# Patient Record
Sex: MALE | Race: WHITE | Employment: OTHER | URBAN - METROPOLITAN AREA
[De-identification: names, ages, dates, MRNs, and addresses within clinical notes are randomized per-mention and may not be internally consistent; named-entity substitution may affect disease eponyms.]

---

## 2017-02-01 RX ORDER — LOSARTAN POTASSIUM 100 MG/1
TABLET ORAL
Qty: 90 TABLET | Refills: 0 | Status: SHIPPED | OUTPATIENT
Start: 2017-02-01 | End: 2017-06-22 | Stop reason: SDUPTHER

## 2017-02-01 RX ORDER — DILTIAZEM HYDROCHLORIDE 240 MG/1
CAPSULE, EXTENDED RELEASE ORAL
Qty: 90 CAPSULE | Refills: 0 | Status: SHIPPED | OUTPATIENT
Start: 2017-02-01 | End: 2017-04-30 | Stop reason: SDUPTHER

## 2017-02-01 RX ORDER — LEVOTHYROXINE SODIUM 0.2 MG/1
TABLET ORAL
Qty: 90 TABLET | Refills: 0 | Status: SHIPPED | OUTPATIENT
Start: 2017-02-01 | End: 2017-04-30 | Stop reason: SDUPTHER

## 2017-02-26 LAB
CHOLESTEROL, TOTAL: 168 MG/DL
CHOLESTEROL/HDL RATIO: NORMAL
HDLC SERPL-MCNC: 63 MG/DL (ref 35–70)
LDL CHOLESTEROL CALCULATED: 87 MG/DL (ref 0–160)
PROSTATE SPECIFIC ANTIGEN: 0.9 NG/ML
TRIGL SERPL-MCNC: 92 MG/DL
VLDLC SERPL CALC-MCNC: NORMAL MG/DL

## 2017-03-01 ENCOUNTER — OFFICE VISIT (OUTPATIENT)
Dept: FAMILY MEDICINE CLINIC | Age: 62
End: 2017-03-01

## 2017-03-01 VITALS
HEART RATE: 88 BPM | BODY MASS INDEX: 31.46 KG/M2 | DIASTOLIC BLOOD PRESSURE: 88 MMHG | WEIGHT: 208.4 LBS | SYSTOLIC BLOOD PRESSURE: 140 MMHG | TEMPERATURE: 97.8 F | RESPIRATION RATE: 20 BRPM

## 2017-03-01 DIAGNOSIS — I10 ESSENTIAL HYPERTENSION: Primary | Chronic | ICD-10-CM

## 2017-03-01 DIAGNOSIS — Z11.59 NEED FOR HEPATITIS C SCREENING TEST: ICD-10-CM

## 2017-03-01 DIAGNOSIS — E03.9 HYPOTHYROIDISM, UNSPECIFIED TYPE: Chronic | ICD-10-CM

## 2017-03-01 PROCEDURE — 99213 OFFICE O/P EST LOW 20 MIN: CPT | Performed by: FAMILY MEDICINE

## 2017-03-02 ASSESSMENT — ENCOUNTER SYMPTOMS
SORE THROAT: 0
ABDOMINAL PAIN: 0
ABDOMINAL DISTENTION: 0
RHINORRHEA: 0
CONSTIPATION: 0
COUGH: 0
DIARRHEA: 0
SHORTNESS OF BREATH: 0
NAUSEA: 0
EYE PAIN: 0
SINUS PRESSURE: 0

## 2017-05-01 RX ORDER — DILTIAZEM HYDROCHLORIDE 240 MG/1
CAPSULE, EXTENDED RELEASE ORAL
Qty: 90 CAPSULE | Refills: 2 | Status: SHIPPED | OUTPATIENT
Start: 2017-05-01 | End: 2018-01-27 | Stop reason: SDUPTHER

## 2017-05-01 RX ORDER — LEVOTHYROXINE SODIUM 0.2 MG/1
TABLET ORAL
Qty: 90 TABLET | Refills: 2 | Status: SHIPPED | OUTPATIENT
Start: 2017-05-01 | End: 2018-01-27 | Stop reason: SDUPTHER

## 2017-06-22 RX ORDER — LOSARTAN POTASSIUM 100 MG/1
TABLET ORAL
Qty: 90 TABLET | Refills: 2 | Status: SHIPPED | OUTPATIENT
Start: 2017-06-22 | End: 2018-03-19 | Stop reason: SDUPTHER

## 2018-01-29 RX ORDER — LEVOTHYROXINE SODIUM 0.2 MG/1
TABLET ORAL
Qty: 90 TABLET | Refills: 0 | Status: SHIPPED | OUTPATIENT
Start: 2018-01-29 | End: 2018-03-06 | Stop reason: SDUPTHER

## 2018-01-29 RX ORDER — DILTIAZEM HYDROCHLORIDE 240 MG/1
CAPSULE, EXTENDED RELEASE ORAL
Qty: 90 CAPSULE | Refills: 0 | Status: SHIPPED | OUTPATIENT
Start: 2018-01-29 | End: 2018-03-06 | Stop reason: SDUPTHER

## 2018-03-01 LAB
ANTIBODY: NORMAL
CHOLESTEROL, TOTAL: 175 MG/DL
CHOLESTEROL/HDL RATIO: NORMAL
HDLC SERPL-MCNC: 55 MG/DL (ref 35–70)
LDL CHOLESTEROL CALCULATED: 104 MG/DL (ref 0–160)
TRIGL SERPL-MCNC: 80 MG/DL
VLDLC SERPL CALC-MCNC: NORMAL MG/DL

## 2018-03-06 ENCOUNTER — OFFICE VISIT (OUTPATIENT)
Dept: FAMILY MEDICINE CLINIC | Age: 63
End: 2018-03-06
Payer: COMMERCIAL

## 2018-03-06 VITALS
BODY MASS INDEX: 31.4 KG/M2 | SYSTOLIC BLOOD PRESSURE: 130 MMHG | TEMPERATURE: 98.3 F | RESPIRATION RATE: 20 BRPM | DIASTOLIC BLOOD PRESSURE: 80 MMHG | WEIGHT: 212 LBS | HEIGHT: 69 IN | HEART RATE: 68 BPM

## 2018-03-06 DIAGNOSIS — I10 ESSENTIAL HYPERTENSION: Primary | Chronic | ICD-10-CM

## 2018-03-06 DIAGNOSIS — Z12.5 SCREENING PSA (PROSTATE SPECIFIC ANTIGEN): ICD-10-CM

## 2018-03-06 DIAGNOSIS — E03.9 HYPOTHYROIDISM, UNSPECIFIED TYPE: Chronic | ICD-10-CM

## 2018-03-06 PROCEDURE — 99213 OFFICE O/P EST LOW 20 MIN: CPT | Performed by: FAMILY MEDICINE

## 2018-03-06 RX ORDER — LEVOTHYROXINE SODIUM 0.2 MG/1
TABLET ORAL
Qty: 90 TABLET | Refills: 2 | Status: SHIPPED | OUTPATIENT
Start: 2018-03-06 | End: 2019-01-01 | Stop reason: SDUPTHER

## 2018-03-06 RX ORDER — DILTIAZEM HYDROCHLORIDE 240 MG/1
CAPSULE, EXTENDED RELEASE ORAL
Qty: 90 CAPSULE | Refills: 2 | Status: SHIPPED | OUTPATIENT
Start: 2018-03-06 | End: 2019-01-01 | Stop reason: SDUPTHER

## 2018-03-06 ASSESSMENT — PATIENT HEALTH QUESTIONNAIRE - PHQ9
SUM OF ALL RESPONSES TO PHQ9 QUESTIONS 1 & 2: 0
2. FEELING DOWN, DEPRESSED OR HOPELESS: 0
SUM OF ALL RESPONSES TO PHQ QUESTIONS 1-9: 0
1. LITTLE INTEREST OR PLEASURE IN DOING THINGS: 0

## 2018-03-11 ASSESSMENT — ENCOUNTER SYMPTOMS
CONSTIPATION: 0
ABDOMINAL PAIN: 0
COUGH: 0
ABDOMINAL DISTENTION: 0
SHORTNESS OF BREATH: 0
RHINORRHEA: 0
EYE PAIN: 0
NAUSEA: 0
SINUS PRESSURE: 0
SORE THROAT: 0
DIARRHEA: 0

## 2018-03-19 RX ORDER — LOSARTAN POTASSIUM 100 MG/1
TABLET ORAL
Qty: 90 TABLET | Refills: 3 | Status: SHIPPED | OUTPATIENT
Start: 2018-03-19 | End: 2019-03-17 | Stop reason: SDUPTHER

## 2019-01-02 RX ORDER — DILTIAZEM HYDROCHLORIDE 240 MG/1
CAPSULE, EXTENDED RELEASE ORAL
Qty: 90 CAPSULE | Refills: 2 | Status: SHIPPED | OUTPATIENT
Start: 2019-01-02 | End: 2019-09-30 | Stop reason: SDUPTHER

## 2019-01-02 RX ORDER — LEVOTHYROXINE SODIUM 0.2 MG/1
TABLET ORAL
Qty: 90 TABLET | Refills: 2 | Status: SHIPPED | OUTPATIENT
Start: 2019-01-02 | End: 2019-03-12 | Stop reason: SDUPTHER

## 2019-03-07 LAB
CHOLESTEROL, TOTAL: 168 MG/DL
CHOLESTEROL/HDL RATIO: NORMAL
HDLC SERPL-MCNC: 53 MG/DL (ref 35–70)
LDL CHOLESTEROL CALCULATED: 96 MG/DL (ref 0–160)
PROSTATE SPECIFIC ANTIGEN: 0.9 NG/ML
TRIGL SERPL-MCNC: 97 MG/DL
VLDLC SERPL CALC-MCNC: NORMAL MG/DL

## 2019-03-12 ENCOUNTER — OFFICE VISIT (OUTPATIENT)
Dept: FAMILY MEDICINE CLINIC | Age: 64
End: 2019-03-12
Payer: MEDICARE

## 2019-03-12 VITALS
BODY MASS INDEX: 31.67 KG/M2 | HEART RATE: 86 BPM | DIASTOLIC BLOOD PRESSURE: 70 MMHG | RESPIRATION RATE: 16 BRPM | HEIGHT: 70 IN | SYSTOLIC BLOOD PRESSURE: 138 MMHG | WEIGHT: 221.2 LBS

## 2019-03-12 DIAGNOSIS — I10 ESSENTIAL HYPERTENSION: Chronic | ICD-10-CM

## 2019-03-12 DIAGNOSIS — E03.9 HYPOTHYROIDISM, UNSPECIFIED TYPE: Primary | Chronic | ICD-10-CM

## 2019-03-12 PROCEDURE — 99213 OFFICE O/P EST LOW 20 MIN: CPT | Performed by: FAMILY MEDICINE

## 2019-03-12 RX ORDER — LEVOTHYROXINE SODIUM 0.12 MG/1
TABLET ORAL
Qty: 180 TABLET | Refills: 3 | Status: SHIPPED | OUTPATIENT
Start: 2019-03-12 | End: 2020-02-17

## 2019-03-12 ASSESSMENT — PATIENT HEALTH QUESTIONNAIRE - PHQ9
SUM OF ALL RESPONSES TO PHQ9 QUESTIONS 1 & 2: 0
2. FEELING DOWN, DEPRESSED OR HOPELESS: 0
SUM OF ALL RESPONSES TO PHQ QUESTIONS 1-9: 0
SUM OF ALL RESPONSES TO PHQ QUESTIONS 1-9: 0
1. LITTLE INTEREST OR PLEASURE IN DOING THINGS: 0

## 2019-03-16 ASSESSMENT — ENCOUNTER SYMPTOMS
VOMITING: 0
WHEEZING: 0
SORE THROAT: 0
RHINORRHEA: 0
SHORTNESS OF BREATH: 0
ABDOMINAL PAIN: 0
BACK PAIN: 0
COUGH: 0
NAUSEA: 0
DIARRHEA: 0
CONSTIPATION: 0

## 2019-03-18 RX ORDER — LOSARTAN POTASSIUM 100 MG/1
TABLET ORAL
Qty: 90 TABLET | Refills: 3 | Status: SHIPPED | OUTPATIENT
Start: 2019-03-18 | End: 2020-03-12

## 2019-06-18 ENCOUNTER — TELEPHONE (OUTPATIENT)
Dept: FAMILY MEDICINE CLINIC | Age: 64
End: 2019-06-18

## 2019-09-30 RX ORDER — DILTIAZEM HYDROCHLORIDE 240 MG/1
CAPSULE, EXTENDED RELEASE ORAL
Qty: 90 CAPSULE | Refills: 1 | Status: SHIPPED | OUTPATIENT
Start: 2019-09-30 | End: 2020-03-16 | Stop reason: SDUPTHER

## 2020-02-17 RX ORDER — LEVOTHYROXINE SODIUM 0.12 MG/1
TABLET ORAL
Qty: 180 TABLET | Refills: 4 | Status: SHIPPED | OUTPATIENT
Start: 2020-02-17 | End: 2021-04-12 | Stop reason: SDUPTHER

## 2020-02-18 ENCOUNTER — TELEPHONE (OUTPATIENT)
Dept: FAMILY MEDICINE CLINIC | Age: 65
End: 2020-02-18

## 2020-02-18 NOTE — TELEPHONE ENCOUNTER
DOLV=03-16-20. Nena Sosa is calling to request an order for routine bloodwork so he can go over the results at his upcoming appt. He wants his B12 checked.

## 2020-03-05 LAB
CHOLESTEROL, TOTAL: 160 MG/DL
CHOLESTEROL/HDL RATIO: NORMAL
HDLC SERPL-MCNC: 57 MG/DL (ref 35–70)
LDL CHOLESTEROL CALCULATED: 92 MG/DL (ref 0–160)
TRIGL SERPL-MCNC: 56 MG/DL
VLDLC SERPL CALC-MCNC: 11 MG/DL

## 2020-03-12 RX ORDER — LOSARTAN POTASSIUM 100 MG/1
TABLET ORAL
Qty: 90 TABLET | Refills: 0 | Status: SHIPPED | OUTPATIENT
Start: 2020-03-12 | End: 2020-03-16 | Stop reason: SDUPTHER

## 2020-03-12 NOTE — TELEPHONE ENCOUNTER
Last visit- 3/12/2019  Next visit- 3/16/2020    Requested Prescriptions     Pending Prescriptions Disp Refills    losartan (COZAAR) 100 MG tablet [Pharmacy Med Name: LOSARTAN TABS 100MG] 90 tablet 3     Sig: TAKE 1 TABLET DAILY       CMP completed 03/04/20.

## 2020-03-16 ENCOUNTER — OFFICE VISIT (OUTPATIENT)
Dept: FAMILY MEDICINE CLINIC | Age: 65
End: 2020-03-16
Payer: MEDICARE

## 2020-03-16 VITALS
HEART RATE: 76 BPM | SYSTOLIC BLOOD PRESSURE: 136 MMHG | RESPIRATION RATE: 16 BRPM | BODY MASS INDEX: 31.25 KG/M2 | WEIGHT: 211 LBS | DIASTOLIC BLOOD PRESSURE: 78 MMHG | HEIGHT: 69 IN

## 2020-03-16 PROCEDURE — 99214 OFFICE O/P EST MOD 30 MIN: CPT | Performed by: FAMILY MEDICINE

## 2020-03-16 RX ORDER — DILTIAZEM HYDROCHLORIDE 240 MG/1
CAPSULE, EXTENDED RELEASE ORAL
Qty: 90 CAPSULE | Refills: 3 | Status: SHIPPED | OUTPATIENT
Start: 2020-03-16 | End: 2021-03-11

## 2020-03-16 RX ORDER — LOSARTAN POTASSIUM 100 MG/1
TABLET ORAL
Qty: 90 TABLET | Refills: 3 | Status: SHIPPED | OUTPATIENT
Start: 2020-03-16 | End: 2021-05-13

## 2020-03-16 ASSESSMENT — ENCOUNTER SYMPTOMS
EYE PAIN: 0
DIARRHEA: 0
RHINORRHEA: 0
COUGH: 0
SINUS PRESSURE: 0
ABDOMINAL PAIN: 0
NAUSEA: 0
CONSTIPATION: 0
SHORTNESS OF BREATH: 0
ABDOMINAL DISTENTION: 0
SORE THROAT: 0

## 2020-03-16 NOTE — PROGRESS NOTES
[facility]  [address] [place] [id number]  Dept: [phone]  Dept Fax: [phone]  Loc: [phone]  PROGRESS NOTE      VisitDate: 3/16/2020    Yaw Pollard is a 75 y.o. male who presents today for:     Chief Complaint   Patient presents with    Check-Up     HTN, Hypothyroidism    Discuss Labs    Immunizations     declined pneumovax    Medication Refill         Subjective:  HPI  Follow-up hypertension and hypothyroidism. Blood pressure is stable. Hypothyroid is him stable controlled. Labs reviewed with the patient. Patient and his wife report he is got very active legs at nighttime it does not disrupt his sleep but he states he is fatigued through the day once he gets to the mid afternoon he needs a nap. Review of Systems   Constitutional: Negative for appetite change and fever. HENT: Negative for congestion, ear pain, postnasal drip, rhinorrhea, sinus pressure and sore throat. Eyes: Negative for pain and visual disturbance. Respiratory: Negative for cough and shortness of breath. Cardiovascular: Negative for chest pain. Gastrointestinal: Negative for abdominal distention, abdominal pain, constipation, diarrhea and nausea. Genitourinary: Negative for dysuria, frequency and urgency. Musculoskeletal: Negative for arthralgias. Skin: Negative for rash. Neurological: Negative for dizziness. Past Medical History:   Diagnosis Date    Hypertension     Hypothyroidism       No past surgical history on file. Family History   Problem Relation Age of Onset    High Blood Pressure Mother      Social History     Tobacco Use    Smoking status: Current Every Day Smoker     Types: Cigars    Smokeless tobacco: Never Used    Tobacco comment: -3-4 cigars per day   Substance Use Topics    Alcohol use:  Yes     Alcohol/week: 0.0 standard drinks     Comment: 3-4 beers per day       Current Outpatient Medications oriented to person, place, and time. /78   Pulse 76   Resp 16   Ht 5' 8.75\" (1.746 m)   Wt 211 lb (95.7 kg)   BMI 31.39 kg/m²       Impression/Plan:  1. Essential hypertension    2. Hypothyroidism, unspecified type    3. RLS (restless legs syndrome)    4. Screening PSA (prostate specific antigen)      Requested Prescriptions     Signed Prescriptions Disp Refills    dilTIAZem (DILT-XR) 240 MG extended release capsule 90 capsule 3     Sig: TAKE 1 CAPSULE DAILY    losartan (COZAAR) 100 MG tablet 90 tablet 3     Sig: TAKE 1 TABLET DAILY     Orders Placed This Encounter   Procedures    Comprehensive Metabolic Panel     Standing Status:   Future     Standing Expiration Date:   3/16/2021    Lipid Panel     Standing Status:   Future     Standing Expiration Date:   3/16/2021     Order Specific Question:   Is Patient Fasting?/# of Hours     Answer:   yes/12    Psa screening     Standing Status:   Future     Standing Expiration Date:   3/16/2021    T4, Free     Standing Status:   Future     Standing Expiration Date:   3/16/2021    TSH without Reflex     Standing Status:   Future     Standing Expiration Date:   3/16/2021     25 minutes face-to-face time time spent on counseling regarding current viral illnesses exposure transmission lack of treatment and testing criteria. Discussed restless leg syndrome screen for sleep apnea  Patient giveneducational materials - see patient instructions. Discussed use, benefit, and side effects of prescribed medications. All patient questions answered. Pt voiced understanding. Reviewed health maintenance. Patient agreedwith treatment plan. Follow up as directed. **This report has been created using voice recognition software. It may contain minor errorswhich are inherent in voice recognition technology. **       Electronically signed by Reginaldo Rodney MD on 3/16/2020 at 1:36 PM

## 2020-06-19 ENCOUNTER — TELEPHONE (OUTPATIENT)
Dept: FAMILY MEDICINE CLINIC | Age: 65
End: 2020-06-19

## 2020-06-23 ENCOUNTER — OFFICE VISIT (OUTPATIENT)
Dept: FAMILY MEDICINE CLINIC | Age: 65
End: 2020-06-23
Payer: MEDICARE

## 2020-06-23 ENCOUNTER — HOSPITAL ENCOUNTER (OUTPATIENT)
Dept: GENERAL RADIOLOGY | Age: 65
Discharge: HOME | End: 2020-06-23
Payer: MEDICARE

## 2020-06-23 ENCOUNTER — HOSPITAL ENCOUNTER (OUTPATIENT)
Age: 65
Discharge: HOME | End: 2020-06-23
Payer: MEDICARE

## 2020-06-23 VITALS
BODY MASS INDEX: 28.68 KG/M2 | DIASTOLIC BLOOD PRESSURE: 72 MMHG | WEIGHT: 193.6 LBS | HEART RATE: 84 BPM | HEIGHT: 69 IN | TEMPERATURE: 98.4 F | SYSTOLIC BLOOD PRESSURE: 138 MMHG | RESPIRATION RATE: 16 BRPM

## 2020-06-23 DIAGNOSIS — M25.50 POLYARTHRALGIA: ICD-10-CM

## 2020-06-23 DIAGNOSIS — E03.9 HYPOTHYROIDISM, UNSPECIFIED TYPE: ICD-10-CM

## 2020-06-23 LAB
BASOPHILS # BLD: 0.9 %
BASOPHILS ABSOLUTE: 0.1 THOU/MM3 (ref 0–0.1)
C-REACTIVE PROTEIN: 7.28 MG/DL (ref 0–1)
EOSINOPHIL # BLD: 1.2 %
EOSINOPHILS ABSOLUTE: 0.1 THOU/MM3 (ref 0–0.4)
ERYTHROCYTE [DISTWIDTH] IN BLOOD BY AUTOMATED COUNT: 13.2 % (ref 11.5–14.5)
ERYTHROCYTE [DISTWIDTH] IN BLOOD BY AUTOMATED COUNT: 47.5 FL (ref 35–45)
HCT VFR BLD CALC: 48.5 % (ref 42–52)
HEMOGLOBIN: 15.4 GM/DL (ref 14–18)
IMMATURE GRANS (ABS): 0.05 THOU/MM3 (ref 0–0.07)
IMMATURE GRANULOCYTES: 0.4 %
LYMPHOCYTES # BLD: 18.7 %
LYMPHOCYTES ABSOLUTE: 2.2 THOU/MM3 (ref 1–4.8)
MCH RBC QN AUTO: 31 PG (ref 26–33)
MCHC RBC AUTO-ENTMCNC: 31.8 GM/DL (ref 32.2–35.5)
MCV RBC AUTO: 97.6 FL (ref 80–94)
MONOCYTES # BLD: 8.2 %
MONOCYTES ABSOLUTE: 0.9 THOU/MM3 (ref 0.4–1.3)
NUCLEATED RED BLOOD CELLS: 0 /100 WBC
PLATELET # BLD: 382 THOU/MM3 (ref 130–400)
PMV BLD AUTO: 10.4 FL (ref 9.4–12.4)
RBC # BLD: 4.97 MILL/MM3 (ref 4.7–6.1)
RHEUMATOID FACTOR: 12 IU/ML (ref 0–13)
SEDIMENTATION RATE, ERYTHROCYTE: 60 MM/HR (ref 0–10)
SEG NEUTROPHILS: 70.6 %
SEGMENTED NEUTROPHILS ABSOLUTE COUNT: 8.1 THOU/MM3 (ref 1.8–7.7)
T4 FREE: 2.09 NG/DL (ref 0.93–1.76)
TSH SERPL DL<=0.05 MIU/L-ACNC: 0.48 UIU/ML (ref 0.4–4.2)
WBC # BLD: 11.5 THOU/MM3 (ref 4.8–10.8)

## 2020-06-23 PROCEDURE — 99213 OFFICE O/P EST LOW 20 MIN: CPT | Performed by: FAMILY MEDICINE

## 2020-06-23 PROCEDURE — 73120 X-RAY EXAM OF HAND: CPT

## 2020-06-23 PROCEDURE — G8510 SCR DEP NEG, NO PLAN REQD: HCPCS | Performed by: FAMILY MEDICINE

## 2020-06-23 PROCEDURE — 84439 ASSAY OF FREE THYROXINE: CPT

## 2020-06-23 PROCEDURE — 3288F FALL RISK ASSESSMENT DOCD: CPT | Performed by: FAMILY MEDICINE

## 2020-06-23 PROCEDURE — 86430 RHEUMATOID FACTOR TEST QUAL: CPT

## 2020-06-23 PROCEDURE — 86140 C-REACTIVE PROTEIN: CPT

## 2020-06-23 PROCEDURE — 84443 ASSAY THYROID STIM HORMONE: CPT

## 2020-06-23 PROCEDURE — 86038 ANTINUCLEAR ANTIBODIES: CPT

## 2020-06-23 PROCEDURE — 85651 RBC SED RATE NONAUTOMATED: CPT

## 2020-06-23 PROCEDURE — 36415 COLL VENOUS BLD VENIPUNCTURE: CPT

## 2020-06-23 PROCEDURE — 85025 COMPLETE CBC W/AUTO DIFF WBC: CPT

## 2020-06-23 RX ORDER — MELOXICAM 15 MG/1
15 TABLET ORAL DAILY PRN
Qty: 30 TABLET | Refills: 3 | Status: SHIPPED | OUTPATIENT
Start: 2020-06-23 | End: 2020-09-16 | Stop reason: SDUPTHER

## 2020-06-23 RX ORDER — CELECOXIB 200 MG/1
200 CAPSULE ORAL 2 TIMES DAILY
Qty: 60 CAPSULE | Refills: 3 | Status: SHIPPED | OUTPATIENT
Start: 2020-06-23 | End: 2020-06-24

## 2020-06-23 SDOH — ECONOMIC STABILITY: TRANSPORTATION INSECURITY
IN THE PAST 12 MONTHS, HAS THE LACK OF TRANSPORTATION KEPT YOU FROM MEDICAL APPOINTMENTS OR FROM GETTING MEDICATIONS?: NO

## 2020-06-23 SDOH — ECONOMIC STABILITY: FOOD INSECURITY: WITHIN THE PAST 12 MONTHS, YOU WORRIED THAT YOUR FOOD WOULD RUN OUT BEFORE YOU GOT MONEY TO BUY MORE.: NEVER TRUE

## 2020-06-23 SDOH — ECONOMIC STABILITY: TRANSPORTATION INSECURITY
IN THE PAST 12 MONTHS, HAS LACK OF TRANSPORTATION KEPT YOU FROM MEETINGS, WORK, OR FROM GETTING THINGS NEEDED FOR DAILY LIVING?: NO

## 2020-06-23 SDOH — ECONOMIC STABILITY: INCOME INSECURITY: HOW HARD IS IT FOR YOU TO PAY FOR THE VERY BASICS LIKE FOOD, HOUSING, MEDICAL CARE, AND HEATING?: NOT HARD AT ALL

## 2020-06-23 SDOH — ECONOMIC STABILITY: FOOD INSECURITY: WITHIN THE PAST 12 MONTHS, THE FOOD YOU BOUGHT JUST DIDN'T LAST AND YOU DIDN'T HAVE MONEY TO GET MORE.: NEVER TRUE

## 2020-06-23 ASSESSMENT — ENCOUNTER SYMPTOMS
ABDOMINAL PAIN: 0
SHORTNESS OF BREATH: 0
SINUS PRESSURE: 0
COUGH: 0
CONSTIPATION: 0
ABDOMINAL DISTENTION: 0
SORE THROAT: 0
DIARRHEA: 0
NAUSEA: 0
RHINORRHEA: 0
EYE PAIN: 0

## 2020-06-23 ASSESSMENT — PATIENT HEALTH QUESTIONNAIRE - PHQ9
SUM OF ALL RESPONSES TO PHQ QUESTIONS 1-9: 0
2. FEELING DOWN, DEPRESSED OR HOPELESS: 0
SUM OF ALL RESPONSES TO PHQ QUESTIONS 1-9: 0
SUM OF ALL RESPONSES TO PHQ9 QUESTIONS 1 & 2: 0
1. LITTLE INTEREST OR PLEASURE IN DOING THINGS: 0

## 2020-06-23 NOTE — PROGRESS NOTES
[address] [facility] [address]  Dept: [phone]  Dept Fax: [phone]  Loc: [phone]  PROGRESS NOTE      VisitDate: 6/23/2020    Bhaskar Adair is a 75 y.o. male who presents today for:     Chief Complaint   Patient presents with    Swelling     joint pain and swelling bilateral hands, ankles, aches, causing sleeping issues, ibuprofen did not help, makes him help         Subjective:  HPI  Patient comes in with months long history of increasing pain swelling and stiffness in his hands and feet. When he wakes up in the morning it takes him several hours to get loosened up. His symptoms never completely resolved but have gotten worse recently. No recent blood work. History of hypothyroidism which previously is well controlled. History of hypertension that is stable and well-controlled. Review of Systems   Constitutional: Positive for fatigue. Negative for appetite change and fever. HENT: Negative for congestion, ear pain, postnasal drip, rhinorrhea, sinus pressure and sore throat. Eyes: Negative for pain and visual disturbance. Respiratory: Negative for cough and shortness of breath. Cardiovascular: Negative for chest pain. Gastrointestinal: Negative for abdominal distention, abdominal pain, constipation, diarrhea and nausea. Genitourinary: Negative for dysuria, frequency and urgency. Musculoskeletal: Positive for arthralgias. Skin: Negative for rash. Neurological: Negative for dizziness. Past Medical History:   Diagnosis Date    Hypertension     Hypothyroidism       No past surgical history on file. Family History   Problem Relation Age of Onset    High Blood Pressure Mother      Social History     Tobacco Use    Smoking status: Current Every Day Smoker     Types: Cigars    Smokeless tobacco: Never Used    Tobacco comment: -3-4 cigars per day   Substance Use Topics    Alcohol use:  Yes

## 2020-06-23 NOTE — TELEPHONE ENCOUNTER
Patient called about the medication that was sent today. Celebrex. The Pharmacy called him to tell him the cost was over $250, he was wondering if there was something cheaper that will give the same results. Please advise.

## 2020-06-24 ENCOUNTER — TELEPHONE (OUTPATIENT)
Dept: FAMILY MEDICINE CLINIC | Age: 65
End: 2020-06-24

## 2020-06-26 LAB — ANA SCREEN: NORMAL

## 2020-07-01 ENCOUNTER — TELEPHONE (OUTPATIENT)
Dept: FAMILY MEDICINE CLINIC | Age: 65
End: 2020-07-01

## 2020-07-01 NOTE — TELEPHONE ENCOUNTER
----- Message from CARLOS Jensen CNP sent at 6/30/2020  8:26 AM EDT -----  Dr. Herlinda Iglesias already resulted the other labs except the DIANA screen was not completed. It is now shown to be negative.

## 2020-09-12 LAB
ALBUMIN SERPL-MCNC: 4.4 G/DL
ALP BLD-CCNC: 69 U/L
ALT SERPL-CCNC: 10 U/L
ANION GAP SERPL CALCULATED.3IONS-SCNC: 1.6 MMOL/L
AST SERPL-CCNC: 19 U/L
BILIRUB SERPL-MCNC: 0.5 MG/DL (ref 0.1–1.4)
BUN BLDV-MCNC: 21 MG/DL
CALCIUM SERPL-MCNC: 9.5 MG/DL
CHLORIDE BLD-SCNC: 102 MMOL/L
CHOLESTEROL, TOTAL: 170 MG/DL
CHOLESTEROL/HDL RATIO: NORMAL
CO2: 23 MMOL/L
CREAT SERPL-MCNC: 1.39 MG/DL
GFR CALCULATED: 52
GLUCOSE BLD-MCNC: 102 MG/DL
HDLC SERPL-MCNC: 70 MG/DL (ref 35–70)
LDL CHOLESTEROL CALCULATED: 85 MG/DL (ref 0–160)
NONHDLC SERPL-MCNC: NORMAL MG/DL
POTASSIUM SERPL-SCNC: 4.7 MMOL/L
PSA, ULTRASENSITIVE: 0.9
SODIUM BLD-SCNC: 139 MMOL/L
TOTAL PROTEIN: 7.2
TRIGL SERPL-MCNC: 78 MG/DL
VLDLC SERPL CALC-MCNC: 15 MG/DL

## 2020-09-16 ENCOUNTER — OFFICE VISIT (OUTPATIENT)
Dept: FAMILY MEDICINE CLINIC | Age: 65
End: 2020-09-16
Payer: MEDICARE

## 2020-09-16 VITALS
BODY MASS INDEX: 29.09 KG/M2 | DIASTOLIC BLOOD PRESSURE: 80 MMHG | OXYGEN SATURATION: 98 % | HEART RATE: 70 BPM | RESPIRATION RATE: 14 BRPM | WEIGHT: 197 LBS | SYSTOLIC BLOOD PRESSURE: 128 MMHG | TEMPERATURE: 97.1 F

## 2020-09-16 PROBLEM — M15.9 PRIMARY OSTEOARTHRITIS INVOLVING MULTIPLE JOINTS: Status: ACTIVE | Noted: 2020-09-16

## 2020-09-16 PROCEDURE — 99213 OFFICE O/P EST LOW 20 MIN: CPT | Performed by: FAMILY MEDICINE

## 2020-09-16 RX ORDER — MELOXICAM 15 MG/1
15 TABLET ORAL DAILY PRN
Qty: 90 TABLET | Refills: 3 | Status: SHIPPED | OUTPATIENT
Start: 2020-09-16 | End: 2021-09-20

## 2020-09-16 ASSESSMENT — ENCOUNTER SYMPTOMS
COUGH: 0
DIARRHEA: 0
SORE THROAT: 0
CONSTIPATION: 0
ABDOMINAL DISTENTION: 0
SHORTNESS OF BREATH: 0
RHINORRHEA: 0
EYE PAIN: 0
ABDOMINAL PAIN: 0
SINUS PRESSURE: 0
NAUSEA: 0

## 2020-09-17 NOTE — PROGRESS NOTES
[address] [place] [address] [address]  [facility] [address]  Dept: [phone]  Dept Fax: [phone]  Loc: [phone]  PROGRESS NOTE      VisitDate: 9/16/2020    Kelly Matamoros is a 75 y.o. male who presents today for:     Chief Complaint   Patient presents with    6 Month Follow-Up     HTN         Subjective:  HPI  Follow-up hypertension, stable well-controlled on current medications. Follow-up hypothyroidism stable well-controlled TSH normal.  Follow-up osteoarthritis. Celebrex was unaffordable due to his insurance. Transition to Mobic and his symptoms have improved greatly he is taking it daily. Monitoring his renal function has improved from previous but remains slightly elevated. Review of Systems   Constitutional: Negative for appetite change and fever. HENT: Negative for congestion, ear pain, postnasal drip, rhinorrhea, sinus pressure and sore throat. Eyes: Negative for pain and visual disturbance. Respiratory: Negative for cough and shortness of breath. Cardiovascular: Negative for chest pain. Gastrointestinal: Negative for abdominal distention, abdominal pain, constipation, diarrhea and nausea. Genitourinary: Negative for dysuria, frequency and urgency. Musculoskeletal: Positive for arthralgias. Skin: Negative for rash. Neurological: Negative for dizziness. Past Medical History:   Diagnosis Date    Hypertension     Hypothyroidism       History reviewed. No pertinent surgical history. Family History   Problem Relation Age of Onset    High Blood Pressure Mother      Social History     Tobacco Use    Smoking status: Current Every Day Smoker     Packs/day: 0.10     Years: 30.00     Pack years: 3.00     Types: Cigars     Start date: 9/16/1990    Smokeless tobacco: Never Used    Tobacco comment: -3-4 cigars per day   Substance Use Topics    Alcohol use:  Yes     Alcohol/week: 0.0 standard drinks     Comment: 3-4 beers per day       Current Outpatient Medications   Medication Sig Dispense Refill    meloxicam (MOBIC) 15 MG tablet Take 1 tablet by mouth daily as needed for Pain 90 tablet 3    dilTIAZem (DILT-XR) 240 MG extended release capsule TAKE 1 CAPSULE DAILY 90 capsule 3    losartan (COZAAR) 100 MG tablet TAKE 1 TABLET DAILY 90 tablet 3    levothyroxine (SYNTHROID) 125 MCG tablet TAKE 2 TABLETS DAILY 180 tablet 4    Multiple Vitamin (MULTIVITAMIN PO) Take 1 capsule by mouth daily.  aspirin 81 MG EC tablet Take 81 mg by mouth daily. No current facility-administered medications for this visit. No Known Allergies  Health Maintenance   Topic Date Due    AAA screen  1955    DTaP/Tdap/Td vaccine (1 - Tdap) 12/20/1972    Diabetes screen  12/20/1993    Shingles Vaccine (1 of 2) 12/20/2003    Pneumococcal 65+ years Vaccine (1 of 1 - PPSV23) 12/20/2018    Annual Wellness Visit (AWV)  05/29/2019    Flu vaccine (1) 09/01/2020    TSH testing  06/23/2021    Potassium monitoring  09/12/2021    Creatinine monitoring  09/12/2021    Lipid screen  09/12/2025    Colon cancer screen colonoscopy  02/24/2026    Hepatitis C screen  Completed    Hepatitis A vaccine  Aged Out    Hepatitis B vaccine  Aged Out    Hib vaccine  Aged Out    Meningococcal (ACWY) vaccine  Aged Out         Objective:     Physical Exam  Constitutional:       General: He is not in acute distress. Appearance: He is well-developed. He is not diaphoretic. HENT:      Head: Normocephalic and atraumatic. Right Ear: External ear normal.      Left Ear: External ear normal.   Eyes:      Conjunctiva/sclera: Conjunctivae normal.   Neck:      Vascular: No JVD. Cardiovascular:      Rate and Rhythm: Normal rate and regular rhythm. Heart sounds: Normal heart sounds. Pulmonary:      Effort: Pulmonary effort is normal.      Breath sounds: Normal breath sounds. No wheezing or rales.    Musculoskeletal:         General: No tenderness. Skin:     General: Skin is warm and dry. Coloration: Skin is not pale. Neurological:      Mental Status: He is alert and oriented to person, place, and time. /80   Pulse 70   Temp 97.1 °F (36.2 °C) (Temporal)   Resp 14   Wt 197 lb (89.4 kg)   SpO2 98%   BMI 29.09 kg/m²       Impression/Plan:  1. Essential hypertension    2. Hypothyroidism, unspecified type    3. Primary osteoarthritis involving multiple joints      Requested Prescriptions     Signed Prescriptions Disp Refills    meloxicam (MOBIC) 15 MG tablet 90 tablet 3     Sig: Take 1 tablet by mouth daily as needed for Pain     Orders Placed This Encounter   Procedures    T4, Free     Standing Status:   Future     Standing Expiration Date:   9/16/2021    TSH without Reflex     Standing Status:   Future     Standing Expiration Date:   9/16/2021    Lipid Panel     Standing Status:   Future     Standing Expiration Date:   9/16/2021     Order Specific Question:   Is Patient Fasting?/# of Hours     Answer:   yes/12    Comprehensive Metabolic Panel     Standing Status:   Future     Standing Expiration Date:   9/16/2021   Labs as above. Counseled on healthy diet maintaining exercise    Patient giveneducational materials - see patient instructions. Discussed use, benefit, and side effects of prescribed medications. All patient questions answered. Pt voiced understanding. Reviewed health maintenance. Patient agreedwith treatment plan. Follow up as directed. **This report has been created using voice recognition software. It may contain minor errorswhich are inherent in voice recognition technology. **       Electronically signed by Lance Osman MD on 9/16/2020 at 8:54 PM

## 2021-03-11 RX ORDER — DILTIAZEM HYDROCHLORIDE 240 MG/1
CAPSULE, EXTENDED RELEASE ORAL
Qty: 90 CAPSULE | Refills: 1 | Status: SHIPPED | OUTPATIENT
Start: 2021-03-11 | End: 2021-09-07

## 2021-03-16 LAB
CHOLESTEROL, TOTAL: 163 MG/DL
CHOLESTEROL/HDL RATIO: NORMAL
HDLC SERPL-MCNC: 60 MG/DL (ref 35–70)
LDL CHOLESTEROL CALCULATED: 88 MG/DL (ref 0–160)
NONHDLC SERPL-MCNC: NORMAL MG/DL
TRIGL SERPL-MCNC: 81 MG/DL
VLDLC SERPL CALC-MCNC: 15 MG/DL

## 2021-04-12 ENCOUNTER — OFFICE VISIT (OUTPATIENT)
Dept: FAMILY MEDICINE CLINIC | Age: 66
End: 2021-04-12
Payer: MEDICARE

## 2021-04-12 VITALS
RESPIRATION RATE: 20 BRPM | BODY MASS INDEX: 28.77 KG/M2 | HEART RATE: 72 BPM | SYSTOLIC BLOOD PRESSURE: 138 MMHG | HEIGHT: 70 IN | TEMPERATURE: 98.4 F | WEIGHT: 201 LBS | DIASTOLIC BLOOD PRESSURE: 84 MMHG

## 2021-04-12 DIAGNOSIS — E03.9 HYPOTHYROIDISM, UNSPECIFIED TYPE: Chronic | ICD-10-CM

## 2021-04-12 DIAGNOSIS — M15.9 PRIMARY OSTEOARTHRITIS INVOLVING MULTIPLE JOINTS: ICD-10-CM

## 2021-04-12 DIAGNOSIS — I10 ESSENTIAL HYPERTENSION: Primary | ICD-10-CM

## 2021-04-12 PROCEDURE — 99214 OFFICE O/P EST MOD 30 MIN: CPT | Performed by: FAMILY MEDICINE

## 2021-04-12 RX ORDER — LEVOTHYROXINE SODIUM 0.12 MG/1
TABLET ORAL
Qty: 180 TABLET | Refills: 3 | Status: SHIPPED | OUTPATIENT
Start: 2021-04-12 | End: 2022-04-12 | Stop reason: SDUPTHER

## 2021-04-12 ASSESSMENT — PATIENT HEALTH QUESTIONNAIRE - PHQ9
SUM OF ALL RESPONSES TO PHQ9 QUESTIONS 1 & 2: 0
1. LITTLE INTEREST OR PLEASURE IN DOING THINGS: 0
2. FEELING DOWN, DEPRESSED OR HOPELESS: 0

## 2021-04-12 NOTE — PATIENT INSTRUCTIONS
Patient Education        Stopping Smoking: Care Instructions  Your Care Instructions     Cigarette smokers crave the nicotine in cigarettes. Giving it up is much harder than simply changing a habit. Your body has to stop craving the nicotine. It is hard to quit, but you can do it. There are many tools that people use to quit smoking. You may find that combining tools works best for you. There are several steps to quitting. First you get ready to quit. Then you get support to help you. After that, you learn new skills and behaviors to become a nonsmoker. For many people, a necessary step is getting and using medicine. Your doctor will help you set up the plan that best meets your needs. You may want to attend a smoking cessation program to help you quit smoking. When you choose a program, look for one that has proven success. Ask your doctor for ideas. You will greatly increase your chances of success if you take medicine as well as get counseling or join a cessation program.  Some of the changes you feel when you first quit tobacco are uncomfortable. Your body will miss the nicotine at first, and you may feel short-tempered and grumpy. You may have trouble sleeping or concentrating. Medicine can help you deal with these symptoms. You may struggle with changing your smoking habits and rituals. The last step is the tricky one: Be prepared for the smoking urge to continue for a time. This is a lot to deal with, but keep at it. You will feel better. Follow-up care is a key part of your treatment and safety. Be sure to make and go to all appointments, and call your doctor if you are having problems. It's also a good idea to know your test results and keep a list of the medicines you take. How can you care for yourself at home? · Ask your family, friends, and coworkers for support. You have a better chance of quitting if you have help and support.   · Join a support group, such as Nicotine Anonymous, for people who are trying to quit smoking. · Consider signing up for a smoking cessation program, such as the American Lung Association's Freedom from Smoking program.  · Get text messaging support. Go to the website at www.smokefree. gov to sign up for the  program.  · Set a quit date. Pick your date carefully so that it is not right in the middle of a big deadline or stressful time. Once you quit, do not even take a puff. Get rid of all ashtrays and lighters after your last cigarette. Clean your house and your clothes so that they do not smell of smoke. · Learn how to be a nonsmoker. Think about ways you can avoid those things that make you reach for a cigarette. ? Avoid situations that put you at greatest risk for smoking. For some people, it is hard to have a drink with friends without smoking. For others, they might skip a coffee break with coworkers who smoke. ? Change your daily routine. Take a different route to work or eat a meal in a different place. · Cut down on stress. Calm yourself or release tension by doing an activity you enjoy, such as reading a book, taking a hot bath, or gardening. · Talk to your doctor or pharmacist about nicotine replacement therapy, which replaces the nicotine in your body. You still get nicotine but you do not use tobacco. Nicotine replacement products help you slowly reduce the amount of nicotine you need. These products come in several forms, many of them available over-the-counter:  ? Nicotine patches  ? Nicotine gum and lozenges  ? Nicotine inhaler  · Ask your doctor about bupropion (Wellbutrin) or varenicline (Chantix), which are prescription medicines. They do not contain nicotine. They help you by reducing withdrawal symptoms, such as stress and anxiety. · Some people find hypnosis, acupuncture, and massage helpful for ending the smoking habit. · Eat a healthy diet and get regular exercise.  Having healthy habits will help your body move past its craving for nicotine. · Be prepared to keep trying. Most people are not successful the first few times they try to quit. Do not get mad at yourself if you smoke again. Make a list of things you learned and think about when you want to try again, such as next week, next month, or next year. Where can you learn more? Go to https://HeyStakspedwight.[employer]. org and sign in to your [employer] account. Enter Q612 in the Feedjit box to learn more about \"Stopping Smoking: Care Instructions. \"     If you do not have an account, please click on the \"Sign Up Now\" link. Current as of: March 12, 2020               Content Version: 12.8  © 2006-2021 Healthwise, Incorporated. Care instructions adapted under license by [facility] ([facility]). If you have questions about a medical condition or this instruction, always ask your healthcare professional. [address] any warranty or liability for your use of this information.

## 2021-04-18 ASSESSMENT — ENCOUNTER SYMPTOMS
COUGH: 0
ABDOMINAL DISTENTION: 0
ABDOMINAL PAIN: 0
SINUS PRESSURE: 0
SORE THROAT: 0
NAUSEA: 0
EYE PAIN: 0
SHORTNESS OF BREATH: 0
CONSTIPATION: 0
RHINORRHEA: 0
DIARRHEA: 0

## 2021-04-18 NOTE — PROGRESS NOTES
[address] [place] [facility] [address]  Dept: [phone]  Dept Fax: [phone]  Loc: [phone]  PROGRESS NOTE      VisitDate: 4/12/2021    Mari Robles is a 77 y.o. male who presents today for:     Chief Complaint   Patient presents with    6 Month Follow-Up     HTN, Hypothyroidism, OA    Discuss Labs     due for thyroid labs    Medication Refill         Subjective:  HPI  Hypertension, stable doing well with current medications. Follow-up hypothyroidism, labs reviewed with the patient discussed dosing of his medication. Follow-up osteoarthritis seems to be worse in his hands pain and stiffness. Review of Systems   Constitutional: Negative for appetite change and fever. HENT: Negative for congestion, ear pain, postnasal drip, rhinorrhea, sinus pressure and sore throat. Eyes: Negative for pain and visual disturbance. Respiratory: Negative for cough and shortness of breath. Cardiovascular: Negative for chest pain. Gastrointestinal: Negative for abdominal distention, abdominal pain, constipation, diarrhea and nausea. Genitourinary: Negative for dysuria, frequency and urgency. Musculoskeletal: Positive for arthralgias. Skin: Negative for rash. Neurological: Negative for dizziness. Past Medical History:   Diagnosis Date    Hypertension     Hypothyroidism       History reviewed. No pertinent surgical history. Family History   Problem Relation Age of Onset    High Blood Pressure Mother      Social History     Tobacco Use    Smoking status: Current Every Day Smoker     Packs/day: 0.10     Years: 30.00     Pack years: 3.00     Types: Cigars     Start date: 9/16/1990    Smokeless tobacco: Never Used    Tobacco comment: -3-4 cigars per day. Printed to avs   Substance Use Topics    Alcohol use:  Yes     Alcohol/week: 0.0 standard drinks     Comment: 3-4 beers per day       Current Outpatient Medications Medication Sig Dispense Refill    levothyroxine (SYNTHROID) 125 MCG tablet TAKE 2 TABLETS DAILY 180 tablet 3    dilTIAZem (DILACOR XR) 240 MG extended release capsule TAKE 1 CAPSULE DAILY 90 capsule 1    meloxicam (MOBIC) 15 MG tablet Take 1 tablet by mouth daily as needed for Pain 90 tablet 3    losartan (COZAAR) 100 MG tablet TAKE 1 TABLET DAILY 90 tablet 3    Multiple Vitamin (MULTIVITAMIN PO) Take 1 capsule by mouth daily.  aspirin 81 MG EC tablet Take 81 mg by mouth daily. No current facility-administered medications for this visit. No Known Allergies  Health Maintenance   Topic Date Due    AAA screen  Never done    COVID-19 Vaccine (1) Never done    DTaP/Tdap/Td vaccine (1 - Tdap) Never done    Diabetes screen  Never done    Shingles Vaccine (1 of 2) Never done    Pneumococcal 65+ years Vaccine (1 of 1 - PPSV23) Never done   [employer] Visit (AWV)  Never done    TSH testing  06/23/2021    Flu vaccine (Season Ended) 09/01/2021    Potassium monitoring  03/16/2022    Creatinine monitoring  03/16/2022    Colon cancer screen colonoscopy  02/24/2026    Lipid screen  03/16/2026    Hepatitis C screen  Completed    Hepatitis A vaccine  Aged Out    Hepatitis B vaccine  Aged Out    Hib vaccine  Aged Out    Meningococcal (ACWY) vaccine  Aged Out         Objective:     Physical Exam  Constitutional:       General: He is not in acute distress. Appearance: He is well-developed. He is not diaphoretic. HENT:      Head: Normocephalic and atraumatic. Right Ear: External ear normal.      Left Ear: External ear normal.   Eyes:      Conjunctiva/sclera: Conjunctivae normal.   Neck:      Vascular: No JVD. Cardiovascular:      Rate and Rhythm: Normal rate and regular rhythm. Heart sounds: Normal heart sounds. Pulmonary:      Effort: Pulmonary effort is normal.      Breath sounds: Normal breath sounds. No wheezing or rales.    Musculoskeletal:         General: No tenderness. Skin:     General: Skin is warm and dry. Coloration: Skin is not pale. Neurological:      Mental Status: He is alert and oriented to person, place, and time. /84   Pulse 72   Temp 98.4 °F (36.9 °C) (Oral)   Resp 20   Ht 5' 9.5\" (1.765 m)   Wt 201 lb (91.2 kg)   BMI 29.26 kg/m²       Impression/Plan:  1. Essential hypertension    2. Hypothyroidism, unspecified type    3. Primary osteoarthritis involving multiple joints      Requested Prescriptions     Signed Prescriptions Disp Refills    levothyroxine (SYNTHROID) 125 MCG tablet 180 tablet 3     Sig: TAKE 2 TABLETS DAILY     Orders Placed This Encounter   Procedures    Comprehensive Metabolic Panel     Standing Status:   Future     Standing Expiration Date:   4/12/2022    T4, Free     Standing Status:   Future     Standing Expiration Date:   4/12/2022    TSH without Reflex     Standing Status:   Future     Standing Expiration Date:   4/12/2022    CBC Auto Differential     Standing Status:   Future     Standing Expiration Date:   4/12/2022       Patient giveneducational materials - see patient instructions. Discussed use, benefit, and side effects of prescribed medications. All patient questions answered. Pt voiced understanding. Reviewed health maintenance. Patient agreedwith treatment plan. Follow up as directed. **This report has been created using voice recognition software. It may contain minor errorswhich are inherent in voice recognition technology. **       Electronically signed by Demarco Trujillo MD on 4/18/2021 at 9:50 AM

## 2021-05-13 RX ORDER — LOSARTAN POTASSIUM 100 MG/1
TABLET ORAL
Qty: 90 TABLET | Refills: 2 | Status: SHIPPED | OUTPATIENT
Start: 2021-05-13 | End: 2022-02-07

## 2021-09-07 RX ORDER — DILTIAZEM HYDROCHLORIDE 240 MG/1
CAPSULE, EXTENDED RELEASE ORAL
Qty: 90 CAPSULE | Refills: 2 | Status: SHIPPED | OUTPATIENT
Start: 2021-09-07 | End: 2022-05-12

## 2021-09-20 RX ORDER — MELOXICAM 15 MG/1
TABLET ORAL
Qty: 90 TABLET | Refills: 1 | Status: SHIPPED | OUTPATIENT
Start: 2021-09-20 | End: 2022-03-21

## 2021-10-12 ENCOUNTER — OFFICE VISIT (OUTPATIENT)
Dept: FAMILY MEDICINE CLINIC | Age: 66
End: 2021-10-12
Payer: MEDICARE

## 2021-10-12 VITALS
WEIGHT: 196 LBS | BODY MASS INDEX: 29.03 KG/M2 | SYSTOLIC BLOOD PRESSURE: 134 MMHG | TEMPERATURE: 98.2 F | HEART RATE: 80 BPM | HEIGHT: 69 IN | RESPIRATION RATE: 20 BRPM | DIASTOLIC BLOOD PRESSURE: 72 MMHG

## 2021-10-12 DIAGNOSIS — I10 PRIMARY HYPERTENSION: Primary | Chronic | ICD-10-CM

## 2021-10-12 DIAGNOSIS — E03.9 HYPOTHYROIDISM, UNSPECIFIED TYPE: Chronic | ICD-10-CM

## 2021-10-12 DIAGNOSIS — Z12.5 SCREENING PSA (PROSTATE SPECIFIC ANTIGEN): ICD-10-CM

## 2021-10-12 DIAGNOSIS — M15.9 PRIMARY OSTEOARTHRITIS INVOLVING MULTIPLE JOINTS: ICD-10-CM

## 2021-10-12 PROCEDURE — 99214 OFFICE O/P EST MOD 30 MIN: CPT | Performed by: FAMILY MEDICINE

## 2021-10-12 SDOH — ECONOMIC STABILITY: FOOD INSECURITY: WITHIN THE PAST 12 MONTHS, THE FOOD YOU BOUGHT JUST DIDN'T LAST AND YOU DIDN'T HAVE MONEY TO GET MORE.: NEVER TRUE

## 2021-10-12 SDOH — ECONOMIC STABILITY: FOOD INSECURITY: WITHIN THE PAST 12 MONTHS, YOU WORRIED THAT YOUR FOOD WOULD RUN OUT BEFORE YOU GOT MONEY TO BUY MORE.: NEVER TRUE

## 2021-10-12 ASSESSMENT — SOCIAL DETERMINANTS OF HEALTH (SDOH): HOW HARD IS IT FOR YOU TO PAY FOR THE VERY BASICS LIKE FOOD, HOUSING, MEDICAL CARE, AND HEATING?: NOT HARD AT ALL

## 2021-10-12 NOTE — PATIENT INSTRUCTIONS
Patient Education        Stopping Smoking: Care Instructions  Your Care Instructions     Cigarette smokers crave the nicotine in cigarettes. Giving it up is much harder than simply changing a habit. Your body has to stop craving the nicotine. It is hard to quit, but you can do it. There are many tools that people use to quit smoking. You may find that combining tools works best for you. There are several steps to quitting. First you get ready to quit. Then you get support to help you. After that, you learn new skills and behaviors to become a nonsmoker. For many people, a necessary step is getting and using medicine. Your doctor will help you set up the plan that best meets your needs. You may want to attend a smoking cessation program to help you quit smoking. When you choose a program, look for one that has proven success. Ask your doctor for ideas. You will greatly increase your chances of success if you take medicine as well as get counseling or join a cessation program.  Some of the changes you feel when you first quit tobacco are uncomfortable. Your body will miss the nicotine at first, and you may feel short-tempered and grumpy. You may have trouble sleeping or concentrating. Medicine can help you deal with these symptoms. You may struggle with changing your smoking habits and rituals. The last step is the tricky one: Be prepared for the smoking urge to continue for a time. This is a lot to deal with, but keep at it. You will feel better. Follow-up care is a key part of your treatment and safety. Be sure to make and go to all appointments, and call your doctor if you are having problems. It's also a good idea to know your test results and keep a list of the medicines you take. How can you care for yourself at home? · Ask your family, friends, and coworkers for support. You have a better chance of quitting if you have help and support.   · Join a support group, such as Nicotine Anonymous, for people who are trying to quit smoking. · Consider signing up for a smoking cessation program, such as the American Lung Association's Freedom from Smoking program.  · Get text messaging support. Go to the website at www.smokefree. gov to sign up for the [facility] program.  · Set a quit date. Pick your date carefully so that it is not right in the middle of a big deadline or stressful time. Once you quit, do not even take a puff. Get rid of all ashtrays and lighters after your last cigarette. Clean your house and your clothes so that they do not smell of smoke. · Learn how to be a nonsmoker. Think about ways you can avoid those things that make you reach for a cigarette. ? Avoid situations that put you at greatest risk for smoking. For some people, it is hard to have a drink with friends without smoking. For others, they might skip a coffee break with coworkers who smoke. ? Change your daily routine. Take a different route to work or eat a meal in a different place. · Cut down on stress. Calm yourself or release tension by doing an activity you enjoy, such as reading a book, taking a hot bath, or gardening. · Talk to your doctor or pharmacist about nicotine replacement therapy, which replaces the nicotine in your body. You still get nicotine but you do not use tobacco. Nicotine replacement products help you slowly reduce the amount of nicotine you need. These products come in several forms, many of them available over-the-counter:  ? Nicotine patches  ? Nicotine gum and lozenges  ? Nicotine inhaler  · Ask your doctor about bupropion (Wellbutrin) or varenicline (Chantix), which are prescription medicines. They do not contain nicotine. They help you by reducing withdrawal symptoms, such as stress and anxiety. · Some people find hypnosis, acupuncture, and massage helpful for ending the smoking habit. · Eat a healthy diet and get regular exercise.  Having healthy habits will help your body move past its craving for nicotine. · Be prepared to keep trying. Most people are not successful the first few times they try to quit. Do not get mad at yourself if you smoke again. Make a list of things you learned and think about when you want to try again, such as next week, next month, or next year. Where can you learn more? Go to https://[employer] Solutionspetaliewchantell.[employer]. org and sign in to your Ice[employer] account. Enter U801 in the PaintZen box to learn more about \"Stopping Smoking: Care Instructions. \"     If you do not have an account, please click on the \"Sign Up Now\" link. Current as of: February 11, 2021               Content Version: 13.0  © 2006-2021 Healthwise, Incorporated. Care instructions adapted under license by [facility] ([facility]). If you have questions about a medical condition or this instruction, always ask your healthcare professional. [address] any warranty or liability for your use of this information.

## 2021-10-17 ASSESSMENT — ENCOUNTER SYMPTOMS
DIARRHEA: 0
SINUS PRESSURE: 0
EYE PAIN: 0
ABDOMINAL DISTENTION: 0
RHINORRHEA: 0
ABDOMINAL PAIN: 0
COUGH: 0
SORE THROAT: 0
SHORTNESS OF BREATH: 0
NAUSEA: 0
CONSTIPATION: 0

## 2021-10-17 NOTE — PROGRESS NOTES
Comment: 3-4 beers per day       Current Outpatient Medications   Medication Sig Dispense Refill    meloxicam (MOBIC) 15 MG tablet TAKE 1 TABLET DAILY AS NEEDED FOR PAIN 90 tablet 1    dilTIAZem (DILACOR XR) 240 MG extended release capsule TAKE 1 CAPSULE DAILY 90 capsule 2    losartan (COZAAR) 100 MG tablet TAKE 1 TABLET DAILY 90 tablet 2    levothyroxine (SYNTHROID) 125 MCG tablet TAKE 2 TABLETS DAILY 180 tablet 3    Multiple Vitamin (MULTIVITAMIN PO) Take 1 capsule by mouth daily.  aspirin 81 MG EC tablet Take 81 mg by mouth daily. No current facility-administered medications for this visit. No Known Allergies  Health Maintenance   Topic Date Due    AAA screen  Never done    COVID-19 Vaccine (1) Never done    DTaP/Tdap/Td vaccine (1 - Tdap) Never done    Diabetes screen  Never done    Colon cancer screen colonoscopy  Never done    Shingles Vaccine (1 of 2) Never done    Pneumococcal 65+ years Vaccine (1 of 1 - PPSV23) Never done   [employer] Visit (AWV)  Never done    Flu vaccine (1) Never done    TSH testing  10/06/2022    Potassium monitoring  10/06/2022    Creatinine monitoring  10/06/2022    Lipid screen  03/16/2026    Hepatitis C screen  Completed    Hepatitis A vaccine  Aged Out    Hepatitis B vaccine  Aged Out    Hib vaccine  Aged Out    Meningococcal (ACWY) vaccine  Aged Out         Objective:     Physical Exam  Constitutional:       General: He is not in acute distress. Appearance: He is well-developed. He is not diaphoretic. HENT:      Head: Normocephalic and atraumatic. Right Ear: External ear normal.      Left Ear: External ear normal.   Eyes:      Conjunctiva/sclera: Conjunctivae normal.   Neck:      Vascular: No JVD. Cardiovascular:      Rate and Rhythm: Normal rate and regular rhythm. Heart sounds: Normal heart sounds. Pulmonary:      Effort: Pulmonary effort is normal.      Breath sounds: Normal breath sounds.  No wheezing or rales.   Musculoskeletal:         General: Tenderness and deformity present. Comments: OA changes in hands bilateral   Skin:     General: Skin is warm and dry. Coloration: Skin is not pale. Neurological:      Mental Status: He is alert and oriented to person, place, and time. /72 (Site: Left Upper Arm)   Pulse 80   Temp 98.2 °F (36.8 °C) (Oral)   Resp 20   Ht 5' 9\" (1.753 m)   Wt 196 lb (88.9 kg)   BMI 28.94 kg/m²       Impression/Plan:  1. Primary hypertension    2. Hypothyroidism, unspecified type    3. Primary osteoarthritis involving multiple joints    4. Screening PSA (prostate specific antigen)      Requested Prescriptions      No prescriptions requested or ordered in this encounter     Orders Placed This Encounter   Procedures    T4, Free     Standing Status:   Future     Standing Expiration Date:   10/12/2022    Lipid Panel     Standing Status:   Future     Standing Expiration Date:   10/12/2022     Order Specific Question:   Is Patient Fasting?/# of Hours     Answer:   yes/12    PSA screening     Standing Status:   Future     Standing Expiration Date:   10/12/2022    Comprehensive Metabolic Panel     Standing Status:   Future     Standing Expiration Date:   10/12/2022    TSH without Reflex     Standing Status:   Future     Standing Expiration Date:   10/12/2022       Patient giveneducational materials - see patient instructions. Discussed use, benefit, and side effects of prescribed medications. All patient questions answered. Pt voiced understanding. Reviewed health maintenance. Patient agreedwith treatment plan. Follow up as directed. **This report has been created using voice recognition software. It may contain minor errorswhich are inherent in voice recognition technology. **       Electronically signed by Chepe Hurst MD on 10/17/2021 at 11:51 AM

## 2022-02-08 RX ORDER — LOSARTAN POTASSIUM 100 MG/1
TABLET ORAL
Qty: 90 TABLET | Refills: 2 | Status: SHIPPED | OUTPATIENT
Start: 2022-02-08 | End: 2022-11-04

## 2022-03-21 RX ORDER — MELOXICAM 15 MG/1
TABLET ORAL
Qty: 90 TABLET | Refills: 1 | Status: SHIPPED | OUTPATIENT
Start: 2022-03-21 | End: 2022-07-06

## 2022-04-07 LAB
CHOLESTEROL, TOTAL: 135 MG/DL
CHOLESTEROL/HDL RATIO: NORMAL
HDLC SERPL-MCNC: 57 MG/DL (ref 35–70)
LDL CHOLESTEROL CALCULATED: 66 MG/DL (ref 0–160)
NONHDLC SERPL-MCNC: NORMAL MG/DL
PROSTATE SPECIFIC ANTIGEN: 0.9 NG/ML
TRIGL SERPL-MCNC: 55 MG/DL
VLDLC SERPL CALC-MCNC: 12 MG/DL

## 2022-04-12 ENCOUNTER — OFFICE VISIT (OUTPATIENT)
Dept: FAMILY MEDICINE CLINIC | Age: 67
End: 2022-04-12
Payer: MEDICARE

## 2022-04-12 VITALS
BODY MASS INDEX: 26.22 KG/M2 | RESPIRATION RATE: 20 BRPM | OXYGEN SATURATION: 97 % | SYSTOLIC BLOOD PRESSURE: 120 MMHG | HEIGHT: 69 IN | HEART RATE: 68 BPM | DIASTOLIC BLOOD PRESSURE: 80 MMHG | WEIGHT: 177 LBS | TEMPERATURE: 98 F

## 2022-04-12 DIAGNOSIS — E03.9 HYPOTHYROIDISM, UNSPECIFIED TYPE: Chronic | ICD-10-CM

## 2022-04-12 DIAGNOSIS — Z00.00 INITIAL MEDICARE ANNUAL WELLNESS VISIT: ICD-10-CM

## 2022-04-12 DIAGNOSIS — Z00.00 ROUTINE GENERAL MEDICAL EXAMINATION AT A HEALTH CARE FACILITY: Primary | ICD-10-CM

## 2022-04-12 PROCEDURE — G0438 PPPS, INITIAL VISIT: HCPCS | Performed by: FAMILY MEDICINE

## 2022-04-12 RX ORDER — LEVOTHYROXINE SODIUM 0.2 MG/1
TABLET ORAL
Qty: 90 TABLET | Refills: 3 | Status: SHIPPED | OUTPATIENT
Start: 2022-04-12 | End: 2022-06-21 | Stop reason: DRUGHIGH

## 2022-04-12 ASSESSMENT — PATIENT HEALTH QUESTIONNAIRE - PHQ9
SUM OF ALL RESPONSES TO PHQ QUESTIONS 1-9: 1
2. FEELING DOWN, DEPRESSED OR HOPELESS: 1
SUM OF ALL RESPONSES TO PHQ QUESTIONS 1-9: 1
SUM OF ALL RESPONSES TO PHQ9 QUESTIONS 1 & 2: 1
1. LITTLE INTEREST OR PLEASURE IN DOING THINGS: 0
SUM OF ALL RESPONSES TO PHQ QUESTIONS 1-9: 1
SUM OF ALL RESPONSES TO PHQ QUESTIONS 1-9: 1

## 2022-04-12 ASSESSMENT — LIFESTYLE VARIABLES
HOW OFTEN DURING THE LAST YEAR HAVE YOU FAILED TO DO WHAT WAS NORMALLY EXPECTED FROM YOU BECAUSE OF DRINKING: 0
HAS A RELATIVE, FRIEND, DOCTOR, OR ANOTHER HEALTH PROFESSIONAL EXPRESSED CONCERN ABOUT YOUR DRINKING OR SUGGESTED YOU CUT DOWN: 0
HOW MANY STANDARD DRINKS CONTAINING ALCOHOL DO YOU HAVE ON A TYPICAL DAY: 3 OR 4
HOW OFTEN DURING THE LAST YEAR HAVE YOU BEEN UNABLE TO REMEMBER WHAT HAPPENED THE NIGHT BEFORE BECAUSE YOU HAD BEEN DRINKING: 0
HOW OFTEN DURING THE LAST YEAR HAVE YOU HAD A FEELING OF GUILT OR REMORSE AFTER DRINKING: 0
HAVE YOU OR SOMEONE ELSE BEEN INJURED AS A RESULT OF YOUR DRINKING: 0
HOW OFTEN DO YOU HAVE A DRINK CONTAINING ALCOHOL: 4 OR MORE TIMES A WEEK
HOW OFTEN DURING THE LAST YEAR HAVE YOU FOUND THAT YOU WERE NOT ABLE TO STOP DRINKING ONCE YOU HAD STARTED: 0
HOW OFTEN DURING THE LAST YEAR HAVE YOU NEEDED AN ALCOHOLIC DRINK FIRST THING IN THE MORNING TO GET YOURSELF GOING AFTER A NIGHT OF HEAVY DRINKING: 0

## 2022-04-12 NOTE — PATIENT INSTRUCTIONS
Patient Education        Stopping Smoking: Care Instructions  Your Care Instructions     Cigarette smokers crave the nicotine in cigarettes. Giving it up is much harder than simply changing a habit. Your body has to stop craving the nicotine. It is hard to quit, but you can do it. There are many tools that people use to quitsmoking. You may find that combining tools works best for you. There are several steps to quitting. First you get ready to quit. Then you get support to help you. After that, you learn new skills and behaviors to become anonsmoker. For many people, a necessary step is getting and using medicine. Your doctor will help you set up the plan that best meets your needs. You may want to attend a smoking cessation program to help you quit smoking. When you choose a program, look for one that has proven success. Ask your doctor for ideas. You will greatly increase your chances of success if you take medicineas well as get counseling or join a cessation program.  Some of the changes you feel when you first quit tobacco are uncomfortable. Your body will miss the nicotine at first, and you may feel short-tempered and grumpy. You may have trouble sleeping or concentrating. Medicine can help you deal with these symptoms. You may struggle with changing your smoking habits and rituals. The last step is the tricky one: Be prepared for the smoking urge to continue for a time. This is a lot to deal with, but keep at it. You willfeel better. Follow-up care is a key part of your treatment and safety. Be sure to make and go to all appointments, and call your doctor if you are having problems. It's also a good idea to know your test results and keep alist of the medicines you take. How can you care for yourself at home?  Ask your family, friends, and coworkers for support. You have a better chance of quitting if you have help and support.    Join a support group, such as Nicotine Anonymous, for people who are trying to quit smoking.  Consider signing up for a smoking cessation program, such as the American Lung Association's Freedom from Smoking program.   Get text messaging support. Go to the website at www.smokefree. gov to sign up for the [facility] program.   Set a quit date. Pick your date carefully so that it is not right in the middle of a big deadline or stressful time. Once you quit, do not even take a puff. Get rid of all ashtrays and lighters after your last cigarette. Clean your house and your clothes so that they do not smell of smoke.  Learn how to be a nonsmoker. Think about ways you can avoid those things that make you reach for a cigarette. ? Avoid situations that put you at greatest risk for smoking. For some people, it is hard to have a drink with friends without smoking. For others, they might skip a coffee break with coworkers who smoke. ? Change your daily routine. Take a different route to work or eat a meal in a different place.  Cut down on stress. Calm yourself or release tension by doing an activity you enjoy, such as reading a book, taking a hot bath, or gardening.  Talk to your doctor or pharmacist about nicotine replacement therapy, which replaces the nicotine in your body. You still get nicotine but you do not use tobacco. Nicotine replacement products help you slowly reduce the amount of nicotine you need. These products come in several forms, many of them available over-the-counter:  ? Nicotine patches  ? Nicotine gum and lozenges  ? Nicotine inhaler   Ask your doctor about bupropion (Wellbutrin) or varenicline (Chantix), which are prescription medicines. They do not contain nicotine. They help you by reducing withdrawal symptoms, such as stress and anxiety.  Some people find hypnosis, acupuncture, and massage helpful for ending the smoking habit.  Eat a healthy diet and get regular exercise.  Having healthy habits will help your body move past its craving for nicotine.  Be prepared to keep trying. Most people are not successful the first few times they try to quit. Do not get mad at yourself if you smoke again. Make a list of things you learned and think about when you want to try again, such as next week, next month, or next year. Where can you learn more? Go to https://chpepiceweb.[employer]. org and sign in to your [employer] account. Enter L226 in the Mirror [employer] box to learn more about \"Stopping Smoking: Care Instructions. \"     If you do not have an account, please click on the \"Sign Up Now\" link. Current as of: October 28, 2021               Content Version: 13.2  © 2006-2022 Healthwise, [employer]. Care instructions adapted under license by [facility] ([facility]). If you have questions about a medical condition or this instruction, always ask your healthcare professional. [place] [address] any warranty or liability for your use of this information. Personalized Preventive Plan for Traci Abbasi - 4/12/2022  Medicare offers a range of preventive health benefits. Some of the tests and screenings are paid in full while other may be subject to a deductible, co-insurance, and/or copay. Some of these benefits include a comprehensive review of your medical history including lifestyle, illnesses that may run in your family, and various assessments and screenings as appropriate. After reviewing your medical record and screening and assessments performed today your provider may have ordered immunizations, labs, imaging, and/or referrals for you. A list of these orders (if applicable) as well as your Preventive Care list are included within your After Visit Summary for your review. Other Preventive Recommendations:    · A preventive eye exam performed by an eye specialist is recommended every 1-2 years to screen for glaucoma; cataracts, macular degeneration, and other eye disorders.   · A preventive dental visit is recommended every 6 months. · Try to get at least 150 minutes of exercise per week or 10,000 steps per day on a pedometer . · Order or download the FREE \"Exercise & Physical Activity: Your Everyday Guide\" from [employer]. Call 0-504.948.8703 or search [employer] online. · You need 5595-6491 mg of calcium and 7379-3609 IU of vitamin D per day. It is possible to meet your calcium requirement with diet alone, but a vitamin D supplement is usually necessary to meet this goal.  · When exposed to the sun, use a sunscreen that protects against both UVA and UVB radiation with an SPF of 30 or greater. Reapply every 2 to 3 hours or after sweating, drying off with a towel, or swimming. · Always wear a seat belt when traveling in a car. Always wear a helmet when riding a bicycle or motorcycle.

## 2022-04-12 NOTE — PROGRESS NOTES
Medicare Annual Wellness Visit    Mavis Becerra is here for Medicare AWV (Declined cologuard, colonoscopy or pneumovax), Discuss Labs, and Medication Refill    Assessment & Plan   Routine general medical examination at a health care facility  Hypothyroidism, unspecified type  -     levothyroxine (SYNTHROID) 200 MCG tablet; TAKE 1 TABLETS DAILY, Disp-90 tablet, R-3Normal  -     TSH; Future  -     T4, Free; Future      Recommendations for Preventive Services Due: see orders and patient instructions/AVS.  Recommended screening schedule for the next 5-10 years is provided to the patient in written form: see Patient Instructions/AVS.     No follow-ups on file. Subjective   The following acute and/or chronic problems were also addressed today:   Diagnosis Orders   1. Routine general medical examination at a health care facility     2. Hypothyroidism, unspecified type  levothyroxine (SYNTHROID) 200 MCG tablet    TSH    T4, Free         Patient's complete Health Risk Assessment and screening values have been reviewed and are found in Flowsheets. The following problems were reviewed today and where indicated follow up appointments were made and/or referrals ordered. Positive Risk Factor Screenings with Interventions:       Tobacco Use:     Tobacco Use: High Risk    Smoking Tobacco Use: Current Every Day Smoker    Smokeless Tobacco Use: Never Used     E-Cigarettes/Vaping Use     Questions Responses    E-Cigarette/Vaping Use     Start Date     Passive Exposure     Quit Date     Counseling Given     Comments         Substance Abuse - Tobacco Interventions:  na    Alcohol Screening:  AUDIT Total Score: 6    A score of 8 or more is associated with harmful or hazardous drinking. A score of 13 or more in women, and 15 or more in men, is likely to indicate alcohol dependence.     Substance Abuse - Alcohol Interventions:  patient is not ready to change his/her alcohol consumption behavior at this time        [facility] and ACP:  General  In general, how would you say your health is?: Very Good  In the past 7 days, have you experienced any of the following: New or Increased Pain, New or Increased Fatigue, Loneliness, Social Isolation, Stress or Anger?: No  Do you get the social and emotional support that you need?: Yes  Do you have a Living Will?: (!) No    Advance Directives     Power of [place] Will ACP-Advance Directive ACP-Power of     Not on File Not on File Not on File Not on File      General Health Risk Interventions:  · na    Health Habits/Nutrition:     Physical Activity: Inactive    Days of Exercise per Week: 0 days    Minutes of Exercise per Session: 0 min     Have you lost any weight without trying in the past 3 months?: (!) Yes  Body mass index: (!) 26.33  Have you seen the dentist within the past year?: Yes    Health Habits/Nutrition Interventions:  · na    Hearing/Vision:  Do you or your family notice any trouble with your hearing that hasn't been managed with hearing aids?: No  Do you have difficulty driving, watching TV, or doing any of your daily activities because of your eyesight?: No  Have you had an eye exam within the past year?: (!) No  No exam data present    Hearing/Vision Interventions:  · na    Safety:  Do you have working smoke detectors?: Yes  Do you have any tripping hazards - loose or unsecured carpets or rugs?: No  Do you have any tripping hazards - clutter in doorways, halls, or stairs?: No  Do you have either shower bars, grab bars, non-slip mats or non-slip surfaces in your shower or bathtub?: (!) No  Do all of your stairways have a railing or banister?: Yes  Do you always fasten your seatbelt when you are in a car?: Yes    Safety Interventions:  · Home safety tips provided           Objective   Vitals:    04/12/22 0908   BP: 120/80   Pulse: 68   Resp: 20   Temp: 98 °F (36.7 °C)   TempSrc: Oral   SpO2: 97%   Weight: 177 lb (80.3 kg)   Height: 5' 8.75\" (1.746 m)      Body mass index is 26.33 kg/m². General Appearance: alert and oriented to person, place and time, well developed and well- nourished, in no acute distress  Skin: warm and dry, no rash or erythema  Head: normocephalic and atraumatic  Eyes: pupils equal, round, and reactive to light, extraocular eye movements intact, conjunctivae normal  ENT: tympanic membrane, external ear and ear canal normal bilaterally, nose without deformity, nasal mucosa and turbinates normal without polyps  Neck: supple and non-tender without mass, no thyromegaly or thyroid nodules, no cervical lymphadenopathy  Pulmonary/Chest: clear to auscultation bilaterally- no wheezes, rales or rhonchi, normal air movement, no respiratory distress  Cardiovascular: normal rate, regular rhythm, normal S1 and S2, no murmurs, rubs, clicks, or gallops, distal pulses intact, no carotid bruits  Abdomen: soft, non-tender, non-distended, normal bowel sounds, no masses or organomegaly  Extremities: no cyanosis, clubbing or edema  Musculoskeletal: normal range of motion, no joint swelling, deformity or tenderness  Neurologic: reflexes normal and symmetric, no cranial nerve deficit, gait, coordination and speech normal       No Known Allergies  Prior to Visit Medications    Medication Sig Taking? Authorizing Provider   levothyroxine (SYNTHROID) 200 MCG tablet TAKE 1 TABLETS DAILY Yes Ramya Aleman MD   losartan (COZAAR) 100 MG tablet TAKE 1 TABLET DAILY Yes CARLOS Barrett - CNP   dilTIAZem (DILACOR XR) 240 MG extended release capsule TAKE 1 CAPSULE DAILY Yes Ramya Aleman MD   Multiple Vitamin (MULTIVITAMIN PO) Take 1 capsule by mouth daily. Yes Historical Provider, MD   aspirin 81 MG EC tablet Take 81 mg by mouth daily.    Yes Historical Provider, MD   meloxicam (MOBIC) 15 MG tablet TAKE 1 TABLET DAILY AS NEEDED FOR PAIN  Patient not taking: Reported on 4/12/2022  Ramya Aleman MD       [facility] (Including outside providers/suppliers regularly involved in providing care):   Patient Care Team:  Jon Woodruff MD as PCP - General (Family Medicine)  Jon Woodruff MD as PCP - [facility] Empaneled Provider    Reviewed and updated this visit:  Tobacco  Allergies  Meds  Problems  Med Hx  Surg Hx  Soc Hx  Fam Hx            Seen today for wellness visit. Discussed the importance of a healthy life style. Balanced diet, nutrition, physical activity,and injury prevention. Also discussed the importance of up to date immunizations and annual screenings.

## 2022-05-12 RX ORDER — DILTIAZEM HYDROCHLORIDE 240 MG/1
CAPSULE, EXTENDED RELEASE ORAL
Qty: 90 CAPSULE | Refills: 3 | Status: SHIPPED | OUTPATIENT
Start: 2022-05-12

## 2022-05-25 LAB — TSH SERPL DL<=0.05 MIU/L-ACNC: NORMAL M[IU]/L

## 2022-06-21 ENCOUNTER — TELEPHONE (OUTPATIENT)
Dept: FAMILY MEDICINE CLINIC | Age: 67
End: 2022-06-21

## 2022-06-21 DIAGNOSIS — E03.9 HYPOTHYROIDISM, UNSPECIFIED TYPE: Primary | ICD-10-CM

## 2022-06-21 RX ORDER — LEVOTHYROXINE SODIUM 175 UG/1
175 TABLET ORAL DAILY
Qty: 30 TABLET | Refills: 1 | Status: SHIPPED | OUTPATIENT
Start: 2022-06-21 | End: 2022-08-04 | Stop reason: SDUPTHER

## 2022-06-21 NOTE — TELEPHONE ENCOUNTER
Tsh is normal but ft4 is slightly high. Rec change levothyroxine to 175mcg daily, 30 1 rf.    Tsh ft4 in 6 wks

## 2022-06-21 NOTE — TELEPHONE ENCOUNTER
Patient called asking for TSH lab results- states he has been having trouble sleeping and has been losing weight. Asking if his dose of levothyroxine is right for him.

## 2022-06-21 NOTE — TELEPHONE ENCOUNTER
[facility] pt's wife was informed of levothyroxine dose change. Rx was escribed to [facility]. Lab orders were mailed to repeat in 6 wks. Pt's wife verbalized understanding. States pt is at an appointment.

## 2022-07-06 ENCOUNTER — OFFICE VISIT (OUTPATIENT)
Dept: FAMILY MEDICINE CLINIC | Age: 67
End: 2022-07-06
Payer: MEDICARE

## 2022-07-06 VITALS
BODY MASS INDEX: 22.22 KG/M2 | SYSTOLIC BLOOD PRESSURE: 110 MMHG | RESPIRATION RATE: 20 BRPM | HEART RATE: 88 BPM | WEIGHT: 150 LBS | HEIGHT: 69 IN | DIASTOLIC BLOOD PRESSURE: 70 MMHG | TEMPERATURE: 98.3 F | OXYGEN SATURATION: 93 %

## 2022-07-06 DIAGNOSIS — K08.409 S/P TOOTH EXTRACTION: ICD-10-CM

## 2022-07-06 DIAGNOSIS — Z87.898 HISTORY OF ALCOHOL USE: ICD-10-CM

## 2022-07-06 DIAGNOSIS — R10.13 EPIGASTRIC PAIN: ICD-10-CM

## 2022-07-06 DIAGNOSIS — E03.9 HYPOTHYROIDISM, UNSPECIFIED TYPE: Primary | ICD-10-CM

## 2022-07-06 DIAGNOSIS — R53.83 FATIGUE, UNSPECIFIED TYPE: ICD-10-CM

## 2022-07-06 DIAGNOSIS — R10.11 RUQ PAIN: ICD-10-CM

## 2022-07-06 DIAGNOSIS — R63.4 WEIGHT LOSS: ICD-10-CM

## 2022-07-06 PROCEDURE — 1123F ACP DISCUSS/DSCN MKR DOCD: CPT | Performed by: NURSE PRACTITIONER

## 2022-07-06 PROCEDURE — 99214 OFFICE O/P EST MOD 30 MIN: CPT | Performed by: NURSE PRACTITIONER

## 2022-07-06 RX ORDER — PANTOPRAZOLE SODIUM 40 MG/1
40 TABLET, DELAYED RELEASE ORAL
Qty: 30 TABLET | Refills: 2 | Status: SHIPPED | OUTPATIENT
Start: 2022-07-06 | End: 2022-07-29 | Stop reason: SDUPTHER

## 2022-07-06 RX ORDER — SUCRALFATE 1 G/1
1 TABLET ORAL 4 TIMES DAILY
Qty: 120 TABLET | Refills: 0 | Status: SHIPPED | OUTPATIENT
Start: 2022-07-06 | End: 2022-07-28

## 2022-07-06 NOTE — PATIENT INSTRUCTIONS
Patient Education        Peptic Ulcer Disease: Care Instructions  Overview     Peptic ulcers are sores on the inside of the stomach. Or they may be on the inside of the small intestine (such as a duodenal ulcer). They are most often caused by an infection with bacteria or from use of nonsteroidal anti-inflammatory drugs (NSAIDs). NSAIDs include aspirin, ibuprofen (Advil),and naproxen (Aleve). Your doctor may have prescribed medicine to reduce stomach acid. You also may need to take antibiotics if your peptic ulcers are caused by an infection. You can help yourself heal and keep ulcers from coming back. You can do this bymaking some changes in your lifestyle. Quit smoking. Limit alcohol. Follow-up care is a key part of your treatment and safety. Be sure to make and go to all appointments, and call your doctor if you are having problems. It's also a good idea to know your test results and keep alist of the medicines you take. How can you care for yourself at home?  Be safe with medicines. Take your medicines exactly as prescribed. Call your doctor if you think you are having a problem with your medicine.  Do not take aspirin or other NSAIDs such as ibuprofen (Advil or Motrin) or naproxen (Aleve). Ask your doctor what you can take for pain.  Do not smoke. Smoking can make ulcers worse. If you need help quitting, talk to your doctor about stop-smoking programs and medicines. These can increase your chances of quitting for good.  Drink in moderation or avoid drinking alcohol.  Eat a balanced diet of small, frequent meals. See a dietitian if you need help planning your meals. When should you call for help? Call 911  anytime you think you may need emergency care. For example, call if:     You vomit blood or what looks like coffee grounds.      You pass maroon or very bloody stools.    Call your doctor now or seek immediate medical care if:     You have new or worse belly pain.      Your stools are

## 2022-07-11 ASSESSMENT — ENCOUNTER SYMPTOMS
COUGH: 0
DIARRHEA: 0
ABDOMINAL PAIN: 0
BACK PAIN: 0
WHEEZING: 0
SHORTNESS OF BREATH: 0
NAUSEA: 0
VOMITING: 0
ABDOMINAL DISTENTION: 0
CHEST TIGHTNESS: 0
BLOOD IN STOOL: 0

## 2022-07-11 NOTE — PROGRESS NOTES
[address] [place] [facility] [address]  Dept: [phone]  Dept Fax: [phone]  Loc: [phone]  PROGRESS NOTE      VisitDate: 7/6/2022    Nelly Hickman is a 74 y.o. male who presents today for:     Chief Complaint   Patient presents with    Weight Loss     and not sleeping, restless, naps during the day. Had 3 teeth removed 1 month ago, was anxious, appetite is much less,  drinking 2 beers a day vs 8-12. Thyroid dose in 2020 was 250mcg, 4/22 down to 200mcg, 6/2022 lowered to 175mcg.  Abdominal Pain     generalized abd discomfort-takes pepto tablets, denies n,v or d. Sx for 2 months, ? ulcer. Does not see GI         Subjective:  Patient presents accompanied with spouse complains of increased restlessness, anxiety, agitation, daytime sleepiness the past several months. Reports he is decreased his alcohol intake from 12 pack of beer daily to 2 daily. Patient reports recent adjustments of his thyroid medication. Patient also reports weight loss, generalized abdominal bloating and pain. Taking Pepto-Bismol minimal relief. Denies any nausea vomiting diarrhea blood in stools melena. Denies any fever, chest pain shortness of breath. Review of Systems   Constitutional: Positive for appetite change, fatigue and unexpected weight change. Negative for activity change, chills and fever. Eyes: Negative for visual disturbance. Respiratory: Negative for cough, chest tightness, shortness of breath and wheezing. Cardiovascular: Negative for chest pain, palpitations and leg swelling. Gastrointestinal: Negative for abdominal distention, abdominal pain, blood in stool, diarrhea, nausea and vomiting. Genitourinary: Negative for dysuria. Musculoskeletal: Negative for arthralgias, back pain and neck pain. Skin: Negative. Negative for rash. Neurological: Negative for dizziness, light-headedness and headaches. Hematological: Negative for adenopathy. Psychiatric/Behavioral: Positive for decreased concentration and sleep disturbance. Negative for dysphoric mood. The patient is nervous/anxious. All other systems reviewed and are negative. Past Medical History:   Diagnosis Date    Hypertension     Hypothyroidism       History reviewed. No pertinent surgical history. Family History   Problem Relation Age of Onset    High Blood Pressure Mother      Social History     Tobacco Use    Smoking status: Current Every Day Smoker     Packs/day: 0.10     Years: 30.00     Pack years: 3.00     Types: Cigars     Start date: 9/16/1990    Smokeless tobacco: Never Used    Tobacco comment: -3-4 cigars per day. Printed to avs   Substance Use Topics    Alcohol use: Yes     Comment: 2-3 beers per day since 2022   Previous drank 8-12      Current Outpatient Medications   Medication Sig Dispense Refill    sucralfate (CARAFATE) 1 GM tablet Take 1 tablet by mouth 4 times daily 120 tablet 0    pantoprazole (PROTONIX) 40 MG tablet Take 1 tablet by mouth every morning (before breakfast) 30 tablet 2    levothyroxine (SYNTHROID) 175 MCG tablet Take 1 tablet by mouth daily 30 tablet 1    dilTIAZem (DILACOR XR) 240 MG extended release capsule TAKE 1 CAPSULE DAILY 90 capsule 3    losartan (COZAAR) 100 MG tablet TAKE 1 TABLET DAILY 90 tablet 2    Multiple Vitamin (MULTIVITAMIN PO) Take 1 capsule by mouth daily.  aspirin 81 MG EC tablet Take 81 mg by mouth daily. No current facility-administered medications for this visit.      No Known Allergies  Health Maintenance   Topic Date Due    COVID-19 Vaccine (1) Never done    Pneumococcal 65+ years Vaccine (1 - PCV) Never done    DTaP/Tdap/Td vaccine (1 - Tdap) Never done    Colorectal Cancer Screen  Never done    Shingles vaccine (1 of 2) Never done    AAA screen  Never done    Flu vaccine (1) 09/01/2022    Prostate Specific Antigen (PSA) Screening or Monitoring 04/07/2023    Depression Screen  04/12/2023    Annual Wellness Visit (AWV)  04/13/2023    Lipids  04/07/2027    Hepatitis C screen  Completed    Hepatitis A vaccine  Aged Out    Hepatitis B vaccine  Aged Out    Hib vaccine  Aged Out    Meningococcal (ACWY) vaccine  Aged Out         Objective:     Physical Exam  Vitals and nursing note reviewed. Constitutional:       Appearance: Normal appearance. He is well-developed. He is not diaphoretic. HENT:      Head: Normocephalic and atraumatic. Not macrocephalic and not microcephalic. Right Ear: Hearing, tympanic membrane, ear canal and external ear normal. No drainage or tenderness. No middle ear effusion. No hemotympanum. Tympanic membrane is not injected, scarred, perforated or bulging. Left Ear: Hearing, tympanic membrane, ear canal and external ear normal. No drainage or tenderness. No middle ear effusion. No hemotympanum. Tympanic membrane is not injected, scarred, perforated or bulging. Nose: Nose normal. No nasal deformity, septal deviation, mucosal edema or rhinorrhea. Right Sinus: No maxillary sinus tenderness or frontal sinus tenderness. Left Sinus: No maxillary sinus tenderness or frontal sinus tenderness. Mouth/Throat:      Mouth: No oral lesions. Dentition: Normal dentition. Does not have dentures. No dental caries or dental abscesses. Pharynx: Oropharynx is clear. No oropharyngeal exudate or posterior oropharyngeal erythema. Tonsils: No tonsillar abscesses. Eyes:      General: Lids are normal. No scleral icterus. Extraocular Movements:      Right eye: Normal extraocular motion. Left eye: Normal extraocular motion. Conjunctiva/sclera: Conjunctivae normal.      Right eye: Right conjunctiva is not injected. Left eye: Left conjunctiva is not injected. Pupils: Pupils are equal, round, and reactive to light. Neck:      Thyroid: No thyroid mass or thyromegaly.       Vascular: No carotid bruit or JVD. Trachea: Trachea normal.   Cardiovascular:      Rate and Rhythm: Normal rate and regular rhythm. Pulses: Normal pulses. Heart sounds: Normal heart sounds, S1 normal and S2 normal. No murmur heard. No friction rub. No gallop. Pulmonary:      Effort: Pulmonary effort is normal. No respiratory distress. Breath sounds: Normal breath sounds. No wheezing, rhonchi or rales. Chest:      Chest wall: No tenderness. Breasts:      Right: No supraclavicular adenopathy. Left: No supraclavicular adenopathy. Abdominal:      General: Bowel sounds are normal.      Palpations: Abdomen is soft. There is no hepatomegaly, splenomegaly or mass. Tenderness: There is abdominal tenderness. There is no guarding or rebound. Hernia: No hernia is present. There is no hernia in the ventral area or left inguinal area. Musculoskeletal:         General: No tenderness. Normal range of motion. Cervical back: Normal range of motion and neck supple. No edema or erythema. Normal range of motion. Lymphadenopathy:      Head:      Right side of head: No submental, submandibular, tonsillar, preauricular, posterior auricular or occipital adenopathy. Left side of head: No submental, submandibular, tonsillar, preauricular, posterior auricular or occipital adenopathy. Cervical: No cervical adenopathy. Right cervical: No superficial, deep or posterior cervical adenopathy. Left cervical: No superficial, deep or posterior cervical adenopathy. Upper Body:      Right upper body: No supraclavicular or pectoral adenopathy. Left upper body: No supraclavicular or pectoral adenopathy. Skin:     General: Skin is warm and dry. Coloration: Skin is not pale. Findings: No bruising, ecchymosis, laceration, lesion or rash. Nails: There is no clubbing. Neurological:      General: No focal deficit present.       Mental Status: He is alert and oriented to person, place, and time. Cranial Nerves: No cranial nerve deficit. Motor: No abnormal muscle tone. Coordination: Coordination normal.      Deep Tendon Reflexes: Reflexes normal.   Psychiatric:         Attention and Perception: Attention normal.         Mood and Affect: Mood is anxious. Speech: Speech normal.         Behavior: Behavior is hyperactive. Behavior is cooperative. Thought Content: Thought content normal.         Cognition and Memory: Cognition normal.         Judgment: Judgment normal.       /70   Pulse 88   Temp 98.3 °F (36.8 °C) (Oral)   Resp 20   Ht 5' 9.25\" (1.759 m)   Wt 150 lb (68 kg)   SpO2 93%   BMI 21.99 kg/m²       Impression/Plan:  1. Hypothyroidism, unspecified type    2. Weight loss    3. S/P tooth extraction    4. Epigastric pain    5. RUQ pain    6. History of alcohol use    7. Fatigue, unspecified type      Requested Prescriptions     Signed Prescriptions Disp Refills    sucralfate (CARAFATE) 1 GM tablet 120 tablet 0     Sig: Take 1 tablet by mouth 4 times daily    pantoprazole (PROTONIX) 40 MG tablet 30 tablet 2     Sig: Take 1 tablet by mouth every morning (before breakfast)     No orders of the defined types were placed in this encounter. Outpatient Encounter Medications as of 7/6/2022   Medication Sig Dispense Refill    sucralfate (CARAFATE) 1 GM tablet Take 1 tablet by mouth 4 times daily 120 tablet 0    pantoprazole (PROTONIX) 40 MG tablet Take 1 tablet by mouth every morning (before breakfast) 30 tablet 2    levothyroxine (SYNTHROID) 175 MCG tablet Take 1 tablet by mouth daily 30 tablet 1    dilTIAZem (DILACOR XR) 240 MG extended release capsule TAKE 1 CAPSULE DAILY 90 capsule 3    losartan (COZAAR) 100 MG tablet TAKE 1 TABLET DAILY 90 tablet 2    Multiple Vitamin (MULTIVITAMIN PO) Take 1 capsule by mouth daily.  aspirin 81 MG EC tablet Take 81 mg by mouth daily.         [DISCONTINUED] meloxicam (MOBIC) 15 MG tablet TAKE 1 TABLET DAILY AS NEEDED FOR PAIN (Patient not taking: Reported on 4/12/2022) 90 tablet 1     No facility-administered encounter medications on file as of 7/6/2022. No orders of the defined types were placed in this encounter. Patient giveneducational materials - see patient instructions. Discussed use, benefit, and side effects of prescribed medications. All patient questions answered. Pt voiced understanding. Reviewed health maintenance. Patient agreedwith treatment plan. Follow up as directed. Continue medications as prescribed. Educational information on above diagnosis  provided per AVS.  Protonix 40 mg p.o. daily. Carafate 1 g 4 times daily meals. Follow-up 1 month. Declines labs and imaging at this time.      30 min  Electronically signed by CARLOS Rodriguez CNP on 7/11/2022 at 11:02 AM

## 2022-07-12 ENCOUNTER — TELEPHONE (OUTPATIENT)
Dept: FAMILY MEDICINE CLINIC | Age: 67
End: 2022-07-12

## 2022-07-12 NOTE — TELEPHONE ENCOUNTER
----- Message from Orinmelitavicky Gonzalez sent at 7/12/2022  8:39 AM EDT -----  Subject: Results Request    QUESTIONS  Results: Blood work ; Ordered by: Malissa Kruse   Date Performed: 2022-07-07  ---------------------------------------------------------------------------  --------------  Ky Fraction EXQN    [id number]; OK to leave message on voicemail  ---------------------------------------------------------------------------  --------------

## 2022-07-12 NOTE — TELEPHONE ENCOUNTER
----- Message from CARLOS Adler CNP sent at 7/11/2022  7:51 AM EDT -----  CBC CMP B12 lipase within normal limits.

## 2022-07-28 RX ORDER — SUCRALFATE 1 G/1
TABLET ORAL
Qty: 120 TABLET | Refills: 5 | Status: SHIPPED | OUTPATIENT
Start: 2022-07-28 | End: 2022-10-04

## 2022-07-29 RX ORDER — PANTOPRAZOLE SODIUM 40 MG/1
40 TABLET, DELAYED RELEASE ORAL
Qty: 90 TABLET | Refills: 1 | Status: SHIPPED | OUTPATIENT
Start: 2022-07-29

## 2022-08-02 LAB — T4 FREE: 1.57

## 2022-08-04 ENCOUNTER — TELEPHONE (OUTPATIENT)
Dept: FAMILY MEDICINE CLINIC | Age: 67
End: 2022-08-04

## 2022-08-04 RX ORDER — LEVOTHYROXINE SODIUM 175 UG/1
175 TABLET ORAL DAILY
Qty: 90 TABLET | Refills: 3 | Status: SHIPPED | OUTPATIENT
Start: 2022-08-04 | End: 2022-08-12

## 2022-08-04 NOTE — TELEPHONE ENCOUNTER
Pt informed thyroid labs were normal. Ok to send in refill on levothyroxine 175mcg 90/3rf per SUSAN. . Rx was sent to [facility].

## 2022-08-08 ENCOUNTER — OFFICE VISIT (OUTPATIENT)
Dept: FAMILY MEDICINE CLINIC | Age: 67
End: 2022-08-08
Payer: MEDICARE

## 2022-08-08 VITALS
TEMPERATURE: 98.2 F | RESPIRATION RATE: 16 BRPM | DIASTOLIC BLOOD PRESSURE: 66 MMHG | BODY MASS INDEX: 21.41 KG/M2 | WEIGHT: 146 LBS | HEART RATE: 78 BPM | SYSTOLIC BLOOD PRESSURE: 136 MMHG

## 2022-08-08 DIAGNOSIS — E03.9 HYPOTHYROIDISM, UNSPECIFIED TYPE: ICD-10-CM

## 2022-08-08 DIAGNOSIS — R10.13 EPIGASTRIC PAIN: ICD-10-CM

## 2022-08-08 DIAGNOSIS — R10.11 RUQ PAIN: ICD-10-CM

## 2022-08-08 DIAGNOSIS — Z87.898 HISTORY OF ALCOHOL USE: ICD-10-CM

## 2022-08-08 DIAGNOSIS — R63.4 WEIGHT LOSS: ICD-10-CM

## 2022-08-08 DIAGNOSIS — R53.83 FATIGUE, UNSPECIFIED TYPE: Primary | ICD-10-CM

## 2022-08-08 PROCEDURE — 99214 OFFICE O/P EST MOD 30 MIN: CPT | Performed by: NURSE PRACTITIONER

## 2022-08-08 PROCEDURE — 1123F ACP DISCUSS/DSCN MKR DOCD: CPT | Performed by: NURSE PRACTITIONER

## 2022-08-08 NOTE — PATIENT INSTRUCTIONS
THE MOST IMPORTANT ACTION YOU CAN TAKE TO IMPROVE YOUR CURRENT AND FUTURE HEALTH IS TO QUIT SMOKING. Call the [facility] [place] NOW ([phone])    Smoking harms nonsmokers. When nonsmokers are around people who smoke, they absorb nicotine, carbon monoxide, and other ingredients of tobacco smoke. DO NOT SMOKE AROUND CHILDREN    Children exposed to secondhand smoke are at an increased risk of:  Sudden Infant Death Syndrome (SIDS), acute respiratory infections, inflammation of the middle ear, and severe asthma. Over a longer time, it causes heart disease and lung cancer. There is no safe level of exposure to secondhand smoke.

## 2022-08-12 RX ORDER — LEVOTHYROXINE SODIUM 175 UG/1
TABLET ORAL
Qty: 30 TABLET | Refills: 0 | Status: SHIPPED | OUTPATIENT
Start: 2022-08-12 | End: 2022-10-12 | Stop reason: SDUPTHER

## 2022-08-14 ASSESSMENT — ENCOUNTER SYMPTOMS
BLOOD IN STOOL: 0
WHEEZING: 0
COUGH: 0
DIARRHEA: 0
CONSTIPATION: 0
ABDOMINAL PAIN: 1
ABDOMINAL DISTENTION: 0
VOMITING: 0
SHORTNESS OF BREATH: 0
CHEST TIGHTNESS: 0
NAUSEA: 0
BACK PAIN: 0

## 2022-08-14 NOTE — PROGRESS NOTES
[address] [place] [address]  Dept: [phone]  Dept Fax: [phone]  Loc: [phone]  PROGRESS NOTE      VisitDate: 8/8/2022    Mariaa Lind is a 74 y.o. male who presents today for:     Chief Complaint   Patient presents with    1 Month Follow-Up     Has lost another 4lbs since last visit, no abd pain for the last 5-6 days, sleeping better-4hrs/night-per pt         Subjective:  1 month f/u  abd pain, weight loss. Here to review labs. Taking meds as prescribed. Abd pain much improved, still present. Review of Systems   Constitutional:  Positive for unexpected weight change. Negative for activity change, appetite change, chills, fatigue and fever. Eyes:  Negative for visual disturbance. Respiratory:  Negative for cough, chest tightness, shortness of breath and wheezing. Cardiovascular:  Negative for chest pain, palpitations and leg swelling. Gastrointestinal:  Positive for abdominal pain. Negative for abdominal distention, blood in stool, constipation, diarrhea, nausea and vomiting. Genitourinary:  Negative for dysuria. Musculoskeletal:  Negative for arthralgias, back pain and neck pain. Skin: Negative. Negative for rash. Neurological:  Negative for dizziness, light-headedness and headaches. Hematological:  Negative for adenopathy. Psychiatric/Behavioral:  Negative for decreased concentration and dysphoric mood. All other systems reviewed and are negative. Past Medical History:   Diagnosis Date    Hypertension     Hypothyroidism       History reviewed. No pertinent surgical history.   Family History   Problem Relation Age of Onset    High Blood Pressure Mother      Social History     Tobacco Use    Smoking status: Every Day     Packs/day: 0.10     Years: 30.00     Pack years: 3.00     Types: Cigars, Cigarettes     Start date: 9/16/1990    Smokeless tobacco: Never    Tobacco comments:     -3-4 cigars per day. Printed to avs   Substance Use Topics    Alcohol use: Yes     Comment: 2-3 beers per day since 2022   Previous drank 8-12      Current Outpatient Medications   Medication Sig Dispense Refill    pantoprazole (PROTONIX) 40 MG tablet Take 1 tablet by mouth every morning (before breakfast) 90 tablet 1    sucralfate (CARAFATE) 1 GM tablet take 1 tablet by mouth four times a day 120 tablet 5    dilTIAZem (DILACOR XR) 240 MG extended release capsule TAKE 1 CAPSULE DAILY 90 capsule 3    losartan (COZAAR) 100 MG tablet TAKE 1 TABLET DAILY 90 tablet 2    Multiple Vitamin (MULTIVITAMIN PO) Take 1 capsule by mouth daily. aspirin 81 MG EC tablet Take 81 mg by mouth daily. levothyroxine (SYNTHROID) 175 MCG tablet take 1 tablet by mouth once daily 30 tablet 0     No current facility-administered medications for this visit. No Known Allergies  Health Maintenance   Topic Date Due    COVID-19 Vaccine (1) Never done    Pneumococcal 65+ years Vaccine (1 - PCV) Never done    DTaP/Tdap/Td vaccine (1 - Tdap) Never done    Colorectal Cancer Screen  Never done    Shingles vaccine (1 of 2) Never done    AAA screen  Never done    Flu vaccine (1) 09/01/2022    Depression Screen  04/12/2023    Annual Wellness Visit (AWV)  04/13/2023    Lipids  04/07/2027    Hepatitis C screen  Completed    Hepatitis A vaccine  Aged Out    Hepatitis B vaccine  Aged Out    Hib vaccine  Aged Out    Meningococcal (ACWY) vaccine  Aged Out         Objective:     Physical Exam  Vitals and nursing note reviewed. Constitutional:       Appearance: Normal appearance. He is well-developed. He is not diaphoretic. HENT:      Head: Normocephalic and atraumatic. Not macrocephalic and not microcephalic. Right Ear: Hearing, tympanic membrane, ear canal and external ear normal. No drainage or tenderness. No middle ear effusion. No hemotympanum. Tympanic membrane is not injected, scarred, perforated or bulging.       Left Ear: Hearing, tenderness. Normal range of motion. Cervical back: Normal range of motion and neck supple. No edema or erythema. Normal range of motion. Lymphadenopathy:      Head:      Right side of head: No submental, submandibular, tonsillar, preauricular, posterior auricular or occipital adenopathy. Left side of head: No submental, submandibular, tonsillar, preauricular, posterior auricular or occipital adenopathy. Cervical: No cervical adenopathy. Right cervical: No superficial, deep or posterior cervical adenopathy. Left cervical: No superficial, deep or posterior cervical adenopathy. Upper Body:      Right upper body: No supraclavicular or pectoral adenopathy. Left upper body: No supraclavicular or pectoral adenopathy. Skin:     General: Skin is warm and dry. Coloration: Skin is not pale. Findings: No bruising, ecchymosis, laceration, lesion or rash. Nails: There is no clubbing. Neurological:      Mental Status: He is alert and oriented to person, place, and time. Cranial Nerves: No cranial nerve deficit. Motor: No abnormal muscle tone. Coordination: Coordination normal.      Deep Tendon Reflexes: Reflexes normal.   Psychiatric:         Speech: Speech normal.         Behavior: Behavior normal.         Thought Content: Thought content normal.         Judgment: Judgment normal.     /66 (Site: Right Upper Arm)   Pulse 78   Temp 98.2 °F (36.8 °C) (Oral)   Resp 16   Wt 146 lb (66.2 kg)   BMI 21.41 kg/m²                 Impression/Plan:  1. Fatigue, unspecified type    2. Weight loss    3. Epigastric pain    4. History of alcohol use    5. RUQ pain    6.  Hypothyroidism, unspecified type      Requested Prescriptions      No prescriptions requested or ordered in this encounter     Orders Placed This Encounter   Procedures    CT ABDOMEN PELVIS W IV CONTRAST     Standing Status:   Future     Standing Expiration Date:   8/8/2023     Order Specific Question:   STAT Creatinine as needed:     Answer:   Yes    CBC with Auto Differential     Standing Status:   Future     Standing Expiration Date:   8/8/2023    Comprehensive Metabolic Panel     Standing Status:   Future     Standing Expiration Date:   8/8/2023    T4, Free     Standing Status:   Future     Standing Expiration Date:   8/8/2023    TSH     Standing Status:   Future     Standing Expiration Date:   8/8/2023    KATHERINE Daivs MD, Gastroenterology, [facility]     Referral Priority:   Routine     Referral Type:   Eval and Treat     Referral Reason:   Specialty Services Required     Referred to Provider:   Wendy Higginbotham MD     Requested Specialty:   Gastroenterology     Number of Visits Requested:   1       Patient giveneducational materials - see patient instructions. Discussed use, benefit, and side effects of prescribed medications. All patient questions answered. Pt voiced understanding. Reviewed health maintenance. Patient agreedwith treatment plan. Follow up as directed. Continue medications as prescribed. Educational information on above diagnosis  provided per AVS.  Consult with gi. Ct abd pelvis. Cbc, cmp, tsh, ft4. F/u 1 month.  Contue meds     30 min  Electronically signed by CARLOS Amanda CNP on 8/14/2022 at 9:12 AM

## 2022-08-25 ENCOUNTER — HOSPITAL ENCOUNTER (OUTPATIENT)
Dept: CT IMAGING | Age: 67
Discharge: HOME | End: 2022-08-25
Payer: MEDICARE

## 2022-08-25 DIAGNOSIS — R63.4 WEIGHT LOSS: ICD-10-CM

## 2022-08-25 DIAGNOSIS — R53.83 FATIGUE, UNSPECIFIED TYPE: ICD-10-CM

## 2022-08-25 DIAGNOSIS — R10.11 RUQ PAIN: ICD-10-CM

## 2022-08-25 DIAGNOSIS — Z87.898 HISTORY OF ALCOHOL USE: ICD-10-CM

## 2022-08-25 DIAGNOSIS — R10.13 EPIGASTRIC PAIN: ICD-10-CM

## 2022-08-25 PROCEDURE — 6360000004 HC RX CONTRAST MEDICATION: Performed by: NURSE PRACTITIONER

## 2022-08-25 PROCEDURE — 74177 CT ABD & PELVIS W/CONTRAST: CPT

## 2022-08-25 RX ADMIN — IOPAMIDOL 85 ML: 755 INJECTION, SOLUTION INTRAVENOUS at 08:56

## 2022-08-26 ENCOUNTER — OFFICE VISIT (OUTPATIENT)
Dept: FAMILY MEDICINE CLINIC | Age: 67
End: 2022-08-26
Payer: MEDICARE

## 2022-08-26 ENCOUNTER — TELEPHONE (OUTPATIENT)
Dept: FAMILY MEDICINE CLINIC | Age: 67
End: 2022-08-26

## 2022-08-26 VITALS
DIASTOLIC BLOOD PRESSURE: 82 MMHG | TEMPERATURE: 98.3 F | HEART RATE: 86 BPM | WEIGHT: 142 LBS | OXYGEN SATURATION: 99 % | SYSTOLIC BLOOD PRESSURE: 130 MMHG | BODY MASS INDEX: 20.82 KG/M2 | RESPIRATION RATE: 20 BRPM

## 2022-08-26 DIAGNOSIS — R10.13 EPIGASTRIC PAIN: ICD-10-CM

## 2022-08-26 DIAGNOSIS — K86.89 PANCREATIC MASS: Primary | ICD-10-CM

## 2022-08-26 DIAGNOSIS — R63.4 WEIGHT LOSS: ICD-10-CM

## 2022-08-26 PROCEDURE — 1123F ACP DISCUSS/DSCN MKR DOCD: CPT | Performed by: NURSE PRACTITIONER

## 2022-08-26 PROCEDURE — 99214 OFFICE O/P EST MOD 30 MIN: CPT | Performed by: NURSE PRACTITIONER

## 2022-08-26 ASSESSMENT — ENCOUNTER SYMPTOMS
CHEST TIGHTNESS: 0
BACK PAIN: 0
SHORTNESS OF BREATH: 0
DIARRHEA: 0
BLOOD IN STOOL: 0
VOMITING: 0
COUGH: 0
ABDOMINAL DISTENTION: 0
WHEEZING: 0
ABDOMINAL PAIN: 1
CONSTIPATION: 0
NAUSEA: 0

## 2022-08-26 NOTE — TELEPHONE ENCOUNTER
Spoke with Brenton Klein at [address] [facility] were faxed to [phone]. They will review and contact the pt. [place]. Pt informed of this and will get CT on disc for appt once established.

## 2022-08-26 NOTE — PROGRESS NOTES
Ann Lara [address] [place] [facility] [address]  Dept: [phone]  Dept Fax: [phone]  Loc: [phone]  PROGRESS NOTE      VisitDate: 8/26/2022    Chantel Paul is a 74 y.o. male who presents today for:     Chief Complaint   Patient presents with    Results     Discuss Ct results. JEFFRY left a message-he has not called back to schedule. Subjective:  Patient presents accompanied with spouse for review of abnormal CT of abdomen and pelvis as well as follow-up regarding abdominal pain bloating weight loss epigastric pain. Medications as prescribed. History of hypertension hypothyroid. Josselyn pack-a-day smoker for 30 years. Stopped 1990. Reports has decreased beer consumption to 2-3 beers daily since 2022 . previous beer consumption 8-12 daily. Review of Systems   Constitutional:  Positive for fatigue and unexpected weight change. Negative for activity change, appetite change, chills and fever. Eyes:  Negative for visual disturbance. Respiratory:  Negative for cough, chest tightness, shortness of breath and wheezing. Cardiovascular:  Negative for chest pain, palpitations and leg swelling. Gastrointestinal:  Positive for abdominal pain. Negative for abdominal distention, blood in stool, constipation, diarrhea, nausea and vomiting. Genitourinary:  Negative for dysuria. Musculoskeletal:  Negative for arthralgias, back pain and neck pain. Skin: Negative. Negative for rash. Neurological:  Negative for dizziness, light-headedness and headaches. Hematological:  Negative for adenopathy. Psychiatric/Behavioral:  Negative for decreased concentration and dysphoric mood. All other systems reviewed and are negative. Past Medical History:   Diagnosis Date    Hypertension     Hypothyroidism       History reviewed. No pertinent surgical history.   Family History   Problem Relation Age of Onset    High Blood Pressure Mother      Social History     Tobacco Use    Smoking status: Every Day     Packs/day: 0.10     Years: 30.00     Pack years: 3.00     Types: Cigars, Cigarettes     Start date: 9/16/1990    Smokeless tobacco: Never    Tobacco comments:     -3-4 cigars per day. Printed to avs   Substance Use Topics    Alcohol use: Yes     Comment: 2-3 beers per day since 2022   Previous drank 8-12      Current Outpatient Medications   Medication Sig Dispense Refill    levothyroxine (SYNTHROID) 175 MCG tablet take 1 tablet by mouth once daily 30 tablet 0    pantoprazole (PROTONIX) 40 MG tablet Take 1 tablet by mouth every morning (before breakfast) 90 tablet 1    sucralfate (CARAFATE) 1 GM tablet take 1 tablet by mouth four times a day 120 tablet 5    dilTIAZem (DILACOR XR) 240 MG extended release capsule TAKE 1 CAPSULE DAILY 90 capsule 3    losartan (COZAAR) 100 MG tablet TAKE 1 TABLET DAILY 90 tablet 2    Multiple Vitamin (MULTIVITAMIN PO) Take 1 capsule by mouth daily. aspirin 81 MG EC tablet Take 81 mg by mouth daily. No current facility-administered medications for this visit. No Known Allergies  Health Maintenance   Topic Date Due    COVID-19 Vaccine (1) Never done    Pneumococcal 65+ years Vaccine (1 - PCV) Never done    DTaP/Tdap/Td vaccine (1 - Tdap) Never done    Colorectal Cancer Screen  Never done    Shingles vaccine (1 of 2) Never done    Flu vaccine (1) 09/01/2022    Depression Screen  04/12/2023    Annual Wellness Visit (AWV)  04/13/2023    Lipids  04/07/2027    AAA screen  Completed    Hepatitis C screen  Completed    Hepatitis A vaccine  Aged Out    Hepatitis B vaccine  Aged Out    Hib vaccine  Aged Out    Meningococcal (ACWY) vaccine  Aged Out         Objective:     Physical Exam  Vitals and nursing note reviewed. Constitutional:       Appearance: Normal appearance. He is well-developed and normal weight. He is not diaphoretic. HENT:      Head: Normocephalic and atraumatic.  Not macrocephalic and not microcephalic. Right Ear: Hearing normal. No drainage or tenderness. No middle ear effusion. No hemotympanum. Tympanic membrane is not injected, scarred, perforated or bulging. Left Ear: Hearing normal. No drainage or tenderness. No middle ear effusion. No hemotympanum. Tympanic membrane is not injected, scarred, perforated or bulging. Nose: No nasal deformity, septal deviation, mucosal edema or rhinorrhea. Right Sinus: No maxillary sinus tenderness or frontal sinus tenderness. Left Sinus: No maxillary sinus tenderness or frontal sinus tenderness. Mouth/Throat:      Mouth: No oral lesions. Dentition: Normal dentition. Does not have dentures. No dental caries or dental abscesses. Pharynx: No oropharyngeal exudate or posterior oropharyngeal erythema. Tonsils: No tonsillar abscesses. Eyes:      General: Lids are normal. No scleral icterus. Extraocular Movements:      Right eye: Normal extraocular motion. Left eye: Normal extraocular motion. Conjunctiva/sclera: Conjunctivae normal.      Right eye: Right conjunctiva is not injected. Left eye: Left conjunctiva is not injected. Neck:      Thyroid: No thyroid mass or thyromegaly. Vascular: No carotid bruit or JVD. Trachea: Trachea normal.   Cardiovascular:      Rate and Rhythm: Normal rate and regular rhythm. Pulses: Normal pulses. Heart sounds: Normal heart sounds, S1 normal and S2 normal. No murmur heard. No friction rub. No gallop. Pulmonary:      Effort: Pulmonary effort is normal. No respiratory distress. Breath sounds: Normal breath sounds. No wheezing, rhonchi or rales. Chest:      Chest wall: No tenderness. Breasts:     Right: No supraclavicular adenopathy. Left: No supraclavicular adenopathy. Abdominal:      General: Bowel sounds are normal. There is no distension. Palpations: Abdomen is soft. There is no hepatomegaly, splenomegaly or mass. Tenderness: There is no abdominal tenderness. There is no guarding or rebound. Hernia: No hernia is present. There is no hernia in the ventral area or left inguinal area. Musculoskeletal:         General: No tenderness. Normal range of motion. Cervical back: Normal range of motion and neck supple. No edema or erythema. Normal range of motion. Lymphadenopathy:      Head:      Right side of head: No submental, submandibular, tonsillar, preauricular, posterior auricular or occipital adenopathy. Left side of head: No submental, submandibular, tonsillar, preauricular, posterior auricular or occipital adenopathy. Cervical: No cervical adenopathy. Right cervical: No superficial, deep or posterior cervical adenopathy. Left cervical: No superficial, deep or posterior cervical adenopathy. Upper Body:      Right upper body: No supraclavicular or pectoral adenopathy. Left upper body: No supraclavicular or pectoral adenopathy. Skin:     General: Skin is warm and dry. Coloration: Skin is not jaundiced or pale. Findings: No bruising, ecchymosis, laceration, lesion or rash. Nails: There is no clubbing. Neurological:      General: No focal deficit present. Mental Status: He is alert and oriented to person, place, and time. Cranial Nerves: No cranial nerve deficit. Motor: No abnormal muscle tone. Coordination: Coordination normal.      Deep Tendon Reflexes: Reflexes normal.   Psychiatric:         Speech: Speech normal.         Behavior: Behavior normal.         Thought Content: Thought content normal.         Judgment: Judgment normal.     /82 (Site: Right Upper Arm, Position: Sitting, Cuff Size: Small Adult)   Pulse 86   Temp 98.3 °F (36.8 °C) (Oral)   Resp 20   Wt 142 lb (64.4 kg)   SpO2 99%   BMI 20.82 kg/m²        PROCEDURE: CT ABDOMEN PELVIS W IV CONTRAST       CLINICAL INFORMATION: Fatigue. Weight loss. Abdominal pain. COMPARISON: None       TECHNIQUE: Axial 5 mm CT images were obtained through the abdomen and pelvis after the administration of 85  cc Isovue 370 intravenous contrast. Coronal and sagittal reconstructions were obtained. All CT scans at this facility use dose modulation, iterative reconstruction, and/or weight-based dosing when appropriate to reduce radiation dose to as low as reasonably achievable. FINDINGS:    Lung bases: Unremarkable. Liver/gallbladder/bilary tree: No radiopaque gallstones or biliary ductal dilatation is identified. Hepatic steatosis is observed. No liver lesions are present. Pancreas: A hypoattenuating infiltrating mass in the body of the pancreas measures at least 5.5 x 4.8 x 5.0 cm. Numerous enlarged peripancreatic lymph nodes measure up to 14 mm (series 2, image 19). The mass encases the celiac trunk and SMA. The mass    infiltrates the root of the mesentery where there is soft tissue thickening and fat stranding. Spleen : Normal.   Adrenal glands: Normal.       Kidneys/ ureters/ bladder: The bladder is distended. Bladder wall thickening and trabeculation is observed. Mild to moderate left hydronephrosis and hydroureter is present. No definite ureteral calculus is visualized. Gastrointestinal:  The appendix is normal. Moderate retained fecal material seen throughout the colon particularly the rectosigmoid colon. Retroperitoneum / lymph nodes: No pathologically enlarged lymph nodes. Pelvis: The prostate gland is enlarged measuring 3.5 x 4.5 cm. Musculoskeletal: Multilevel degenerative disc disease is noted in the thoracic and lumbar spine. The visualized skeletal structures appear intact. Impression   1. Infiltrative mass in the body of the pancreas measures at least 5.5 x 4.8 x 5.0 cm. The mass encases and narrows the celiac trunk and SMA and infiltrates the root of the mesentery.  Numerous enlarged peripancreatic and mesenteric lymph nodes measure up    to 14 mm in short axis. The gallbladder is mildly distended however no biliary ductal dilatation or liver lesions are observed. 2. Distention of the urinary bladder which exhibits wall thickening and trabeculation. Mild to moderate left hydronephrosis and hydroureter is of uncertain etiology no ureteral calculus is observed. Differential considerations include a recently passed    calculus, ascending urinary tract infection, urothelial lesion. Only mild prostatomegaly is visualized. Correlate with urinalysis and PSA levels. **This report has been created using voice recognition software. It may contain minor errors which are inherent in voice recognition technology. **       Final report electronically signed by Dr Lev Pimentel on 8/25/2022 9:29 AM         Order History    Open Order Details     PACS Images     Show images for CT ABDOMEN PELVIS W IV CONTRAST    Results History Report    View Report     Associated Diagnoses    Fatigue, unspecified type       Weight loss       Epigastric pain       History of alcohol use       RUQ pain         External Result Report    External Result Report 2022     Existing Charges    Charge Line Charge           Impression/Plan:  1. Pancreatic mass    2. Weight loss    3.  Epigastric pain      Requested Prescriptions      No prescriptions requested or ordered in this encounter     Orders Placed This Encounter   Procedures    CBC with Auto Differential     Standing Status:   Future     Standing Expiration Date:   8/26/2023    Comprehensive Metabolic Panel     Standing Status:   Future     Standing Expiration Date:   8/26/2023    Lipase     Standing Status:   Future     Standing Expiration Date:   8/26/2023    External Referral To Gastroenterology     Referral Priority:   Routine     Referral Type:   Eval and Treat     Referral Reason:   Specialty Services Required     Requested Specialty:   Gastroenterology     Number of Visits Requested:   1       Patient giveneducational materials - see patient instructions. Discussed use, benefit, and side effects of prescribed medications. All patient questions answered. Pt voiced understanding. Reviewed health maintenance. Patient agreedwith treatment plan. Follow up as directed. CBC CMP lipase. Consult with [facility] as soon as possible please. Continue current medications. Continue medications as prescribed.  Educational information on above diagnosis  provided per AVS.         Electronically signed by CARLOS Diane CNP on 8/26/2022 at 12:36 PM

## 2022-08-30 ENCOUNTER — TELEPHONE (OUTPATIENT)
Dept: FAMILY MEDICINE CLINIC | Age: 67
End: 2022-08-30

## 2022-08-30 NOTE — TELEPHONE ENCOUNTER
----- Message from CARLOS Underwood - CNP sent at 8/30/2022  8:51 AM EDT -----  Cbc, cmp, pancreatic enzyme wnl. Normal liver function.  Labs appear stable

## 2022-08-31 ENCOUNTER — TELEPHONE (OUTPATIENT)
Dept: FAMILY MEDICINE CLINIC | Age: 67
End: 2022-08-31

## 2022-08-31 ENCOUNTER — HOSPITAL ENCOUNTER (OUTPATIENT)
Age: 67
Discharge: HOME | End: 2022-08-31
Payer: MEDICARE

## 2022-08-31 DIAGNOSIS — Z11.59 SCREENING FOR VIRAL DISEASE: Primary | ICD-10-CM

## 2022-08-31 LAB
INFLUENZA A: NOT DETECTED
INFLUENZA B: NOT DETECTED
SARS-COV-2 RNA, RT PCR: NOT DETECTED

## 2022-08-31 PROCEDURE — 87636 SARSCOV2 & INF A&B AMP PRB: CPT

## 2022-09-01 NOTE — TELEPHONE ENCOUNTER
Pt called wanting to let Ky Peacock and Dr. Aram Fang know he has an appt at  on 9/19/22. States he's having an US and to hold aspirin 7 days prior.

## 2022-09-21 ENCOUNTER — OFFICE VISIT (OUTPATIENT)
Dept: FAMILY MEDICINE CLINIC | Age: 67
End: 2022-09-21
Payer: MEDICARE

## 2022-09-21 VITALS
HEART RATE: 92 BPM | WEIGHT: 139 LBS | BODY MASS INDEX: 20.38 KG/M2 | RESPIRATION RATE: 16 BRPM | OXYGEN SATURATION: 97 % | SYSTOLIC BLOOD PRESSURE: 94 MMHG | DIASTOLIC BLOOD PRESSURE: 60 MMHG

## 2022-09-21 DIAGNOSIS — C25.9 MALIGNANT NEOPLASM OF PANCREAS, UNSPECIFIED LOCATION OF MALIGNANCY (HCC): ICD-10-CM

## 2022-09-21 DIAGNOSIS — R93.3 ABNORMAL FINDINGS ON DIAGNOSTIC IMAGING OF OTHER PARTS OF DIGESTIVE TRACT: ICD-10-CM

## 2022-09-21 DIAGNOSIS — K86.89 PANCREATIC MASS: Primary | ICD-10-CM

## 2022-09-21 PROCEDURE — 99214 OFFICE O/P EST MOD 30 MIN: CPT | Performed by: FAMILY MEDICINE

## 2022-09-21 PROCEDURE — 1123F ACP DISCUSS/DSCN MKR DOCD: CPT | Performed by: FAMILY MEDICINE

## 2022-09-25 ASSESSMENT — ENCOUNTER SYMPTOMS
COLOR CHANGE: 1
EYES NEGATIVE: 1
VOMITING: 0
SORE THROAT: 0
CONSTIPATION: 0
ABDOMINAL PAIN: 0
DIARRHEA: 0
COUGH: 0
WHEEZING: 0
NAUSEA: 0

## 2022-09-25 NOTE — PROGRESS NOTES
[address] [place] [facility] [address]  Dept: [phone]  Dept Fax: [phone]  Loc: [phone]  PROGRESS NOTE      VisitDate: 9/21/2022    La Mejia is a 74 y.o. male who presents today for:     Chief Complaint   Patient presents with    Follow-up     Had a biopsy of pancreas done at the [facility]. Flu Vaccine     declines    Health Maintenance     Colon screening due         Subjective:  HPI  Follow-up pancreatic mass. Recently seen at [facility] had a stent placed. States the office called and told him to follow-up in our office for biopsy results. At the time of his visit there were no biopsy results available. He states he is feeling better but is continue to lose weight    Review of Systems   Constitutional:  Positive for unexpected weight change. Negative for chills and fever. HENT:  Negative for congestion and sore throat. Eyes: Negative. Respiratory:  Negative for cough and wheezing. Cardiovascular:  Negative for palpitations and leg swelling. Gastrointestinal:  Negative for abdominal pain, constipation, diarrhea, nausea and vomiting. Genitourinary:  Negative for dysuria and frequency. Musculoskeletal:  Negative for myalgias. Skin:  Positive for color change. Negative for rash. Neurological:  Negative for dizziness and headaches. Past Medical History:   Diagnosis Date    Hypertension     Hypothyroidism       History reviewed. No pertinent surgical history. Family History   Problem Relation Age of Onset    High Blood Pressure Mother      Social History     Tobacco Use    Smoking status: Every Day     Packs/day: 0.10     Years: 30.00     Pack years: 3.00     Types: Cigars, Cigarettes     Start date: 9/16/1990    Smokeless tobacco: Never    Tobacco comments:     -3-4 cigars per day. Printed to avs   Substance Use Topics    Alcohol use:  Yes Comment: 2-3 beers per day since 2022   Previous drank 8-12      Current Outpatient Medications   Medication Sig Dispense Refill    levothyroxine (SYNTHROID) 175 MCG tablet take 1 tablet by mouth once daily 30 tablet 0    pantoprazole (PROTONIX) 40 MG tablet Take 1 tablet by mouth every morning (before breakfast) 90 tablet 1    sucralfate (CARAFATE) 1 GM tablet take 1 tablet by mouth four times a day 120 tablet 5    dilTIAZem (DILACOR XR) 240 MG extended release capsule TAKE 1 CAPSULE DAILY 90 capsule 3    losartan (COZAAR) 100 MG tablet TAKE 1 TABLET DAILY 90 tablet 2    Multiple Vitamin (MULTIVITAMIN PO) Take 1 capsule by mouth daily. aspirin 81 MG EC tablet Take 81 mg by mouth daily. No current facility-administered medications for this visit. No Known Allergies  Health Maintenance   Topic Date Due    COVID-19 Vaccine (1) Never done    Pneumococcal 65+ years Vaccine (1 - PCV) Never done    DTaP/Tdap/Td vaccine (1 - Tdap) Never done    Colorectal Cancer Screen  Never done    Shingles vaccine (1 of 2) Never done    Flu vaccine (1) Never done    Depression Screen  04/12/2023    Annual Wellness Visit (AWV)  04/13/2023    Lipids  04/07/2027    Hepatitis B vaccine  Completed    AAA screen  Completed    Hepatitis C screen  Completed    Hepatitis A vaccine  Aged Out    Hib vaccine  Aged Out    Meningococcal (ACWY) vaccine  Aged Out         Objective:     Physical Exam  Constitutional:       General: He is not in acute distress. Appearance: He is well-developed. He is not diaphoretic. HENT:      Head: Normocephalic and atraumatic. Right Ear: External ear normal.      Left Ear: External ear normal.   Eyes:      Conjunctiva/sclera: Conjunctivae normal.   Neck:      Vascular: No JVD. Cardiovascular:      Rate and Rhythm: Normal rate and regular rhythm. Heart sounds: Normal heart sounds. Pulmonary:      Effort: Pulmonary effort is normal.      Breath sounds: Normal breath sounds.  No wheezing or rales. Musculoskeletal:         General: No tenderness. Skin:     General: Skin is warm and dry. Coloration: Skin is not pale. Neurological:      Mental Status: He is alert and oriented to person, place, and time. BP 94/60   Pulse 92   Resp 16   Wt 139 lb (63 kg)   SpO2 97%   BMI 20.38 kg/m²       Impression/Plan:  1. Pancreatic mass    2. Malignant neoplasm of pancreas, unspecified location of malignancy ([address]    3. Abnormal findings on diagnostic imaging of other parts of digestive tract       Requested Prescriptions      No prescriptions requested or ordered in this encounter     Orders Placed This Encounter   Procedures    PET CT SKULL BASE MID THIGH RESTAGE     Standing Status:   Future     Standing Expiration Date:   9/22/2023    Brenda Griffith MD, Hematology & Oncology, [address] [facility]     Referral Priority:   Routine     Referral Type:   Eval and Treat     Referral Reason:   Specialty Services Required     Referred to Provider: German Cooper MD     Requested Specialty:   Hematology and Oncology     Number of Visits Requested:   1   302 get lab results, his CA 19-9 was elevated. All imaging studies and endoscopic reports indicate a high concern for pancreatic cancer we will get him set up with a PET scan and an appointment with oncology  30 minutes face-to-face time for percent time spent on counseling and care coordination    Patient giveneducational materials - see patient instructions. Discussed use, benefit, and side effects of prescribed medications. All patient questions answered. Pt voiced understanding. Reviewed health maintenance. Patient agreedwith treatment plan. Follow up as directed. **This report has been created using voice recognition software. It may contain minor errorswhich are inherent in voice recognition technology. **       Electronically signed by Ines Felix MD on 9/25/2022 at 12:02 PM

## 2022-09-26 ENCOUNTER — CLINICAL DOCUMENTATION (OUTPATIENT)
Dept: CASE MANAGEMENT | Age: 67
End: 2022-09-26

## 2022-09-28 ENCOUNTER — HOSPITAL ENCOUNTER (OUTPATIENT)
Dept: PET IMAGING | Age: 67
Discharge: HOME | End: 2022-09-28
Payer: MEDICARE

## 2022-09-28 DIAGNOSIS — C25.9 MALIGNANT NEOPLASM OF PANCREAS, UNSPECIFIED LOCATION OF MALIGNANCY (HCC): ICD-10-CM

## 2022-09-28 DIAGNOSIS — R93.3 ABNORMAL FINDINGS ON DIAGNOSTIC IMAGING OF OTHER PARTS OF DIGESTIVE TRACT: ICD-10-CM

## 2022-09-28 PROCEDURE — 78815 PET IMAGE W/CT SKULL-THIGH: CPT

## 2022-09-28 PROCEDURE — 3430000000 HC RX DIAGNOSTIC RADIOPHARMACEUTICAL: Performed by: INTERNAL MEDICINE

## 2022-09-28 PROCEDURE — A9552 F18 FDG: HCPCS | Performed by: INTERNAL MEDICINE

## 2022-09-28 RX ORDER — FLUDEOXYGLUCOSE F 18 200 MCI/ML
15 INJECTION, SOLUTION INTRAVENOUS
Status: COMPLETED | OUTPATIENT
Start: 2022-09-28 | End: 2022-09-28

## 2022-09-28 RX ADMIN — FLUDEOXYGLUCOSE F 18 15 MILLICURIE: 200 INJECTION, SOLUTION INTRAVENOUS at 12:31

## 2022-10-04 ENCOUNTER — TELEPHONE (OUTPATIENT)
Dept: SURGERY | Age: 67
End: 2022-10-04

## 2022-10-04 ENCOUNTER — OFFICE VISIT (OUTPATIENT)
Dept: ONCOLOGY | Age: 67
End: 2022-10-04
Payer: MEDICARE

## 2022-10-04 ENCOUNTER — CLINICAL DOCUMENTATION (OUTPATIENT)
Dept: CASE MANAGEMENT | Age: 67
End: 2022-10-04

## 2022-10-04 ENCOUNTER — HOSPITAL ENCOUNTER (OUTPATIENT)
Dept: INFUSION THERAPY | Age: 67
Discharge: HOME | End: 2022-10-04
Payer: MEDICARE

## 2022-10-04 VITALS
HEIGHT: 69 IN | BODY MASS INDEX: 20.29 KG/M2 | DIASTOLIC BLOOD PRESSURE: 64 MMHG | WEIGHT: 137 LBS | TEMPERATURE: 97.7 F | SYSTOLIC BLOOD PRESSURE: 110 MMHG | RESPIRATION RATE: 16 BRPM | OXYGEN SATURATION: 100 % | HEART RATE: 75 BPM

## 2022-10-04 VITALS
TEMPERATURE: 97.7 F | RESPIRATION RATE: 16 BRPM | DIASTOLIC BLOOD PRESSURE: 64 MMHG | SYSTOLIC BLOOD PRESSURE: 110 MMHG | HEART RATE: 75 BPM

## 2022-10-04 DIAGNOSIS — C25.1 MALIGNANT NEOPLASM OF BODY OF PANCREAS (HCC): Primary | ICD-10-CM

## 2022-10-04 PROCEDURE — 99205 OFFICE O/P NEW HI 60 MIN: CPT | Performed by: INTERNAL MEDICINE

## 2022-10-04 PROCEDURE — 99211 OFF/OP EST MAY X REQ PHY/QHP: CPT

## 2022-10-04 PROCEDURE — 1123F ACP DISCUSS/DSCN MKR DOCD: CPT | Performed by: INTERNAL MEDICINE

## 2022-10-04 ASSESSMENT — ENCOUNTER SYMPTOMS
RHINORRHEA: 0
TROUBLE SWALLOWING: 0
RECTAL PAIN: 0
VOMITING: 0
COUGH: 0
NAUSEA: 0
SHORTNESS OF BREATH: 0
CONSTIPATION: 0
SINUS PRESSURE: 0
BLOOD IN STOOL: 0

## 2022-10-04 NOTE — PATIENT INSTRUCTIONS
Reviewed labs, pathology and recent medical history. Discussed the possibility of returning to see medical oncology at [facility] for treatment options. Reviewed chemotherapy and radiation for treatment plan for treatment here. Chemotherapy plan placed. Discussed the NCCN guidelines. org for patient and provider use to help with planning. Referral to Radiation Oncology  MRI Brain for staging  Referral to General Surgery for port placement  Return to see MD in 2 weeks.

## 2022-10-04 NOTE — TELEPHONE ENCOUNTER
Referral for Medip[facility] to Dr. Mariola Luke received.     Called ptALEXIA asking him to call office to schedule appointment with Dr. Mariola Luke

## 2022-10-04 NOTE — PROGRESS NOTES
[address] [facility]  [address] [place] [address]  Dept: [phone]  Loc: Shantanu Larose 1945 [facility]  1955   No ref. provider found   Ralf Diaz MD       Sloan Later is a 74 y.o.  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 HISTORY OF PRESENT ILLNESS    August 212.  25-year-old male with 6 months of weight loss. He went to the dentist and had some bad teeth. He was told that he had to have these pulled. He was nervous about this and was not eating and then felt that he had an infection that was spreading through his body. He began having stomach pain and was treated for ulcers but this did not cause any improvement. Ultrasound was performed and was abnormal leading to a CT scan. Had decreased his beer intake from 12 pack of beer a day to 2. Felt restless, agitated, anxious. 8/25/2022. CAT scan of the abdomen performed locally showed a mass in the body of the pancreas 5 x 5.5 x 4.8 cm with involvement of the celiac axis and encasement of the superior mesenteric artery, narrows the celiac trunk and infiltrates the root of the mesentery. There were numerous enlarged peripancreatic and mesenteric lymph nodes measuring up to 14 mm. .  Labs in July were normal.  Just returned from a cruise to the [place]. Had noted dark urine for a week and appeared to be slightly jaundiced. 9/19/2022. Bilirubin was 4.5 with ALT of 136 and AST of 148. CA 19-9 was 321. Sugar was 111. CBC showed a white count 6.6 with a hemoglobin of 14 hematocrit 42 and a platelet count of 509,822.    9/19/2022. Endoscopy was performed. Endosonographic findings showed diffuse dilatation of the intrahepatic bile ducts.   There is an irregular mass in the genu of the pancreas and pancreatic body measuring approximately 4.5 x 3.8 cm. There was sonographic evidence of invasion into the superior mesenteric artery and a few abnormal lymph nodes were visualized in the peripancreatic region. Fine-needle aspiration of mass of the body of the pancreas at [facility] showed high-grade neuroendocrine carcinoma favoring small cell carcinoma. 9/19/2022. ERCP. There is no evidence of esophageal varices or gastric varices. There is stenosis in the main bile duct in the middle third. Sphincterotomy was performed. An uncovered metal stent was placed and he was discharged home. 9/21/2022. Patient followed up with his primary care physician Dr. Onel Blanco. Patient said that he felt better but was continuing to lose weight.    9/28/2022. PET scan showed that there was an FDG avid pancreatic mass highly suspicious for malignancy that was better seen on recent CT scan. Maximum SUV was 5.7 with areas of photopenia in the mass associated with hypoattenuation likely secondary to necrosis. The urinary bladder was markedly distended and there was bilateral hydroureter. Prostate was enlarged with calcifications. There is no evidence of mesenteric retroperitoneal pelvic or inguinal lymphadenopathy that was FDG avid. Degenerative changes were seen in the lumbar spine and pelvis without evidence of hypermetabolic avidity    Comorbidities include hypertension,hypothyroidism, anxiety, alcohol use disorder, nicotine use disorder. The patient said that he used to weigh 197 pounds and now he weighs 137. He says that he feels cold all of the time. MONITORING PARAMETERS    CAT scans, PET scans, weights    PAST MEDICAL HISTORY  Past Medical History:   Diagnosis Date    Hypertension     Hypothyroidism     Malignant neoplasm of pancreas, unspecified location of malignancy ([facility] [address] 09/2022        REVIEW OF SYSTEMS  Review of Systems   Constitutional:  Negative for chills, diaphoresis and fever.    HENT:  Negative for dental problem, ear discharge, mouth sores, rhinorrhea, sinus pressure and trouble swallowing. Eyes:  Negative for visual disturbance. Respiratory:  Negative for cough and shortness of breath. Cardiovascular:  Negative for chest pain and leg swelling. Gastrointestinal:  Negative for blood in stool, constipation, nausea, rectal pain and vomiting. Endocrine: Negative for polyuria. Genitourinary:  Negative for dysuria, hematuria and urgency. Musculoskeletal:  Positive for arthralgias (generalized). Negative for myalgias. Skin:  Negative for rash. Neurological:  Negative for weakness, light-headedness, numbness and headaches. Hematological:  Negative for adenopathy. Does not bruise/bleed easily. Psychiatric/Behavioral:  Positive for sleep disturbance (doesn't sleep well, anxiety). Negative for agitation, dysphoric mood and self-injury. The patient is nervous/anxious. FAMILY HISTORY  Family History   Problem Relation Age of Onset    High Blood Pressure Mother     Lung Cancer Father         SOCIAL HISTORY  Social History     Socioeconomic History    Marital status:      Spouse name: Not on file    Number of children: Not on file    Years of education: Not on file    Highest education level: Not on file   Occupational History    Not on file   Tobacco Use    Smoking status: Every Day     Packs/day: 0.10     Years: 30.00     Pack years: 3.00     Types: Cigars, Cigarettes     Start date: 9/16/1990    Smokeless tobacco: Never    Tobacco comments:     -3-4 cigars per day.  Printed to [facility] cessation counseling 10/4/22   Substance and Sexual Activity    Alcohol use: Yes     Comment: 2-3 beers per day since 2022   Previous drank 8-12    Drug use: No    Sexual activity: Not on file   Other Topics Concern    Not on file   Social History Narrative    Not on file     Social Determinants of Health     Financial Resource Strain: Low Risk     Difficulty of Paying Living Expenses: Not hard at all   Food Insecurity: No Food Insecurity    Worried About Running Out of Food in the Last Year: Never true    Ran Out of Food in the Last Year: Never true   Transportation Needs: Not on file   Physical Activity: Inactive    Days of Exercise per Week: 0 days    Minutes of Exercise per Session: 0 min   Stress: Not on file   Social Connections: Not on file   Intimate Partner Violence: Not on file   Housing Stability: Not on file        CURRENT MEDICATIONS  Current Outpatient Medications   Medication Sig Dispense Refill    levothyroxine (SYNTHROID) 175 MCG tablet take 1 tablet by mouth once daily 30 tablet 0    pantoprazole (PROTONIX) 40 MG tablet Take 1 tablet by mouth every morning (before breakfast) 90 tablet 1    dilTIAZem (DILACOR XR) 240 MG extended release capsule TAKE 1 CAPSULE DAILY 90 capsule 3    losartan (COZAAR) 100 MG tablet TAKE 1 TABLET DAILY 90 tablet 2    Multiple Vitamin (MULTIVITAMIN PO) Take 1 capsule by mouth daily. aspirin 81 MG EC tablet Take 81 mg by mouth daily. No current facility-administered medications for this visit. OARRS    PDMP Monitoring: He had a prescription for tramadol after his teeth extraction. No cause for concern. PDMP Monitoring:    Last PDMP Dean Tejada as Reviewed:  Review User Review Instant Review Result   [facility] 10/5/2022 12:45 AM Reviewed PDMP [1]     Physical Exam  Vitals and nursing note reviewed. Exam conducted with a chaperone present. Constitutional:       General: He is not in acute distress. Appearance: Normal appearance. He is not ill-appearing, toxic-appearing or diaphoretic. Comments: Very thin, tanned  male who is in no acute distress he is here today with his wife, daughter and niece. He is alert, appropriate and interactive. HENT:      Head: Normocephalic and atraumatic.       Comments: Bilateral temporal wasting     Nose: Nose normal.      Mouth/Throat:      Mouth: Mucous membranes are moist.      Pharynx: Oropharynx is clear. No oropharyngeal exudate or posterior oropharyngeal erythema. Eyes:      General: No scleral icterus. Extraocular Movements: Extraocular movements intact. Conjunctiva/sclera: Conjunctivae normal.      Pupils: Pupils are equal, round, and reactive to light. Cardiovascular:      Rate and Rhythm: Normal rate and regular rhythm. Pulses: Normal pulses. Heart sounds: Normal heart sounds. No murmur heard. No gallop. Comments: No ectopy  Pulmonary:      Effort: Pulmonary effort is normal. No respiratory distress. Breath sounds: Normal breath sounds. No stridor. No wheezing, rhonchi or rales. Chest:      Chest wall: No tenderness. Abdominal:      General: Abdomen is flat. Bowel sounds are normal. There is no distension. Palpations: Abdomen is soft. There is no mass. Tenderness: There is no abdominal tenderness. There is no guarding. Hernia: No hernia is present. Comments: Prominent abdominal aorta pulsations   Musculoskeletal:         General: Normal range of motion. Cervical back: Normal range of motion and neck supple. No rigidity or tenderness. Right lower leg: No edema. Left lower leg: No edema. Lymphadenopathy:      Head:      Right side of head: No submental or submandibular adenopathy. Left side of head: No submental or submandibular adenopathy. Cervical: No cervical adenopathy. Right cervical: No superficial, deep or posterior cervical adenopathy. Left cervical: No superficial, deep or posterior cervical adenopathy. Upper Body:      Right upper body: No supraclavicular or axillary adenopathy. Left upper body: No supraclavicular or axillary adenopathy. Skin:     General: Skin is warm and dry. Coloration: Skin is not jaundiced or pale. Findings: No bruising or lesion. Comments: Scattered areas of sun damaged areas   Neurological:      General: No focal deficit present.       Mental Status: He is alert and oriented to person, place, and time. Mental status is at baseline. Cranial Nerves: No cranial nerve deficit. Motor: No weakness. Gait: Gait normal.   Psychiatric:         Mood and Affect: Mood normal.         Behavior: Behavior normal.         Thought Content: Thought content normal.         Judgment: Judgment normal.        LABS/IMAGING  PET CT SKULL BASE TO MID THIGH    Result Date: 9/28/2022  FDG avid pancreatic mass highly suspicious for malignancy. Final report electronically signed by Dr. Taniya Paris on 9/28/2022 2:24 PM     PATHOLOGY/GENETICS    Small cell neuroendocrine carcinoma of the pancreas    ASSESSMENT and PLAN    1. Small cell neuroendocrine carcinoma of the pancreas. We discussed further staging which would include an MRI of the brain and probably a bone scan. We talked about the NCCN guidelines which would recommend for this nonresectable aggressive nonpulmonary neuroendocrine tumor that chemotherapy and radiation therapy would be most appropriate. We talked about whether or not he wanted to go back down to [facility] for medical oncology opinion. In the meantime we will orchestrate the the rest of his staging and put a treatment plan in place and refer him to radiation oncology as well. Referral has been made to general surgery for port placement. 2.  Profound weight loss. We talked about eating lean protein. Megace may be something that might help him. Family is very supportive. He may needed dietitian consult. 3.  Hypertension. We will continue on his current medication. 4.  GERD/stomach upset. He will continue on his Protonix. Mr. Ronald Roberts and his family will call us if he has any change in his status, concerns or questions.     Hien Colindres MD 10/5/2022 12:45 AM

## 2022-10-04 NOTE — PROGRESS NOTES
Name: Aisha Flores  : 1955  MRN: [id number]    Oncology Navigation- Initial Note:    Intake-  Contact Type: Medical Oncology    Diagnosis: GI- malignant    Home Disposition: Lives with other who is able to assist    Patient needs and barriers to care: Knowledge deficit and Symptom Management     Referral Source: Outpatient    Receptive to Advanced Care Planning/ Palliative Care:  Deferred    Interventions-   General Interventions: Navigation Welcome Packet given and program explaine     Referrals: General Surgery, Radiation Oncology       Continuum of Care: Diagnosis/Active Treatment    Notes:   Met with Eladio, his wife-Ladi, daughter-Danae, niece-Lindy during consultation for his neuro endocrine small cell cancer of pancreas-Limited stage. PET-contained in pancreas-inoperable. Dr. Alena Almeida reviewed history and tests. NCCN guidelines given. Plan of Care discussed in detail:  Chemotherapy-Cisplatin day #1, -16 days 1-3 every 21 days. Radiation will join second cycle. Referral to Radiation Oncology-Dr. Steph Weiner  Referral to [facility] -Dr. Lanie Hassan for mediport placement  Chemo teaching  MRI of head 10/26  Discussed being seen back at  for their input-he willl think about this    Discussed nutrition -lost approximately 30 pounds-starting to put some back on. Discussed walking about 30 minutes daily-does stay busy, not as much as before. Limit alcohol intake to maybe one beer/day-has been 7-8 daily. 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 Contact information given and they know to call with any questions or concerns.     Electronically signed by Faith Piper RN on 10/4/2022 at 3:57 PM

## 2022-10-06 ENCOUNTER — TELEPHONE (OUTPATIENT)
Dept: FAMILY MEDICINE CLINIC | Age: 67
End: 2022-10-06

## 2022-10-06 DIAGNOSIS — K86.89 PANCREATIC MASS: ICD-10-CM

## 2022-10-06 DIAGNOSIS — E03.9 HYPOTHYROIDISM, UNSPECIFIED TYPE: ICD-10-CM

## 2022-10-06 DIAGNOSIS — R63.4 WEIGHT LOSS: Primary | ICD-10-CM

## 2022-10-06 DIAGNOSIS — R53.83 FATIGUE, UNSPECIFIED TYPE: ICD-10-CM

## 2022-10-06 NOTE — TELEPHONE ENCOUNTER
Multiple attempts made over the last 2 days to reach pt to schedule appointment to see Dr. Tamera Pallas, no answer, have left messages asking for a return call.

## 2022-10-12 ENCOUNTER — OFFICE VISIT (OUTPATIENT)
Dept: FAMILY MEDICINE CLINIC | Age: 67
End: 2022-10-12
Payer: MEDICARE

## 2022-10-12 VITALS
TEMPERATURE: 97.1 F | BODY MASS INDEX: 20.25 KG/M2 | RESPIRATION RATE: 14 BRPM | SYSTOLIC BLOOD PRESSURE: 110 MMHG | DIASTOLIC BLOOD PRESSURE: 60 MMHG | HEIGHT: 69 IN | WEIGHT: 136.7 LBS | OXYGEN SATURATION: 99 % | HEART RATE: 76 BPM

## 2022-10-12 DIAGNOSIS — C25.1 MALIGNANT NEOPLASM OF BODY OF PANCREAS (HCC): ICD-10-CM

## 2022-10-12 DIAGNOSIS — E03.9 HYPOTHYROIDISM, UNSPECIFIED TYPE: Primary | Chronic | ICD-10-CM

## 2022-10-12 PROCEDURE — 99213 OFFICE O/P EST LOW 20 MIN: CPT | Performed by: FAMILY MEDICINE

## 2022-10-12 PROCEDURE — 1123F ACP DISCUSS/DSCN MKR DOCD: CPT | Performed by: FAMILY MEDICINE

## 2022-10-12 RX ORDER — LEVOTHYROXINE SODIUM 0.2 MG/1
TABLET ORAL
Qty: 30 TABLET | Refills: 1
Start: 2022-10-12

## 2022-10-12 ASSESSMENT — ENCOUNTER SYMPTOMS
COUGH: 0
EYE PAIN: 0
ABDOMINAL DISTENTION: 0
ABDOMINAL PAIN: 0
NAUSEA: 0
RHINORRHEA: 0
DIARRHEA: 0
SORE THROAT: 0
SHORTNESS OF BREATH: 0
SINUS PRESSURE: 0
CONSTIPATION: 0

## 2022-10-14 ENCOUNTER — HOSPITAL ENCOUNTER (OUTPATIENT)
Dept: RADIATION ONCOLOGY | Age: 67
Discharge: HOME | End: 2022-10-14
Payer: MEDICARE

## 2022-10-14 VITALS
WEIGHT: 136.8 LBS | HEIGHT: 69 IN | RESPIRATION RATE: 16 BRPM | HEART RATE: 65 BPM | DIASTOLIC BLOOD PRESSURE: 78 MMHG | BODY MASS INDEX: 20.26 KG/M2 | OXYGEN SATURATION: 99 % | TEMPERATURE: 98.2 F | SYSTOLIC BLOOD PRESSURE: 155 MMHG

## 2022-10-14 PROCEDURE — 99202 OFFICE O/P NEW SF 15 MIN: CPT | Performed by: RADIOLOGY

## 2022-10-14 PROCEDURE — 99205 OFFICE O/P NEW HI 60 MIN: CPT | Performed by: RADIOLOGY

## 2022-10-14 ASSESSMENT — ENCOUNTER SYMPTOMS
BLOOD IN STOOL: 0
SORE THROAT: 0
COUGH: 0
SHORTNESS OF BREATH: 0
VOMITING: 0
ABDOMINAL PAIN: 0
CHEST TIGHTNESS: 0
TROUBLE SWALLOWING: 0
NAUSEA: 0
WHEEZING: 0
BACK PAIN: 0

## 2022-10-14 NOTE — PROGRESS NOTES
Radiation Oncology Consultation  Encounter Date: 10/14/2022     Mr. Rai Cotter is a 74 y.o. male  : 1955  MRN: [id number]  [facility]  Requesting Provider: Dr. Janiya Kaufman     Reason for request: Pancreas cancer      CONSULTANT: Anusha Arredondo PhD, MD      DIAGNOSIS: C25 -- Malignant neoplasm of the pancreas; High grade neuroendocrine carcinoma (favor small cell); cT4 N2 M0, Stage III  Date of diagnosis: 2022      ASSESSMENT:  High-grade neuroendocrine carcinoma of the pancreas as described above. This is a very rare cancer comprising only 1 to 2% of all pancreas cancers. The AJCC staging was reviewed. Micah Sawyer received a copy of his AJCC staging information. Treatment options and recommendations including current clinical practice guidelines (NCCN) were reviewed. For non-adenocarcinoma pancreas cancers, the recommendation is to treat based on standard treatment for the particular histology involved. In this case, chemotherapy involves cisplatin or carboplatin with etoposide (in 1 reported case doxorubicin was added, while another utilized gemcitabine and 5-FU, but the majority of cases have been managed with carboplatin/etoposide or cisplatin/etoposide). Micah Sawyer received a copy of the clinical practice guideline pertaining to his situation. 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 We reviewed various aspects of the recommended radiation therapy. This included discussion of the nature/mode of action of external beam radiation therapy, multiple steps involved in set up/simulation, planning, initiation and performance of the treatment. We reviewed logistics including expected number of treatments and the amount of time usually spent in the department for each treatment.   We then reviewed expected and potential short and long-term side effects and risks of the treatment. Lyssa Olson and his wife had the opportunity to ask questions and indicated their questions were satisfactorily addressed. Lyssa Olson also indicated he understood the discussion and wishes to proceed with the recommended treatment. PLAN:  Schedule CT simulation  Schedule teaching  Initiate radiation therapy after completion of treatment planning in conjunction with chemotherapy  Continue care with other physicians/providers  Continue surveillance and basic/preventive/supportive health care in accordance with clinical practice guidelines      HISTORY OF PRESENT ILLNESS:   Quin Lora Is a very nice and active 68-year-old gentleman Who had insidious onset of symptoms including unexplained weight loss, epigastric discomfort and abdominal bloating. His work-up included laboratory studies which were unremarkable and a CT of the abdomen and pelvis. The CT scan (see below) returned markedly abnormal showing a mass in the body of the pancreas encasing celiac and superior mesenteric arteries. Lyssa Olson was referred to [facility] for further evaluation. He underwent fine-needle aspiration in September 2022 with the pathology unexpectedly returning showing high-grade neuroendocrine carcinoma favoring small cell carcinoma. Lyssa Olson underwent stent placement and was advised that the treatment recommendation was for initial chemotherapy and radiation therapy followed by response assessment. Lyssa Olson wishes to undergo his treatment closer to his home. Staging PET scan was obtained and does not show any evidence of hematogenous metastasis (staging MRI scan of the brain is pending). Lyssa Olson was seen by Dr. Vimal Tapia in medical oncology regarding systemic therapy. He is being seen today 10/14/2022 for evaluation and discussion regarding the role of radiation therapy in the management of his high-grade neuroendocrine carcinoma of the pancreas.         Past Medical History:   Diagnosis Date    Hypertension Hypothyroidism     Malignant neoplasm of pancreas, unspecified location of malignancy ([facility] [address] 09/2022        History reviewed. No pertinent surgical history. Family History   Problem Relation Age of Onset    High Blood Pressure Mother     Lung Cancer Father        Social History     Socioeconomic History    Marital status:      Spouse name: Kike Valle    Number of children: 3    Years of education: Not on file    Highest education level: Not on file   Occupational History    Not on file   Tobacco Use    Smoking status: Every Day     Types: Cigars     Start date: 1/1/2012     Passive exposure: Past (Dad smoked)    Smokeless tobacco: Never    Tobacco comments:     -3-4 cigars per day.  denies cessation counseling 10/14/22   Vaping Use    Vaping Use: Never used   Substance and Sexual Activity    Alcohol use: Yes     Comment: 2-3 beers per day since 2022   Previous drank 8-12    Drug use: No    Sexual activity: Not on file   Other Topics Concern    Not on file   Social History Narrative    Not on file     Social Determinants of Health     Financial Resource Strain: Not on file   Food Insecurity: Not on file   Transportation Needs: Not on file   Physical Activity: Inactive    Days of Exercise per Week: 0 days    Minutes of Exercise per Session: 0 min   Stress: Not on file   Social Connections: Not on file   Intimate Partner Violence: Not on file   Housing Stability: Not on file     Exposure to Industrial/ environmental Carcinogens: no      ALLERGIES: No Known Allergies       Current Outpatient Medications   Medication Sig Dispense Refill    levothyroxine (SYNTHROID) 200 MCG tablet take 1 tablet by mouth once daily 30 tablet 1    pantoprazole (PROTONIX) 40 MG tablet Take 1 tablet by mouth every morning (before breakfast) (Patient not taking: Reported on 10/12/2022) 90 tablet 1    dilTIAZem (DILACOR XR) 240 MG extended release capsule TAKE 1 CAPSULE DAILY 90 capsule 3    losartan (COZAAR) 100 MG tablet TAKE 1 TABLET DAILY 90 tablet 2    Multiple Vitamin (MULTIVITAMIN PO) Take 1 capsule by mouth daily. aspirin 81 MG EC tablet Take 81 mg by mouth daily. No current facility-administered medications for this encounter. No outpatient medications have been marked as taking for the 10/14/22 encounter [facility] Encounter) with Kurt Roy MD.          LABORATORY STUDIES:   CBC:   Lab Results   Component Value Date/Time    WBC 11.5 06/23/2020 12:14 PM    RBC 4.97 06/23/2020 12:14 PM    HGB 15.4 06/23/2020 12:14 PM    HCT 48.5 06/23/2020 12:14 PM    MCV 97.6 06/23/2020 12:14 PM    MCH 31.0 06/23/2020 12:14 PM    MCHC 31.8 06/23/2020 12:14 PM     06/23/2020 12:14 PM    MPV 10.4 06/23/2020 43:23 PM     MetabolicPanel:  Lab Results   Component Value Date/Time     09/12/2020 12:00 AM    K 4.7 09/12/2020 12:00 AM     09/12/2020 12:00 AM    CO2 23 09/12/2020 12:00 AM    BUN 21 09/12/2020 12:00 AM    CREATININE 1.39 09/12/2020 12:00 AM    LABGLOM 52 09/12/2020 12:00 AM    GLUCOSE 102 09/12/2020 12:00 AM    LABALBU 4.4 09/12/2020 12:00 AM    CALCIUM 9.5 09/12/2020 12:00 AM    BILITOT 0.5 09/12/2020 12:00 AM    ALKPHOS 69 09/12/2020 12:00 AM    AST 19 09/12/2020 12:00 AM    ALT 10 09/12/2020 12:00 AM           PATHOLOGY:   9/19/2022: Cytology none GYN:        RADIOLOGIC STUDIES:   8/25/2022: CT abdomen pelvis with IV contrast:  Impression   1. Infiltrative mass in the body of the pancreas measures at least 5.5 x 4.8 x 5.0 cm. The mass encases and narrows the celiac trunk and SMA and infiltrates the root of the mesentery. Numerous enlarged peripancreatic and mesenteric lymph nodes measure up    to 14 mm in short axis. The gallbladder is mildly distended however no biliary ductal dilatation or liver lesions are observed. 2. Distention of the urinary bladder which exhibits wall thickening and trabeculation.  Mild to moderate left hydronephrosis and hydroureter is of uncertain etiology no ureteral calculus is observed. Differential considerations include a recently passed    calculus, ascending urinary tract infection, urothelial lesion. Only mild prostatomegaly is visualized. Correlate with urinalysis and PSA levels. 2022: PET/CT skull base MID thigh:  Impression       FDG avid pancreatic mass highly suspicious for malignancy. Review of Systems   Constitutional:  Positive for unexpected weight change. Negative for activity change, appetite change and fatigue. HENT:  Negative for congestion, sore throat and trouble swallowing. Respiratory:  Negative for cough, chest tightness, shortness of breath and wheezing. Cardiovascular:  Negative for chest pain and leg swelling. Gastrointestinal:  Negative for abdominal pain, blood in stool, nausea and vomiting. Genitourinary:  Negative for dysuria, frequency and urgency. Musculoskeletal:  Negative for back pain. Neurological:  Negative for dizziness, weakness, numbness and headaches. Psychiatric/Behavioral:  Negative for confusion. A complete review of systems was performed and found to be negative except as presented above. PHYSICAL EXAMINATION:   VITAL SIGNS: BP (!) 155/78   Pulse 65   Temp 98.2 °F (36.8 °C) (Infrared)   Resp 16   Ht 5' 9.02\" (1.753 m)   Wt 136 lb 12.8 oz (62.1 kg)   SpO2 99%   BMI 20.19 kg/m²   ECOG PERFORMANCE STATUS: 1  PAIN RATIN/10  GENERAL: Pleasant well-developed but cachectic appearing adult Male. In no acute distress. Alert and oriented ×3; clear mentation with appropriate affect  SKIN: Warm and dry, without jaundice, ecchymoses, or petechiae. HEENT: Normocephalic, atraumatic. PERRL/EOMI; ears, nose and lips unremarkable on external examination;  NECK:  No JVD; no palpable cervical adenopathy. THORAX: No palpable supraclavicular or axillary adenopathy;  LUNGS: clear bilaterally.     CARDIAC: Regular rate and rhythm  ABDOMEN: Soft, nontender, bowel sounds present  EXTREMITIES: No clubbing or cyanosis  NEUROLOGIC: No grossly apparent focal deficits. Cranial nerves grossly intact      Thank you for allowing my assistance in [address] [place] [address]. Kathrin York PhD MD  Radiation Oncology     ATTESTATION: 60 minutes face-to-face time,  >50% spent in counseling/discussion/education. CC: Alex Quinones; DEJA Alexandra  ACC: Tumor Registry: [address]      This document was created using a voice-recognition program.  Computer generated transcription errors may be present.

## 2022-10-17 ENCOUNTER — CLINICAL DOCUMENTATION (OUTPATIENT)
Dept: CASE MANAGEMENT | Age: 67
End: 2022-10-17

## 2022-10-17 NOTE — PROGRESS NOTES
Name: Pushpa Mosher  : 1955  MRN: [id number]    Oncology Navigation Follow-Up Note    Contact Type:  Telephone    Notes:   Phoned Torito Nicolejelly, Eladio's wife, to go over new schedule of events. 10/19-Sim in radiation  10/24-consultation with Dr. Sean Pham for mediport placement (probably will be placed that week)  10/26-MRI head  -Plan placed for chemotherapy to start -cisplatin with Etopside-waiting for authorization-will have follow up with Dr. Genny Pathak notified of appointment)  Possibility of radiation being ready at time of chemotherapy alsol  They will be called for appointment time of chemotherapy teaching. Torito Mehta verbalized understanding of above and they know to call with any questions or concerns.     Electronically signed by Chencho Huff RN on 10/17/2022 at 4:58 PM

## 2022-10-19 ENCOUNTER — HOSPITAL ENCOUNTER (OUTPATIENT)
Dept: CT IMAGING | Age: 67
Discharge: HOME | End: 2022-10-19

## 2022-10-19 ENCOUNTER — HOSPITAL ENCOUNTER (OUTPATIENT)
Dept: RADIATION ONCOLOGY | Age: 67
Discharge: HOME | End: 2022-10-19
Payer: MEDICARE

## 2022-10-19 DIAGNOSIS — C25.0 MALIGNANT NEOPLASM OF HEAD OF PANCREAS (HCC): ICD-10-CM

## 2022-10-19 PROCEDURE — 77470 SPECIAL RADIATION TREATMENT: CPT | Performed by: RADIOLOGY

## 2022-10-19 PROCEDURE — 3209999900 CT GUIDE RADIATION THERAPY NO CHARGE

## 2022-10-19 PROCEDURE — 77263 THER RADIOLOGY TX PLNG CPLX: CPT | Performed by: RADIOLOGY

## 2022-10-19 PROCEDURE — 77334 RADIATION TREATMENT AID(S): CPT | Performed by: RADIOLOGY

## 2022-10-24 ENCOUNTER — TELEPHONE (OUTPATIENT)
Dept: SURGERY | Age: 67
End: 2022-10-24

## 2022-10-24 ENCOUNTER — OFFICE VISIT (OUTPATIENT)
Dept: SURGERY | Age: 67
End: 2022-10-24
Payer: MEDICARE

## 2022-10-24 VITALS
BODY MASS INDEX: 21.18 KG/M2 | SYSTOLIC BLOOD PRESSURE: 128 MMHG | HEIGHT: 69 IN | DIASTOLIC BLOOD PRESSURE: 60 MMHG | HEART RATE: 83 BPM | TEMPERATURE: 97.9 F | OXYGEN SATURATION: 99 % | WEIGHT: 143 LBS

## 2022-10-24 DIAGNOSIS — C25.1 MALIGNANT NEOPLASM OF BODY OF PANCREAS (HCC): Primary | ICD-10-CM

## 2022-10-24 PROCEDURE — 99203 OFFICE O/P NEW LOW 30 MIN: CPT | Performed by: SURGERY

## 2022-10-24 PROCEDURE — 1123F ACP DISCUSS/DSCN MKR DOCD: CPT | Performed by: SURGERY

## 2022-10-24 NOTE — TELEPHONE ENCOUNTER
Patient scheduled for surgery with Dr. Stepan Reyes on 10- at 7:30am with an arrival of 6:00am.  Preop orders (EKG) and instructions given. Surgery consent signed. Antibacterial soap given.

## 2022-10-24 NOTE — TELEPHONE ENCOUNTER
Per [facility]-    CPT code: 58065       The procedure code you entered was not found on the [facility] Participating Provider Medical Precertification List. If youre a participating provider, no precertification is required when this service is performed as an outpatient procedure for a medical or surgical diagnosis.

## 2022-10-24 NOTE — PROGRESS NOTES
Jarrod Reddy MD [facility]  General Surgery  New Patient Evaluation in Office  Pt Name: Kat Beltre  Date of Birth 1955   Today's Date: 10/24/2022  Medical Record Number: [id number]  Referring Provider: No ref. provider found  Primary Care Provider: Freddy Clemons MD  Chief Complaint   Patient presents with    Surgical Consult     New patient- referred by Dr. Hammer Pu - Pancreatic Trudell Proper List Items Addressed This Visit       Malignant neoplasm of body of pancreas ([facility] [address] - Primary    Relevant Orders    INSERTION CENTRAL VENOUS ACCESS DEVICE W/ SUBCUTANEOUS PORT    FL VASCULAR ACCESS Ghulam Dobbins  has a past medical history of Hypertension, Hypothyroidism, and Malignant neoplasm of pancreas, unspecified location of malignancy ([address] PLANS      Schedule Steven Rodríguez for Left  single [employer]	[place]  Status: Outpatient  Planned anesthesia: MAC  He will undergo pre-operative clearance per anesthesia guidelines with risk factors listed under the past medical history diagnosis & problem list.  Perioperative discontinuation of ASA, Plavix, warfarin, Brillinta, Effient, Pradaxa, Eliquis, and Xarelto. Continuation of 81 mg Aspirin is acceptable. Perioperative medical clearance is not required   Techniques for long term IV access were discussed. Risks, complications and benefits of each were reviewed including bleeding, infection, thrombosis and lung injury were reviewed. The patient was given the opportunity to ask questions. Once answered, informed consent was obtained and the patient scheduled for the procedure.     Orders Placed This Encounter:  Orders Placed This Encounter   Procedures    INSERTION CENTRAL VENOUS ACCESS DEVICE W/ SUBCUTANEOUS PORT     Standing Status:   Future     Standing Expiration Date:   10/24/2023    [facility] VASCULAR ACCESS GUIDANCE     Standing Status:   Future     Standing Expiration Date:   10/24/2023          Tracy Edge is a 74 y.o. male seen in the office for evaluation for single lumen powerport placement. He has a diagnosis of pancreatic cancer and requires semi permanent IV access for planned therapy. He denies any previous history of vascular injury, procedures or medical problems involving the upper extremities. He denies history of clavicle fracture. He is right hand dominant. Pt denies any history of bleeding problems. Past Medical History  Past Medical History:   Diagnosis Date    Hypertension     Hypothyroidism     Malignant neoplasm of pancreas, unspecified location of malignancy ([address] 09/2022     Past Surgical History  Past Surgical History:   Procedure Laterality Date    IR BILI DUCT INSERT STENT NEW ACCESS W/O DRAIN  09/19/2022    [facility]    MULTIPLE TOOTH EXTRACTIONS       Medications  Current Outpatient Medications on File Prior to Visit   Medication Sig Dispense Refill    levothyroxine (SYNTHROID) 200 MCG tablet take 1 tablet by mouth once daily 30 tablet 1    pantoprazole (PROTONIX) 40 MG tablet Take 1 tablet by mouth every morning (before breakfast) 90 tablet 1    dilTIAZem (DILACOR XR) 240 MG extended release capsule TAKE 1 CAPSULE DAILY 90 capsule 3    losartan (COZAAR) 100 MG tablet TAKE 1 TABLET DAILY 90 tablet 2    Multiple Vitamin (MULTIVITAMIN PO) Take 1 capsule by mouth daily. aspirin 81 MG EC tablet Take 81 mg by mouth daily. No current facility-administered medications on file prior to visit. Allergies  No Known Allergies  Family History  Family History   Problem Relation Age of Onset    High Blood Pressure Mother     Lung Cancer Father      Social History  Social History     Socioeconomic History    Marital status:      Spouse name:  [facility]    Number of children: 3    Years of education: Not on file    Highest education level: Not on file   Occupational History    Not on file   Tobacco Use    Smoking status: Every Day     Types: Cigars     Start date: 1/1/2012     Passive exposure: Past (Dad smoked)    Smokeless tobacco: Never    Tobacco comments:     -3-4 cigars per day. denies cessation counseling 10/14/22   Vaping Use    Vaping Use: Never used   Substance and Sexual Activity    Alcohol use: Yes     Comment: 2-3 beers per day since 2022   Previous drank 8-12    Drug use: No    Sexual activity: Not on file   Other Topics Concern    Not on file   Social History Narrative    Not on file     Social Determinants of Health     Financial Resource Strain: Not on file   Food Insecurity: Not on file   Transportation Needs: Not on file   Physical Activity: Inactive    Days of Exercise per Week: 0 days    Minutes of Exercise per Session: 0 min   Stress: Not on file   Social Connections: Not on file   Intimate Partner Violence: Not on file   Housing Stability: Not on file     [address] Maintenance   Topic Date Due    COVID-19 Vaccine (1) Never done    Pneumococcal 65+ years Vaccine (1 - PCV) Never done    DTaP/Tdap/Td vaccine (1 - Tdap) Never done    Shingles vaccine (1 of 2) Never done    Colorectal Cancer Screen  Never done    Flu vaccine (1) Never done    Depression Screen  04/12/2023    Annual Wellness Visit (AWV)  04/13/2023    Lipids  04/07/2027    AAA screen  Completed    Hepatitis C screen  Completed    Hepatitis A vaccine  Aged Out    Hib vaccine  Aged Out    Meningococcal (ACWY) vaccine  Aged Out     Review of Systems  Constitutional:  Positive for fatigue. Negative for activity change, appetite change, chills, diaphoresis, fever and unexpected weight change. HENT:  Negative for congestion, dental problem, drooling, ear discharge, ear pain, facial swelling, hearing loss, mouth sores, nosebleeds, postnasal drip, rhinorrhea, sinus pressure, sneezing, sore throat, tinnitus, trouble swallowing and voice change. Eyes:  Negative for photophobia, pain, discharge, redness, itching and visual disturbance.    Respiratory:  Negative for apnea, cough, choking, chest tightness, shortness of breath, wheezing and stridor. Cardiovascular:  Negative for chest pain, palpitations and leg swelling. Gastrointestinal:  Negative for abdominal distention, abdominal pain, anal bleeding, blood in stool, constipation, diarrhea, nausea, rectal pain and vomiting. Endocrine: Negative for cold intolerance, heat intolerance, polydipsia, polyphagia and polyuria. Genitourinary:  Negative for decreased urine volume, difficulty urinating, dysuria, enuresis, flank pain, frequency, genital sores, hematuria and urgency. Musculoskeletal:  Positive for arthralgias. Negative for back pain, gait problem, joint swelling, myalgias, neck pain and neck stiffness. Skin:  Negative for color change, pallor, rash and wound. Allergic/Immunologic: Negative for environmental allergies, food allergies and immunocompromised state. Neurological:  Negative for dizziness, tremors, seizures, syncope, facial asymmetry, speech difficulty, weakness, light-headedness, numbness and headaches. Hematological:  Negative for adenopathy. Does not bruise/bleed easily. Psychiatric/Behavioral:  Positive for sleep disturbance. Negative for agitation, behavioral problems, confusion, decreased concentration, dysphoric mood, hallucinations, self-injury and suicidal ideas. The patient is not nervous/anxious and is not hyperactive     OBJECTIVE      VITALS:  height is 5' 9.02\" (1.753 m) and weight is 143 lb (64.9 kg). His temperature is 97.9 °F (36.6 °C). His blood pressure is 128/60 and his pulse is 83. His oxygen saturation is 99%. CONSTITUTIONAL: Alert and oriented times 3, no acute distress and cooperative to examination with proper mood and affect. SKIN: Skin color, texture, turgor normal. No rashes or lesions. LYMPH: no cervical nodes, no inguinal nodes  HEENT: Head is normocephalic, atraumatic. EOMI, PERRLA.   NECK: Supple, symmetrical, trachea midline, no adenopathy, thyroid symmetric, not enlarged and no tenderness, skin normal.  CHEST/LUNGS: chest symmetric with normal A/P diameter, normal respiratory rate and rhythm, lungs clear to auscultation without wheezes, rales or rhonchi. No accessory muscle use. Scars None   CARDIOVASCULAR: Heart sounds are normal.  Regular rate and rhythm without murmur, gallop or rub. Normal S1 and S2. . Carotid and femoral pulses 2+/4 and equal bilaterally. ABDOMEN: Normal shape. No  scar(s) present. Normal bowel sounds. No bruits. Celestia Gino \"soft, nontender, nondistended, no masses or organomegaly. Percussion: Normal without hepatosplenomegally. Tenderness: absent. RECTAL: deferred, not clinically indicated  NEUROLOGIC: There are no focalizing motor or sensory deficits. CN II-XII are grossly intact. Celestia Gino EXTREMITIES: no cyanosis, no clubbing, and no edema.              Anyi Lizarraga MD FACS  Electronically signed by Anyi Lizarraga MD on 10/24/2022 at 1:24 PM

## 2022-10-24 NOTE — LETTER
[address]  [place] [address] [facility] [address]  Phone: [phone]  Fax: [phone]    Frieda Mcgee MD    October 25, 2022     Mj Owens MD  [address] [place] [address] [address] [address]    Patient: Mariia Jules   MR Number: [id number]   YOB: 1955   Date of Visit: 10/24/2022       Dear Mj Owens: Thank you for referring Alberto Swanson to me for evaluation/treatment. Below are the relevant portions of my assessment and plan of care. Problem List Items Addressed This Visit       Malignant neoplasm of body of pancreas ([facility] [address] - Primary    Relevant Orders    INSERTION CENTRAL VENOUS ACCESS DEVICE W/ SUBCUTANEOUS PORT    FL VASCULAR ACCESS Erika Borjas  has a past medical history of Hypertension, Hypothyroidism, and Malignant neoplasm of pancreas, unspecified location of malignancy ([address] Schedule Aaron for Left  single [facility]  Status: Outpatient  Planned anesthesia: MAC  He will undergo pre-operative clearance per anesthesia guidelines with risk factors listed under the past medical history diagnosis & problem list.  Perioperative discontinuation of ASA, Plavix, warfarin, Brillinta, Effient, Pradaxa, Eliquis, and Xarelto. Continuation of 81 mg Aspirin is acceptable. Perioperative medical clearance is not required   Techniques for long term IV access were discussed. Risks, complications and benefits of each were reviewed including bleeding, infection, thrombosis and lung injury were reviewed. The patient was given the opportunity to ask questions. Once answered, informed consent was obtained and the patient scheduled for the procedure.     Orders Placed This Encounter:  Orders Placed This Encounter   Procedures    INSERTION CENTRAL VENOUS ACCESS DEVICE W/ SUBCUTANEOUS PORT     Standing Status:   Future     Standing Expiration Date:   10/24/2023    [facility] VASCULAR ACCESS GUIDANCE     Standing Status:   Future     Standing Expiration Date:   10/24/2023         If you have questions, please do not hesitate to call me. I look forward to following Ese Sung along with you.     Sincerely,      Jose Corona MD

## 2022-10-24 NOTE — PROGRESS NOTES
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

## 2022-10-24 NOTE — TELEPHONE ENCOUNTER
[address]    Phone * [phone]   Surgical Scheduling Direct line Phone * [phone]  Fax * [phone]      Diego Munoz [facility]      1955    male    [place] [address]   Marital Status:         Home Phone: [phone]   Cell Phone:   Telephone Information:   Mobile [phone]              Surgeon: Dr. Vashti Muniz  Surgery Date:10-   Time: 7:30am     Procedure: Left single lumen mediport insertion    Outpatient     Diagnosis: Pancreatic cancer    Important Medical History: In Epic    Special Inst/Equip:     CPT Codes: 58363    Latex Allergy:   no Cardiac Device:  no    Anesthesia Type: MAC    Case Location:  Main OR     Preadmission Testing: Phone Call      PAT Date and Time: ________________________________    PAT Confirmation #: _________________________________    Post Op Visit:  ______________________________________    Need Preop Cardiac Clearance:   no    Does patient have Cardiologist/physician?  No      Surgery Conformation #:  ______________________________________________    Becker Signs: __________________________________ Date:____________________        [employer] Name:  [facility]

## 2022-10-25 ENCOUNTER — HOSPITAL ENCOUNTER (OUTPATIENT)
Dept: RADIATION ONCOLOGY | Age: 67
End: 2022-10-25
Payer: MEDICARE

## 2022-10-25 ENCOUNTER — CLINICAL DOCUMENTATION (OUTPATIENT)
Dept: CASE MANAGEMENT | Age: 67
End: 2022-10-25

## 2022-10-25 PROCEDURE — 77300 RADIATION THERAPY DOSE PLAN: CPT | Performed by: RADIOLOGY

## 2022-10-25 PROCEDURE — 77338 DESIGN MLC DEVICE FOR IMRT: CPT | Performed by: RADIOLOGY

## 2022-10-25 PROCEDURE — 77301 RADIOTHERAPY DOSE PLAN IMRT: CPT | Performed by: RADIOLOGY

## 2022-10-25 ASSESSMENT — ENCOUNTER SYMPTOMS
TROUBLE SWALLOWING: 0
ABDOMINAL PAIN: 0
DIARRHEA: 0
NAUSEA: 0
FACIAL SWELLING: 0
ABDOMINAL DISTENTION: 0
SINUS PRESSURE: 0
VOICE CHANGE: 0
BLOOD IN STOOL: 0
EYE PAIN: 0
CHEST TIGHTNESS: 0
STRIDOR: 0
COLOR CHANGE: 0
WHEEZING: 0
COUGH: 0
ANAL BLEEDING: 0
APNEA: 0
BACK PAIN: 0
SORE THROAT: 0
EYE REDNESS: 0
SHORTNESS OF BREATH: 0
EYE DISCHARGE: 0
EYE ITCHING: 0
RECTAL PAIN: 0
VOMITING: 0
CONSTIPATION: 0
RHINORRHEA: 0
PHOTOPHOBIA: 0
CHOKING: 0

## 2022-10-25 NOTE — PROGRESS NOTES
Name: Rai Cotter  : 1955  MRN: [id number]    Oncology Navigation Follow-Up Note    Contact Type:  Telephone    Notes:   Notified wife, Letha Balderas, for time tomorrow for chemotherapy teaching: Wednesday @ 2pm. Will come after MRI of head at noon. Chemotherapy will start  along with radiation. Ladi verbalized understanding.       Electronically signed by Esa Rick RN on 10/25/2022 at 3:12 PM

## 2022-10-25 NOTE — PROGRESS NOTES
Subjective:      Patient ID: Pushpa Mosher is a 74 y.o. male. Chief Complaint   Patient presents with    Surgical Consult     New patient- referred by Dr. Genny Carnes - Pancreatic cancer-Needs Mediport       HPI    Review of Systems   Constitutional:  Positive for fatigue. Negative for activity change, appetite change, chills, diaphoresis, fever and unexpected weight change. HENT:  Negative for congestion, dental problem, drooling, ear discharge, ear pain, facial swelling, hearing loss, mouth sores, nosebleeds, postnasal drip, rhinorrhea, sinus pressure, sneezing, sore throat, tinnitus, trouble swallowing and voice change. Eyes:  Negative for photophobia, pain, discharge, redness, itching and visual disturbance. Respiratory:  Negative for apnea, cough, choking, chest tightness, shortness of breath, wheezing and stridor. Cardiovascular:  Negative for chest pain, palpitations and leg swelling. Gastrointestinal:  Negative for abdominal distention, abdominal pain, anal bleeding, blood in stool, constipation, diarrhea, nausea, rectal pain and vomiting. Endocrine: Negative for cold intolerance, heat intolerance, polydipsia, polyphagia and polyuria. Genitourinary:  Negative for decreased urine volume, difficulty urinating, dysuria, enuresis, flank pain, frequency, genital sores, hematuria and urgency. Musculoskeletal:  Positive for arthralgias. Negative for back pain, gait problem, joint swelling, myalgias, neck pain and neck stiffness. Skin:  Negative for color change, pallor, rash and wound. Allergic/Immunologic: Negative for environmental allergies, food allergies and immunocompromised state. Neurological:  Negative for dizziness, tremors, seizures, syncope, facial asymmetry, speech difficulty, weakness, light-headedness, numbness and headaches. Hematological:  Negative for adenopathy. Does not bruise/bleed easily. Psychiatric/Behavioral:  Positive for sleep disturbance.  Negative for agitation, behavioral problems, confusion, decreased concentration, dysphoric mood, hallucinations, self-injury and suicidal ideas. The patient is not nervous/anxious and is not hyperactive.     /60 (Site: Right Upper Arm, Position: Sitting, Cuff Size: Medium Adult)   Pulse 83   Temp 97.9 °F (36.6 °C)   Ht 5' 9.02\" (1.753 m)   Wt 143 lb (64.9 kg)   SpO2 99%   BMI 21.11 kg/m²     Objective:   Physical Exam    Assessment:            Plan:              Romelia Gonzales LPN

## 2022-10-26 ENCOUNTER — HOSPITAL ENCOUNTER (OUTPATIENT)
Age: 67
Discharge: HOME | End: 2022-10-26
Payer: MEDICARE

## 2022-10-26 ENCOUNTER — HOSPITAL ENCOUNTER (OUTPATIENT)
Dept: INFUSION THERAPY | Age: 67
Discharge: HOME | End: 2022-10-26
Payer: MEDICARE

## 2022-10-26 ENCOUNTER — HOSPITAL ENCOUNTER (OUTPATIENT)
Dept: MRI IMAGING | Age: 67
Discharge: HOME | End: 2022-10-26
Payer: MEDICARE

## 2022-10-26 DIAGNOSIS — C25.1 MALIGNANT NEOPLASM OF BODY OF PANCREAS (HCC): ICD-10-CM

## 2022-10-26 PROCEDURE — A9579 GAD-BASE MR CONTRAST NOS,1ML: HCPCS | Performed by: INTERNAL MEDICINE

## 2022-10-26 PROCEDURE — 93005 ELECTROCARDIOGRAM TRACING: CPT

## 2022-10-26 PROCEDURE — 99212 OFFICE O/P EST SF 10 MIN: CPT

## 2022-10-26 PROCEDURE — 70553 MRI BRAIN STEM W/O & W/DYE: CPT

## 2022-10-26 PROCEDURE — 6360000004 HC RX CONTRAST MEDICATION: Performed by: INTERNAL MEDICINE

## 2022-10-26 RX ADMIN — GADOTERIDOL 10 ML: 279.3 INJECTION, SOLUTION INTRAVENOUS at 12:20

## 2022-10-26 NOTE — PROGRESS NOTES
Chemotherapy/Immunotherapy Teaching Checklist    Treatment Plan: Cisplatin/Etoposide  Frequency: 28 days  Patient accompanied by spouse      Day of chemo instructions:  [x] Must have    [x] Eat light breakfast  [x] Bring pain medications/other routine medications scheduled during appointment time  [] Hold blood pressure medications morning of treatment    Treatment process:  [x] Flow of appointment  [x] IV access Peripheral start  or  PORT access process [x] EMLA cream  [x] Premedication/ Hydration  [x] Length of treatment  [x] Tour of clinic    Diet and hydration:  [x] Discuss [place] diet    [x] Eating Hints book provided  [x] Importance of hydration 48- 64 ounces daily   [x] Hydration handout provided     Side Effects:  [x] Chemotherapy and you book provided  [x] Side effects and management discussed   [x] Diarrhea[x]Nausea/Vomiting [x]home antiemetic [x]hair loss[x]Neuropathy[x] Constipation[x] Fatigue[x]Mouth sores[x] Dehydration[x] Skin/Nail Changes []Pneumonitis []Colitis [] Hepatitis []Thyroid changes  []Fertility issues (birth control, sperm/egg banking issues)    Labs:  [x]BMP- renal function and electrolytes discussed  [x] CBC- RBC, WBC with ANC, platelet counts discussed  [x]Understanding your Blood hand out given   [x]Neutropenia handout/Reduce infection handout [x]Thrombocytopenia handout   [x]Marino discussed    Complications that require immediate attention from physician:  [x]Fever 100.3 [x]signs of infection- chills, fever, burning with urination, worsening cough   [x]Uncontrolled vomiting [x]Uncontrolled diarrhea/constipation   [x]Unable to drink 48 ounces [x]Uncontrolled pain  [x] When to notify provider handout given  [x] After hours contact process discussed    Support Services:  [x] Financial navigator   []RN navigator explained  [x]Local cancer society  [x]     Patient and family verbalized understanding and all  questions answered. Encouraged to call for any concerns. Approximately 60 minutes spent with patient and family. Discharged in satisfactory condition. Ambulated off unit per self.

## 2022-10-26 NOTE — PLAN OF CARE
Problem: Safety - Adult  Goal: Free from fall injury  Outcome: Adequate for Discharge  Flowsheets (Taken 10/26/2022 1525)  Free From Fall Injury: Instruct family/caregiver on patient safety     Problem: Discharge Planning  Goal: Discharge to home or other facility with appropriate resources  Outcome: Adequate for Discharge  Flowsheets (Taken 10/26/2022 1525)  Discharge to home or other facility with appropriate resources: Identify barriers to discharge with patient and caregiver     Problem: Chronic Conditions and Co-morbidities  Goal: Patient's chronic conditions and co-morbidity symptoms are monitored and maintained or improved  Outcome: Adequate for Discharge  Flowsheets (Taken 10/26/2022 1525)  Care Plan - Patient's Chronic Conditions and Co-Morbidity Symptoms are Monitored and Maintained or Improved: Collaborate with multidisciplinary team to address chronic and comorbid conditions and prevent exacerbation or deterioration     Care plan reviewed with patient and spouse. Patient and spouse verbalize understanding of the plan of care and contribute to goal setting.

## 2022-10-27 ENCOUNTER — PREP FOR PROCEDURE (OUTPATIENT)
Dept: SURGERY | Age: 67
End: 2022-10-27

## 2022-10-27 LAB
EKG ATRIAL RATE: 53 BPM
EKG P AXIS: 76 DEGREES
EKG P-R INTERVAL: 164 MS
EKG Q-T INTERVAL: 438 MS
EKG QRS DURATION: 90 MS
EKG QTC CALCULATION (BAZETT): 410 MS
EKG R AXIS: 56 DEGREES
EKG T AXIS: 59 DEGREES
EKG VENTRICULAR RATE: 53 BPM

## 2022-10-27 PROCEDURE — 93010 ELECTROCARDIOGRAM REPORT: CPT | Performed by: INTERNAL MEDICINE

## 2022-10-30 NOTE — H&P
Jose Corona MD [facility]  General Surgery  H and P for Surgery   Pt Name: Aisha Flores  Date of Birth 1955   Today's Date: 10/30/2022  Medical Record Number: [id number]  Referring Provider: No ref. provider found  Primary Care Provider: Roderick Vásquez MD  No chief complaint on file. ASSESSMENT      Problem List Items Addressed This Visit    None    Ese Reason  has a past medical history of Hypertension, Hypothyroidism, and Malignant neoplasm of pancreas, unspecified location of malignancy ([address] PLANS      Schedule Ese Reason for Left  single [employer]	[place]  Status: Outpatient  Planned anesthesia: MAC  He will undergo pre-operative clearance per anesthesia guidelines with risk factors listed under the past medical history diagnosis & problem list.  Perioperative discontinuation of ASA, Plavix, warfarin, Brillinta, Effient, Pradaxa, Eliquis, and Xarelto. Continuation of 81 mg Aspirin is acceptable. Perioperative medical clearance is not required   Techniques for long term IV access were discussed. Risks, complications and benefits of each were reviewed including bleeding, infection, thrombosis and lung injury were reviewed. The patient was given the opportunity to ask questions. Once answered, informed consent was obtained and the patient scheduled for the procedure. Orders Placed This Encounter:  No orders of the defined types were placed in this encounter. Yunior Foote is a 74 y.o. male seen in the office for evaluation for single lumen powerport placement. He has a diagnosis of pancreatic cancer and requires semi permanent IV access for planned therapy. He denies any previous history of vascular injury, procedures or medical problems involving the upper extremities. He denies history of clavicle fracture. He is right hand dominant. Pt denies any history of bleeding problems.   Past Medical History  Past Medical History:   Diagnosis Date    Hypertension     Hypothyroidism     Malignant neoplasm of pancreas, unspecified location of malignancy ([facility] 09/2022     Past Surgical History  Past Surgical History:   Procedure Laterality Date    IR BILI DUCT INSERT STENT NEW ACCESS W/O DRAIN  09/19/2022    [facility]    MULTIPLE TOOTH EXTRACTIONS       Medications  No current facility-administered medications on file prior to encounter. Current Outpatient Medications on File Prior to Encounter   Medication Sig Dispense Refill    levothyroxine (SYNTHROID) 200 MCG tablet take 1 tablet by mouth once daily 30 tablet 1    pantoprazole (PROTONIX) 40 MG tablet Take 1 tablet by mouth every morning (before breakfast) 90 tablet 1    dilTIAZem (DILACOR XR) 240 MG extended release capsule TAKE 1 CAPSULE DAILY 90 capsule 3    losartan (COZAAR) 100 MG tablet TAKE 1 TABLET DAILY 90 tablet 2    Multiple Vitamin (MULTIVITAMIN PO) Take 1 capsule by mouth daily. aspirin 81 MG EC tablet Take 81 mg by mouth daily. Allergies  No Known Allergies  Family History  Family History   Problem Relation Age of Onset    High Blood Pressure Mother     Lung Cancer Father      Social History  Social History     Socioeconomic History    Marital status:      Spouse name: Micki Trinh    Number of children: 3    Years of education: Not on file    Highest education level: Not on file   Occupational History    Not on file   Tobacco Use    Smoking status: Every Day     Types: Cigars     Start date: 1/1/2012     Passive exposure: Past (Dad smoked)    Smokeless tobacco: Never    Tobacco comments:     -3-4 cigars per day.  denies cessation counseling 10/26/22   Vaping Use    Vaping Use: Never used   Substance and Sexual Activity    Alcohol use: Yes     Comment: 2-3 beers per day since 2022   Previous drank 8-12    Drug use: No    Sexual activity: Not on file   Other Topics Concern    Not on file   Social History Narrative    Not on file     Social Determinants of Health     Financial Resource Strain: Not on file   Food Insecurity: Not on file   Transportation Needs: Not on file   Physical Activity: Inactive    Days of Exercise per Week: 0 days    Minutes of Exercise per Session: 0 min   Stress: Not on file   Social Connections: Not on file   Intimate Partner Violence: Not on file   Housing Stability: Not on file     [address] Maintenance   Topic Date Due    COVID-19 Vaccine (1) Never done    Pneumococcal 65+ years Vaccine (1 - PCV) Never done    DTaP/Tdap/Td vaccine (1 - Tdap) Never done    Shingles vaccine (1 of 2) Never done    Colorectal Cancer Screen  Never done    Flu vaccine (1) Never done    Depression Screen  04/12/2023    Annual Wellness Visit (AWV)  04/13/2023    Lipids  04/07/2027    AAA screen  Completed    Hepatitis C screen  Completed    Hepatitis A vaccine  Aged Out    Hib vaccine  Aged Out    Meningococcal (ACWY) vaccine  Aged Out     Review of Systems  Constitutional:  Positive for fatigue. Negative for activity change, appetite change, chills, diaphoresis, fever and unexpected weight change. HENT:  Negative for congestion, dental problem, drooling, ear discharge, ear pain, facial swelling, hearing loss, mouth sores, nosebleeds, postnasal drip, rhinorrhea, sinus pressure, sneezing, sore throat, tinnitus, trouble swallowing and voice change. Eyes:  Negative for photophobia, pain, discharge, redness, itching and visual disturbance. Respiratory:  Negative for apnea, cough, choking, chest tightness, shortness of breath, wheezing and stridor. Cardiovascular:  Negative for chest pain, palpitations and leg swelling. Gastrointestinal:  Negative for abdominal distention, abdominal pain, anal bleeding, blood in stool, constipation, diarrhea, nausea, rectal pain and vomiting. Endocrine: Negative for cold intolerance, heat intolerance, polydipsia, polyphagia and polyuria.    Genitourinary:  Negative for decreased urine volume, difficulty urinating, dysuria, enuresis, flank pain, frequency, genital sores, hematuria and urgency. Musculoskeletal:  Positive for arthralgias. Negative for back pain, gait problem, joint swelling, myalgias, neck pain and neck stiffness. Skin:  Negative for color change, pallor, rash and wound. Allergic/Immunologic: Negative for environmental allergies, food allergies and immunocompromised state. Neurological:  Negative for dizziness, tremors, seizures, syncope, facial asymmetry, speech difficulty, weakness, light-headedness, numbness and headaches. Hematological:  Negative for adenopathy. Does not bruise/bleed easily. Psychiatric/Behavioral:  Positive for sleep disturbance. Negative for agitation, behavioral problems, confusion, decreased concentration, dysphoric mood, hallucinations, self-injury and suicidal ideas. The patient is not nervous/anxious and is not hyperactive     OBJECTIVE      VITALS:  vitals were not taken for this visit. CONSTITUTIONAL: Alert and oriented times 3, no acute distress and cooperative to examination with proper mood and affect. SKIN: Skin color, texture, turgor normal. No rashes or lesions. LYMPH: no cervical nodes, no inguinal nodes  HEENT: Head is normocephalic, atraumatic. EOMI, PERRLA. NECK: Supple, symmetrical, trachea midline, no adenopathy, thyroid symmetric, not enlarged and no tenderness, skin normal.  CHEST/LUNGS: chest symmetric with normal A/P diameter, normal respiratory rate and rhythm, lungs clear to auscultation without wheezes, rales or rhonchi. No accessory muscle use. Scars None   CARDIOVASCULAR: Heart sounds are normal.  Regular rate and rhythm without murmur, gallop or rub. Normal S1 and S2. . Carotid and femoral pulses 2+/4 and equal bilaterally. ABDOMEN: Normal shape. No  scar(s) present. Normal bowel sounds. No bruits. Lucyann Push \"soft, nontender, nondistended, no masses or organomegaly. Percussion: Normal without hepatosplenomegally. Tenderness: absent.   RECTAL: deferred, not clinically indicated  NEUROLOGIC: There are no focalizing motor or sensory deficits. CN II-XII are grossly intact. [place] EXTREMITIES: no cyanosis, no clubbing, and no edema.              Frieda Mcgee MD FACS  Electronically signed by Frieda Mcgee MD on 10/30/2022 at 10:47 AM

## 2022-10-31 ENCOUNTER — APPOINTMENT (OUTPATIENT)
Dept: GENERAL RADIOLOGY | Age: 67
End: 2022-10-31
Attending: SURGERY
Payer: MEDICARE

## 2022-10-31 ENCOUNTER — HOSPITAL ENCOUNTER (OUTPATIENT)
Age: 67
Setting detail: OUTPATIENT SURGERY
Discharge: HOME | End: 2022-10-31
Attending: SURGERY | Admitting: SURGERY
Payer: MEDICARE

## 2022-10-31 ENCOUNTER — ANESTHESIA EVENT (OUTPATIENT)
Dept: OPERATING ROOM | Age: 67
End: 2022-10-31
Payer: MEDICARE

## 2022-10-31 ENCOUNTER — ANESTHESIA (OUTPATIENT)
Dept: OPERATING ROOM | Age: 67
End: 2022-10-31
Payer: MEDICARE

## 2022-10-31 VITALS
BODY MASS INDEX: 19.85 KG/M2 | DIASTOLIC BLOOD PRESSURE: 68 MMHG | SYSTOLIC BLOOD PRESSURE: 127 MMHG | TEMPERATURE: 96.7 F | HEIGHT: 69 IN | RESPIRATION RATE: 16 BRPM | WEIGHT: 134 LBS | OXYGEN SATURATION: 100 % | HEART RATE: 59 BPM

## 2022-10-31 DIAGNOSIS — Z45.2 ENCOUNTER FOR INSERTION OF VENOUS ACCESS PORT: Primary | ICD-10-CM

## 2022-10-31 PROCEDURE — 7100000010 HC PHASE II RECOVERY - FIRST 15 MIN: Performed by: SURGERY

## 2022-10-31 PROCEDURE — 3600000002 HC SURGERY LEVEL 2 BASE: Performed by: SURGERY

## 2022-10-31 PROCEDURE — 6370000000 HC RX 637 (ALT 250 FOR IP): Performed by: SURGERY

## 2022-10-31 PROCEDURE — 3600000012 HC SURGERY LEVEL 2 ADDTL 15MIN: Performed by: SURGERY

## 2022-10-31 PROCEDURE — 77001 FLUOROGUIDE FOR VEIN DEVICE: CPT | Performed by: SURGERY

## 2022-10-31 PROCEDURE — 2500000003 HC RX 250 WO HCPCS: Performed by: SURGERY

## 2022-10-31 PROCEDURE — 71045 X-RAY EXAM CHEST 1 VIEW: CPT

## 2022-10-31 PROCEDURE — 6360000002 HC RX W HCPCS: Performed by: SURGERY

## 2022-10-31 PROCEDURE — 77001 FLUOROGUIDE FOR VEIN DEVICE: CPT

## 2022-10-31 PROCEDURE — 2709999900 HC NON-CHARGEABLE SUPPLY: Performed by: SURGERY

## 2022-10-31 PROCEDURE — 2580000003 HC RX 258: Performed by: SURGERY

## 2022-10-31 PROCEDURE — 2580000003 HC RX 258: Performed by: NURSE ANESTHETIST, CERTIFIED REGISTERED

## 2022-10-31 PROCEDURE — 2500000003 HC RX 250 WO HCPCS: Performed by: NURSE ANESTHETIST, CERTIFIED REGISTERED

## 2022-10-31 PROCEDURE — 36561 INSERT TUNNELED CV CATH: CPT | Performed by: SURGERY

## 2022-10-31 PROCEDURE — 7100000011 HC PHASE II RECOVERY - ADDTL 15 MIN: Performed by: SURGERY

## 2022-10-31 PROCEDURE — 3700000000 HC ANESTHESIA ATTENDED CARE: Performed by: SURGERY

## 2022-10-31 PROCEDURE — C1788 PORT, INDWELLING, IMP: HCPCS | Performed by: SURGERY

## 2022-10-31 PROCEDURE — 3700000001 HC ADD 15 MINUTES (ANESTHESIA): Performed by: SURGERY

## 2022-10-31 DEVICE — PORT INFUS 6FR SLIM POLYUR ATTCH OPN END SGL LUMN VEN CATH: Type: IMPLANTABLE DEVICE | Site: CHEST | Status: FUNCTIONAL

## 2022-10-31 RX ORDER — ACETAMINOPHEN 500 MG
1000 TABLET ORAL ONCE
Status: COMPLETED | OUTPATIENT
Start: 2022-10-31 | End: 2022-10-31

## 2022-10-31 RX ORDER — HYDROCODONE BITARTRATE AND ACETAMINOPHEN 5; 325 MG/1; MG/1
1 TABLET ORAL EVERY 6 HOURS PRN
Qty: 20 TABLET | Refills: 0 | Status: SHIPPED | OUTPATIENT
Start: 2022-10-31 | End: 2022-11-01

## 2022-10-31 RX ORDER — HEPARIN SODIUM (PORCINE) LOCK FLUSH IV SOLN 100 UNIT/ML 100 UNIT/ML
SOLUTION INTRAVENOUS PRN
Status: DISCONTINUED | OUTPATIENT
Start: 2022-10-31 | End: 2022-10-31 | Stop reason: ALTCHOICE

## 2022-10-31 RX ORDER — IBUPROFEN 400 MG/1
400 TABLET ORAL ONCE
Status: COMPLETED | OUTPATIENT
Start: 2022-10-31 | End: 2022-10-31

## 2022-10-31 RX ORDER — SODIUM CHLORIDE 0.9 % (FLUSH) 0.9 %
5-40 SYRINGE (ML) INJECTION PRN
Status: CANCELLED | OUTPATIENT
Start: 2022-10-31

## 2022-10-31 RX ORDER — SODIUM CHLORIDE 9 MG/ML
INJECTION, SOLUTION INTRAVENOUS CONTINUOUS PRN
Status: DISCONTINUED | OUTPATIENT
Start: 2022-10-31 | End: 2022-10-31 | Stop reason: SDUPTHER

## 2022-10-31 RX ORDER — SODIUM CHLORIDE 9 MG/ML
INJECTION, SOLUTION INTRAVENOUS PRN
Status: CANCELLED | OUTPATIENT
Start: 2022-10-31

## 2022-10-31 RX ORDER — LIDOCAINE HYDROCHLORIDE 20 MG/ML
INJECTION, SOLUTION EPIDURAL; INFILTRATION; INTRACAUDAL; PERINEURAL PRN
Status: DISCONTINUED | OUTPATIENT
Start: 2022-10-31 | End: 2022-10-31 | Stop reason: SDUPTHER

## 2022-10-31 RX ORDER — SODIUM CHLORIDE 9 MG/ML
INJECTION, SOLUTION INTRAVENOUS PRN
Status: DISCONTINUED | OUTPATIENT
Start: 2022-10-31 | End: 2022-10-31 | Stop reason: HOSPADM

## 2022-10-31 RX ORDER — PROPOFOL 10 MG/ML
INJECTION, EMULSION INTRAVENOUS PRN
Status: DISCONTINUED | OUTPATIENT
Start: 2022-10-31 | End: 2022-10-31 | Stop reason: SDUPTHER

## 2022-10-31 RX ORDER — SODIUM CHLORIDE 0.9 % (FLUSH) 0.9 %
5-40 SYRINGE (ML) INJECTION EVERY 12 HOURS SCHEDULED
Status: CANCELLED | OUTPATIENT
Start: 2022-10-31

## 2022-10-31 RX ORDER — SODIUM CHLORIDE 0.9 % (FLUSH) 0.9 %
5-40 SYRINGE (ML) INJECTION EVERY 12 HOURS SCHEDULED
Status: DISCONTINUED | OUTPATIENT
Start: 2022-10-31 | End: 2022-10-31 | Stop reason: HOSPADM

## 2022-10-31 RX ORDER — SODIUM CHLORIDE 9 MG/ML
INJECTION, SOLUTION INTRAVENOUS CONTINUOUS
Status: DISCONTINUED | OUTPATIENT
Start: 2022-10-31 | End: 2022-10-31 | Stop reason: HOSPADM

## 2022-10-31 RX ORDER — SODIUM CHLORIDE 0.9 % (FLUSH) 0.9 %
5-40 SYRINGE (ML) INJECTION PRN
Status: DISCONTINUED | OUTPATIENT
Start: 2022-10-31 | End: 2022-10-31 | Stop reason: HOSPADM

## 2022-10-31 RX ADMIN — Medication 100 MG: at 07:25

## 2022-10-31 RX ADMIN — IBUPROFEN 400 MG: 400 TABLET, FILM COATED ORAL at 06:40

## 2022-10-31 RX ADMIN — PROPOFOL 20 MG: 10 INJECTION, EMULSION INTRAVENOUS at 07:48

## 2022-10-31 RX ADMIN — PROPOFOL 40 MG: 10 INJECTION, EMULSION INTRAVENOUS at 07:28

## 2022-10-31 RX ADMIN — CEFAZOLIN 2000 MG: 10 INJECTION, POWDER, FOR SOLUTION INTRAVENOUS at 07:26

## 2022-10-31 RX ADMIN — PROPOFOL 30 MG: 10 INJECTION, EMULSION INTRAVENOUS at 07:36

## 2022-10-31 RX ADMIN — PROPOFOL 20 MG: 10 INJECTION, EMULSION INTRAVENOUS at 07:44

## 2022-10-31 RX ADMIN — PROPOFOL 40 MG: 10 INJECTION, EMULSION INTRAVENOUS at 07:25

## 2022-10-31 RX ADMIN — PROPOFOL 50 MG: 10 INJECTION, EMULSION INTRAVENOUS at 07:34

## 2022-10-31 RX ADMIN — SODIUM CHLORIDE: 9 INJECTION, SOLUTION INTRAVENOUS at 07:22

## 2022-10-31 RX ADMIN — SODIUM CHLORIDE: 9 INJECTION, SOLUTION INTRAVENOUS at 06:37

## 2022-10-31 RX ADMIN — PROPOFOL 40 MG: 10 INJECTION, EMULSION INTRAVENOUS at 07:31

## 2022-10-31 RX ADMIN — ACETAMINOPHEN 1000 MG: 500 TABLET ORAL at 06:40

## 2022-10-31 RX ADMIN — PROPOFOL 30 MG: 10 INJECTION, EMULSION INTRAVENOUS at 07:41

## 2022-10-31 RX ADMIN — PROPOFOL 50 MG: 10 INJECTION, EMULSION INTRAVENOUS at 07:38

## 2022-10-31 ASSESSMENT — PAIN SCALES - GENERAL
PAINLEVEL_OUTOF10: 0

## 2022-10-31 ASSESSMENT — LIFESTYLE VARIABLES: SMOKING_STATUS: 1

## 2022-10-31 ASSESSMENT — PAIN - FUNCTIONAL ASSESSMENT: PAIN_FUNCTIONAL_ASSESSMENT: NONE - DENIES PAIN

## 2022-10-31 NOTE — H&P
[address] [address]  History and Physical Update    Pt Name: Deya Darling  MRN: [id number]  YOB: 1955  Date of evaluation: 10/31/2022    [x] I have examined the patient and reviewed the H&P/Consult and there are no changes to the patient or plans.     [] I have examined the patient and reviewed the H&P/Consult and have noted the following changes:        Alex Saucedo MD  Electronically signed 10/31/2022 at 6:41 AM

## 2022-10-31 NOTE — OP NOTE
[facility]  Operative Report    PATIENT NAME: Malissa Chavira [facility]  MEDICAL RECORD NO. [id number]  SURGEON: Marvin Urbina MD MD FACS  Primary Care Physician: Jory Bocanegra MD  Date: 10/31/2022, 7:55 AM     PROCEDURE PERFORMED:  Left Single Lumen Smart Port Placement   PREOPERATIVE DIAGNOSIS: Active Problems:    * No active hospital problems. *  Resolved Problems:    * No resolved hospital problems. *    POSTOPERATIVE DIAGNOSIS: Same  SURGEON:  Marvin Urbina MD MD FACS  ANESTHESIA:  By anesthesia  ESTIMATED BLOOD LOSS:  15  ml  SPECIMEN:  None  COMPLICATIONS:  None; patient tolerated the procedure well. DISPOSITION: Outpatient Surgery  CONDITION: stable      BRIEF HISTORY: Krista Larson is a 74 y.o.  male  who was recently diagnosed with Active Problems:    * No active hospital problems. *  Resolved Problems:    * No resolved hospital problems. *  . He  will require a MediPort for long-term IV access. I discussed \"placement of MediPort\" with them including   the procedure , risks and alternatives, and they agreed. PROCEDURE IN DETAIL: The patient was taken to the operative suite and was placed on the table in a supine position . With the arms tucked at their side, and the head turned to the right. The neck, chest, and shoulders were clipped, prepped with Betadine and draped in a sterile fashion. The patient  was placed into Trendelenburg position and was left in Trendelenburg position during the entire procedure. The skin, subcutaneous tissue, and periosteum of the left clavicle were anesthetized with the local mixture. After this, a left subclavian venipuncture was performed and a guidewire was passed through the needle and into the superior vena cava. Its position was confirmed using the fluoroscopy unit. The dilator/tear away sheath was placed over the guidewire under fluoroscopic guidance and the guidewire and dilator removed leaving the sheath in place.  The catheter was passed through the introducer and into the superior vena cava, and it was positioned just above the level of the heart using the fluoroscopy unit. The tip was positioned in the SVC and the prabhjot noted at the entrance site. Attention was turned to creation of the port pocket. The port pocket was marked in the upper medial left chest wall, and then the skin and subcutaneous tissue were anesthetized with additional local mixture. A transverse linear incision was made and was carried down through the subcutaneous tissue using the electrocautery. A combination of sharp dissection with the electrocautery and blunt finger dissection was used to create a pocket for the port, just large enough to accommodate a single-lumen Smart Port in the upper medial right chest wall. Once complete hemostasis was seen, the tract between the venipuncture site and the port site was anesthetized below using the local mixture. The supplied straight tunneler was used to tunnel the tubing from the venipuncture site to the port site on a gentle curve to prevent kinking of the tubing. Following this, the approximate length of tubing required to place the tip of the tubing at the junction between the superior vena cava and the right atrium was measured against the angle of Cyril externally. The excess catheter was then cut at the port incision and then the supplied connector was used to connect the tubing to the port itself. Once again, there was good blood return from the port and the port flushed easily with heparinized saline. The port was then placed into the port pocket and was sutured to the fascia using a 2 sutures of 2-0 prolene stitch to prevent rolling and flipping of the port. The patient was then placed flat. The skin was closed with 4-0 Vicryl subcuticular sutures, and skin affix glue was applied. The patient tolerated the procedure well. Sponge, needle, and instrument counts were correct.  The patient had a stat portable upright chest x-ray done as soon as [facility]  was transferred to the cart for transport to the Same Day Surgery Unit.  This revealed No pneumothorax, Central line in proper location    Alex Saucedo MD, MD FACS  Electronically signed 10/31/2022 at 7:55 AM

## 2022-10-31 NOTE — ANESTHESIA PRE PROCEDURE
Department of Anesthesiology  Preprocedure Note       Name:  Shabbir Coffman   Age:  74 y.o.  :  1955                                          MRN:  [id number]         Date:  10/31/2022      Surgeon: Dorothy Cee):  Dee Cardenas MD    Procedure: Procedure(s):  LEFT SINGLE LUMEN MEDIPORT INSERTION    Medications prior to admission:   Prior to Admission medications    Medication Sig Start Date End Date Taking? Authorizing Provider   levothyroxine (SYNTHROID) 200 MCG tablet take 1 tablet by mouth once daily 10/12/22   Presley Monet MD   pantoprazole (PROTONIX) 40 MG tablet Take 1 tablet by mouth every morning (before breakfast) 22   CARLOS Ragsdale CNP   dilTIAZem (DILACOR XR) 240 MG extended release capsule TAKE 1 CAPSULE DAILY 22   Presley Monet MD   losartan (COZAAR) 100 MG tablet TAKE 1 TABLET DAILY 22   CARLOS Ragsdale CNP   Multiple Vitamin (MULTIVITAMIN PO) Take 1 capsule by mouth daily. Historical Provider, MD   aspirin 81 MG EC tablet Take 81 mg by mouth daily.       Historical Provider, MD       Current medications:    Current Facility-Administered Medications   Medication Dose Route Frequency Provider Last Rate Last Admin    0.9 % sodium chloride infusion   IntraVENous Continuous Dee Cardenas  mL/hr at 10/31/22 0637 New Bag at 10/31/22 0637    sodium chloride flush 0.9 % injection 5-40 mL  5-40 mL IntraVENous 2 times per day Dee Cardenas MD        sodium chloride flush 0.9 % injection 5-40 mL  5-40 mL IntraVENous PRN Dee Cardenas MD        0.9 % sodium chloride infusion   IntraVENous PRN Dee Cardenas MD        ceFAZolin (ANCEF) 2000 mg in dextrose 5 % 50 mL IVPB  2,000 mg IntraVENous On Call to MD Rashad           Allergies:  No Known Allergies    Problem List:    Patient Active Problem List   Diagnosis Code    Hypertension I10    Hypothyroidism E03.9    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    Malignant neoplasm of body of pancreas ([facility] C25.1       Past Medical History:        Diagnosis Date    Hypertension     Hypothyroidism     Malignant neoplasm of pancreas, unspecified location of malignancy ([facility] 09/2022       Past Surgical History:        Procedure Laterality Date    IR BILI DUCT INSERT STENT NEW ACCESS W/O DRAIN  09/19/2022    [address]    MULTIPLE TOOTH EXTRACTIONS         Social History:    Social History     Tobacco Use    Smoking status: Every Day     Types: Cigars     Start date: 1/1/2012     Passive exposure: Past (Dad smoked)    Smokeless tobacco: Never    Tobacco comments:     -3-4 cigars per day. denies cessation counseling 10/26/22   Substance Use Topics    Alcohol use: Yes     Comment: 2-3 beers per day since 2022   Previous drank 8-12                                Ready to quit: Not Answered  Counseling given: Not Answered  Tobacco comments: -3-4 cigars per day. denies cessation counseling 10/26/22      Vital Signs (Current):   Vitals:    10/31/22 0615 10/31/22 0617   BP: 134/75    Pulse: 74    Resp: 16    Temp: (!) 96.3 °F (35.7 °C)    TempSrc: Temporal    SpO2: 100%    Weight:  134 lb (60.8 kg)   Height:  5' 9\" (1.753 m)                                              BP Readings from Last 3 Encounters:   10/31/22 134/75   10/24/22 128/60   10/14/22 (!) 155/78       NPO Status: Time of last liquid consumption: 2300                        Time of last solid consumption: 2300                        Date of last liquid consumption: 10/30/22                        Date of last solid food consumption: 10/30/22    BMI:   Wt Readings from Last 3 Encounters:   10/31/22 134 lb (60.8 kg)   10/24/22 143 lb (64.9 kg)   10/14/22 136 lb 12.8 oz (62.1 kg)     Body mass index is 19.79 kg/m².     CBC:   Lab Results   Component Value Date/Time    WBC 11.5 06/23/2020 12:14 PM    RBC 4.97 06/23/2020 12:14 PM    HGB 15.4 06/23/2020 12:14 PM    HCT 48.5 06/23/2020 12:14 PM    MCV 97.6 06/23/2020 12:14 PM     06/23/2020 12:14 PM       CMP:   Lab Results   Component Value Date/Time     09/12/2020 12:00 AM    K 4.7 09/12/2020 12:00 AM     09/12/2020 12:00 AM    CO2 23 09/12/2020 12:00 AM    BUN 21 09/12/2020 12:00 AM    CREATININE 1.39 09/12/2020 12:00 AM    LABGLOM 52 09/12/2020 12:00 AM    GLUCOSE 102 09/12/2020 12:00 AM    CALCIUM 9.5 09/12/2020 12:00 AM    BILITOT 0.5 09/12/2020 12:00 AM    ALKPHOS 69 09/12/2020 12:00 AM    AST 19 09/12/2020 12:00 AM    ALT 10 09/12/2020 12:00 AM       POC Tests: No results for input(s): POCGLU, POCNA, POCK, POCCL, POCBUN, POCHEMO, POCHCT in the last 72 hours. Coags: No results found for: PROTIME, INR, APTT    HCG (If Applicable): No results found for: PREGTESTUR, PREGSERUM, HCG, HCGQUANT     ABGs: No results found for: PHART, PO2ART, XRD2NEP, CID4ENW, BEART, X0OPTTYI     Type & Screen (If Applicable):  No results found for: LABABO, LABRH    Drug/Infectious Status (If Applicable):  No results found for: HIV, HEPCAB    COVID-19 Screening (If Applicable):   Lab Results   Component Value Date/Time    COVID19 NOT DETECTED 08/31/2022 12:54 PM           Anesthesia Evaluation  Patient summary reviewed no history of anesthetic complications:   Airway: Mallampati: II  TM distance: >3 FB   Neck ROM: full  Mouth opening: > = 3 FB   Dental: normal exam         Pulmonary:normal exam    (+) current smoker                           Cardiovascular:    (+) hypertension:,                   Neuro/Psych:               GI/Hepatic/Renal:             Endo/Other:    (+) hypothyroidism::., .                 Abdominal:             Vascular: Other Findings:           Anesthesia Plan      MAC     ASA 3       Induction: intravenous. MIPS: prophylactic pharmacologic antiemetic agents not administered perioperatively for documented reasons. Anesthetic plan and risks discussed with patient and spouse.       Plan discussed with CRNA and surgical team.                    Brandi Cagle MD   10/31/2022

## 2022-10-31 NOTE — ANESTHESIA POSTPROCEDURE EVALUATION
Department of Anesthesiology  Postprocedure Note    Patient: Tirso Tobar  MRN: [id number]  YOB: 1955  Date of evaluation: 10/31/2022      Procedure Summary     Date: 10/31/22 Room / Location: [facility] [address] [place] [address] [facility] [place]    Anesthesia Start: 0722 Anesthesia Stop: 4129    Procedure: LEFT SINGLE LUMEN MEDIPORT INSERTION (Left: Chest) Diagnosis:       Malignant neoplasm of pancreas, unspecified location of malignancy ([address]      (Malignant neoplasm of pancreas, unspecified location of malignancy ([facility] [C25.9])    Surgeons: Yessi Day MD Responsible Provider: Emilee Balderas MD    Anesthesia Type: MAC ASA Status: 3          Anesthesia Type: No value filed. Caroline Phase I: Caroline Score: 10    Caroline Phase II: Caroline Score: 10      Anesthesia Post Evaluation    Patient location during evaluation: bedside  Patient participation: complete - patient cannot participate  Level of consciousness: awake and alert  Airway patency: patent  Nausea & Vomiting: no nausea and no vomiting  Complications: no  Cardiovascular status: hemodynamically stable  Respiratory status: acceptable  Hydration status: euvolemic      [facility]  POST-ANESTHESIA NOTE       Name:  Tirso Tobar                                         Age:  74 y.o.   MRN:  [id number]      Last Vitals:  /68   Pulse 59   Temp (!) 96.7 °F (35.9 °C) (Temporal)   Resp 16   Ht 5' 9\" (1.753 m)   Wt 134 lb (60.8 kg)   SpO2 100%   BMI 19.79 kg/m²   Patient Vitals for the past 4 hrs:   BP Temp Temp src Pulse Resp SpO2 Height Weight   10/31/22 0837 127/68 -- -- 59 16 100 % -- --   10/31/22 0806 101/68 (!) 96.7 °F (35.9 °C) Temporal 66 16 99 % -- --   10/31/22 0617 -- -- -- -- -- -- 5' 9\" (1.753 m) 134 lb (60.8 kg)   10/31/22 0615 134/75 (!) 96.3 °F (35.7 °C) Temporal 74 16 100 % -- --       Level of Consciousness:  Awake    Respiratory:  Stable    Oxygen Saturation:  Stable    Cardiovascular:  Stable    Hydration:  Adequate    PONV: Stable    Post-op Pain:  Adequate analgesia    Post-op Assessment:  No apparent anesthetic complications    Additional Follow-Up / Treatment / Comment:  None    Alma Bryant MD  October 31, 2022   10:10 AM

## 2022-10-31 NOTE — PROGRESS NOTES
Patient return to [facility] Emergency room 20. VS stable. Pain level assessed. Updated patient and family on discharge planning. Patient has snack and drink. No further questions or concerns voiced at this time. Side rails up times 2. Call light in reach.

## 2022-11-01 ENCOUNTER — HOSPITAL ENCOUNTER (OUTPATIENT)
Dept: INFUSION THERAPY | Age: 67
Discharge: HOME | End: 2022-11-01
Payer: MEDICARE

## 2022-11-01 ENCOUNTER — SOCIAL WORK (OUTPATIENT)
Dept: RADIATION ONCOLOGY | Age: 67
End: 2022-11-01

## 2022-11-01 ENCOUNTER — CLINICAL DOCUMENTATION (OUTPATIENT)
Dept: CASE MANAGEMENT | Age: 67
End: 2022-11-01

## 2022-11-01 ENCOUNTER — HOSPITAL ENCOUNTER (OUTPATIENT)
Dept: RADIATION ONCOLOGY | Age: 67
Discharge: HOME | End: 2022-11-01
Payer: MEDICARE

## 2022-11-01 ENCOUNTER — OFFICE VISIT (OUTPATIENT)
Dept: ONCOLOGY | Age: 67
End: 2022-11-01
Payer: MEDICARE

## 2022-11-01 VITALS
HEART RATE: 61 BPM | SYSTOLIC BLOOD PRESSURE: 143 MMHG | TEMPERATURE: 97.9 F | DIASTOLIC BLOOD PRESSURE: 71 MMHG | BODY MASS INDEX: 20.32 KG/M2 | RESPIRATION RATE: 16 BRPM | HEIGHT: 69 IN | WEIGHT: 137.2 LBS | OXYGEN SATURATION: 100 %

## 2022-11-01 VITALS
BODY MASS INDEX: 20.29 KG/M2 | TEMPERATURE: 97.9 F | WEIGHT: 137 LBS | HEIGHT: 69 IN | OXYGEN SATURATION: 100 % | DIASTOLIC BLOOD PRESSURE: 71 MMHG | HEART RATE: 82 BPM | SYSTOLIC BLOOD PRESSURE: 125 MMHG | RESPIRATION RATE: 16 BRPM

## 2022-11-01 DIAGNOSIS — C25.1 MALIGNANT NEOPLASM OF BODY OF PANCREAS (HCC): Primary | ICD-10-CM

## 2022-11-01 DIAGNOSIS — C25.1 MALIGNANT NEOPLASM OF BODY OF PANCREAS (HCC): ICD-10-CM

## 2022-11-01 LAB
ABSOLUTE IMMATURE GRANULOCYTE: 0.01 THOU/MM3 (ref 0–0.07)
ALBUMIN SERPL-MCNC: 3.7 G/DL (ref 3.5–5.1)
ALP BLD-CCNC: 91 U/L (ref 38–126)
ALT SERPL-CCNC: 9 U/L (ref 11–66)
AST SERPL-CCNC: 21 U/L (ref 5–40)
BASINOPHIL, AUTOMATED: 1 % (ref 0–3)
BASOPHILS ABSOLUTE: 0 THOU/MM3 (ref 0–0.1)
BILIRUB SERPL-MCNC: 0.5 MG/DL (ref 0.3–1.2)
BILIRUBIN DIRECT: < 0.2 MG/DL (ref 0–0.3)
BUN, WHOLE BLOOD: 14 MG/DL (ref 8–26)
CHLORIDE, WHOLE BLOOD: 107 MEQ/L (ref 98–109)
CREATININE, WHOLE BLOOD: 0.9 MG/DL (ref 0.5–1.2)
EOSINOPHILS ABSOLUTE: 0.1 THOU/MM3 (ref 0–0.4)
EOSINOPHILS RELATIVE PERCENT: 2 % (ref 0–4)
GFR SERPL CREATININE-BSD FRML MDRD: > 60 ML/MIN/1.73M2
GLUCOSE, WHOLE BLOOD: 205 MG/DL (ref 70–108)
HCT VFR BLD CALC: 36.9 % (ref 42–52)
HEMOGLOBIN: 12.1 GM/DL (ref 14–18)
IMMATURE GRANULOCYTES: 0 %
IONIZED CALCIUM, WHOLE BLOOD: 1.2 MMOL/L (ref 1.12–1.32)
LYMPHOCYTES # BLD: 24 % (ref 15–47)
LYMPHOCYTES ABSOLUTE: 1.5 THOU/MM3 (ref 1–4.8)
MAGNESIUM: 2.2 MG/DL (ref 1.6–2.4)
MCH RBC QN AUTO: 30 PG (ref 26–33)
MCHC RBC AUTO-ENTMCNC: 32.8 GM/DL (ref 32.2–35.5)
MCV RBC AUTO: 91 FL (ref 80–94)
MONOCYTES ABSOLUTE: 0.4 THOU/MM3 (ref 0.4–1.3)
MONOCYTES: 7 % (ref 0–12)
PDW BLD-RTO: 14.7 % (ref 11.5–14.5)
PHOSPHORUS: 3 MG/DL (ref 2.4–4.7)
PLATELET # BLD: 270 THOU/MM3 (ref 130–400)
PMV BLD AUTO: 9.5 FL (ref 9.4–12.4)
POTASSIUM, WHOLE BLOOD: 3.7 MEQ/L (ref 3.5–4.9)
RBC # BLD: 4.04 MILL/MM3 (ref 4.7–6.1)
SEG NEUTROPHILS: 67 % (ref 43–75)
SEGMENTED NEUTROPHILS ABSOLUTE COUNT: 4.2 THOU/MM3 (ref 1.8–7.7)
SODIUM, WHOLE BLOOD: 140 MEQ/L (ref 138–146)
TOTAL CO2, WHOLE BLOOD: 21 MEQ/L (ref 23–33)
TOTAL PROTEIN: 7 G/DL (ref 6.1–8)
WBC # BLD: 6.3 THOU/MM3 (ref 4.8–10.8)

## 2022-11-01 PROCEDURE — 96367 TX/PROPH/DG ADDL SEQ IV INF: CPT

## 2022-11-01 PROCEDURE — 1123F ACP DISCUSS/DSCN MKR DOCD: CPT | Performed by: INTERNAL MEDICINE

## 2022-11-01 PROCEDURE — G6002 STEREOSCOPIC X-RAY GUIDANCE: HCPCS | Performed by: RADIOLOGY

## 2022-11-01 PROCEDURE — 6360000002 HC RX W HCPCS: Performed by: INTERNAL MEDICINE

## 2022-11-01 PROCEDURE — 96415 CHEMO IV INFUSION ADDL HR: CPT

## 2022-11-01 PROCEDURE — 96375 TX/PRO/DX INJ NEW DRUG ADDON: CPT

## 2022-11-01 PROCEDURE — 36591 DRAW BLOOD OFF VENOUS DEVICE: CPT

## 2022-11-01 PROCEDURE — 80047 BASIC METABLC PNL IONIZED CA: CPT

## 2022-11-01 PROCEDURE — 83735 ASSAY OF MAGNESIUM: CPT

## 2022-11-01 PROCEDURE — 3074F SYST BP LT 130 MM HG: CPT | Performed by: INTERNAL MEDICINE

## 2022-11-01 PROCEDURE — 85025 COMPLETE CBC W/AUTO DIFF WBC: CPT

## 2022-11-01 PROCEDURE — 3078F DIAST BP <80 MM HG: CPT | Performed by: INTERNAL MEDICINE

## 2022-11-01 PROCEDURE — 99211 OFF/OP EST MAY X REQ PHY/QHP: CPT

## 2022-11-01 PROCEDURE — 2580000003 HC RX 258: Performed by: INTERNAL MEDICINE

## 2022-11-01 PROCEDURE — 99215 OFFICE O/P EST HI 40 MIN: CPT | Performed by: INTERNAL MEDICINE

## 2022-11-01 PROCEDURE — 80076 HEPATIC FUNCTION PANEL: CPT

## 2022-11-01 PROCEDURE — 96366 THER/PROPH/DIAG IV INF ADDON: CPT

## 2022-11-01 PROCEDURE — 77386 HC NTSTY MODUL RAD TX DLVR CPLX: CPT | Performed by: RADIOLOGY

## 2022-11-01 PROCEDURE — 96413 CHEMO IV INFUSION 1 HR: CPT

## 2022-11-01 PROCEDURE — 96417 CHEMO IV INFUS EACH ADDL SEQ: CPT

## 2022-11-01 PROCEDURE — 84100 ASSAY OF PHOSPHORUS: CPT

## 2022-11-01 RX ORDER — ONDANSETRON 4 MG/1
4 TABLET, FILM COATED ORAL DAILY PRN
Qty: 30 TABLET | Refills: 0 | Status: SHIPPED | OUTPATIENT
Start: 2022-11-01

## 2022-11-01 RX ORDER — EPINEPHRINE 1 MG/ML
0.3 INJECTION, SOLUTION, CONCENTRATE INTRAVENOUS PRN
Status: CANCELLED | OUTPATIENT
Start: 2022-11-01

## 2022-11-01 RX ORDER — SODIUM CHLORIDE 9 MG/ML
5-250 INJECTION, SOLUTION INTRAVENOUS PRN
Status: CANCELLED | OUTPATIENT
Start: 2022-11-03

## 2022-11-01 RX ORDER — SODIUM CHLORIDE 9 MG/ML
5-40 INJECTION INTRAVENOUS PRN
Status: CANCELLED | OUTPATIENT
Start: 2022-11-01

## 2022-11-01 RX ORDER — SODIUM CHLORIDE 9 MG/ML
5-250 INJECTION, SOLUTION INTRAVENOUS PRN
Status: CANCELLED | OUTPATIENT
Start: 2022-11-02

## 2022-11-01 RX ORDER — SODIUM CHLORIDE 9 MG/ML
5-250 INJECTION, SOLUTION INTRAVENOUS PRN
Status: DISCONTINUED | OUTPATIENT
Start: 2022-11-01 | End: 2022-11-02 | Stop reason: HOSPADM

## 2022-11-01 RX ORDER — HEPARIN SODIUM (PORCINE) LOCK FLUSH IV SOLN 100 UNIT/ML 100 UNIT/ML
500 SOLUTION INTRAVENOUS PRN
Status: CANCELLED | OUTPATIENT
Start: 2022-11-03

## 2022-11-01 RX ORDER — MEPERIDINE HYDROCHLORIDE 50 MG/ML
12.5 INJECTION INTRAMUSCULAR; INTRAVENOUS; SUBCUTANEOUS PRN
Status: CANCELLED | OUTPATIENT
Start: 2022-11-01

## 2022-11-01 RX ORDER — SODIUM CHLORIDE 9 MG/ML
5-40 INJECTION INTRAVENOUS PRN
Status: CANCELLED | OUTPATIENT
Start: 2022-11-02

## 2022-11-01 RX ORDER — ONDANSETRON 2 MG/ML
8 INJECTION INTRAMUSCULAR; INTRAVENOUS
Status: CANCELLED | OUTPATIENT
Start: 2022-11-01

## 2022-11-01 RX ORDER — FAMOTIDINE 10 MG/ML
20 INJECTION, SOLUTION INTRAVENOUS
Status: CANCELLED | OUTPATIENT
Start: 2022-11-03

## 2022-11-01 RX ORDER — SODIUM CHLORIDE 9 MG/ML
5-40 INJECTION INTRAVENOUS PRN
Status: CANCELLED | OUTPATIENT
Start: 2022-11-03

## 2022-11-01 RX ORDER — SODIUM CHLORIDE 9 MG/ML
5-250 INJECTION, SOLUTION INTRAVENOUS PRN
Status: CANCELLED | OUTPATIENT
Start: 2022-11-01

## 2022-11-01 RX ORDER — SODIUM CHLORIDE 9 MG/ML
INJECTION, SOLUTION INTRAVENOUS CONTINUOUS
Status: CANCELLED | OUTPATIENT
Start: 2022-11-02

## 2022-11-01 RX ORDER — ACETAMINOPHEN 325 MG/1
650 TABLET ORAL
Status: CANCELLED | OUTPATIENT
Start: 2022-11-03

## 2022-11-01 RX ORDER — DIPHENHYDRAMINE HYDROCHLORIDE 50 MG/ML
50 INJECTION INTRAMUSCULAR; INTRAVENOUS
Status: CANCELLED | OUTPATIENT
Start: 2022-11-03

## 2022-11-01 RX ORDER — SODIUM CHLORIDE 9 MG/ML
INJECTION, SOLUTION INTRAVENOUS CONTINUOUS
Status: CANCELLED | OUTPATIENT
Start: 2022-11-03

## 2022-11-01 RX ORDER — ALBUTEROL SULFATE 90 UG/1
4 AEROSOL, METERED RESPIRATORY (INHALATION) PRN
Status: CANCELLED | OUTPATIENT
Start: 2022-11-03

## 2022-11-01 RX ORDER — SODIUM CHLORIDE 0.9 % (FLUSH) 0.9 %
5-40 SYRINGE (ML) INJECTION PRN
Status: CANCELLED | OUTPATIENT
Start: 2022-11-02

## 2022-11-01 RX ORDER — PROCHLORPERAZINE MALEATE 10 MG
10 TABLET ORAL EVERY 6 HOURS PRN
Qty: 60 TABLET | Refills: 3 | Status: SHIPPED | OUTPATIENT
Start: 2022-11-01

## 2022-11-01 RX ORDER — HEPARIN SODIUM (PORCINE) LOCK FLUSH IV SOLN 100 UNIT/ML 100 UNIT/ML
500 SOLUTION INTRAVENOUS PRN
Status: CANCELLED | OUTPATIENT
Start: 2022-11-02

## 2022-11-01 RX ORDER — ALBUTEROL SULFATE 90 UG/1
4 AEROSOL, METERED RESPIRATORY (INHALATION) PRN
Status: CANCELLED | OUTPATIENT
Start: 2022-11-01

## 2022-11-01 RX ORDER — SODIUM CHLORIDE 9 MG/ML
25 INJECTION, SOLUTION INTRAVENOUS PRN
Status: CANCELLED | OUTPATIENT
Start: 2022-11-01

## 2022-11-01 RX ORDER — EPINEPHRINE 1 MG/ML
0.3 INJECTION, SOLUTION, CONCENTRATE INTRAVENOUS PRN
Status: CANCELLED | OUTPATIENT
Start: 2022-11-03

## 2022-11-01 RX ORDER — DIPHENHYDRAMINE HYDROCHLORIDE 50 MG/ML
50 INJECTION INTRAMUSCULAR; INTRAVENOUS
Status: CANCELLED | OUTPATIENT
Start: 2022-11-02

## 2022-11-01 RX ORDER — MEPERIDINE HYDROCHLORIDE 50 MG/ML
12.5 INJECTION INTRAMUSCULAR; INTRAVENOUS; SUBCUTANEOUS PRN
Status: CANCELLED | OUTPATIENT
Start: 2022-11-02

## 2022-11-01 RX ORDER — PALONOSETRON 0.05 MG/ML
0.25 INJECTION, SOLUTION INTRAVENOUS ONCE
Status: CANCELLED | OUTPATIENT
Start: 2022-11-01 | End: 2022-11-01

## 2022-11-01 RX ORDER — HEPARIN SODIUM (PORCINE) LOCK FLUSH IV SOLN 100 UNIT/ML 100 UNIT/ML
500 SOLUTION INTRAVENOUS PRN
Status: CANCELLED | OUTPATIENT
Start: 2022-11-01

## 2022-11-01 RX ORDER — SODIUM CHLORIDE 0.9 % (FLUSH) 0.9 %
5-40 SYRINGE (ML) INJECTION PRN
Status: CANCELLED | OUTPATIENT
Start: 2022-11-01

## 2022-11-01 RX ORDER — FAMOTIDINE 10 MG/ML
20 INJECTION, SOLUTION INTRAVENOUS
Status: CANCELLED | OUTPATIENT
Start: 2022-11-02

## 2022-11-01 RX ORDER — PALONOSETRON 0.05 MG/ML
0.25 INJECTION, SOLUTION INTRAVENOUS ONCE
Status: COMPLETED | OUTPATIENT
Start: 2022-11-01 | End: 2022-11-01

## 2022-11-01 RX ORDER — MEPERIDINE HYDROCHLORIDE 50 MG/ML
12.5 INJECTION INTRAMUSCULAR; INTRAVENOUS; SUBCUTANEOUS PRN
Status: CANCELLED | OUTPATIENT
Start: 2022-11-03

## 2022-11-01 RX ORDER — SODIUM CHLORIDE 0.9 % (FLUSH) 0.9 %
5-40 SYRINGE (ML) INJECTION PRN
Status: DISCONTINUED | OUTPATIENT
Start: 2022-11-01 | End: 2022-11-02 | Stop reason: HOSPADM

## 2022-11-01 RX ORDER — FAMOTIDINE 10 MG/ML
20 INJECTION, SOLUTION INTRAVENOUS
Status: CANCELLED | OUTPATIENT
Start: 2022-11-01

## 2022-11-01 RX ORDER — ACETAMINOPHEN 325 MG/1
650 TABLET ORAL
Status: CANCELLED | OUTPATIENT
Start: 2022-11-02

## 2022-11-01 RX ORDER — ONDANSETRON 2 MG/ML
8 INJECTION INTRAMUSCULAR; INTRAVENOUS
Status: CANCELLED | OUTPATIENT
Start: 2022-11-03

## 2022-11-01 RX ORDER — DIPHENHYDRAMINE HYDROCHLORIDE 50 MG/ML
50 INJECTION INTRAMUSCULAR; INTRAVENOUS
Status: CANCELLED | OUTPATIENT
Start: 2022-11-01

## 2022-11-01 RX ORDER — SODIUM CHLORIDE 0.9 % (FLUSH) 0.9 %
5-40 SYRINGE (ML) INJECTION PRN
Status: CANCELLED | OUTPATIENT
Start: 2022-11-03

## 2022-11-01 RX ORDER — SODIUM CHLORIDE 9 MG/ML
INJECTION, SOLUTION INTRAVENOUS CONTINUOUS
Status: CANCELLED | OUTPATIENT
Start: 2022-11-01

## 2022-11-01 RX ORDER — EPINEPHRINE 1 MG/ML
0.3 INJECTION, SOLUTION, CONCENTRATE INTRAVENOUS PRN
Status: CANCELLED | OUTPATIENT
Start: 2022-11-02

## 2022-11-01 RX ORDER — ALBUTEROL SULFATE 90 UG/1
4 AEROSOL, METERED RESPIRATORY (INHALATION) PRN
Status: CANCELLED | OUTPATIENT
Start: 2022-11-02

## 2022-11-01 RX ORDER — LIDOCAINE AND PRILOCAINE 25; 25 MG/G; MG/G
CREAM TOPICAL
Qty: 5 G | Refills: 3 | Status: SHIPPED | OUTPATIENT
Start: 2022-11-01

## 2022-11-01 RX ORDER — HEPARIN SODIUM (PORCINE) LOCK FLUSH IV SOLN 100 UNIT/ML 100 UNIT/ML
500 SOLUTION INTRAVENOUS PRN
Status: DISCONTINUED | OUTPATIENT
Start: 2022-11-01 | End: 2022-11-02 | Stop reason: HOSPADM

## 2022-11-01 RX ORDER — ONDANSETRON 2 MG/ML
8 INJECTION INTRAMUSCULAR; INTRAVENOUS
Status: CANCELLED | OUTPATIENT
Start: 2022-11-02

## 2022-11-01 RX ORDER — ACETAMINOPHEN 325 MG/1
650 TABLET ORAL
Status: CANCELLED | OUTPATIENT
Start: 2022-11-01

## 2022-11-01 RX ADMIN — Medication 500 UNITS: at 18:55

## 2022-11-01 RX ADMIN — POTASSIUM CHLORIDE: 2 INJECTION, SOLUTION, CONCENTRATE INTRAVENOUS at 10:55

## 2022-11-01 RX ADMIN — DEXAMETHASONE SODIUM PHOSPHATE 12 MG: 4 INJECTION, SOLUTION INTRA-ARTICULAR; INTRALESIONAL; INTRAMUSCULAR; INTRAVENOUS; SOFT TISSUE at 11:58

## 2022-11-01 RX ADMIN — SODIUM CHLORIDE, PRESERVATIVE FREE 10 ML: 5 INJECTION INTRAVENOUS at 09:17

## 2022-11-01 RX ADMIN — SODIUM CHLORIDE 174 MG: 0.9 INJECTION, SOLUTION INTRAVENOUS at 13:13

## 2022-11-01 RX ADMIN — POTASSIUM CHLORIDE: 2 INJECTION, SOLUTION, CONCENTRATE INTRAVENOUS at 17:48

## 2022-11-01 RX ADMIN — SODIUM CHLORIDE, PRESERVATIVE FREE 20 ML: 5 INJECTION INTRAVENOUS at 09:18

## 2022-11-01 RX ADMIN — FOSAPREPITANT 150 MG: 150 INJECTION, POWDER, LYOPHILIZED, FOR SOLUTION INTRAVENOUS at 12:19

## 2022-11-01 RX ADMIN — SODIUM CHLORIDE, PRESERVATIVE FREE 20 ML: 5 INJECTION INTRAVENOUS at 18:55

## 2022-11-01 RX ADMIN — SODIUM CHLORIDE 25 ML/HR: 9 INJECTION, SOLUTION INTRAVENOUS at 10:51

## 2022-11-01 RX ADMIN — SODIUM CHLORIDE 131 MG: 9 INJECTION, SOLUTION INTRAVENOUS at 14:25

## 2022-11-01 RX ADMIN — PALONOSETRON 0.25 MG: 0.05 INJECTION, SOLUTION INTRAVENOUS at 12:18

## 2022-11-01 NOTE — PROGRESS NOTES
[address] [facility]  [address] [place] [address]  Dept: [phone]  Loc: Shantanu Larose 1945 [address] [facility]  1955   No ref. provider found   Mj Younger MD       Sukh Cabrera is a 74 y.o.  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  76-year-old male with 6 months of weight loss. He went to the dentist and had some bad teeth. He was told that he had to have these pulled. He was nervous about this and was not eating and then felt that he had an infection that was spreading through his body. He began having stomach pain and was treated for ulcers but this did not cause any improvement. Ultrasound was performed and was abnormal leading to a CT scan. Had decreased his beer intake from 12 pack of beer a day to 2. Felt restless, agitated, anxious. 8/25/2022. CAT scan of the abdomen performed locally showed a mass in the body of the pancreas 5 x 5.5 x 4.8 cm with involvement of the celiac axis and encasement of the superior mesenteric artery, narrows the celiac trunk and infiltrates the root of the mesentery. There were numerous enlarged peripancreatic and mesenteric lymph nodes measuring up to 14 mm. .  Labs in July were normal.  Just returned from a cruise to the [place]. Had noted dark urine for a week and appeared to be slightly jaundiced. 9/19/2022. Bilirubin was 4.5 with ALT of 136 and AST of 148. CA 19-9 was 321. Sugar was 111. CBC showed a white count 6.6 with a hemoglobin of 14 hematocrit 42 and a platelet count of 583,070.    9/19/2022. Endoscopy was performed. Endosonographic findings showed diffuse dilatation of the intrahepatic bile ducts.   There is an irregular mass in the genu of the pancreas and pancreatic body measuring approximately 4.5 x 3.8 cm. There was sonographic evidence of invasion into the superior mesenteric artery and a few abnormal lymph nodes were visualized in the peripancreatic region. 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 9/19/2022. ERCP. There is no evidence of esophageal varices or gastric varices. There is stenosis in the main bile duct in the middle third. Sphincterotomy was performed. An uncovered metal stent was placed and he was discharged home. 9/21/2022. Patient followed up with his primary care physician Dr. Jeanne Snell. Patient said that he felt better but was continuing to lose weight.    9/28/2022. PET scan showed that there was an FDG avid pancreatic mass highly suspicious for malignancy that was better seen on recent CT scan. Maximum SUV was 5.7 with areas of photopenia in the mass associated with hypoattenuation likely secondary to necrosis. The urinary bladder was markedly distended and there was bilateral hydroureter. Prostate was enlarged with calcifications. There is no evidence of mesenteric retroperitoneal pelvic or inguinal lymphadenopathy that was FDG avid. Degenerative changes were seen in the lumbar spine and pelvis without evidence of hypermetabolic avidity    Comorbidities include hypertension,hypothyroidism, anxiety, alcohol use disorder, nicotine use disorder. The patient said that he used to weigh 197 pounds and now he weighs 137. He says that he feels cold all of the time. INTERVAL NOTE    10/4/2022. Initial clinical nurse oncology navigation. Encouraged physical activity, smoking cessation, minimization of alcohol take, consideration of being seen back at [facility] for their input. 10/14/2022. Consultation with Dr. Carleene Goldmann radiation oncology.   Collaborated with Dr. Carleene Goldmann who wants to give radiation and chemotherapy concurrently à la small cell carcinoma of the lung protocols. 10/ 26/ 2022. Chemotherapy teaching. 10/31/2022. Port placed by Dr. Stepan Reyes. Mr. Omalley Rich is here today to begin his chemotherapy. He is quite flat in his affect. His wife says that she has been present at all of the interactions and has been taking in the information which he refuses to review. Today we had a long discussion concerning the side effects of his chemotherapy that will be started today as well as his radiation therapy. He verbalized understanding and was engaged in the conversation although distant in his interaction. She has been quite tearful today. They know that they must call for any complications, questions or concerns. MONITORING PARAMETERS    Physical examinations CAT scans and PET scans. PAST MEDICAL HISTORY  Past Medical History:   Diagnosis Date    Hypertension     Hypothyroidism     Malignant neoplasm of pancreas, unspecified location of malignancy ([facility] [address] 09/2022        REVIEW OF SYSTEMS  Review of Systems     FAMILY HISTORY  Family History   Problem Relation Age of Onset    High Blood Pressure Mother     Lung Cancer Father         SOCIAL HISTORY  Social History     Socioeconomic History    Marital status:      Spouse name: Ta Flores    Number of children: 3    Years of education: Not on file    Highest education level: Not on file   Occupational History    Not on file   Tobacco Use    Smoking status: Every Day     Types: Cigars     Start date: 1/1/2012     Passive exposure: Past (Dad smoked)    Smokeless tobacco: Never    Tobacco comments:     -3-4 cigars per day.  denies cessation counseling 11/1/22   Vaping Use    Vaping Use: Never used   Substance and Sexual Activity    Alcohol use: Yes     Comment: 2-3 beers per day since 2022   Previous drank 8-12    Drug use: No    Sexual activity: Not on file   Other Topics Concern    Not on file   Social History Narrative    Not on file Social Determinants of Health     Financial Resource Strain: Not on file   Food Insecurity: Not on file   Transportation Needs: Not on file   Physical Activity: Inactive    Days of Exercise per Week: 0 days    Minutes of Exercise per Session: 0 min   Stress: Not on file   Social Connections: Not on file   Intimate Partner Violence: Not on file   Housing Stability: Not on file        CURRENT MEDICATIONS  Current Outpatient Medications   Medication Sig Dispense Refill    levothyroxine (SYNTHROID) 200 MCG tablet take 1 tablet by mouth once daily 30 tablet 1    pantoprazole (PROTONIX) 40 MG tablet Take 1 tablet by mouth every morning (before breakfast) 90 tablet 1    dilTIAZem (DILACOR XR) 240 MG extended release capsule TAKE 1 CAPSULE DAILY 90 capsule 3    losartan (COZAAR) 100 MG tablet TAKE 1 TABLET DAILY 90 tablet 2    Multiple Vitamin (MULTIVITAMIN PO) Take 1 capsule by mouth daily. aspirin 81 MG EC tablet Take 81 mg by mouth daily. No current facility-administered medications for this visit. Facility-Administered Medications Ordered in Other Visits   Medication Dose Route Frequency Provider Last Rate Last Admin    sodium chloride flush 0.9 % injection 5-40 mL  5-40 mL IntraVENous PRRAISA Skelton MD   20 mL at 11/01/22 0918    heparin flush 100 UNIT/ML injection 500 Units  500 Units IntraCATHeter PRRAISA Skelton MD            OARRS    PDMP Monitoring:    Last PDMP Jude Alford as Reviewed [facility]):  Review User Review Instant Review Result   Joselyn Choe 10/5/2022 12:45 AM Reviewed PDMP [1]       Physical Exam  Vitals and nursing note reviewed. Exam conducted with a chaperone present. Constitutional:       General: He is not in acute distress. Appearance: Normal appearance. He is ill-appearing (chronically). Comments: Mr. Lyssa Villanueva is a thin, somewhat cachectic, pale, chronically ill-appearing  male who is in no acute distress.   He has a flat affect and is not very interactive in his demeanor today. He is accompanied by his wife. She is tearful. HENT:      Head: Normocephalic and atraumatic. Comments: Bilateral temporal wasting     Mouth/Throat:      Mouth: Mucous membranes are moist.      Pharynx: Oropharynx is clear. No oropharyngeal exudate or posterior oropharyngeal erythema. Eyes:      General: No scleral icterus. Extraocular Movements: Extraocular movements intact. Conjunctiva/sclera: Conjunctivae normal.      Pupils: Pupils are equal, round, and reactive to light. Cardiovascular:      Rate and Rhythm: Normal rate and regular rhythm. Pulmonary:      Effort: Pulmonary effort is normal. No respiratory distress. Breath sounds: No stridor. No wheezing, rhonchi or rales. Chest:      Chest wall: No tenderness. Abdominal:      General: Abdomen is flat. There is no distension. Palpations: Abdomen is soft. There is no mass. Tenderness: There is no abdominal tenderness. Musculoskeletal:         General: No swelling. Normal range of motion. Cervical back: Normal range of motion and neck supple. No rigidity or tenderness. Right lower leg: No edema. Left lower leg: No edema. Lymphadenopathy:      Cervical: No cervical adenopathy. Skin:     General: Skin is warm and dry. Coloration: Skin is pale. Skin is not jaundiced. Neurological:      General: No focal deficit present. Mental Status: He is alert and oriented to person, place, and time. Cranial Nerves: No cranial nerve deficit. Motor: No weakness. Gait: Gait normal.   Psychiatric:         Thought Content: Thought content normal.         Judgment: Judgment normal.      Comments: Flat affect, distant, not interactive        LABS/IMAGING  XR CHEST PORTABLE    Result Date: 10/31/2022  1. Port-A-Cath overlying the left hemithorax with the tip in the superior vena cava. 2. Otherwise negative chest x-ray. . **This report has been created using voice recognition software. It may contain minor errors which are inherent in voice recognition technology. ** Final report electronically signed by DR Bre Gerard on 10/31/2022 8:18 AM    MRI BRAIN W WO CONTRAST    Result Date: 10/26/2022   1. No evidence of intracranial metastatic disease. 2. Probable ischemic changes in the white matter. No evidence of an acute infarct. 3. Otherwise negative MRI scan of the brain with and without intravenous contrast.. **This report has been created using voice recognition software. It may contain minor errors which are inherent in voice recognition technology. ** Final report electronically signed by DR Bre Gerard on 10/26/2022 1:18 PM       PATHOLOGY/GENETICS    Small cell carcinoma of the pancreas. ASSESSMENT and PLAN    1. Small cell carcinoma of the pancreas. Will be started on combination chemotherapy with cisplatin and etoposide as well as radiation. He understands that this will be of grueling combination treatment and that he must communicate how he is doing with his multiple care providers. He understands that he needs to keep his bowels moving and that he must control his nausea and vomiting with his antiemetics. He has the availability of receiving intravenous fluids on a regular basis to help him through the chemotherapy side effects. He will be receiving radiation therapy Monday through Friday in addition to his chemotherapy which will be cycled every 28 days. Plan to give him 4-6 cycles. 2.  Profound weight loss. His weight has actually improved 1 pound since last time I saw him which was almost a month ago. He was encouraged to continue to try to put weight back on with the intake of lean protein. 3.  Hypertension. Blood pressure today was excellent at 125/71. We will monitor this. 4.  GERD. He will continue on his Protonix. 5.  Recently placed port. Site looks excellent. No problems with infection or inflammation.     The patient and his family know to call if he has any change in his status, questions or concerns.       Caternia Wall MD 11/1/2022 9:40 AM

## 2022-11-01 NOTE — ONCOLOGY
Oncology Social Work    Date: 11/1/2022  Time: 11:29 AM  Name: Tirso Tobar  MRN: [id number]     Contact Type: Face-To-Face    Note:    introduced self, and role of the  to patient's wife, Alesia Whitaker, as Phi Orellana is here for first Chemo Tx and Radiation Tx today. Ladi tearful throughout the interaction,  provided emotional support and spoke with Alesia Whitaker about her feelings and how things have been going. Ladi aware of how to reach  as needed.  will continue to follow up as needed and continue to monitor.       MAYELA Rodrigues, [place]  Oncology Social Worker

## 2022-11-01 NOTE — PLAN OF CARE
Problem: Infection - Adult  Goal: Absence of infection at discharge  Flowsheets (Taken 11/1/2022 1731)  Absence of infection at discharge:   Assess and monitor for signs and symptoms of infection   Monitor lab/diagnostic results   Monitor all insertion sites i.e., indwelling lines, tubes and drains  Note: Mediport site with no redness or warmth. Skin over port site intact with no signs of breakdown noted. Patient verbalizes signs/symptoms of port infection and when to notify the physician. Goal: Absence of fever/infection during anticipated neutropenic period  Flowsheets (Taken 11/1/2022 1731)  Absence of fever/infection during anticipated neutropenic period:   Monitor white blood cell count   Administer growth factors as ordered  Note: Discuss port maintenance,infection prevention, sign of infection and when to call MD.    Care plan reviewed with patient and spouse. Patient and spouse verbalize understanding of the plan of care and contribute to goal setting.

## 2022-11-01 NOTE — PLAN OF CARE
Problem: Safety - Adult  Goal: Free from fall injury  11/1/2022 1729 by Ye Mercado RN  Outcome: Adequate for Discharge  Flowsheets (Taken 11/1/2022 1729)  Free From Fall Injury:   Instruct family/caregiver on patient safety   Based on caregiver fall risk screen, instruct family/caregiver to ask for assistance with transferring infant if caregiver noted to have fall risk factors  Note: Free from falls while in O.P. Oncology. 11/1/2022 1057 by Kurt Rob RN  Outcome: Adequate for Discharge  Flowsheets (Taken 11/1/2022 1057)  Free From Fall Injury: Instruct family/caregiver on patient safety  Note: No falls occurred with visit today. Problem: Discharge Planning  Goal: Discharge to home or other facility with appropriate resources  11/1/2022 1729 by Ye Mercado RN  Outcome: Adequate for Discharge  Flowsheets (Taken 11/1/2022 1729)  Discharge to home or other facility with appropriate resources:   Identify barriers to discharge with patient and caregiver   Arrange for needed discharge resources and transportation as appropriate   Identify discharge learning needs (meds, wound care, etc)  Note: Verbalize understanding of discharge instructions, follow up appointments, and when to call Physician. 11/1/2022 1057 by Kurt Rob RN  Outcome: Adequate for Discharge  Flowsheets (Taken 11/1/2022 1057)  Discharge to home or other facility with appropriate resources: Identify barriers to discharge with patient and caregiver  Note: Verbalize understanding of discharge instructions, follow up appointments, and when to call Physician.       Problem: Chronic Conditions and Co-morbidities  Goal: Patient's chronic conditions and co-morbidity symptoms are monitored and maintained or improved  11/1/2022 1729 by Ye Mercado RN  Outcome: Adequate for Discharge  Flowsheets (Taken 11/1/2022 1729)  Care Plan - Patient's Chronic Conditions and Co-Morbidity Symptoms are Monitored and Maintained or Improved:   Monitor and assess patient's chronic conditions and comorbid symptoms for stability, deterioration, or improvement   Collaborate with multidisciplinary team to address chronic and comorbid conditions and prevent exacerbation or deterioration   Update acute care plan with appropriate goals if chronic or comorbid symptoms are exacerbated and prevent overall improvement and discharge  Note: Patient verbalizes understanding to verbal information given on Cisplatin and Etopside,action and possible side effects. Aware to call MD if develop complications. 11/1/2022 1057 by Valencia Hernandes RN  Outcome: Adequate for Discharge  Flowsheets (Taken 11/1/2022 1057)  Care Plan - Patient's Chronic Conditions and Co-Morbidity Symptoms are Monitored and Maintained or Improved: Monitor and assess patient's chronic conditions and comorbid symptoms for stability, deterioration, or improvement  Note: Patient verbalizes understanding to verbal information given on ETOPOSIDE/CISPLATIN,action and possible side effects. Aware to call MD if develop complications. Care plan reviewed with patient and spouse. Patient and spouse verbalize understanding of the plan of care and contribute to goal setting.

## 2022-11-01 NOTE — PLAN OF CARE
Problem: Safety - Adult  Goal: Free from fall injury  Outcome: Adequate for Discharge  Flowsheets (Taken 11/1/2022 1057)  Free From Fall Injury: Instruct family/caregiver on patient safety  Note: No falls occurred with visit today. Problem: Discharge Planning  Goal: Discharge to home or other facility with appropriate resources  Outcome: Adequate for Discharge  Flowsheets (Taken 11/1/2022 1057)  Discharge to home or other facility with appropriate resources: Identify barriers to discharge with patient and caregiver  Note: Verbalize understanding of discharge instructions, follow up appointments, and when to call Physician. Problem: Chronic Conditions and Co-morbidities  Goal: Patient's chronic conditions and co-morbidity symptoms are monitored and maintained or improved  Outcome: Adequate for Discharge  Flowsheets (Taken 11/1/2022 1057)  Care Plan - Patient's Chronic Conditions and Co-Morbidity Symptoms are Monitored and Maintained or Improved: Monitor and assess patient's chronic conditions and comorbid symptoms for stability, deterioration, or improvement  Note: Patient verbalizes understanding to verbal information given on ETOPOSIDE/CISPLATIN,action and possible side effects. Aware to call MD if develop complications. Care plan reviewed with patient. Patient verbalizes understanding of the plan of care and contributes to goal setting.

## 2022-11-01 NOTE — DISCHARGE INSTRUCTIONS
Please contact your Oncologist if you have any questions regarding the chemotherapy Cisplatin and Etopside that you received today. Patient instructed if experience any of the symptoms following today's chemotherapy / to notify MD immediately or go to emergency department. * dizziness/lightheadedness  *acute nausea/vomiting - not relieved with medication  *headache - not relieved from Tylenol/pain medication  *chest pain/pressure  *rash/itching  *shortness of breath      Instructions      Return in about 28 years (around 11/1/2050). Reviewed labs and recent medical history. Reviewed side effects of chemotherapy and encouraged patient to call with any questions or concerns. [phone] (Dr. Cheyanne Cortes personal cell phone number)  Discussed the possibility of constipation/diarrhea. Okay to use Senekot 1-2 tablets a day to help with constipation. (This drug is over the counter at the pharmacy)  Scripts for Emla cream and Zofran and Compazine sent to pharmacy. Advised to  script to have for nausea. Okay for treatment today 11/1/22. Starting Radiation therapy today too. Will be here tomorrow and Thursday for treatment. Will need labs next Wednesday to follow up on counts. See MD in 28 days for next cycle.                Drink fluids - 48oz fluids daily  Call if develop fever/ chills/ signs or symptoms of infection

## 2022-11-01 NOTE — PROGRESS NOTES
Name: Yessi Herrera  : 1955  MRN: [id number]    Oncology Navigation Follow-Up Note    Contact Type:  Medical Oncology    Notes:   Met with Luis Mluler and his wife, Tomas Viramontes, during Cycle #1 Day 1 of Chemotherapy-Cisplatin and -16. Both tearful, its the unknown. But glad to be starting treatment for his pancreatic cancer. Has positive attitude-still working when he can at [employer]. He is always one to keep busy. Talked with them for 30 minutes, emotional support given. Dr. Edith Noriega plans on following weekly with follow up and blood work. Will return tomorrow and Wednesday for Day 2 & 3 of -16. They have my contact information and know they can call with any questions or concerns.     Electronically signed by Isamar Park RN on 2022 at 4:46 PM

## 2022-11-01 NOTE — PATIENT INSTRUCTIONS
Reviewed labs and recent medical history. Reviewed side effects of chemotherapy and encouraged patient to call with any questions or concerns. [phone] (Dr. Casper Mckeon personal cell phone number)  Discussed the possibility of constipation/diarrhea. Okay to use Senekot 1-2 tablets a day to help with constipation. (This drug is over the counter at the pharmacy)  Scripts for Emla cream and Zofran and Compazine sent to pharmacy. Advised to  script to have for nausea. Okay for treatment today 11/1/22. Starting Radiation therapy today too. Will be here tomorrow and Thursday for treatment. Will need labs next Wednesday to follow up on counts. See MD in 28 days for next cycle.

## 2022-11-01 NOTE — DISCHARGE SUMMARY
Patient assessed for the following post chemotherapy:    Dizziness   No  Lightheadedness  No      Acute nausea/vomiting No  Headache   No  Chest pain/pressure  No  Rash/itching   No  Shortness of breath  No    Patient kept for 20 minutes observation post infusion chemotherapy. Patient tolerated chemotherapy treatment Cisplatin and Etopside without any complications. Last vital signs:   /71   Pulse 82   Temp 97.9 °F (36.6 °C) (Oral)   Resp 16   Ht 5' 9\" (1.753 m)   Wt 137 lb 3.2 oz (62.2 kg)   SpO2 100%   BMI 20.26 kg/m²     Patient instructed if experience any of the above symptoms following today's infusion,he/she is to notify MD immediately or go to the emergency department. Discharge instructions given to patient. Verbalizes understanding. Ambulated off unit per self with belongings.

## 2022-11-02 ENCOUNTER — CLINICAL DOCUMENTATION (OUTPATIENT)
Dept: NUTRITION | Age: 67
End: 2022-11-02

## 2022-11-02 ENCOUNTER — HOSPITAL ENCOUNTER (OUTPATIENT)
Dept: RADIATION ONCOLOGY | Age: 67
Discharge: HOME | End: 2022-11-02
Payer: MEDICARE

## 2022-11-02 ENCOUNTER — CLINICAL DOCUMENTATION (OUTPATIENT)
Dept: CASE MANAGEMENT | Age: 67
End: 2022-11-02

## 2022-11-02 ENCOUNTER — HOSPITAL ENCOUNTER (OUTPATIENT)
Dept: INFUSION THERAPY | Age: 67
Discharge: HOME | End: 2022-11-02
Payer: MEDICARE

## 2022-11-02 VITALS
SYSTOLIC BLOOD PRESSURE: 148 MMHG | TEMPERATURE: 97.9 F | DIASTOLIC BLOOD PRESSURE: 75 MMHG | OXYGEN SATURATION: 100 % | HEART RATE: 60 BPM | RESPIRATION RATE: 16 BRPM

## 2022-11-02 DIAGNOSIS — C25.1 MALIGNANT NEOPLASM OF BODY OF PANCREAS (HCC): Primary | ICD-10-CM

## 2022-11-02 PROCEDURE — 77386 HC NTSTY MODUL RAD TX DLVR CPLX: CPT | Performed by: RADIOLOGY

## 2022-11-02 PROCEDURE — 96413 CHEMO IV INFUSION 1 HR: CPT

## 2022-11-02 PROCEDURE — G6002 STEREOSCOPIC X-RAY GUIDANCE: HCPCS | Performed by: RADIOLOGY

## 2022-11-02 PROCEDURE — 2580000003 HC RX 258: Performed by: INTERNAL MEDICINE

## 2022-11-02 PROCEDURE — 96375 TX/PRO/DX INJ NEW DRUG ADDON: CPT

## 2022-11-02 PROCEDURE — 6360000002 HC RX W HCPCS: Performed by: INTERNAL MEDICINE

## 2022-11-02 RX ORDER — HEPARIN SODIUM (PORCINE) LOCK FLUSH IV SOLN 100 UNIT/ML 100 UNIT/ML
500 SOLUTION INTRAVENOUS PRN
Status: DISCONTINUED | OUTPATIENT
Start: 2022-11-02 | End: 2022-11-03 | Stop reason: HOSPADM

## 2022-11-02 RX ORDER — DEXAMETHASONE SODIUM PHOSPHATE 4 MG/ML
8 INJECTION, SOLUTION INTRA-ARTICULAR; INTRALESIONAL; INTRAMUSCULAR; INTRAVENOUS; SOFT TISSUE ONCE
Status: COMPLETED | OUTPATIENT
Start: 2022-11-02 | End: 2022-11-02

## 2022-11-02 RX ORDER — SODIUM CHLORIDE 9 MG/ML
5-250 INJECTION, SOLUTION INTRAVENOUS PRN
Status: DISCONTINUED | OUTPATIENT
Start: 2022-11-02 | End: 2022-11-03 | Stop reason: HOSPADM

## 2022-11-02 RX ORDER — SODIUM CHLORIDE 0.9 % (FLUSH) 0.9 %
5-40 SYRINGE (ML) INJECTION PRN
Status: DISCONTINUED | OUTPATIENT
Start: 2022-11-02 | End: 2022-11-03 | Stop reason: HOSPADM

## 2022-11-02 RX ADMIN — SODIUM CHLORIDE 174 MG: 0.9 INJECTION, SOLUTION INTRAVENOUS at 09:43

## 2022-11-02 RX ADMIN — Medication 500 UNITS: at 10:53

## 2022-11-02 RX ADMIN — DEXAMETHASONE SODIUM PHOSPHATE 8 MG: 4 INJECTION, SOLUTION INTRAMUSCULAR; INTRAVENOUS at 09:36

## 2022-11-02 RX ADMIN — SODIUM CHLORIDE 20 ML/HR: 9 INJECTION, SOLUTION INTRAVENOUS at 09:33

## 2022-11-02 RX ADMIN — SODIUM CHLORIDE, PRESERVATIVE FREE 10 ML: 5 INJECTION INTRAVENOUS at 09:30

## 2022-11-02 RX ADMIN — SODIUM CHLORIDE, PRESERVATIVE FREE 10 ML: 5 INJECTION INTRAVENOUS at 10:52

## 2022-11-02 RX ADMIN — SODIUM CHLORIDE, PRESERVATIVE FREE 10 ML: 5 INJECTION INTRAVENOUS at 09:31

## 2022-11-02 NOTE — PROGRESS NOTES
Nutrition Assessment    Reason for Visit:   11/2/22 - pancreatic cancer    Nutrition Recommendations:   Small, frequent meals  PO at best efforts  Lean protein sources  ONS prn, trail gelotein  High calorie, high protein recipes (pudding, jello, etc)    Malnutrition Assessment: (11/2/22)  Malnutrition Status: severe malnutrition  Context: chronic illness  Findings of the 6 clinical characteristics of malnutrition (minimum of 2 out of 6 clinical characteristics is required to make the dx of moderate or severe Protein Calorie Malnutrition based on AND/ASPEN Guidelines):   1. Energy Intake: decreased intake   2. Weight Loss: 13# (9% of body weight) in ~6 months   3. Fat Loss: severe loss evident in triceps   4. Muscle Loss: severe loss evident in shoulders and thighs   5. Fluid Accumulation: none noted   6.  Strength: not measures    Nutrition Diagnosis:   Problem: severe malnutrition in the context of chronic illness  Etiology: catabolic illness, pancreatic cancer  Signs and Symptoms: decreased oral intake, unintentional weight loss, severe fat and muscle loss    Nutrition Assessment:   History: alcohol use, HTN, pancreatic cancer  Subjective: Patient seen with wife Flor Somers). Radha Leger reports that patient is eating differently then prior to dx. For example, patient used to eat steak 4 times/week but now he is eating lighter, blander meals. Radha Leger mentions that last night they had chicken noodle soup which is something they would not have had in the past. Patient says that he eats cereal most morning now. Radha Leger adds that he probably goes through 2 boxes of cereal a week, where prior to dx they probably went through 2 boxes per year. Patient does admit that he cannot tolerate monique or steak at this time. Patient also mentions that he tried to eat two fish filet sandwiches from [employer] at one time and did not feel well after.  Patient says that he does have ensure at home and drinks prn, patient says that it does help soothe his stomach. Patient and wife pleasant. Current Nutrition:   Oral Diet:   as tolerated    Oral Diet Intake:    fair    Oral Nutrition Supplement (ONS): ensure   ONS intake:    prn  Anthropometric Measures:   Ht:   5'9\"   Current Weight:   137# (EHR, 11/1/22)   Admit Weight:   139# (Oct 2022)   Usual Weight:   150# (6 months ago, per pt)   Ideal Weight:   160#     Nutrition Interventions:     Provided Diet and Nutrition book for Pancreatic Cancer patient. Encouraged small, frequent meals  Encouraged lean protein sources  Encouraged hydration  Encouraged ONS use prn, offered coupons but patient and wife declined need. Provided Gelotein samples  Provided high calorie, high protein recipes for jello, pudding, etc  Provided RD contact information and encouraged to call with needs    Nutrition Evaluation:          Evaluation: Goals set       Goals: Patient will consume 75% or greater of normal intake and maintain weight throughout treatment.          Monitoring: Oral intake, diet tolerance, weight, ONS acceptance and use, hydration    Signed: Electronically signed by Je Smith RD, LD on 11/2/2022 at 2:42 PM    Contact number: [phone]

## 2022-11-02 NOTE — PROGRESS NOTES
Name: Elias Harkins  : 1955  MRN: [id number]    Oncology Navigation Follow-Up Note    Contact Type:  Medical Oncology    Notes:   Met with Tanner Rosas and his wife, Cr, during Cycle #1 Day #2 of Chemotherapy-VP16. Checking on him regarding steroids-at end of day yesterday having a steroid \"high\". He stated he felt good last evening and this morning. He actually slept well. No nausea. Completed Day #2 of Radiation, then met with Britni Tee. Open to her suggestions. Tanner Rosas will return tomorrow for Day # 3 of -16. They know to call with any questions or concerns.     Electronically signed by Praful Easley RN on 2022 at 12:41 PM

## 2022-11-02 NOTE — PROGRESS NOTES
Patient assessed for the following post chemotherapy:    Dizziness   No  Lightheadedness  No      Acute nausea/vomiting No  Headache   No  Chest pain/pressure  No  Rash/itching   No  Shortness of breath  No    Patient kept for 20 minutes observation post infusion chemotherapy. Patient tolerated chemotherapy treatment Etoposide without any complications. Last vital signs:   BP (!) 148/75   Pulse 60   Temp 97.9 °F (36.6 °C) (Oral)   Resp 16   SpO2 100%       Patient instructed if experience any of the above symptoms following today's infusion,he/she is to notify MD immediately or go to the emergency department. Discharge instructions given to patient. Verbalizes understanding. Ambulated off unit to Radiation Oncology with spouse and with belongings.

## 2022-11-02 NOTE — DISCHARGE INSTRUCTIONS
Please contact your Oncologist if you have any questions regarding the chemotherapy Etoposide that you received today. Patient instructed if experience any of the symptoms following today's chemotherapy / to notify MD immediately or go to emergency department.     * dizziness/lightheadedness  *acute nausea/vomiting - not relieved with medication  *headache - not relieved from Tylenol/pain medication  *chest pain/pressure  *rash/itching  *shortness of breath        Drink fluids - 48oz fluids daily  Call if develop fever/ chills/ signs or symptoms of infection

## 2022-11-02 NOTE — PLAN OF CARE
Problem: Safety - Adult  Goal: Free from fall injury  Outcome: Progressing  Flowsheets (Taken 11/2/2022 0954)  Free From Fall Injury: Instruct family/caregiver on patient safety  Note: No falls occurred with visit today. Problem: Discharge Planning  Goal: Discharge to home or other facility with appropriate resources  Outcome: Progressing  Flowsheets (Taken 11/2/2022 0954)  Discharge to home or other facility with appropriate resources: Identify barriers to discharge with patient and caregiver  Note: Verbalized understanding of discharge instructions, follow-up appointments, and when to call the physician.       Problem: Chronic Conditions and Co-morbidities  Goal: Patient's chronic conditions and co-morbidity symptoms are monitored and maintained or improved  Outcome: Progressing  Flowsheets (Taken 11/2/2022 0954)  Care Plan - Patient's Chronic Conditions and Co-Morbidity Symptoms are Monitored and Maintained or Improved: Monitor and assess patient's chronic conditions and comorbid symptoms for stability, deterioration, or improvement  Note: Chemotherapy Teaching     What is Chemotherapy   Drug action [x]   Method of Administration [x]   Handouts given []     Side Effects  Nausea/vomiting [x]   Diarrhea [x]   Fatigue [x]   Signs / Symptoms of infection [x]   Neutropenia [x]   Thrombocytopenia [x]   Alopecia [x]   neuropathy [x]   [place] diet &  the importance of fluids [x]       Micellaneous  Importance of nutrition [x]   Importance of oral hygiene [x]   When to call the MD [x]   Monitoring labs [x]   Use of supportive services []     Explanation of Drug Regimen / Frequency  Etoposide     Comments  Verbalized understanding to drug,action,side effects and when to call MD         Problem: Infection - Adult  Goal: Absence of infection at discharge  Outcome: Progressing  Flowsheets (Taken 11/2/2022 0954)  Absence of infection at discharge: Assess and monitor for signs and symptoms of infection  Note: [facility] with no redness or warmth. Skin over port site intact with no signs of breakdown noted. Patient verbalizes signs/symptoms of port infection and when to notify the physician. Goal: Absence of fever/infection during anticipated neutropenic period  Outcome: Progressing    Care plan reviewed with patient and spouse. Patient and spouse verbalize understanding of the plan of care and contribute to goal setting.

## 2022-11-03 ENCOUNTER — HOSPITAL ENCOUNTER (OUTPATIENT)
Dept: RADIATION ONCOLOGY | Age: 67
Discharge: HOME | End: 2022-11-03
Payer: MEDICARE

## 2022-11-03 ENCOUNTER — HOSPITAL ENCOUNTER (OUTPATIENT)
Dept: INFUSION THERAPY | Age: 67
Discharge: HOME | End: 2022-11-03
Payer: MEDICARE

## 2022-11-03 VITALS
WEIGHT: 137 LBS | DIASTOLIC BLOOD PRESSURE: 74 MMHG | SYSTOLIC BLOOD PRESSURE: 143 MMHG | HEART RATE: 50 BPM | TEMPERATURE: 97.9 F | OXYGEN SATURATION: 100 % | HEIGHT: 69 IN | RESPIRATION RATE: 16 BRPM | BODY MASS INDEX: 20.29 KG/M2

## 2022-11-03 VITALS
DIASTOLIC BLOOD PRESSURE: 70 MMHG | RESPIRATION RATE: 16 BRPM | WEIGHT: 140.21 LBS | TEMPERATURE: 97.8 F | OXYGEN SATURATION: 100 % | HEART RATE: 51 BPM | SYSTOLIC BLOOD PRESSURE: 170 MMHG | BODY MASS INDEX: 20.71 KG/M2

## 2022-11-03 DIAGNOSIS — C25.1 MALIGNANT NEOPLASM OF BODY OF PANCREAS (HCC): Primary | ICD-10-CM

## 2022-11-03 PROCEDURE — G6002 STEREOSCOPIC X-RAY GUIDANCE: HCPCS | Performed by: RADIOLOGY

## 2022-11-03 PROCEDURE — 77386 HC NTSTY MODUL RAD TX DLVR CPLX: CPT | Performed by: RADIOLOGY

## 2022-11-03 PROCEDURE — 96413 CHEMO IV INFUSION 1 HR: CPT

## 2022-11-03 PROCEDURE — 96375 TX/PRO/DX INJ NEW DRUG ADDON: CPT

## 2022-11-03 PROCEDURE — 2580000003 HC RX 258: Performed by: INTERNAL MEDICINE

## 2022-11-03 PROCEDURE — 6360000002 HC RX W HCPCS: Performed by: INTERNAL MEDICINE

## 2022-11-03 RX ORDER — DEXAMETHASONE SODIUM PHOSPHATE 4 MG/ML
8 INJECTION, SOLUTION INTRA-ARTICULAR; INTRALESIONAL; INTRAMUSCULAR; INTRAVENOUS; SOFT TISSUE ONCE
Status: COMPLETED | OUTPATIENT
Start: 2022-11-03 | End: 2022-11-03

## 2022-11-03 RX ORDER — SODIUM CHLORIDE 0.9 % (FLUSH) 0.9 %
5-40 SYRINGE (ML) INJECTION PRN
Status: DISCONTINUED | OUTPATIENT
Start: 2022-11-03 | End: 2022-11-04 | Stop reason: HOSPADM

## 2022-11-03 RX ORDER — HEPARIN SODIUM (PORCINE) LOCK FLUSH IV SOLN 100 UNIT/ML 100 UNIT/ML
500 SOLUTION INTRAVENOUS PRN
Status: DISCONTINUED | OUTPATIENT
Start: 2022-11-03 | End: 2022-11-04 | Stop reason: HOSPADM

## 2022-11-03 RX ORDER — SODIUM CHLORIDE 9 MG/ML
5-250 INJECTION, SOLUTION INTRAVENOUS PRN
Status: DISCONTINUED | OUTPATIENT
Start: 2022-11-03 | End: 2022-11-04 | Stop reason: HOSPADM

## 2022-11-03 RX ADMIN — DEXAMETHASONE SODIUM PHOSPHATE 8 MG: 4 INJECTION, SOLUTION INTRAMUSCULAR; INTRAVENOUS at 09:25

## 2022-11-03 RX ADMIN — SODIUM CHLORIDE 174 MG: 9 INJECTION, SOLUTION INTRAVENOUS at 09:29

## 2022-11-03 RX ADMIN — SODIUM CHLORIDE 20 ML/HR: 9 INJECTION, SOLUTION INTRAVENOUS at 09:23

## 2022-11-03 RX ADMIN — HEPARIN 500 UNITS: 100 SYRINGE at 10:34

## 2022-11-03 NOTE — PROGRESS NOTES
Patient assessed for the following post chemotherapy:    Dizziness   No  Lightheadedness  No      Acute nausea/vomiting No  Headache   No  Chest pain/pressure  No  Rash/itching   No  Shortness of breath  No    Patient kept for 20 minutes observation post infusion chemotherapy. Patient tolerated chemotherapy treatment etoposide without any complications. Last vital signs:   BP (!) 143/74   Pulse 50   Temp 97.9 °F (36.6 °C) (Oral)   Resp 16   Ht 5' 9\" (1.753 m)   Wt 137 lb (62.1 kg)   SpO2 100%   BMI 20.23 kg/m²         Patient instructed if experience any of the above symptoms following today's infusion,he/she is to notify MD immediately or go to the emergency department. Discharge instructions given to patient. Verbalizes understanding. Ambulated off unit per self with belongings.

## 2022-11-03 NOTE — PROGRESS NOTES
[address] [facility] [address], [address]        [place],  [facility]        Karla Arellano: [phone]        F: [phone]       KaritKarma            Dr. Ricky Golden MD MS          Dr. 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 Dose (cGy): 5040    Patient was seen today for weekly visit. Wt Readings from Last 3 Encounters:   22 140 lb 3.4 oz (63.6 kg)   22 137 lb (62.1 kg)   22 137 lb (62.1 kg)       BP (!) 170/70   Pulse 51   Temp 97.8 °F (36.6 °C) (Infrared)   Resp 16   Wt 140 lb 3.4 oz (63.6 kg)   SpO2 100%   BMI 20.71 kg/m²     Lab Results   Component Value Date    WBC 6.3 2022     2022         Comfort Alteration  Fatigue:None, able to perform daily activities    Pain Location: Denies  Pain Intensity (Current): 0 No Pain  Pain Treatment: N/A  Pain Relief: n/a    Emotional Alteration:   Coping: effective    Nutritional Alteration  Anorexia: none   Nausea: No nausea noted  Vomiting: No vomiting     Skin Alteration   Skin reaction: No changes noted    Elimination Alterations  Urinary Frequency: None  Urinary Retention: Normal  Urinary Incontinence: None  Dysuria: None  Nocturia: 1-3 times nightly  Proctitis: None  Diarrhea: None    Additional Comments: Teaching done today.     MEDICATIONS:     Current Outpatient Medications   Medication Sig Dispense Refill    ondansetron (ZOFRAN) 4 MG tablet Take 1 tablet by mouth daily as needed for Nausea or Vomiting 30 tablet 0    prochlorperazine (COMPAZINE) 10 MG tablet Take 1 tablet by mouth every 6 hours as needed (for nausea and vomiting) 60 tablet 3    lidocaine-prilocaine (EMLA) 2.5-2.5 % cream Apply topically as needed. 5 g 3    levothyroxine (SYNTHROID) 200 MCG tablet take 1 tablet by mouth once daily 30 tablet 1    pantoprazole (PROTONIX) 40 MG tablet Take 1 tablet by mouth every morning (before breakfast) 90 tablet 1    dilTIAZem (DILACOR XR) 240 MG extended release capsule TAKE 1 CAPSULE DAILY 90 capsule 3    losartan (COZAAR) 100 MG tablet TAKE 1 TABLET DAILY 90 tablet 2    Multiple Vitamin (MULTIVITAMIN PO) Take 1 capsule by mouth daily. aspirin 81 MG EC tablet Take 81 mg by mouth daily. No current facility-administered medications for this encounter. Facility-Administered Medications Ordered in Other Encounters   Medication Dose Route Frequency Provider Last Rate Last Admin    sodium chloride flush 0.9 % injection 5-40 mL  5-40 mL IntraVENous PRN Ivan Horn MD        heparin flush 100 UNIT/ML injection 500 Units  500 Units IntraCATHeter PRN Ivan Horn MD   500 Units at 22 1034    0.9 % sodium chloride infusion  5-250 mL/hr IntraVENous PRRAISA Horn MD   Stopped at 22 1032     * New    PHYSICAL EXAM:       ECO - Asymptomatic (Fully active, able to carry on all pre-disease activities without restriction)    General: NAD, AO x 3, Mentation is clear with appropriate affect. HEENT: Normocephalic, atraumatic  Thorax:  Unlabored  COR: Nontachycardic  Abdomen:  Non-distended  Neuro:  Cranial nerves grossly intact; no focal deficits  Skin - treatment portal: SEE above. Skin intact with no treatment effects noted. Chemotherapy Update: Concurrent chemotherapy    Treatment Imaging: CBCT All imaging reviewed and approved by Dr. Triston Christianson. ASSESSMENT: No significant radiation side effects.      New medications, diagnostic results: Not applicable    PLAN: Again reviewed potential side effects of radiation for the patient's treatment. Continue local/topical care. Continue current radiation course as prescribed. Patient seen and examined independently by me. The above note has been modified by me to reflect current findings and plans. Labs, cultures, and radiographs where available were reviewed. I discussed patient concerns and instructions were given. Please see our orders for the updated patient care plan.     Azeb Singh PhD MD  Radiation Oncology

## 2022-11-03 NOTE — PLAN OF CARE
Problem: Safety - Adult  Goal: Free from fall injury  Outcome: Adequate for Discharge  Flowsheets (Taken 11/3/2022 1241)  Free From Fall Injury: Instruct family/caregiver on patient safety  Note: No falls occurred with visit today. Problem: Discharge Planning  Goal: Discharge to home or other facility with appropriate resources  Outcome: Adequate for Discharge  Flowsheets (Taken 11/3/2022 1241)  Discharge to home or other facility with appropriate resources: Identify barriers to discharge with patient and caregiver  Note: Verbalize understanding of discharge instructions, follow up appointments, and when to call Physician. Problem: Infection - Adult  Goal: Absence of infection at discharge  Outcome: Adequate for Discharge  Flowsheets (Taken 11/3/2022 1241)  Absence of infection at discharge: Assess and monitor for signs and symptoms of infection  Note: Mediport site with no redness or warmth. Skin over port site intact with no signs of breakdown noted. Patient verbalizes signs/symptoms of port infection and when to notify the physician. Goal: Absence of fever/infection during anticipated neutropenic period  Outcome: Adequate for Discharge   Care plan reviewed with patient. Patient verbalizes understanding of the plan of care and contributes to goal setting.

## 2022-11-03 NOTE — DISCHARGE INSTRUCTIONS
Please contact your Oncologist if you have any questions regarding the etoposide that you received today. You are instructed to call the office or go to the Emergency Dept. If you experience any of the following symptoms:    Dizziness/lightheadedness   Acute nausea or vomiting-not relieved by medications  Headaches-not relieved by medications  New chest pain or pressure  New rash /itching  New shortness of breath  Fever,chills or signs or symptoms of infection    Make sure you are drinking 48 to 64 ounces of water daily-if you are unable to drink fluids let us know right away.

## 2022-11-04 ENCOUNTER — HOSPITAL ENCOUNTER (OUTPATIENT)
Dept: RADIATION ONCOLOGY | Age: 67
Discharge: HOME | End: 2022-11-04
Payer: MEDICARE

## 2022-11-04 PROCEDURE — G6002 STEREOSCOPIC X-RAY GUIDANCE: HCPCS | Performed by: RADIOLOGY

## 2022-11-04 PROCEDURE — 77386 HC NTSTY MODUL RAD TX DLVR CPLX: CPT | Performed by: RADIOLOGY

## 2022-11-04 RX ORDER — LOSARTAN POTASSIUM 100 MG/1
TABLET ORAL
Qty: 90 TABLET | Refills: 1 | Status: SHIPPED | OUTPATIENT
Start: 2022-11-04

## 2022-11-07 ENCOUNTER — HOSPITAL ENCOUNTER (OUTPATIENT)
Dept: RADIATION ONCOLOGY | Age: 67
Discharge: HOME | End: 2022-11-07
Payer: MEDICARE

## 2022-11-07 PROCEDURE — 77336 RADIATION PHYSICS CONSULT: CPT | Performed by: RADIOLOGY

## 2022-11-07 PROCEDURE — 77386 HC NTSTY MODUL RAD TX DLVR CPLX: CPT | Performed by: RADIOLOGY

## 2022-11-07 PROCEDURE — 77427 RADIATION TX MANAGEMENT X5: CPT | Performed by: RADIOLOGY

## 2022-11-07 PROCEDURE — G6002 STEREOSCOPIC X-RAY GUIDANCE: HCPCS | Performed by: RADIOLOGY

## 2022-11-07 NOTE — PROGRESS NOTES
[facility] [address], [address]  Phone: [phone]   Toll Free: [phone]   Fax: [phone]    RADIATION ONCOLOGY EDUCATION    CHIEF COMPLAINT: Gilford Kleine presents to radiation oncology today for education regarding treatment to the Pancreas. PLAN:   Expected and potential side effects were discussed in detail, along with written handouts. Skin care and moisturization instructions were discussed in detail. Patient was not agreeable to physical therapy referral. Explained referral could be placed at any time if patient changes their mind. Patient was informed of dietician that is available weekly and as needed. Educated on weekly on treatment visit to meet with Physician to monitor side effects and treatment course. Informed patient that Physician is available at any time to discuss radiation side effects/concerns through out treatment course. Gilford Kleine had the opportunity to ask questions, and indicated that all questions were satisfactorily addressed.

## 2022-11-08 ENCOUNTER — HOSPITAL ENCOUNTER (OUTPATIENT)
Dept: RADIATION ONCOLOGY | Age: 67
Discharge: HOME | End: 2022-11-08
Payer: MEDICARE

## 2022-11-08 DIAGNOSIS — C25.1 MALIGNANT NEOPLASM OF BODY OF PANCREAS (HCC): Primary | ICD-10-CM

## 2022-11-08 PROCEDURE — 77386 HC NTSTY MODUL RAD TX DLVR CPLX: CPT | Performed by: RADIOLOGY

## 2022-11-08 PROCEDURE — G6002 STEREOSCOPIC X-RAY GUIDANCE: HCPCS | Performed by: RADIOLOGY

## 2022-11-09 ENCOUNTER — HOSPITAL ENCOUNTER (OUTPATIENT)
Age: 67
Discharge: HOME | End: 2022-11-09
Payer: MEDICARE

## 2022-11-09 ENCOUNTER — HOSPITAL ENCOUNTER (OUTPATIENT)
Dept: RADIATION ONCOLOGY | Age: 67
Discharge: HOME | End: 2022-11-09
Payer: MEDICARE

## 2022-11-09 DIAGNOSIS — C25.1 MALIGNANT NEOPLASM OF BODY OF PANCREAS (HCC): ICD-10-CM

## 2022-11-09 LAB
BASOPHILS # BLD: 1.1 %
BASOPHILS ABSOLUTE: 0 THOU/MM3 (ref 0–0.1)
EOSINOPHIL # BLD: 1.3 %
EOSINOPHILS ABSOLUTE: 0 THOU/MM3 (ref 0–0.4)
ERYTHROCYTE [DISTWIDTH] IN BLOOD BY AUTOMATED COUNT: 14.1 % (ref 11.5–14.5)
ERYTHROCYTE [DISTWIDTH] IN BLOOD BY AUTOMATED COUNT: 46.5 FL (ref 35–45)
HCT VFR BLD CALC: 36.1 % (ref 42–52)
HEMOGLOBIN: 12.1 GM/DL (ref 14–18)
IMMATURE GRANS (ABS): 0.06 THOU/MM3 (ref 0–0.07)
IMMATURE GRANULOCYTES: 1.6 %
LYMPHOCYTES # BLD: 19.2 %
LYMPHOCYTES ABSOLUTE: 0.7 THOU/MM3 (ref 1–4.8)
MCH RBC QN AUTO: 30.3 PG (ref 26–33)
MCHC RBC AUTO-ENTMCNC: 33.5 GM/DL (ref 32.2–35.5)
MCV RBC AUTO: 90.3 FL (ref 80–94)
MONOCYTES # BLD: 1.3 %
MONOCYTES ABSOLUTE: 0 THOU/MM3 (ref 0.4–1.3)
NUCLEATED RED BLOOD CELLS: 0 /100 WBC
PLATELET # BLD: 198 THOU/MM3 (ref 130–400)
PMV BLD AUTO: 10.5 FL (ref 9.4–12.4)
RBC # BLD: 4 MILL/MM3 (ref 4.7–6.1)
SCAN OF BLOOD SMEAR: NORMAL
SEG NEUTROPHILS: 75.5 %
SEGMENTED NEUTROPHILS ABSOLUTE COUNT: 2.9 THOU/MM3 (ref 1.8–7.7)
WBC # BLD: 3.8 THOU/MM3 (ref 4.8–10.8)

## 2022-11-09 PROCEDURE — 77386 HC NTSTY MODUL RAD TX DLVR CPLX: CPT | Performed by: RADIOLOGY

## 2022-11-09 PROCEDURE — 85025 COMPLETE CBC W/AUTO DIFF WBC: CPT

## 2022-11-09 PROCEDURE — 36415 COLL VENOUS BLD VENIPUNCTURE: CPT

## 2022-11-09 PROCEDURE — G6002 STEREOSCOPIC X-RAY GUIDANCE: HCPCS | Performed by: RADIOLOGY

## 2022-11-10 ENCOUNTER — HOSPITAL ENCOUNTER (OUTPATIENT)
Dept: RADIATION ONCOLOGY | Age: 67
Discharge: HOME | End: 2022-11-10
Payer: MEDICARE

## 2022-11-10 VITALS
BODY MASS INDEX: 19.37 KG/M2 | WEIGHT: 131.17 LBS | TEMPERATURE: 98.2 F | SYSTOLIC BLOOD PRESSURE: 124 MMHG | OXYGEN SATURATION: 100 % | RESPIRATION RATE: 18 BRPM | DIASTOLIC BLOOD PRESSURE: 79 MMHG | HEART RATE: 80 BPM

## 2022-11-10 PROCEDURE — 77386 HC NTSTY MODUL RAD TX DLVR CPLX: CPT | Performed by: RADIOLOGY

## 2022-11-10 PROCEDURE — G6002 STEREOSCOPIC X-RAY GUIDANCE: HCPCS | Performed by: RADIOLOGY

## 2022-11-10 NOTE — PROGRESS NOTES
[address] [facility] [address], [address]        [place] [facility]        Cristy Fernandez: [phone]        F: [phone]       [employer]            Dr. Meaghan Gtz MD MS          Dr. 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 Dose (cGy): 5040    Patient was seen today for weekly visit. Wt Readings from Last 3 Encounters:   11/10/22 131 lb 2.8 oz (59.5 kg)   22 140 lb 3.4 oz (63.6 kg)   22 137 lb (62.1 kg)       /79   Pulse 80   Temp 98.2 °F (36.8 °C) (Infrared)   Resp 18   Wt 131 lb 2.8 oz (59.5 kg)   SpO2 100%   BMI 19.37 kg/m²     Lab Results   Component Value Date    WBC 3.8 (L) 2022     2022         Comfort Alteration  Fatigue:Able to perform daily activities with rest periods    Pain Location: Denies  Pain Intensity (Current): 0 No Pain  Pain Treatment: N/A  Pain Relief: n/a    Emotional Alteration:   Coping: effective    Nutritional Alteration  Anorexia: none   Nausea: No nausea noted  Vomiting: No vomiting     Skin Alteration   Skin reaction: No changes noted    Elimination Alterations  Urinary Frequency: None  Urinary Retention: Normal  Urinary Incontinence: None  Dysuria: None  Nocturia: 1-3 times nightly  Proctitis: None  Diarrhea: None    Additional Comments: Using Aquaphor at times.      MEDICATIONS:     Current Outpatient Medications   Medication Sig Dispense Refill    losartan (COZAAR) 100 MG tablet TAKE 1 TABLET DAILY 90 tablet 1    ondansetron (ZOFRAN) 4 MG tablet Take 1 tablet by mouth daily as needed for Nausea or Vomiting 30 tablet 0    prochlorperazine (COMPAZINE) 10 MG tablet Take 1 tablet by mouth every 6 hours as needed (for nausea and vomiting) 60 tablet 3    lidocaine-prilocaine (EMLA) 2.5-2.5 % cream Apply topically as needed. 5 g 3    levothyroxine (SYNTHROID) 200 MCG tablet take 1 tablet by mouth once daily 30 tablet 1    pantoprazole (PROTONIX) 40 MG tablet Take 1 tablet by mouth every morning (before breakfast) 90 tablet 1    dilTIAZem (DILACOR XR) 240 MG extended release capsule TAKE 1 CAPSULE DAILY 90 capsule 3    Multiple Vitamin (MULTIVITAMIN PO) Take 1 capsule by mouth daily. aspirin 81 MG EC tablet Take 81 mg by mouth daily. No current facility-administered medications for this encounter. * New    PHYSICAL EXAM:       ECO - Asymptomatic (Fully active, able to carry on all pre-disease activities without restriction)    General: NAD, AO x 3, Mentation is clear with appropriate affect. HEENT: Normocephalic, atraumatic  Thorax:  Unlabored  COR: Nontachycardic  Abdomen:  Non-distended  Neuro:  Cranial nerves grossly intact; no focal deficits  Skin - treatment portal: SEE above. Skin intact with no treatment effects noted. Chemotherapy Update: Concurrent chemotherapy    Treatment Imaging: CBCT All imaging reviewed and approved by Dr. Adriana Duran. ASSESSMENT: No significant radiation side effects. New medications, diagnostic results: Not applicable    PLAN: Again reviewed potential side effects of radiation for the patient's treatment. Continue local/topical care. Continue current radiation course as prescribed. Patient seen and examined independently by me. The above note has been modified by me to reflect current findings and plans. Labs, cultures, and radiographs where available were reviewed. I discussed patient concerns and instructions were given.  Please see our orders for the updated patient care plan.     Summer Dominguez PhD MD  Radiation Oncology

## 2022-11-11 ENCOUNTER — HOSPITAL ENCOUNTER (OUTPATIENT)
Dept: RADIATION ONCOLOGY | Age: 67
Discharge: HOME | End: 2022-11-11
Payer: MEDICARE

## 2022-11-11 PROCEDURE — 77386 HC NTSTY MODUL RAD TX DLVR CPLX: CPT | Performed by: RADIOLOGY

## 2022-11-11 PROCEDURE — G6002 STEREOSCOPIC X-RAY GUIDANCE: HCPCS | Performed by: RADIOLOGY

## 2022-11-14 ENCOUNTER — HOSPITAL ENCOUNTER (OUTPATIENT)
Dept: RADIATION ONCOLOGY | Age: 67
Discharge: HOME | End: 2022-11-14
Payer: MEDICARE

## 2022-11-14 PROCEDURE — G6002 STEREOSCOPIC X-RAY GUIDANCE: HCPCS | Performed by: RADIOLOGY

## 2022-11-14 PROCEDURE — 77336 RADIATION PHYSICS CONSULT: CPT | Performed by: RADIOLOGY

## 2022-11-14 PROCEDURE — 77386 HC NTSTY MODUL RAD TX DLVR CPLX: CPT | Performed by: RADIOLOGY

## 2022-11-15 ENCOUNTER — HOSPITAL ENCOUNTER (OUTPATIENT)
Dept: RADIATION ONCOLOGY | Age: 67
Discharge: HOME | End: 2022-11-15
Payer: MEDICARE

## 2022-11-15 ENCOUNTER — HOSPITAL ENCOUNTER (OUTPATIENT)
Age: 67
Discharge: HOME | End: 2022-11-15
Payer: MEDICARE

## 2022-11-15 DIAGNOSIS — C25.1 MALIGNANT NEOPLASM OF BODY OF PANCREAS (HCC): ICD-10-CM

## 2022-11-15 LAB
ABSOLUTE IMMATURE GRANULOCYTE: 0 THOU/MM3 (ref 0–0.07)
BASINOPHIL, AUTOMATED: 3 % (ref 0–3)
BASOPHILS ABSOLUTE: 0 THOU/MM3 (ref 0–0.1)
EOSINOPHILS ABSOLUTE: 0.2 THOU/MM3 (ref 0–0.4)
EOSINOPHILS RELATIVE PERCENT: 15 % (ref 0–4)
HCT VFR BLD CALC: 36.7 % (ref 42–52)
HEMOGLOBIN: 12 GM/DL (ref 14–18)
IMMATURE GRANULOCYTES: 0 %
LYMPHOCYTES # BLD: 54 % (ref 15–47)
LYMPHOCYTES ABSOLUTE: 0.7 THOU/MM3 (ref 1–4.8)
MCH RBC QN AUTO: 29.3 PG (ref 26–33)
MCHC RBC AUTO-ENTMCNC: 32.7 GM/DL (ref 32.2–35.5)
MCV RBC AUTO: 90 FL (ref 80–94)
MONOCYTES ABSOLUTE: 0.2 THOU/MM3 (ref 0.4–1.3)
MONOCYTES: 19 % (ref 0–12)
PDW BLD-RTO: 14.1 % (ref 11.5–14.5)
PLATELET # BLD: 154 THOU/MM3 (ref 130–400)
PMV BLD AUTO: 9 FL (ref 9.4–12.4)
RBC # BLD: 4.1 MILL/MM3 (ref 4.7–6.1)
SEG NEUTROPHILS: 9 % (ref 43–75)
SEGMENTED NEUTROPHILS ABSOLUTE COUNT: 0.1 THOU/MM3 (ref 1.8–7.7)
WBC # BLD: 1.2 THOU/MM3 (ref 4.8–10.8)

## 2022-11-15 PROCEDURE — 85025 COMPLETE CBC W/AUTO DIFF WBC: CPT

## 2022-11-15 PROCEDURE — G6002 STEREOSCOPIC X-RAY GUIDANCE: HCPCS | Performed by: RADIOLOGY

## 2022-11-15 PROCEDURE — 36415 COLL VENOUS BLD VENIPUNCTURE: CPT

## 2022-11-15 PROCEDURE — 77386 HC NTSTY MODUL RAD TX DLVR CPLX: CPT | Performed by: RADIOLOGY

## 2022-11-16 ENCOUNTER — CLINICAL DOCUMENTATION (OUTPATIENT)
Dept: CASE MANAGEMENT | Age: 67
End: 2022-11-16

## 2022-11-16 ENCOUNTER — APPOINTMENT (OUTPATIENT)
Dept: RADIATION ONCOLOGY | Age: 67
End: 2022-11-16
Payer: MEDICARE

## 2022-11-16 DIAGNOSIS — D69.6 THROMBOCYTOPENIA (HCC): ICD-10-CM

## 2022-11-16 DIAGNOSIS — C25.1 MALIGNANT NEOPLASM OF BODY OF PANCREAS (HCC): Primary | ICD-10-CM

## 2022-11-17 ENCOUNTER — HOSPITAL ENCOUNTER (OUTPATIENT)
Dept: RADIATION ONCOLOGY | Age: 67
End: 2022-11-17
Payer: MEDICARE

## 2022-11-17 ENCOUNTER — HOSPITAL ENCOUNTER (OUTPATIENT)
Age: 67
Discharge: HOME | End: 2022-11-17
Payer: MEDICARE

## 2022-11-17 DIAGNOSIS — D69.6 THROMBOCYTOPENIA (HCC): ICD-10-CM

## 2022-11-17 DIAGNOSIS — C25.1 MALIGNANT NEOPLASM OF BODY OF PANCREAS (HCC): ICD-10-CM

## 2022-11-17 DIAGNOSIS — C25.1 MALIGNANT NEOPLASM OF BODY OF PANCREAS (HCC): Primary | ICD-10-CM

## 2022-11-17 LAB
ABSOLUTE IMMATURE GRANULOCYTE: 0 THOU/MM3 (ref 0–0.07)
BASINOPHIL, AUTOMATED: 2 % (ref 0–3)
BASOPHILS ABSOLUTE: 0 THOU/MM3 (ref 0–0.1)
EOSINOPHILS ABSOLUTE: 0.2 THOU/MM3 (ref 0–0.4)
EOSINOPHILS RELATIVE PERCENT: 15 % (ref 0–4)
HCT VFR BLD CALC: 34.6 % (ref 42–52)
HEMOGLOBIN: 11.4 GM/DL (ref 14–18)
IMMATURE GRANULOCYTES: 0 %
LYMPHOCYTES # BLD: 49 % (ref 15–47)
LYMPHOCYTES ABSOLUTE: 0.6 THOU/MM3 (ref 1–4.8)
MCH RBC QN AUTO: 29.7 PG (ref 26–33)
MCHC RBC AUTO-ENTMCNC: 32.9 GM/DL (ref 32.2–35.5)
MCV RBC AUTO: 90 FL (ref 80–94)
MONOCYTES ABSOLUTE: 0.3 THOU/MM3 (ref 0.4–1.3)
MONOCYTES: 25 % (ref 0–12)
PATHOLOGIST REVIEW: ABNORMAL
PDW BLD-RTO: 14.5 % (ref 11.5–14.5)
PLATELET # BLD: 193 THOU/MM3 (ref 130–400)
PMV BLD AUTO: 9.2 FL (ref 9.4–12.4)
RBC # BLD: 3.84 MILL/MM3 (ref 4.7–6.1)
SEG NEUTROPHILS: 9 % (ref 43–75)
SEGMENTED NEUTROPHILS ABSOLUTE COUNT: 0.1 THOU/MM3 (ref 1.8–7.7)
WBC # BLD: 1.3 THOU/MM3 (ref 4.8–10.8)

## 2022-11-17 PROCEDURE — 36415 COLL VENOUS BLD VENIPUNCTURE: CPT

## 2022-11-17 PROCEDURE — 85025 COMPLETE CBC W/AUTO DIFF WBC: CPT

## 2022-11-18 ENCOUNTER — APPOINTMENT (OUTPATIENT)
Dept: RADIATION ONCOLOGY | Age: 67
End: 2022-11-18
Payer: MEDICARE

## 2022-11-18 ENCOUNTER — HOSPITAL ENCOUNTER (OUTPATIENT)
Age: 67
Discharge: HOME | End: 2022-11-18
Payer: MEDICARE

## 2022-11-18 DIAGNOSIS — C25.1 MALIGNANT NEOPLASM OF BODY OF PANCREAS (HCC): ICD-10-CM

## 2022-11-18 DIAGNOSIS — C25.1 MALIGNANT NEOPLASM OF BODY OF PANCREAS (HCC): Primary | ICD-10-CM

## 2022-11-18 LAB
ATYPICAL LYMPHOCYTES: ABNORMAL %
BASOPHILS # BLD: 1.9 %
BASOPHILS ABSOLUTE: 0 THOU/MM3 (ref 0–0.1)
EOSINOPHIL # BLD: 18.5 %
EOSINOPHILS ABSOLUTE: 0.3 THOU/MM3 (ref 0–0.4)
ERYTHROCYTE [DISTWIDTH] IN BLOOD BY AUTOMATED COUNT: 14.8 % (ref 11.5–14.5)
ERYTHROCYTE [DISTWIDTH] IN BLOOD BY AUTOMATED COUNT: 47.8 FL (ref 35–45)
HCT VFR BLD CALC: 34.9 % (ref 42–52)
HEMOGLOBIN: 11.8 GM/DL (ref 14–18)
IMMATURE GRANS (ABS): 0.01 THOU/MM3 (ref 0–0.07)
IMMATURE GRANULOCYTES: 0.6 %
LYMPHOCYTES # BLD: 40.1 %
LYMPHOCYTES ABSOLUTE: 0.6 THOU/MM3 (ref 1–4.8)
MCH RBC QN AUTO: 30.6 PG (ref 26–33)
MCHC RBC AUTO-ENTMCNC: 33.8 GM/DL (ref 32.2–35.5)
MCV RBC AUTO: 90.6 FL (ref 80–94)
MONOCYTES # BLD: 29.9 %
MONOCYTES ABSOLUTE: 0.5 THOU/MM3 (ref 0.4–1.3)
NUCLEATED RED BLOOD CELLS: 0 /100 WBC
PATHOLOGIST REVIEW: ABNORMAL
PLATELET # BLD: 233 THOU/MM3 (ref 130–400)
PMV BLD AUTO: 9.7 FL (ref 9.4–12.4)
RBC # BLD: 3.85 MILL/MM3 (ref 4.7–6.1)
SCAN OF BLOOD SMEAR: NORMAL
SEG NEUTROPHILS: 9 %
SEGMENTED NEUTROPHILS ABSOLUTE COUNT: 0.1 THOU/MM3 (ref 1.8–7.7)
WBC # BLD: 1.6 THOU/MM3 (ref 4.8–10.8)

## 2022-11-18 PROCEDURE — 36415 COLL VENOUS BLD VENIPUNCTURE: CPT

## 2022-11-18 PROCEDURE — 85025 COMPLETE CBC W/AUTO DIFF WBC: CPT

## 2022-11-20 ENCOUNTER — APPOINTMENT (OUTPATIENT)
Dept: RADIATION ONCOLOGY | Age: 67
End: 2022-11-20
Payer: MEDICARE

## 2022-11-20 PROCEDURE — 77336 RADIATION PHYSICS CONSULT: CPT | Performed by: RADIOLOGY

## 2022-11-21 ENCOUNTER — HOSPITAL ENCOUNTER (OUTPATIENT)
Dept: RADIATION ONCOLOGY | Age: 67
Discharge: HOME | End: 2022-11-21
Payer: MEDICARE

## 2022-11-21 ENCOUNTER — HOSPITAL ENCOUNTER (OUTPATIENT)
Age: 67
Discharge: HOME | End: 2022-11-21
Payer: MEDICARE

## 2022-11-21 ENCOUNTER — CLINICAL DOCUMENTATION (OUTPATIENT)
Dept: CASE MANAGEMENT | Age: 67
End: 2022-11-21

## 2022-11-21 DIAGNOSIS — C25.1 MALIGNANT NEOPLASM OF BODY OF PANCREAS (HCC): ICD-10-CM

## 2022-11-21 LAB
ABSOLUTE IMMATURE GRANULOCYTE: 0.08 THOU/MM3 (ref 0–0.07)
BASINOPHIL, AUTOMATED: 2 % (ref 0–3)
BASOPHILS ABSOLUTE: 0.1 THOU/MM3 (ref 0–0.1)
EOSINOPHILS ABSOLUTE: 0.2 THOU/MM3 (ref 0–0.4)
EOSINOPHILS RELATIVE PERCENT: 9 % (ref 0–4)
HCT VFR BLD CALC: 34.6 % (ref 42–52)
HEMOGLOBIN: 11.4 GM/DL (ref 14–18)
IMMATURE GRANULOCYTES: 3 %
LYMPHOCYTES # BLD: 30 % (ref 15–47)
LYMPHOCYTES ABSOLUTE: 0.8 THOU/MM3 (ref 1–4.8)
MCH RBC QN AUTO: 29.8 PG (ref 26–33)
MCHC RBC AUTO-ENTMCNC: 32.9 GM/DL (ref 32.2–35.5)
MCV RBC AUTO: 90 FL (ref 80–94)
MONOCYTES ABSOLUTE: 0.6 THOU/MM3 (ref 0.4–1.3)
MONOCYTES: 22 % (ref 0–12)
PDW BLD-RTO: 14.7 % (ref 11.5–14.5)
PLATELET # BLD: 237 THOU/MM3 (ref 130–400)
PMV BLD AUTO: 10 FL (ref 9.4–12.4)
RBC # BLD: 3.83 MILL/MM3 (ref 4.7–6.1)
SEG NEUTROPHILS: 33 % (ref 43–75)
SEGMENTED NEUTROPHILS ABSOLUTE COUNT: 0.9 THOU/MM3 (ref 1.8–7.7)
WBC # BLD: 2.6 THOU/MM3 (ref 4.8–10.8)

## 2022-11-21 PROCEDURE — 85025 COMPLETE CBC W/AUTO DIFF WBC: CPT

## 2022-11-21 PROCEDURE — G6002 STEREOSCOPIC X-RAY GUIDANCE: HCPCS | Performed by: RADIOLOGY

## 2022-11-21 PROCEDURE — 36415 COLL VENOUS BLD VENIPUNCTURE: CPT

## 2022-11-21 PROCEDURE — 77386 HC NTSTY MODUL RAD TX DLVR CPLX: CPT | Performed by: RADIOLOGY

## 2022-11-21 NOTE — PROGRESS NOTES
Name: Carmen Mendez  : 1955  MRN: [id number]    Oncology Navigation Follow-Up Note    Contact Type:  Radiation Oncology    Notes:   Radiation on hold since  due to [address] being less than .7  Today [facility] . 9 Radiation resumed. Due for Cycle 2 of Chemotherapy .     Electronically signed by Kiel Marie RN on 2022 at 10:56 AM

## 2022-11-22 ENCOUNTER — HOSPITAL ENCOUNTER (OUTPATIENT)
Dept: RADIATION ONCOLOGY | Age: 67
Discharge: HOME | End: 2022-11-22
Payer: MEDICARE

## 2022-11-22 PROCEDURE — G6002 STEREOSCOPIC X-RAY GUIDANCE: HCPCS | Performed by: RADIOLOGY

## 2022-11-22 PROCEDURE — 77386 HC NTSTY MODUL RAD TX DLVR CPLX: CPT | Performed by: RADIOLOGY

## 2022-11-23 ENCOUNTER — HOSPITAL ENCOUNTER (OUTPATIENT)
Dept: RADIATION ONCOLOGY | Age: 67
Discharge: HOME | End: 2022-11-23
Payer: MEDICARE

## 2022-11-23 VITALS
DIASTOLIC BLOOD PRESSURE: 86 MMHG | SYSTOLIC BLOOD PRESSURE: 147 MMHG | OXYGEN SATURATION: 100 % | HEART RATE: 78 BPM | WEIGHT: 131.17 LBS | TEMPERATURE: 98.2 F | BODY MASS INDEX: 19.37 KG/M2 | RESPIRATION RATE: 18 BRPM

## 2022-11-23 PROCEDURE — 77386 HC NTSTY MODUL RAD TX DLVR CPLX: CPT | Performed by: RADIOLOGY

## 2022-11-23 PROCEDURE — G6002 STEREOSCOPIC X-RAY GUIDANCE: HCPCS | Performed by: RADIOLOGY

## 2022-11-23 NOTE — PROGRESS NOTES
[address] [facility] [address], [address]        [facility], [address] [facility]        Princess Ocasio: [phone]        F: [phone]       [email or web address]            Dr. Judie Egan MD MS          Dr. 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 Dose (cGy): 5040    Patient was seen today for weekly visit. Wt Readings from Last 3 Encounters:   22 131 lb 2.8 oz (59.5 kg)   11/10/22 131 lb 2.8 oz (59.5 kg)   22 140 lb 3.4 oz (63.6 kg)       BP (!) 147/86   Pulse 78   Temp 98.2 °F (36.8 °C) (Infrared)   Resp 18   Wt 131 lb 2.8 oz (59.5 kg)   SpO2 100%   BMI 19.37 kg/m²     Lab Results   Component Value Date    WBC 2.6 (L) 2022     2022         Comfort Alteration  Fatigue:Able to perform daily activities with rest periods    Pain Location: Denies  Pain Intensity (Current): 0 No Pain  Pain Treatment: N/A  Pain Relief: n/a    Emotional Alteration:   Coping: effective    Nutritional Alteration  Anorexia: none   Nausea: No nausea noted  Vomiting: No vomiting     Skin Alteration   Skin reaction: No changes noted    Elimination Alterations  Urinary Frequency: None  Urinary Retention: Normal  Urinary Incontinence: None  Dysuria: None  Nocturia: 1-3 times nightly  Proctitis: None  Diarrhea: None    Additional Comments: Using Aquaphor at times.      MEDICATIONS:     Current Outpatient Medications   Medication Sig Dispense Refill    losartan (COZAAR) 100 MG tablet TAKE 1 TABLET DAILY 90 tablet 1    ondansetron (ZOFRAN) 4 MG tablet Take 1 tablet by mouth daily as needed for Nausea or Vomiting 30 tablet 0    prochlorperazine (COMPAZINE) 10 MG tablet Take 1 tablet by mouth every 6 hours as needed (for nausea and vomiting) 60 tablet 3    lidocaine-prilocaine (EMLA) 2.5-2.5 % cream Apply topically as needed. 5 g 3    levothyroxine (SYNTHROID) 200 MCG tablet take 1 tablet by mouth once daily 30 tablet 1    pantoprazole (PROTONIX) 40 MG tablet Take 1 tablet by mouth every morning (before breakfast) 90 tablet 1    dilTIAZem (DILACOR XR) 240 MG extended release capsule TAKE 1 CAPSULE DAILY 90 capsule 3    Multiple Vitamin (MULTIVITAMIN PO) Take 1 capsule by mouth daily. aspirin 81 MG EC tablet Take 81 mg by mouth daily. No current facility-administered medications for this encounter. PHYSICAL EXAM:       ECO - Asymptomatic (Fully active, able to carry on all pre-disease activities without restriction)    General: NAD, AO x 3, Mentation is clear with appropriate affect. HEENT: Normocephalic, atraumatic  Thorax:  Unlabored  Abdomen:  Soft, NT/ND, no rebound  Neuro:  Cranial nerves grossly intact; no focal deficits  Skin - treatment portal: SEE above. Chemotherapy Update: Concurrent chemotherapy    Treatment Imaging: CBCT All imaging reviewed and approved by Dr. Kristina Garcia. ASSESSMENT: No significant radiation side effects. Good po intake. Denies abdominal pain, nausea, vomiting, bowel/bladder changes. New medications, diagnostic results: Not applicable    PLAN: Again reviewed potential side effects of radiation for the patient's treatment. Continue local/topical care. Continue current radiation course as prescribed.     Bernadine Spears MD  Radiation Oncology

## 2022-11-28 ENCOUNTER — HOSPITAL ENCOUNTER (OUTPATIENT)
Dept: RADIATION ONCOLOGY | Age: 67
Discharge: HOME | End: 2022-11-28
Payer: MEDICARE

## 2022-11-28 PROCEDURE — G6002 STEREOSCOPIC X-RAY GUIDANCE: HCPCS | Performed by: RADIOLOGY

## 2022-11-28 PROCEDURE — 77386 HC NTSTY MODUL RAD TX DLVR CPLX: CPT | Performed by: RADIOLOGY

## 2022-11-29 ENCOUNTER — OFFICE VISIT (OUTPATIENT)
Dept: ONCOLOGY | Age: 67
End: 2022-11-29
Payer: MEDICARE

## 2022-11-29 ENCOUNTER — HOSPITAL ENCOUNTER (OUTPATIENT)
Dept: INFUSION THERAPY | Age: 67
Discharge: HOME | End: 2022-11-29
Payer: MEDICARE

## 2022-11-29 ENCOUNTER — HOSPITAL ENCOUNTER (OUTPATIENT)
Dept: RADIATION ONCOLOGY | Age: 67
Discharge: HOME | End: 2022-11-29
Payer: MEDICARE

## 2022-11-29 VITALS
HEIGHT: 69 IN | TEMPERATURE: 97.7 F | SYSTOLIC BLOOD PRESSURE: 114 MMHG | RESPIRATION RATE: 18 BRPM | WEIGHT: 133 LBS | OXYGEN SATURATION: 100 % | DIASTOLIC BLOOD PRESSURE: 64 MMHG | HEART RATE: 70 BPM | BODY MASS INDEX: 19.7 KG/M2

## 2022-11-29 VITALS
SYSTOLIC BLOOD PRESSURE: 114 MMHG | TEMPERATURE: 98.7 F | HEIGHT: 69 IN | BODY MASS INDEX: 19.7 KG/M2 | WEIGHT: 133 LBS | HEART RATE: 61 BPM | RESPIRATION RATE: 18 BRPM | OXYGEN SATURATION: 100 % | DIASTOLIC BLOOD PRESSURE: 59 MMHG

## 2022-11-29 DIAGNOSIS — C25.1 MALIGNANT NEOPLASM OF BODY OF PANCREAS (HCC): Primary | ICD-10-CM

## 2022-11-29 LAB
ABSOLUTE IMMATURE GRANULOCYTE: 0.04 THOU/MM3 (ref 0–0.07)
ALBUMIN SERPL-MCNC: 3.7 G/DL (ref 3.5–5.1)
ALP BLD-CCNC: 77 U/L (ref 38–126)
ALT SERPL-CCNC: 7 U/L (ref 11–66)
AST SERPL-CCNC: 19 U/L (ref 5–40)
BASINOPHIL, AUTOMATED: 2 % (ref 0–3)
BASOPHILS ABSOLUTE: 0.1 THOU/MM3 (ref 0–0.1)
BILIRUB SERPL-MCNC: 0.3 MG/DL (ref 0.3–1.2)
BILIRUBIN DIRECT: < 0.2 MG/DL (ref 0–0.3)
BUN, WHOLE BLOOD: 11 MG/DL (ref 8–26)
CHLORIDE, WHOLE BLOOD: 107 MEQ/L (ref 98–109)
CREATININE, WHOLE BLOOD: 1.1 MG/DL (ref 0.5–1.2)
EOSINOPHILS ABSOLUTE: 0.2 THOU/MM3 (ref 0–0.4)
EOSINOPHILS RELATIVE PERCENT: 3 % (ref 0–4)
GFR SERPL CREATININE-BSD FRML MDRD: > 60 ML/MIN/1.73M2
GLUCOSE, WHOLE BLOOD: 163 MG/DL (ref 70–108)
HCT VFR BLD CALC: 34.6 % (ref 42–52)
HEMOGLOBIN: 11.2 GM/DL (ref 14–18)
IMMATURE GRANULOCYTES: 1 %
IONIZED CALCIUM, WHOLE BLOOD: 1.24 MMOL/L (ref 1.12–1.32)
LYMPHOCYTES # BLD: 10 % (ref 15–47)
LYMPHOCYTES ABSOLUTE: 0.6 THOU/MM3 (ref 1–4.8)
MCH RBC QN AUTO: 29.6 PG (ref 26–33)
MCHC RBC AUTO-ENTMCNC: 32.4 GM/DL (ref 32.2–35.5)
MCV RBC AUTO: 91 FL (ref 80–94)
MONOCYTES ABSOLUTE: 0.5 THOU/MM3 (ref 0.4–1.3)
MONOCYTES: 9 % (ref 0–12)
PDW BLD-RTO: 15.7 % (ref 11.5–14.5)
PLATELET # BLD: 329 THOU/MM3 (ref 130–400)
PMV BLD AUTO: 9 FL (ref 9.4–12.4)
POTASSIUM, WHOLE BLOOD: 3.6 MEQ/L (ref 3.5–4.9)
RBC # BLD: 3.79 MILL/MM3 (ref 4.7–6.1)
SEG NEUTROPHILS: 77 % (ref 43–75)
SEGMENTED NEUTROPHILS ABSOLUTE COUNT: 4.7 THOU/MM3 (ref 1.8–7.7)
SODIUM, WHOLE BLOOD: 140 MEQ/L (ref 138–146)
TOTAL CO2, WHOLE BLOOD: 23 MEQ/L (ref 23–33)
TOTAL PROTEIN: 6.2 G/DL (ref 6.1–8)
WBC # BLD: 6.2 THOU/MM3 (ref 4.8–10.8)

## 2022-11-29 PROCEDURE — 2580000003 HC RX 258: Performed by: INTERNAL MEDICINE

## 2022-11-29 PROCEDURE — 96375 TX/PRO/DX INJ NEW DRUG ADDON: CPT

## 2022-11-29 PROCEDURE — 99214 OFFICE O/P EST MOD 30 MIN: CPT | Performed by: INTERNAL MEDICINE

## 2022-11-29 PROCEDURE — 96367 TX/PROPH/DG ADDL SEQ IV INF: CPT

## 2022-11-29 PROCEDURE — 85025 COMPLETE CBC W/AUTO DIFF WBC: CPT

## 2022-11-29 PROCEDURE — 96366 THER/PROPH/DIAG IV INF ADDON: CPT

## 2022-11-29 PROCEDURE — 77386 HC NTSTY MODUL RAD TX DLVR CPLX: CPT | Performed by: RADIOLOGY

## 2022-11-29 PROCEDURE — 36591 DRAW BLOOD OFF VENOUS DEVICE: CPT

## 2022-11-29 PROCEDURE — 6360000002 HC RX W HCPCS: Performed by: INTERNAL MEDICINE

## 2022-11-29 PROCEDURE — G6002 STEREOSCOPIC X-RAY GUIDANCE: HCPCS | Performed by: RADIOLOGY

## 2022-11-29 PROCEDURE — 80076 HEPATIC FUNCTION PANEL: CPT

## 2022-11-29 PROCEDURE — 99211 OFF/OP EST MAY X REQ PHY/QHP: CPT

## 2022-11-29 PROCEDURE — 96417 CHEMO IV INFUS EACH ADDL SEQ: CPT

## 2022-11-29 PROCEDURE — 1123F ACP DISCUSS/DSCN MKR DOCD: CPT | Performed by: INTERNAL MEDICINE

## 2022-11-29 PROCEDURE — 80047 BASIC METABLC PNL IONIZED CA: CPT

## 2022-11-29 PROCEDURE — 96413 CHEMO IV INFUSION 1 HR: CPT

## 2022-11-29 PROCEDURE — 3078F DIAST BP <80 MM HG: CPT | Performed by: INTERNAL MEDICINE

## 2022-11-29 PROCEDURE — 3074F SYST BP LT 130 MM HG: CPT | Performed by: INTERNAL MEDICINE

## 2022-11-29 PROCEDURE — 96415 CHEMO IV INFUSION ADDL HR: CPT

## 2022-11-29 RX ORDER — ACETAMINOPHEN 325 MG/1
650 TABLET ORAL
Status: CANCELLED | OUTPATIENT
Start: 2022-11-29

## 2022-11-29 RX ORDER — ACETAMINOPHEN 325 MG/1
650 TABLET ORAL
Status: CANCELLED | OUTPATIENT
Start: 2022-11-30

## 2022-11-29 RX ORDER — SODIUM CHLORIDE 9 MG/ML
5-40 INJECTION INTRAVENOUS PRN
Status: CANCELLED | OUTPATIENT
Start: 2022-12-01

## 2022-11-29 RX ORDER — SODIUM CHLORIDE 9 MG/ML
5-250 INJECTION, SOLUTION INTRAVENOUS PRN
Status: CANCELLED | OUTPATIENT
Start: 2022-11-30

## 2022-11-29 RX ORDER — HEPARIN SODIUM (PORCINE) LOCK FLUSH IV SOLN 100 UNIT/ML 100 UNIT/ML
500 SOLUTION INTRAVENOUS PRN
Status: CANCELLED | OUTPATIENT
Start: 2022-11-30

## 2022-11-29 RX ORDER — SUCRALFATE 1 G/1
1 TABLET ORAL 4 TIMES DAILY
COMMUNITY
Start: 2022-10-26

## 2022-11-29 RX ORDER — SODIUM CHLORIDE 9 MG/ML
25 INJECTION, SOLUTION INTRAVENOUS PRN
OUTPATIENT
Start: 2022-11-29

## 2022-11-29 RX ORDER — SODIUM CHLORIDE 0.9 % (FLUSH) 0.9 %
5-40 SYRINGE (ML) INJECTION PRN
Status: DISCONTINUED | OUTPATIENT
Start: 2022-11-29 | End: 2022-11-30 | Stop reason: HOSPADM

## 2022-11-29 RX ORDER — SODIUM CHLORIDE 9 MG/ML
5-250 INJECTION, SOLUTION INTRAVENOUS PRN
Status: CANCELLED | OUTPATIENT
Start: 2022-12-01

## 2022-11-29 RX ORDER — SODIUM CHLORIDE 9 MG/ML
INJECTION, SOLUTION INTRAVENOUS CONTINUOUS
Status: CANCELLED | OUTPATIENT
Start: 2022-11-30

## 2022-11-29 RX ORDER — ACETAMINOPHEN 325 MG/1
650 TABLET ORAL
Status: CANCELLED | OUTPATIENT
Start: 2022-12-01

## 2022-11-29 RX ORDER — SODIUM CHLORIDE 9 MG/ML
5-250 INJECTION, SOLUTION INTRAVENOUS PRN
Status: CANCELLED | OUTPATIENT
Start: 2022-11-29

## 2022-11-29 RX ORDER — ALBUTEROL SULFATE 90 UG/1
4 AEROSOL, METERED RESPIRATORY (INHALATION) PRN
Status: CANCELLED | OUTPATIENT
Start: 2022-12-01

## 2022-11-29 RX ORDER — SODIUM CHLORIDE 0.9 % (FLUSH) 0.9 %
5-40 SYRINGE (ML) INJECTION PRN
Status: CANCELLED | OUTPATIENT
Start: 2022-11-29

## 2022-11-29 RX ORDER — SODIUM CHLORIDE 9 MG/ML
INJECTION, SOLUTION INTRAVENOUS CONTINUOUS
Status: CANCELLED | OUTPATIENT
Start: 2022-12-01

## 2022-11-29 RX ORDER — MEPERIDINE HYDROCHLORIDE 50 MG/ML
12.5 INJECTION INTRAMUSCULAR; INTRAVENOUS; SUBCUTANEOUS PRN
Status: CANCELLED | OUTPATIENT
Start: 2022-11-29

## 2022-11-29 RX ORDER — FAMOTIDINE 10 MG/ML
20 INJECTION, SOLUTION INTRAVENOUS
Status: CANCELLED | OUTPATIENT
Start: 2022-12-01

## 2022-11-29 RX ORDER — MEPERIDINE HYDROCHLORIDE 50 MG/ML
12.5 INJECTION INTRAMUSCULAR; INTRAVENOUS; SUBCUTANEOUS PRN
Status: CANCELLED | OUTPATIENT
Start: 2022-12-01

## 2022-11-29 RX ORDER — SODIUM CHLORIDE 0.9 % (FLUSH) 0.9 %
5-40 SYRINGE (ML) INJECTION PRN
OUTPATIENT
Start: 2022-11-29

## 2022-11-29 RX ORDER — ALBUTEROL SULFATE 90 UG/1
4 AEROSOL, METERED RESPIRATORY (INHALATION) PRN
Status: CANCELLED | OUTPATIENT
Start: 2022-11-30

## 2022-11-29 RX ORDER — EPINEPHRINE 1 MG/ML
0.3 INJECTION, SOLUTION, CONCENTRATE INTRAVENOUS PRN
Status: CANCELLED | OUTPATIENT
Start: 2022-12-01

## 2022-11-29 RX ORDER — SODIUM CHLORIDE 0.9 % (FLUSH) 0.9 %
5-40 SYRINGE (ML) INJECTION PRN
Status: CANCELLED | OUTPATIENT
Start: 2022-12-01

## 2022-11-29 RX ORDER — SODIUM CHLORIDE 9 MG/ML
5-40 INJECTION INTRAVENOUS PRN
Status: CANCELLED | OUTPATIENT
Start: 2022-11-29

## 2022-11-29 RX ORDER — ALBUTEROL SULFATE 90 UG/1
4 AEROSOL, METERED RESPIRATORY (INHALATION) PRN
Status: CANCELLED | OUTPATIENT
Start: 2022-11-29

## 2022-11-29 RX ORDER — ONDANSETRON 2 MG/ML
8 INJECTION INTRAMUSCULAR; INTRAVENOUS
Status: CANCELLED | OUTPATIENT
Start: 2022-11-29

## 2022-11-29 RX ORDER — FAMOTIDINE 10 MG/ML
20 INJECTION, SOLUTION INTRAVENOUS
Status: CANCELLED | OUTPATIENT
Start: 2022-11-30

## 2022-11-29 RX ORDER — EPINEPHRINE 1 MG/ML
0.3 INJECTION, SOLUTION, CONCENTRATE INTRAVENOUS PRN
Status: CANCELLED | OUTPATIENT
Start: 2022-11-30

## 2022-11-29 RX ORDER — HEPARIN SODIUM (PORCINE) LOCK FLUSH IV SOLN 100 UNIT/ML 100 UNIT/ML
500 SOLUTION INTRAVENOUS PRN
Status: DISCONTINUED | OUTPATIENT
Start: 2022-11-29 | End: 2022-11-30 | Stop reason: HOSPADM

## 2022-11-29 RX ORDER — FAMOTIDINE 10 MG/ML
20 INJECTION, SOLUTION INTRAVENOUS
Status: CANCELLED | OUTPATIENT
Start: 2022-11-29

## 2022-11-29 RX ORDER — HEPARIN SODIUM (PORCINE) LOCK FLUSH IV SOLN 100 UNIT/ML 100 UNIT/ML
500 SOLUTION INTRAVENOUS PRN
OUTPATIENT
Start: 2022-11-29

## 2022-11-29 RX ORDER — ONDANSETRON 2 MG/ML
8 INJECTION INTRAMUSCULAR; INTRAVENOUS
Status: CANCELLED | OUTPATIENT
Start: 2022-11-30

## 2022-11-29 RX ORDER — DIPHENHYDRAMINE HYDROCHLORIDE 50 MG/ML
50 INJECTION INTRAMUSCULAR; INTRAVENOUS
Status: CANCELLED | OUTPATIENT
Start: 2022-11-30

## 2022-11-29 RX ORDER — SODIUM CHLORIDE 9 MG/ML
INJECTION, SOLUTION INTRAVENOUS CONTINUOUS
Status: CANCELLED | OUTPATIENT
Start: 2022-11-29

## 2022-11-29 RX ORDER — SODIUM CHLORIDE 9 MG/ML
5-40 INJECTION INTRAVENOUS PRN
Status: CANCELLED | OUTPATIENT
Start: 2022-11-30

## 2022-11-29 RX ORDER — PALONOSETRON 0.05 MG/ML
0.25 INJECTION, SOLUTION INTRAVENOUS ONCE
Status: COMPLETED | OUTPATIENT
Start: 2022-11-29 | End: 2022-11-29

## 2022-11-29 RX ORDER — ONDANSETRON 2 MG/ML
8 INJECTION INTRAMUSCULAR; INTRAVENOUS
Status: CANCELLED | OUTPATIENT
Start: 2022-12-01

## 2022-11-29 RX ORDER — DIPHENHYDRAMINE HYDROCHLORIDE 50 MG/ML
50 INJECTION INTRAMUSCULAR; INTRAVENOUS
Status: CANCELLED | OUTPATIENT
Start: 2022-12-01

## 2022-11-29 RX ORDER — SODIUM CHLORIDE 9 MG/ML
5-250 INJECTION, SOLUTION INTRAVENOUS PRN
Status: DISCONTINUED | OUTPATIENT
Start: 2022-11-29 | End: 2022-11-30 | Stop reason: HOSPADM

## 2022-11-29 RX ORDER — HEPARIN SODIUM (PORCINE) LOCK FLUSH IV SOLN 100 UNIT/ML 100 UNIT/ML
500 SOLUTION INTRAVENOUS PRN
Status: CANCELLED | OUTPATIENT
Start: 2022-11-29

## 2022-11-29 RX ORDER — HEPARIN SODIUM (PORCINE) LOCK FLUSH IV SOLN 100 UNIT/ML 100 UNIT/ML
500 SOLUTION INTRAVENOUS PRN
Status: CANCELLED | OUTPATIENT
Start: 2022-12-01

## 2022-11-29 RX ORDER — PALONOSETRON 0.05 MG/ML
0.25 INJECTION, SOLUTION INTRAVENOUS ONCE
Status: CANCELLED | OUTPATIENT
Start: 2022-11-29 | End: 2022-11-29

## 2022-11-29 RX ORDER — MEPERIDINE HYDROCHLORIDE 50 MG/ML
12.5 INJECTION INTRAMUSCULAR; INTRAVENOUS; SUBCUTANEOUS PRN
Status: CANCELLED | OUTPATIENT
Start: 2022-11-30

## 2022-11-29 RX ORDER — DIPHENHYDRAMINE HYDROCHLORIDE 50 MG/ML
50 INJECTION INTRAMUSCULAR; INTRAVENOUS
Status: CANCELLED | OUTPATIENT
Start: 2022-11-29

## 2022-11-29 RX ORDER — SODIUM CHLORIDE 0.9 % (FLUSH) 0.9 %
5-40 SYRINGE (ML) INJECTION PRN
Status: CANCELLED | OUTPATIENT
Start: 2022-11-30

## 2022-11-29 RX ORDER — EPINEPHRINE 1 MG/ML
0.3 INJECTION, SOLUTION, CONCENTRATE INTRAVENOUS PRN
Status: CANCELLED | OUTPATIENT
Start: 2022-11-29

## 2022-11-29 RX ADMIN — PALONOSETRON 0.25 MG: 0.05 INJECTION, SOLUTION INTRAVENOUS at 12:27

## 2022-11-29 RX ADMIN — SODIUM CHLORIDE, PRESERVATIVE FREE 20 ML: 5 INJECTION INTRAVENOUS at 18:10

## 2022-11-29 RX ADMIN — SODIUM CHLORIDE 20 ML/HR: 9 INJECTION, SOLUTION INTRAVENOUS at 10:15

## 2022-11-29 RX ADMIN — SODIUM CHLORIDE, PRESERVATIVE FREE 20 ML: 5 INJECTION INTRAVENOUS at 08:16

## 2022-11-29 RX ADMIN — FOSAPREPITANT 150 MG: 150 INJECTION, POWDER, LYOPHILIZED, FOR SOLUTION INTRAVENOUS at 11:44

## 2022-11-29 RX ADMIN — DEXAMETHASONE SODIUM PHOSPHATE 12 MG: 4 INJECTION, SOLUTION INTRA-ARTICULAR; INTRALESIONAL; INTRAMUSCULAR; INTRAVENOUS; SOFT TISSUE at 11:25

## 2022-11-29 RX ADMIN — SODIUM CHLORIDE 174 MG: 0.9 INJECTION, SOLUTION INTRAVENOUS at 12:31

## 2022-11-29 RX ADMIN — Medication 500 UNITS: at 18:10

## 2022-11-29 RX ADMIN — POTASSIUM CHLORIDE: 2 INJECTION, SOLUTION, CONCENTRATE INTRAVENOUS at 17:01

## 2022-11-29 RX ADMIN — SODIUM CHLORIDE 131 MG: 9 INJECTION, SOLUTION INTRAVENOUS at 13:39

## 2022-11-29 RX ADMIN — POTASSIUM CHLORIDE: 2 INJECTION, SOLUTION, CONCENTRATE INTRAVENOUS at 10:17

## 2022-11-29 RX ADMIN — SODIUM CHLORIDE, PRESERVATIVE FREE 10 ML: 5 INJECTION INTRAVENOUS at 08:15

## 2022-11-29 ASSESSMENT — ENCOUNTER SYMPTOMS
NAUSEA: 0
CONSTIPATION: 0
COUGH: 1
RHINORRHEA: 0
VOMITING: 0
DIARRHEA: 0
SHORTNESS OF BREATH: 0
ABDOMINAL PAIN: 0
SORE THROAT: 0
BACK PAIN: 0
TROUBLE SWALLOWING: 0

## 2022-11-29 NOTE — DISCHARGE INSTRUCTIONS
Please contact your Oncologist if you have any questions regarding the chemotherapy Cisplatin and Etopside that you received today. Patient instructed if experience any of the symptoms following today's chemotherapy / to notify MD immediately or go to emergency department.     * dizziness/lightheadedness  *acute nausea/vomiting - not relieved with medication  *headache - not relieved from Tylenol/pain medication  *chest pain/pressure  *rash/itching  *shortness of breath        Drink fluids - 48oz fluids daily  Call if develop fever/ chills/ signs or symptoms of infection

## 2022-11-29 NOTE — PLAN OF CARE
Problem: Safety - Adult  Goal: Free from fall injury  Outcome: Adequate for Discharge  Flowsheets (Taken 11/29/2022 1502)  Free From Fall Injury:   Instruct family/caregiver on patient safety   Based on caregiver fall risk screen, instruct family/caregiver to ask for assistance with transferring infant if caregiver noted to have fall risk factors  Note: Free from falls while in O.P. Oncology. Problem: Discharge Planning  Goal: Discharge to home or other facility with appropriate resources  Outcome: Adequate for Discharge  Flowsheets (Taken 11/29/2022 1502)  Discharge to home or other facility with appropriate resources:   Identify barriers to discharge with patient and caregiver   Arrange for needed discharge resources and transportation as appropriate   Identify discharge learning needs (meds, wound care, etc)  Note: Verbalize understanding of discharge instructions, follow up appointments, and when to call Physician. Problem: Infection - Adult  Goal: Absence of infection at discharge  Outcome: Adequate for Discharge  Flowsheets (Taken 11/29/2022 1502)  Absence of infection at discharge:   Assess and monitor for signs and symptoms of infection   Monitor lab/diagnostic results   Monitor all insertion sites i.e., indwelling lines, tubes and drains  Note: Mediport site with no redness or warmth. Skin over port site intact with no signs of breakdown noted. Patient verbalizes signs/symptoms of port infection and when to notify the physician.    Goal: Absence of fever/infection during anticipated neutropenic period  Outcome: Adequate for Discharge  Flowsheets (Taken 11/29/2022 1502)  Absence of fever/infection during anticipated neutropenic period:   Monitor white blood cell count   Administer growth factors as ordered  Note: Discuss port maintenance,infection prevention, sign of infection and when to call MD.      Problem: Chronic Conditions and Co-morbidities  Goal: Patient's chronic conditions and co-morbidity symptoms are monitored and maintained or improved  Outcome: Adequate for Discharge  Flowsheets (Taken 11/29/2022 1502)  Care Plan - Patient's Chronic Conditions and Co-Morbidity Symptoms are Monitored and Maintained or Improved:   Monitor and assess patient's chronic conditions and comorbid symptoms for stability, deterioration, or improvement   Collaborate with multidisciplinary team to address chronic and comorbid conditions and prevent exacerbation or deterioration  Note: Patient verbalizes understanding to verbal information given on Cisplatin and Etopside,action and possible side effects. Aware to call MD if develop complications. Care plan reviewed with patient and spouse. Patient and spouse verbalize understanding of the plan of care and contribute to goal setting.

## 2022-11-29 NOTE — PATIENT INSTRUCTIONS
Reviewed labs and recent medical history. Okay for treatment today, 11/29/22  Will return tomorrow and Thursday for chemotherapy treatment. Continuing Radiation daily. Return to see MD in 3 weeks for next treatment.

## 2022-11-29 NOTE — PROGRESS NOTES
[address] [facility]  [address] [place] [address]  Dept: [phone]  Loc: Shantanu Larose 1945 [address] [facility]  1955   No ref. provider found   Med Gutierres MD       Sapphire Angel is a 74 y.o.  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  78-year-old male with 6 months of weight loss. He went to the dentist and had some bad teeth. He was told that he had to have these pulled. He was nervous about this and was not eating and then felt that he had an infection that was spreading through his body. He began having stomach pain and was treated for ulcers but this did not cause any improvement. Ultrasound was performed and was abnormal leading to a CT scan. Had decreased his beer intake from 12 pack of beer a day to 2. Felt restless, agitated, anxious. 8/25/2022. CAT scan of the abdomen performed locally showed a mass in the body of the pancreas 5 x 5.5 x 4.8 cm with involvement of the celiac axis and encasement of the superior mesenteric artery, narrows the celiac trunk and infiltrates the root of the mesentery. There were numerous enlarged peripancreatic and mesenteric lymph nodes measuring up to 14 mm. .  Labs in July were normal.  Just returned from a cruise to the [place]. Had noted dark urine for a week and appeared to be slightly jaundiced. 9/19/2022. Bilirubin was 4.5 with ALT of 136 and AST of 148. CA 19-9 was 321. Sugar was 111. CBC showed a white count 6.6 with a hemoglobin of 14 hematocrit 42 and a platelet count of 185,659.    9/19/2022. Endoscopy was performed. Endosonographic findings showed diffuse dilatation of the intrahepatic bile ducts.   There is an irregular mass in the genu of the pancreas and pancreatic body measuring approximately 4.5 x 3.8 cm. There was sonographic evidence of invasion into the superior mesenteric artery and a few abnormal lymph nodes were visualized in the peripancreatic region. Fine-needle aspiration of mass of the body of the pancreas at [facility] showed high-grade neuroendocrine carcinoma favoring small cell carcinoma. 9/19/2022. ERCP. There is no evidence of esophageal varices or gastric varices. There is stenosis in the main bile duct in the middle third. Sphincterotomy was performed. An uncovered metal stent was placed and he was discharged home. 9/21/2022. Patient followed up with his primary care physician Dr. Ronald Garrido. Patient said that he felt better but was continuing to lose weight.    9/28/2022. PET scan showed that there was an FDG avid pancreatic mass highly suspicious for malignancy that was better seen on recent CT scan. Maximum SUV was 5.7 with areas of photopenia in the mass associated with hypoattenuation likely secondary to necrosis. The urinary bladder was markedly distended and there was bilateral hydroureter. Prostate was enlarged with calcifications. There is no evidence of mesenteric retroperitoneal pelvic or inguinal lymphadenopathy that was FDG avid. Degenerative changes were seen in the lumbar spine and pelvis without evidence of hypermetabolic avidity    Comorbidities include hypertension,hypothyroidism, anxiety, alcohol use disorder, nicotine use disorder. The patient said that he used to weigh 197 pounds and now he weighs 137. He says that he feels cold all of the time. 10/4/2022. Initial clinical nurse oncology navigation. Encouraged physical activity, smoking cessation, minimization of alcohol take, consideration of being seen back at [facility] for their input. 10/14/2022. Consultation with Dr. Teto Mayo radiation oncology.   Collaborated with Dr. Teto Mayo who wants to give radiation and chemotherapy concurrently à la small cell carcinoma of the lung protocols. 10/ 26/ 2022. Chemotherapy teaching. 10/31/2022. Port placed by Dr. Mariola Luke. 11/1/2022. Mr. Olive Bojorquez is here today to begin his chemotherapy. He is quite flat in his affect. His wife says that she has been present at all of the interactions and has been taking in the information which he refuses to review. Today we had a long discussion concerning the side effects of his chemotherapy that will be started today as well as his radiation therapy. He verbalized understanding and was engaged in the conversation although distant in his interaction. She has been quite tearful today. They know that they must call for any complications, questions or concerns. INTERVAL NOTE    11/29/2022. Mr. Olive Bojorquez has been doing much better since his treatment has been ongoing. He had significant drop in his counts from his chemotherapy but he had no problems with infection, bleeding or significant symptoms from his anemia. His treatments were held while his counts recovered. His lowest white count was 1.2. He gets bursts of energy and does a lot of work and then is quite tired. He has had diarrhea off and on. He has had no significant nausea and vomiting. He denies fevers, chills, sweats and denies pain. He broke off a tooth and is interested in going to the dentist.  He was educated that he needs to have his counts checked before any dental work is performed. MONITORING PARAMETERS    Physical examinations CAT scans and PET scans. PAST MEDICAL HISTORY  Past Medical History:   Diagnosis Date    Hypertension     Hypothyroidism     Malignant neoplasm of pancreas, unspecified location of malignancy ([facility] [address] 09/2022        REVIEW OF SYSTEMS  Review of Systems   Constitutional:  Positive for appetite change and fatigue. Negative for chills, diaphoresis, fever and unexpected weight change.    HENT:  Positive for dental problem (had a tooth break off). Negative for hearing loss, mouth sores, rhinorrhea, sore throat and trouble swallowing. Eyes:  Negative for visual disturbance. Respiratory:  Positive for cough (occasional). Negative for shortness of breath. Cardiovascular:  Negative for leg swelling. Gastrointestinal:  Negative for abdominal pain, constipation, diarrhea, nausea and vomiting. Genitourinary:  Negative for dysuria, hematuria and urgency. Musculoskeletal:  Positive for myalgias (generalized). Negative for back pain. Skin:  Positive for pallor. Neurological:  Positive for weakness. Negative for tremors, syncope, numbness and headaches. Hematological:  Negative for adenopathy. Does not bruise/bleed easily. Psychiatric/Behavioral:  Positive for sleep disturbance (unchanged). Negative for self-injury and suicidal ideas. The patient is not nervous/anxious. FAMILY HISTORY  Family History   Problem Relation Age of Onset    High Blood Pressure Mother     Lung Cancer Father         SOCIAL HISTORY  Social History     Socioeconomic History    Marital status:      Spouse name: Conchita Alicea    Number of children: 3    Years of education: Not on file    Highest education level: Not on file   Occupational History    Not on file   Tobacco Use    Smoking status: Every Day     Types: Cigars     Start date: 1/1/2012     Passive exposure: Past (Dad smoked)    Smokeless tobacco: Never    Tobacco comments:     -3-4 cigars per day. denies cessation counseling 11/1/22. Declines counseling 11/2.    Vaping Use    Vaping Use: Never used   Substance and Sexual Activity    Alcohol use: Yes     Comment: 2-3 beers per day since 2022   Previous drank 8-12    Drug use: No    Sexual activity: Not on file   Other Topics Concern    Not on file   Social History Narrative    Not on file     Social Determinants of Health     Financial Resource Strain: Not on file   Food Insecurity: Not on file   Transportation Needs: Not on file   Physical Activity: Inactive    Days of Exercise per Week: 0 days    Minutes of Exercise per Session: 0 min   Stress: Not on file   Social Connections: Not on file   Intimate Partner Violence: Not on file   Housing Stability: Not on file        CURRENT MEDICATIONS  Current Outpatient Medications   Medication Sig Dispense Refill    sucralfate (CARAFATE) 1 GM tablet Take 1 tablet by mouth in the morning, at noon, in the evening, and at bedtime      losartan (COZAAR) 100 MG tablet TAKE 1 TABLET DAILY 90 tablet 1    ondansetron (ZOFRAN) 4 MG tablet Take 1 tablet by mouth daily as needed for Nausea or Vomiting 30 tablet 0    prochlorperazine (COMPAZINE) 10 MG tablet Take 1 tablet by mouth every 6 hours as needed (for nausea and vomiting) 60 tablet 3    lidocaine-prilocaine (EMLA) 2.5-2.5 % cream Apply topically as needed. 5 g 3    levothyroxine (SYNTHROID) 200 MCG tablet take 1 tablet by mouth once daily 30 tablet 1    pantoprazole (PROTONIX) 40 MG tablet Take 1 tablet by mouth every morning (before breakfast) 90 tablet 1    dilTIAZem (DILACOR XR) 240 MG extended release capsule TAKE 1 CAPSULE DAILY 90 capsule 3    Multiple Vitamin (MULTIVITAMIN PO) Take 1 capsule by mouth daily. aspirin 81 MG EC tablet Take 81 mg by mouth daily. No current facility-administered medications for this visit. Facility-Administered Medications Ordered in Other Visits   Medication Dose Route Frequency Provider Last Rate Last Admin    sodium chloride flush 0.9 % injection 5-40 mL  5-40 mL IntraVENous PRRAISA Harrington MD   20 mL at 11/29/22 0816    heparin flush 100 UNIT/ML injection 500 Units  500 Units IntraCATHeter PRN Jo-Ann Harrington MD            OARRS    PDMP Monitoring:    Last PDMP Altagracia Dorsey as Reviewed [facility]):  Review User Review Instant Review Result   Emilee Conner 10/5/2022 12:45 AM Reviewed PDMP [1]       Urine Drug Screenings (1 yr)    No resulted procedures found.        Medication Contract and Consent for Opioid Use Documents Filed No documents found                     PHYSICAL EXAM    Physical Exam  Vitals and nursing note reviewed. Exam conducted with a chaperone present. Constitutional:       General: He is not in acute distress. Appearance: Normal appearance. He is ill-appearing (very thin). He is not toxic-appearing or diaphoretic. HENT:      Head: Normocephalic and atraumatic. Comments: Near-total alopecia     Nose: Nose normal.      Mouth/Throat:      Mouth: Mucous membranes are moist.      Pharynx: Oropharynx is clear. No oropharyngeal exudate or posterior oropharyngeal erythema. Comments: Broken tooth  Eyes:      General: No scleral icterus. Extraocular Movements: Extraocular movements intact. Conjunctiva/sclera: Conjunctivae normal.      Pupils: Pupils are equal, round, and reactive to light. Cardiovascular:      Rate and Rhythm: Normal rate and regular rhythm. Pulses: Normal pulses. Heart sounds: Normal heart sounds. No murmur heard. No gallop. Pulmonary:      Effort: Pulmonary effort is normal. No respiratory distress. Breath sounds: No stridor. No wheezing, rhonchi or rales. Abdominal:      General: Abdomen is flat. Bowel sounds are normal. There is no distension. Palpations: Abdomen is soft. There is no mass. Tenderness: There is no abdominal tenderness. There is no guarding or rebound. Comments: Liver's edge in the inspiration at the right costal margin. Musculoskeletal:         General: Normal range of motion. Cervical back: Normal range of motion and neck supple. No rigidity or tenderness. Right lower leg: No edema. Left lower leg: Edema present. Lymphadenopathy:      Cervical: No cervical adenopathy. Skin:     General: Skin is warm and dry. Coloration: Skin is pale. Skin is not jaundiced. Findings: No bruising or lesion. Neurological:      General: No focal deficit present.       Mental Status: He is alert and oriented to person, place, and time. Motor: No weakness. Gait: Gait normal.      Comments: Somewhat hard of hearing   Psychiatric:         Mood and Affect: Mood normal.         Behavior: Behavior normal.         Thought Content: Thought content normal.         Judgment: Judgment normal.      Comments: He is engaging and interactive and in good spirits        LABS/IMAGING  XR CHEST PORTABLE    Result Date: 10/31/2022  1. Port-A-Cath overlying the left hemithorax with the tip in the superior vena cava. 2. Otherwise negative chest x-ray. . **This report has been created using voice recognition software. It may contain minor errors which are inherent in voice recognition technology. ** Final report electronically signed by DR Wild Barakat on 10/31/2022 8:18 AM     Laboratory data drawn today shows that his BUN is 11 and his creatinine is 1.1. Calcium is normal.  Sugar is 163. Liver function tests are normal.  CBC shows white count 6.2 with a hemoglobin of 11.2 hematocrit 34.6 and a platelet count of 566,570. White blood cell differential count shows 77% polys, 10 lymphs, 9 monos, 3 eosinophils and 2 basophils. PATHOLOGY/GENETICS    Small cell carcinoma of the pancreas. ASSESSMENT and PLAN    1. Small cell carcinoma of the pancreas. He is receiving combination radiation therapy and chemotherapy that he is tolerating it well. His weight is down 3 pounds. Has had some diarrhea. He has had no problems with infectious complications or bleeding. He has no pain. Continues to work part-time. We will go ahead with his treatment today. 2.  Profound weight loss. We will continue to monitor his weight. 3.  Hypertension. Blood pressure today was good at 114/64. 4.  GERD. He will continue on Protonix. He knows to call if he has any change in his status, questions or concerns.             Klel Dodd MD 11/29/2022 9:44 AM

## 2022-11-30 ENCOUNTER — HOSPITAL ENCOUNTER (OUTPATIENT)
Dept: RADIATION ONCOLOGY | Age: 67
Discharge: HOME | End: 2022-11-30
Payer: MEDICARE

## 2022-11-30 ENCOUNTER — HOSPITAL ENCOUNTER (OUTPATIENT)
Dept: INFUSION THERAPY | Age: 67
Discharge: HOME | End: 2022-11-30
Payer: MEDICARE

## 2022-11-30 VITALS
DIASTOLIC BLOOD PRESSURE: 75 MMHG | HEART RATE: 60 BPM | RESPIRATION RATE: 16 BRPM | OXYGEN SATURATION: 100 % | HEIGHT: 69 IN | BODY MASS INDEX: 19.7 KG/M2 | WEIGHT: 133 LBS | SYSTOLIC BLOOD PRESSURE: 141 MMHG | TEMPERATURE: 97.4 F

## 2022-11-30 DIAGNOSIS — C25.1 MALIGNANT NEOPLASM OF BODY OF PANCREAS (HCC): Primary | ICD-10-CM

## 2022-11-30 PROCEDURE — 2580000003 HC RX 258: Performed by: INTERNAL MEDICINE

## 2022-11-30 PROCEDURE — 77386 HC NTSTY MODUL RAD TX DLVR CPLX: CPT | Performed by: RADIOLOGY

## 2022-11-30 PROCEDURE — 96375 TX/PRO/DX INJ NEW DRUG ADDON: CPT

## 2022-11-30 PROCEDURE — 6360000002 HC RX W HCPCS: Performed by: INTERNAL MEDICINE

## 2022-11-30 PROCEDURE — 96413 CHEMO IV INFUSION 1 HR: CPT

## 2022-11-30 PROCEDURE — G6002 STEREOSCOPIC X-RAY GUIDANCE: HCPCS | Performed by: RADIOLOGY

## 2022-11-30 RX ORDER — HEPARIN SODIUM (PORCINE) LOCK FLUSH IV SOLN 100 UNIT/ML 100 UNIT/ML
500 SOLUTION INTRAVENOUS PRN
Status: DISCONTINUED | OUTPATIENT
Start: 2022-11-30 | End: 2022-12-01 | Stop reason: HOSPADM

## 2022-11-30 RX ORDER — DEXAMETHASONE SODIUM PHOSPHATE 4 MG/ML
8 INJECTION, SOLUTION INTRA-ARTICULAR; INTRALESIONAL; INTRAMUSCULAR; INTRAVENOUS; SOFT TISSUE ONCE
Status: COMPLETED | OUTPATIENT
Start: 2022-11-30 | End: 2022-11-30

## 2022-11-30 RX ORDER — SODIUM CHLORIDE 9 MG/ML
5-250 INJECTION, SOLUTION INTRAVENOUS PRN
Status: DISCONTINUED | OUTPATIENT
Start: 2022-11-30 | End: 2022-12-01 | Stop reason: HOSPADM

## 2022-11-30 RX ORDER — SODIUM CHLORIDE 0.9 % (FLUSH) 0.9 %
5-40 SYRINGE (ML) INJECTION PRN
Status: DISCONTINUED | OUTPATIENT
Start: 2022-11-30 | End: 2022-12-01 | Stop reason: HOSPADM

## 2022-11-30 RX ADMIN — SODIUM CHLORIDE, PRESERVATIVE FREE 10 ML: 5 INJECTION INTRAVENOUS at 11:42

## 2022-11-30 RX ADMIN — SODIUM CHLORIDE, PRESERVATIVE FREE 20 ML: 5 INJECTION INTRAVENOUS at 10:15

## 2022-11-30 RX ADMIN — SODIUM CHLORIDE 174 MG: 0.9 INJECTION, SOLUTION INTRAVENOUS at 10:33

## 2022-11-30 RX ADMIN — DEXAMETHASONE SODIUM PHOSPHATE 8 MG: 4 INJECTION, SOLUTION INTRAMUSCULAR; INTRAVENOUS at 10:17

## 2022-11-30 RX ADMIN — Medication 500 UNITS: at 11:42

## 2022-11-30 RX ADMIN — SODIUM CHLORIDE 25 ML/HR: 9 INJECTION, SOLUTION INTRAVENOUS at 10:15

## 2022-11-30 NOTE — PLAN OF CARE
Problem: Safety - Adult  Goal: Free from fall injury  Outcome: Adequate for Discharge  Flowsheets (Taken 11/30/2022 1132)  Free From Fall Injury: Instruct family/caregiver on patient safety     Problem: Discharge Planning  Goal: Discharge to home or other facility with appropriate resources  Outcome: Adequate for Discharge  Flowsheets (Taken 11/30/2022 1132)  Discharge to home or other facility with appropriate resources: Identify barriers to discharge with patient and caregiver     Problem: Chronic Conditions and Co-morbidities  Goal: Patient's chronic conditions and co-morbidity symptoms are monitored and maintained or improved  Outcome: Adequate for Discharge  Flowsheets (Taken 11/30/2022 1132)  Care Plan - Patient's Chronic Conditions and Co-Morbidity Symptoms are Monitored and Maintained or Improved: Monitor and assess patient's chronic conditions and comorbid symptoms for stability, deterioration, or improvement  Note: Chemotherapy Teaching     What is Chemotherapy   Drug action [x]   Method of Administration [x]   Handouts given []     Side Effects  Nausea/vomiting [x]   Diarrhea [x]   Fatigue [x]   Signs / Symptoms of infection [x]   Neutropenia [x]   Thrombocytopenia [x]   Alopecia [x]   neuropathy [x]   [place] diet &  the importance of fluids [x]       Micellaneous  Importance of nutrition [x]   Importance of oral hygiene [x]   When to call the MD [x]   Monitoring labs [x]   Use of supportive services []     Explanation of Drug Regimen / Frequency  Etoposide     Comments  Verbalized understanding to drug,action,side effects and when to call MD         Problem: Infection - Adult  Goal: Absence of infection at discharge  Outcome: Adequate for Discharge  Flowsheets (Taken 11/30/2022 1132)  Absence of infection at discharge: Monitor all insertion sites i.e., indwelling lines, tubes and drains  Note: Mediport site with no redness or warmth. Skin over port site intact with no signs of breakdown noted.  Patient verbalizes signs/symptoms of port infection and when to notify the physician. Goal: Absence of fever/infection during anticipated neutropenic period  Outcome: Adequate for Discharge     Care plan reviewed with patient and spouse. Patient and spouse verbalize understanding of the plan of care and contribute to goal setting.

## 2022-11-30 NOTE — ONCOLOGY
Chemotherapy Administration    Pre-assessment Data: Antineoplastic Agents  See toxicity flow sheet for assessment                                          [x]         Interventions:   Chemotherapy SQ injection given []   Taxol administered-VS per protocol []   Blood pressure meds held 12 hours prior to Rituxan/Ruxience []   Rituxan/Ruxience administered- VS and precautions per guidelines []   Emergency drugs available as appropriate [x]   Anaphylaxis assessment completed [x]   Pre-medications administered as ordered [x]   Blood return noted upon initiation of chemotherapy [x]   Blood return noted each 1-2ml of a vesicant medication if given IV push []   Navelbine, Vincristine and Velban given as a monitored wide open drip, blood return noted before during and after infusion.  []   Blood return noted each 2-3ml of a non-vesicant medication if given IV push []   Patient aware of potential Immunotherapy toxicities []   Monitor for signs / symptoms of hypersensitivity reaction [x]   Chemotherapy orders (drug/dose/rate) verified by 2 Chemo certified RNs [x]   Monitor IV site and blood return throughout the infusion of the medication [x]   Document IV site checks on the IV assessment form [x]   Document chemotherapy teaching on the Patient Education tab [x]   Document patient verbalizes understanding of medications being administered [x]   If IV infiltration, see ONS Guidelines []   Other:     Etoposide []

## 2022-11-30 NOTE — PROGRESS NOTES
Patient assessed for the following post chemotherapy:    Dizziness   No  Lightheadedness  No      Acute nausea/vomiting No  Headache   No  Chest pain/pressure  No  Rash/itching   No  Shortness of breath  No    Patient kept for 20 minutes observation post infusion chemotherapy. Patient tolerated chemotherapy treatment Etoposide without any complications. Last vital signs:   /73   Pulse 73   Temp 97.5 °F (36.4 °C) (Oral)   Resp 16   Ht 5' 9\" (1.753 m)   Wt 133 lb (60.3 kg)   SpO2 99%   BMI 19.64 kg/m²     Patient instructed if experience any of the above symptoms following today's infusion, he is to notify MD immediately or go to the emergency department. Discharge instructions given to patient. Verbalizes understanding. Ambulated off unit per self, accompanied by spouse, with belongings.

## 2022-11-30 NOTE — DISCHARGE INSTRUCTIONS
Please contact your Oncologist if you have any questions regarding the chemotherapy Etoposide that you received today. Patient instructed if experience any of the symptoms following today's chemotherapy treatment to notify MD immediately or go to emergency department. * dizziness/lightheadedness  *acute nausea/vomiting - not relieved with medication  *headache - not relieved from Tylenol/pain medication  *chest pain/pressure  *rash/itching  *shortness of breath        Drink fluids - 48oz fluids daily  Call if develop fever/ chills/ signs or symptoms of infection   Return to clinic tomorrow for day 3 Etoposide.

## 2022-12-01 ENCOUNTER — HOSPITAL ENCOUNTER (OUTPATIENT)
Dept: INFUSION THERAPY | Age: 67
Discharge: HOME | End: 2022-12-01
Payer: MEDICARE

## 2022-12-01 ENCOUNTER — HOSPITAL ENCOUNTER (OUTPATIENT)
Dept: RADIATION ONCOLOGY | Age: 67
Discharge: HOME | End: 2022-12-01
Payer: MEDICARE

## 2022-12-01 VITALS
HEART RATE: 68 BPM | SYSTOLIC BLOOD PRESSURE: 129 MMHG | DIASTOLIC BLOOD PRESSURE: 73 MMHG | OXYGEN SATURATION: 100 % | TEMPERATURE: 98.6 F | RESPIRATION RATE: 18 BRPM | BODY MASS INDEX: 20.51 KG/M2 | WEIGHT: 138.89 LBS

## 2022-12-01 VITALS
OXYGEN SATURATION: 100 % | WEIGHT: 138 LBS | TEMPERATURE: 97.9 F | SYSTOLIC BLOOD PRESSURE: 139 MMHG | DIASTOLIC BLOOD PRESSURE: 72 MMHG | BODY MASS INDEX: 20.38 KG/M2 | HEART RATE: 52 BPM | RESPIRATION RATE: 16 BRPM

## 2022-12-01 DIAGNOSIS — C25.1 MALIGNANT NEOPLASM OF BODY OF PANCREAS (HCC): Primary | ICD-10-CM

## 2022-12-01 PROCEDURE — 77386 HC NTSTY MODUL RAD TX DLVR CPLX: CPT | Performed by: RADIOLOGY

## 2022-12-01 PROCEDURE — 2580000003 HC RX 258: Performed by: INTERNAL MEDICINE

## 2022-12-01 PROCEDURE — 6360000002 HC RX W HCPCS: Performed by: INTERNAL MEDICINE

## 2022-12-01 PROCEDURE — 96413 CHEMO IV INFUSION 1 HR: CPT

## 2022-12-01 PROCEDURE — 96375 TX/PRO/DX INJ NEW DRUG ADDON: CPT

## 2022-12-01 RX ORDER — DEXAMETHASONE SODIUM PHOSPHATE 4 MG/ML
8 INJECTION, SOLUTION INTRA-ARTICULAR; INTRALESIONAL; INTRAMUSCULAR; INTRAVENOUS; SOFT TISSUE ONCE
Status: COMPLETED | OUTPATIENT
Start: 2022-12-01 | End: 2022-12-01

## 2022-12-01 RX ORDER — HEPARIN SODIUM (PORCINE) LOCK FLUSH IV SOLN 100 UNIT/ML 100 UNIT/ML
500 SOLUTION INTRAVENOUS PRN
Status: DISCONTINUED | OUTPATIENT
Start: 2022-12-01 | End: 2022-12-02 | Stop reason: HOSPADM

## 2022-12-01 RX ORDER — SODIUM CHLORIDE 9 MG/ML
5-250 INJECTION, SOLUTION INTRAVENOUS PRN
Status: DISCONTINUED | OUTPATIENT
Start: 2022-12-01 | End: 2022-12-02 | Stop reason: HOSPADM

## 2022-12-01 RX ORDER — SODIUM CHLORIDE 0.9 % (FLUSH) 0.9 %
5-40 SYRINGE (ML) INJECTION PRN
Status: DISCONTINUED | OUTPATIENT
Start: 2022-12-01 | End: 2022-12-02 | Stop reason: HOSPADM

## 2022-12-01 RX ADMIN — SODIUM CHLORIDE, PRESERVATIVE FREE 10 ML: 5 INJECTION INTRAVENOUS at 12:27

## 2022-12-01 RX ADMIN — SODIUM CHLORIDE, PRESERVATIVE FREE 20 ML: 5 INJECTION INTRAVENOUS at 10:55

## 2022-12-01 RX ADMIN — SODIUM CHLORIDE 25 ML/HR: 9 INJECTION, SOLUTION INTRAVENOUS at 10:56

## 2022-12-01 RX ADMIN — DEXAMETHASONE SODIUM PHOSPHATE 8 MG: 4 INJECTION, SOLUTION INTRAMUSCULAR; INTRAVENOUS at 10:57

## 2022-12-01 RX ADMIN — HEPARIN 500 UNITS: 100 SYRINGE at 12:27

## 2022-12-01 RX ADMIN — SODIUM CHLORIDE 174 MG: 0.9 INJECTION, SOLUTION INTRAVENOUS at 11:11

## 2022-12-01 NOTE — PROGRESS NOTES
Patient assessed for the following post chemotherapy:    Dizziness   No  Lightheadedness  No      Acute nausea/vomiting No  Headache   No  Chest pain/pressure  No  Rash/itching   No  Shortness of breath  No    Patient kept for 20 minutes observation post infusion chemotherapy. Patient tolerated chemotherapy treatment Etoposide without any complications. Last vital signs:   /72   Pulse 52   Temp 97.9 °F (36.6 °C) (Oral)   Resp 16   Wt 138 lb (62.6 kg)   SpO2 100%   BMI 20.38 kg/m²     Patient instructed if experience any of the above symptoms following today's infusion, he is to notify MD immediately or go to the emergency department. Discharge instructions given to patient. Verbalizes understanding. Ambulated off unit per self, accompanied by spouse, with belongings.

## 2022-12-01 NOTE — PROGRESS NOTES
[address] [facility] [address], [address]        [address] [facility], [address] [place] [address]        Cristy Fernandez: [phone]        F: [phone]       [facility]            Dr. Meaghan Gtz MD MS          Dr. 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 Dose (cGy): 5040    Patient was seen today for weekly visit. Wt Readings from Last 3 Encounters:   22 133 lb (60.3 kg)   22 133 lb (60.3 kg)   22 133 lb (60.3 kg)       There were no vitals taken for this visit. Lab Results   Component Value Date    WBC 6.2 2022     2022         Comfort Alteration  Fatigue:Able to perform daily activities with rest periods    Pain Location: Denies  Pain Intensity (Current): 0 No Pain  Pain Treatment: N/A  Pain Relief: n/a    Emotional Alteration:   Coping: effective    Nutritional Alteration  Anorexia: none   Nausea: No nausea noted  Vomiting: No vomiting     Skin Alteration   Skin reaction: No changes noted    Elimination Alterations  Urinary Frequency: None  Urinary Retention: Normal  Urinary Incontinence: None  Dysuria: None  Nocturia: 1-3 times nightly  Proctitis: None  Diarrhea: None    Additional Comments: Using Aquaphor at times.      MEDICATIONS:     Current Outpatient Medications   Medication Sig Dispense Refill    sucralfate (CARAFATE) 1 GM tablet Take 1 tablet by mouth in the morning, at noon, in the evening, and at bedtime      losartan (COZAAR) 100 MG tablet TAKE 1 TABLET DAILY 90 tablet 1    ondansetron (ZOFRAN) 4 MG tablet Take 1 tablet by mouth daily as needed for Nausea or Vomiting 30 tablet 0    prochlorperazine (COMPAZINE) 10 MG tablet Take 1 tablet by mouth every 6 hours as needed (for nausea and vomiting) 60 tablet 3    lidocaine-prilocaine (EMLA) 2.5-2.5 % cream Apply topically as needed. 5 g 3    levothyroxine (SYNTHROID) 200 MCG tablet take 1 tablet by mouth once daily 30 tablet 1    pantoprazole (PROTONIX) 40 MG tablet Take 1 tablet by mouth every morning (before breakfast) 90 tablet 1    dilTIAZem (DILACOR XR) 240 MG extended release capsule TAKE 1 CAPSULE DAILY 90 capsule 3    Multiple Vitamin (MULTIVITAMIN PO) Take 1 capsule by mouth daily. aspirin 81 MG EC tablet Take 81 mg by mouth daily. No current facility-administered medications for this encounter. PHYSICAL EXAM:       ECO - Asymptomatic (Fully active, able to carry on all pre-disease activities without restriction)    General: NAD, AO x 3, Mentation is clear with appropriate affect. HEENT: Normocephalic, atraumatic  Thorax:  Unlabored  Abdomen:  Soft, NT/ND, no rebound  Neuro:  Cranial nerves grossly intact; no focal deficits  Skin - treatment portal: SEE above. Chemotherapy Update: Concurrent chemotherapy    Treatment Imaging: CBCT All imaging reviewed and approved by Dr. Marcel Owusu. ASSESSMENT: No significant radiation side effects. Good po intake. Denies abdominal pain, nausea, vomiting, bowel/bladder changes. Regaining weight. New medications, diagnostic results: Not applicable    PLAN: Again reviewed potential side effects of radiation for the patient's treatment. Continue local/topical care. Continue current radiation course as prescribed.     Rober Briceno MD  Radiation Oncology

## 2022-12-01 NOTE — DISCHARGE INSTRUCTIONS
Please contact your Oncologist if you have any questions regarding the chemotherapy Etoposide that you received today. Patient instructed if experience any of the symptoms following today's chemotherapy treatment to notify MD immediately or go to emergency department.     * dizziness/lightheadedness  *acute nausea/vomiting - not relieved with medication  *headache - not relieved from Tylenol/pain medication  *chest pain/pressure  *rash/itching  *shortness of breath        Drink fluids - 48oz fluids daily  Call if develop fever/ chills/ signs or symptoms of infection

## 2022-12-01 NOTE — PLAN OF CARE
Problem: Safety - Adult  Goal: Free from fall injury  Outcome: Adequate for Discharge  Flowsheets (Taken 12/1/2022 1540)  Free From Fall Injury: Instruct family/caregiver on patient safety  Note: Patient verbalizes understanding of fall precautions. Patient free from falls this visit. Problem: Discharge Planning  Goal: Discharge to home or other facility with appropriate resources  Outcome: Adequate for Discharge  Flowsheets (Taken 12/1/2022 1540)  Discharge to home or other facility with appropriate resources: Identify barriers to discharge with patient and caregiver  Note: Verbalized understanding of discharge instructions, follow-up appointments, and when to call the physician. Problem: Infection - Adult  Goal: Absence of infection at discharge  Outcome: Adequate for Discharge  Flowsheets (Taken 12/1/2022 1540)  Absence of infection at discharge:   Assess and monitor for signs and symptoms of infection   Monitor all insertion sites i.e., indwelling lines, tubes and drains  Note: Mediport site with no redness or warmth. Skin over port intact with no signs of breakdown noted. Patient verbalizes signs/symptoms of port infection and when to notify the physician.    Goal: Absence of fever/infection during anticipated neutropenic period  Outcome: Adequate for Discharge  Note: Discussed port maintenance, infection prevention, signs and when to call the doctor     Chemotherapy Teaching     What is Chemotherapy   Drug action [x]   Method of Administration [x]   Handouts given []     Side Effects  Nausea/vomiting [x]   Diarrhea [x]   Fatigue [x]   Signs / Symptoms of infection [x]   Neutropenia [x]   Thrombocytopenia [x]   Alopecia [x]   neuropathy [x]   Todd diet &  the importance of fluids [x]       Micellaneous  Importance of nutrition [x]   Importance of oral hygiene [x]   When to call the MD [x]   Monitoring labs [x]   Use of supportive services []     Explanation of Drug Regimen / Frequency  etoposide Comments  Verbalized understanding to drug,action,side effects and when to call MD     Care plan reviewed with patient. Patient verbalizes understanding of the plan of care and contribute to goal setting.

## 2022-12-01 NOTE — ONCOLOGY
Chemotherapy Administration    Pre-assessment Data: Antineoplastic Agents  See toxicity flow sheet for assessment                                          [x]         Interventions:   Chemotherapy SQ injection given []   Taxol administered-VS per protocol []   Blood pressure meds held 12 hours prior to Rituxan/Ruxience []   Rituxan/Ruxience administered- VS and precautions per guidelines []   Emergency drugs available as appropriate [x]   Anaphylaxis assessment completed [x]   Pre-medications administered as ordered [x]   Blood return noted upon initiation of chemotherapy [x]   Blood return noted each 1-2ml of a vesicant medication if given IV push []   Navelbine, Vincristine and Velban given as a monitored wide open drip, blood return noted before during and after infusion.  []   Blood return noted each 2-3ml of a non-vesicant medication if given IV push []   Patient aware of potential Immunotherapy toxicities []   Monitor for signs / symptoms of hypersensitivity reaction [x]   Chemotherapy orders (drug/dose/rate) verified by 2 Chemo certified RNs [x]   Monitor IV site and blood return throughout the infusion of the medication [x]   Document IV site checks on the IV assessment form [x]   Document chemotherapy teaching on the Patient Education tab [x]   Document patient verbalizes understanding of medications being administered [x]   If IV infiltration, see ONS Guidelines []   Other:     Etoposide [] Detail Level: Detailed Additional Notes: Spot mentioned on intake. Pt advised it does not appear suspicious for skin cancer. Discussed biopsy vs observation vs LN2. Pt declined treatment and prefers to observe. Pt advised to return to clinic if changes or becomes bothersome.

## 2022-12-02 ENCOUNTER — HOSPITAL ENCOUNTER (OUTPATIENT)
Dept: RADIATION ONCOLOGY | Age: 67
Discharge: HOME | End: 2022-12-02
Payer: MEDICARE

## 2022-12-02 PROCEDURE — 77386 HC NTSTY MODUL RAD TX DLVR CPLX: CPT | Performed by: RADIOLOGY

## 2022-12-05 ENCOUNTER — HOSPITAL ENCOUNTER (OUTPATIENT)
Dept: RADIATION ONCOLOGY | Age: 67
Discharge: HOME | End: 2022-12-05
Payer: MEDICARE

## 2022-12-05 PROCEDURE — 77336 RADIATION PHYSICS CONSULT: CPT | Performed by: RADIOLOGY

## 2022-12-05 PROCEDURE — 77386 HC NTSTY MODUL RAD TX DLVR CPLX: CPT | Performed by: RADIOLOGY

## 2022-12-05 PROCEDURE — G6002 STEREOSCOPIC X-RAY GUIDANCE: HCPCS | Performed by: RADIOLOGY

## 2022-12-06 ENCOUNTER — HOSPITAL ENCOUNTER (OUTPATIENT)
Dept: RADIATION ONCOLOGY | Age: 67
Discharge: HOME | End: 2022-12-06
Payer: MEDICARE

## 2022-12-06 PROCEDURE — 77386 HC NTSTY MODUL RAD TX DLVR CPLX: CPT | Performed by: RADIOLOGY

## 2022-12-06 PROCEDURE — G6002 STEREOSCOPIC X-RAY GUIDANCE: HCPCS | Performed by: RADIOLOGY

## 2022-12-07 ENCOUNTER — HOSPITAL ENCOUNTER (OUTPATIENT)
Dept: RADIATION ONCOLOGY | Age: 67
Discharge: HOME | End: 2022-12-07
Payer: MEDICARE

## 2022-12-07 DIAGNOSIS — C25.1 MALIGNANT NEOPLASM OF BODY OF PANCREAS (HCC): Primary | ICD-10-CM

## 2022-12-07 PROCEDURE — 77386 HC NTSTY MODUL RAD TX DLVR CPLX: CPT | Performed by: RADIOLOGY

## 2022-12-07 PROCEDURE — G6002 STEREOSCOPIC X-RAY GUIDANCE: HCPCS | Performed by: RADIOLOGY

## 2022-12-08 ENCOUNTER — HOSPITAL ENCOUNTER (OUTPATIENT)
Age: 67
Discharge: HOME | End: 2022-12-08
Payer: MEDICARE

## 2022-12-08 ENCOUNTER — HOSPITAL ENCOUNTER (OUTPATIENT)
Dept: RADIATION ONCOLOGY | Age: 67
Discharge: HOME | End: 2022-12-08
Payer: MEDICARE

## 2022-12-08 VITALS
RESPIRATION RATE: 18 BRPM | DIASTOLIC BLOOD PRESSURE: 75 MMHG | OXYGEN SATURATION: 99 % | WEIGHT: 130.51 LBS | HEART RATE: 77 BPM | SYSTOLIC BLOOD PRESSURE: 122 MMHG | BODY MASS INDEX: 19.27 KG/M2 | TEMPERATURE: 97.8 F

## 2022-12-08 DIAGNOSIS — C25.1 MALIGNANT NEOPLASM OF BODY OF PANCREAS (HCC): ICD-10-CM

## 2022-12-08 LAB
ABSOLUTE IMMATURE GRANULOCYTE: 0.01 THOU/MM3 (ref 0–0.07)
BASINOPHIL, AUTOMATED: 2 % (ref 0–3)
BASOPHILS ABSOLUTE: 0.1 THOU/MM3 (ref 0–0.1)
EOSINOPHILS ABSOLUTE: 0.3 THOU/MM3 (ref 0–0.4)
EOSINOPHILS RELATIVE PERCENT: 12 % (ref 0–4)
HCT VFR BLD CALC: 31.7 % (ref 42–52)
HEMOGLOBIN: 10.5 GM/DL (ref 14–18)
IMMATURE GRANULOCYTES: 0 %
LYMPHOCYTES # BLD: 16 % (ref 15–47)
LYMPHOCYTES ABSOLUTE: 0.4 THOU/MM3 (ref 1–4.8)
MCH RBC QN AUTO: 29.7 PG (ref 26–33)
MCHC RBC AUTO-ENTMCNC: 33.1 GM/DL (ref 32.2–35.5)
MCV RBC AUTO: 90 FL (ref 80–94)
MONOCYTES ABSOLUTE: 0.1 THOU/MM3 (ref 0.4–1.3)
MONOCYTES: 2 % (ref 0–12)
PDW BLD-RTO: 15.2 % (ref 11.5–14.5)
PLATELET # BLD: 133 THOU/MM3 (ref 130–400)
PMV BLD AUTO: 9.3 FL (ref 9.4–12.4)
RBC # BLD: 3.53 MILL/MM3 (ref 4.7–6.1)
SEG NEUTROPHILS: 67 % (ref 43–75)
SEGMENTED NEUTROPHILS ABSOLUTE COUNT: 1.5 THOU/MM3 (ref 1.8–7.7)
WBC # BLD: 2.3 THOU/MM3 (ref 4.8–10.8)

## 2022-12-08 PROCEDURE — 85025 COMPLETE CBC W/AUTO DIFF WBC: CPT

## 2022-12-08 PROCEDURE — G6002 STEREOSCOPIC X-RAY GUIDANCE: HCPCS | Performed by: RADIOLOGY

## 2022-12-08 PROCEDURE — 77386 HC NTSTY MODUL RAD TX DLVR CPLX: CPT | Performed by: RADIOLOGY

## 2022-12-08 PROCEDURE — 36415 COLL VENOUS BLD VENIPUNCTURE: CPT

## 2022-12-08 NOTE — PROGRESS NOTES
[address] [facility] [address], [address]        [place] [facility]        Dana Mock: [phone]        F: [phone]       [email or web address]            Dr. Josefa Cruz MD MS          Dr. 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 Dose (cGy): 5040    Patient was seen today for weekly visit. Wt Readings from Last 3 Encounters:   22 130 lb 8.2 oz (59.2 kg)   22 138 lb 14.2 oz (63 kg)   22 138 lb (62.6 kg)       /75   Pulse 77   Temp 97.8 °F (36.6 °C) (Infrared)   Resp 18   Wt 130 lb 8.2 oz (59.2 kg)   SpO2 99%   BMI 19.27 kg/m²     Lab Results   Component Value Date    WBC 6.2 2022     2022         Comfort Alteration  Fatigue:Able to perform daily activities with rest periods    Pain Location: Denies  Pain Intensity (Current): 0 No Pain  Pain Treatment: N/A  Pain Relief: n/a    Emotional Alteration:   Coping: effective    Nutritional Alteration  Anorexia: none   Nausea: No nausea noted  Vomiting: No vomiting     Skin Alteration   Skin reaction: No changes noted    Elimination Alterations  Urinary Frequency: None  Urinary Retention: Normal  Urinary Incontinence: None  Dysuria: None  Nocturia: 1-3 times nightly  Proctitis: None  Diarrhea: None    Additional Comments: Using Aquaphor at times.      MEDICATIONS:     Current Outpatient Medications   Medication Sig Dispense Refill    sucralfate (CARAFATE) 1 GM tablet Take 1 tablet by mouth in the morning, at noon, in the evening, and at bedtime      losartan (COZAAR) 100 MG tablet TAKE 1 TABLET DAILY 90 tablet 1    ondansetron (ZOFRAN) 4 MG tablet Take 1 tablet by mouth daily as needed for Nausea or Vomiting 30 tablet 0    prochlorperazine (COMPAZINE) 10 MG tablet Take 1 tablet by mouth every 6 hours as needed (for nausea and vomiting) 60 tablet 3    lidocaine-prilocaine (EMLA) 2.5-2.5 % cream Apply topically as needed. 5 g 3    levothyroxine (SYNTHROID) 200 MCG tablet take 1 tablet by mouth once daily 30 tablet 1    pantoprazole (PROTONIX) 40 MG tablet Take 1 tablet by mouth every morning (before breakfast) 90 tablet 1    dilTIAZem (DILACOR XR) 240 MG extended release capsule TAKE 1 CAPSULE DAILY 90 capsule 3    Multiple Vitamin (MULTIVITAMIN PO) Take 1 capsule by mouth daily. aspirin 81 MG EC tablet Take 81 mg by mouth daily. No current facility-administered medications for this encounter. PHYSICAL EXAM:       ECO - Asymptomatic (Fully active, able to carry on all pre-disease activities without restriction)    General: NAD, AO x 3, Mentation is clear with appropriate affect. HEENT: Normocephalic, atraumatic  Thorax:  Unlabored  Abdomen:  Soft, NT/ND, no rebound  Neuro:  Cranial nerves grossly intact; no focal deficits  Skin - treatment portal: SEE above. Chemotherapy Update: Concurrent chemotherapy    Treatment Imaging: CBCT All imaging reviewed and approved by Dr. Elliot Camarillo. ASSESSMENT: No significant radiation side effects. Good po intake. Denies abdominal pain, nausea, vomiting, bowel/bladder changes. Regaining weight. New medications, diagnostic results: Not applicable    PLAN: Again reviewed potential side effects of radiation for the patient's treatment. Continue local/topical care. Continue current radiation course as prescribed.     Jefferson Lilly, PhD, MD  Radiation Oncology

## 2022-12-09 ENCOUNTER — HOSPITAL ENCOUNTER (OUTPATIENT)
Dept: RADIATION ONCOLOGY | Age: 67
Discharge: HOME | End: 2022-12-09
Payer: MEDICARE

## 2022-12-09 PROCEDURE — G6002 STEREOSCOPIC X-RAY GUIDANCE: HCPCS | Performed by: RADIOLOGY

## 2022-12-09 PROCEDURE — 77386 HC NTSTY MODUL RAD TX DLVR CPLX: CPT | Performed by: RADIOLOGY

## 2022-12-12 ENCOUNTER — HOSPITAL ENCOUNTER (OUTPATIENT)
Dept: RADIATION ONCOLOGY | Age: 67
Discharge: HOME | End: 2022-12-12
Payer: MEDICARE

## 2022-12-12 ENCOUNTER — APPOINTMENT (OUTPATIENT)
Dept: RADIATION ONCOLOGY | Age: 67
End: 2022-12-12
Payer: MEDICARE

## 2022-12-12 PROCEDURE — 77336 RADIATION PHYSICS CONSULT: CPT | Performed by: RADIOLOGY

## 2022-12-12 PROCEDURE — G6002 STEREOSCOPIC X-RAY GUIDANCE: HCPCS | Performed by: RADIOLOGY

## 2022-12-12 PROCEDURE — 77338 DESIGN MLC DEVICE FOR IMRT: CPT | Performed by: RADIOLOGY

## 2022-12-12 PROCEDURE — 77300 RADIATION THERAPY DOSE PLAN: CPT | Performed by: RADIOLOGY

## 2022-12-12 PROCEDURE — 77386 HC NTSTY MODUL RAD TX DLVR CPLX: CPT | Performed by: RADIOLOGY

## 2022-12-13 ENCOUNTER — HOSPITAL ENCOUNTER (OUTPATIENT)
Dept: RADIATION ONCOLOGY | Age: 67
Discharge: HOME | End: 2022-12-13
Payer: MEDICARE

## 2022-12-13 PROCEDURE — 77338 DESIGN MLC DEVICE FOR IMRT: CPT | Performed by: RADIOLOGY

## 2022-12-13 PROCEDURE — G6002 STEREOSCOPIC X-RAY GUIDANCE: HCPCS | Performed by: RADIOLOGY

## 2022-12-13 PROCEDURE — 77386 HC NTSTY MODUL RAD TX DLVR CPLX: CPT | Performed by: RADIOLOGY

## 2022-12-14 ENCOUNTER — HOSPITAL ENCOUNTER (OUTPATIENT)
Age: 67
Discharge: HOME | End: 2022-12-14
Payer: MEDICARE

## 2022-12-14 ENCOUNTER — HOSPITAL ENCOUNTER (OUTPATIENT)
Dept: RADIATION ONCOLOGY | Age: 67
Discharge: HOME | End: 2022-12-14
Payer: MEDICARE

## 2022-12-14 DIAGNOSIS — C25.1 MALIGNANT NEOPLASM OF BODY OF PANCREAS (HCC): ICD-10-CM

## 2022-12-14 LAB
BASOPHILS # BLD: 2.7 %
BASOPHILS ABSOLUTE: 0 THOU/MM3 (ref 0–0.1)
EOSINOPHIL # BLD: 12.3 %
EOSINOPHILS ABSOLUTE: 0.1 THOU/MM3 (ref 0–0.4)
ERYTHROCYTE [DISTWIDTH] IN BLOOD BY AUTOMATED COUNT: 15.9 % (ref 11.5–14.5)
ERYTHROCYTE [DISTWIDTH] IN BLOOD BY AUTOMATED COUNT: 50.4 FL (ref 35–45)
HCT VFR BLD CALC: 30.6 % (ref 42–52)
HEMOGLOBIN: 10.4 GM/DL (ref 14–18)
IMMATURE GRANS (ABS): 0.01 THOU/MM3 (ref 0–0.07)
IMMATURE GRANULOCYTES: 1.4 %
LYMPHOCYTES # BLD: 30.1 %
LYMPHOCYTES ABSOLUTE: 0.2 THOU/MM3 (ref 1–4.8)
MCH RBC QN AUTO: 30.4 PG (ref 26–33)
MCHC RBC AUTO-ENTMCNC: 34 GM/DL (ref 32.2–35.5)
MCV RBC AUTO: 89.5 FL (ref 80–94)
MONOCYTES # BLD: 30.1 %
MONOCYTES ABSOLUTE: 0.2 THOU/MM3 (ref 0.4–1.3)
NUCLEATED RED BLOOD CELLS: 0 /100 WBC
PLATELET # BLD: 136 THOU/MM3 (ref 130–400)
PMV BLD AUTO: 9.8 FL (ref 9.4–12.4)
RBC # BLD: 3.42 MILL/MM3 (ref 4.7–6.1)
SCAN OF BLOOD SMEAR: NORMAL
SEG NEUTROPHILS: 23.4 %
SEGMENTED NEUTROPHILS ABSOLUTE COUNT: 0.2 THOU/MM3 (ref 1.8–7.7)
WBC # BLD: 0.7 THOU/MM3 (ref 4.8–10.8)

## 2022-12-14 PROCEDURE — 77386 HC NTSTY MODUL RAD TX DLVR CPLX: CPT | Performed by: RADIOLOGY

## 2022-12-14 PROCEDURE — 85025 COMPLETE CBC W/AUTO DIFF WBC: CPT

## 2022-12-14 PROCEDURE — G6002 STEREOSCOPIC X-RAY GUIDANCE: HCPCS | Performed by: RADIOLOGY

## 2022-12-14 PROCEDURE — 36415 COLL VENOUS BLD VENIPUNCTURE: CPT

## 2022-12-15 ENCOUNTER — TELEPHONE (OUTPATIENT)
Dept: RADIATION ONCOLOGY | Age: 67
End: 2022-12-15

## 2022-12-15 ENCOUNTER — HOSPITAL ENCOUNTER (OUTPATIENT)
Dept: RADIATION ONCOLOGY | Age: 67
Discharge: HOME | End: 2022-12-15
Payer: MEDICARE

## 2022-12-15 ENCOUNTER — CLINICAL DOCUMENTATION (OUTPATIENT)
Dept: CASE MANAGEMENT | Age: 67
End: 2022-12-15

## 2022-12-15 VITALS
OXYGEN SATURATION: 99 % | RESPIRATION RATE: 18 BRPM | TEMPERATURE: 97.7 F | BODY MASS INDEX: 19.86 KG/M2 | SYSTOLIC BLOOD PRESSURE: 147 MMHG | DIASTOLIC BLOOD PRESSURE: 76 MMHG | WEIGHT: 134.48 LBS | HEART RATE: 94 BPM

## 2022-12-15 DIAGNOSIS — C25.0 MALIGNANT NEOPLASM OF HEAD OF PANCREAS (HCC): Primary | ICD-10-CM

## 2022-12-15 PROCEDURE — 77386 HC NTSTY MODUL RAD TX DLVR CPLX: CPT | Performed by: RADIOLOGY

## 2022-12-15 PROCEDURE — 77336 RADIATION PHYSICS CONSULT: CPT | Performed by: RADIOLOGY

## 2022-12-15 NOTE — PROGRESS NOTES
[address] [facility] [address], [address]        [place] [facility]        Silvion Redder: [phone]        F: [phone]       [email or web address]            Dr. Cadnace Cruz MD MS          Dr. 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 Dose (cGy): 5040    Patient was seen today for weekly visit. Wt Readings from Last 3 Encounters:   12/15/22 134 lb 7.7 oz (61 kg)   22 130 lb 8.2 oz (59.2 kg)   22 138 lb 14.2 oz (63 kg)       BP (!) 147/76   Pulse 94   Temp 97.7 °F (36.5 °C) (Infrared)   Resp 18   Wt 134 lb 7.7 oz (61 kg)   SpO2 99%   BMI 19.86 kg/m²     Lab Results   Component Value Date    WBC 0.7 (LL) 2022     2022         Comfort Alteration  Fatigue:Able to perform daily activities with rest periods    Pain Location: Denies  Pain Intensity (Current): 0 No Pain  Pain Treatment: N/A  Pain Relief: n/a    Emotional Alteration:   Coping: effective    Nutritional Alteration  Anorexia: none   Nausea: No nausea noted  Vomiting: No vomiting     Skin Alteration   Skin reaction: No changes noted    Elimination Alterations  Urinary Frequency: None  Urinary Retention: Normal  Urinary Incontinence: None  Dysuria: None  Nocturia: 1-3 times nightly  Proctitis: None  Diarrhea: None    Additional Comments: Using Aquaphor at times.      MEDICATIONS:     Current Outpatient Medications   Medication Sig Dispense Refill    sucralfate (CARAFATE) 1 GM tablet Take 1 tablet by mouth in the morning, at noon, in the evening, and at bedtime      losartan (COZAAR) 100 MG tablet TAKE 1 TABLET DAILY 90 tablet 1    ondansetron (ZOFRAN) 4 MG tablet Take 1 tablet by mouth daily as needed for Nausea or Vomiting 30 tablet 0    prochlorperazine (COMPAZINE) 10 MG tablet Take 1 tablet by mouth every 6 hours as needed (for nausea and vomiting) 60 tablet 3    lidocaine-prilocaine (EMLA) 2.5-2.5 % cream Apply topically as needed. 5 g 3    levothyroxine (SYNTHROID) 200 MCG tablet take 1 tablet by mouth once daily 30 tablet 1    pantoprazole (PROTONIX) 40 MG tablet Take 1 tablet by mouth every morning (before breakfast) 90 tablet 1    dilTIAZem (DILACOR XR) 240 MG extended release capsule TAKE 1 CAPSULE DAILY 90 capsule 3    Multiple Vitamin (MULTIVITAMIN PO) Take 1 capsule by mouth daily. aspirin 81 MG EC tablet Take 81 mg by mouth daily. No current facility-administered medications for this encounter. PHYSICAL EXAM:       ECO - Asymptomatic (Fully active, able to carry on all pre-disease activities without restriction)    General: NAD, AO x 3, Mentation is clear with appropriate affect. HEENT: Normocephalic, atraumatic  Thorax:  Unlabored  Abdomen:  Soft, NT/ND, no rebound  Neuro:  Cranial nerves grossly intact; no focal deficits  Skin - treatment portal: SEE above. Chemotherapy Update: Concurrent chemotherapy    Treatment Imaging: CBCT All imaging reviewed and approved by Dr. Sidney Khan. ASSESSMENT: No significant radiation side effects. Good po intake. Denies abdominal pain, nausea, vomiting, bowel/bladder changes. Regaining weight. New medications, diagnostic results: Not applicable    PLAN: Again reviewed potential side effects of radiation for the patient's treatment. Continue local/topical care. Radiation therapy course completed. . Will obtain repeat CT scan of Abdomen/Pelvis with contrast prior to follow up in one month.     Nereyda Boykin PhD, MD  Radiation Oncology

## 2022-12-15 NOTE — TELEPHONE ENCOUNTER
Routine blood work done that Dr. Mio Coleman ordered showed WBC 0.7. We are going ahead and finishing last radiation treatment today 12/15/22, he is due for Chemo 12/20. Thanks.

## 2022-12-15 NOTE — DISCHARGE INSTRUCTIONS
PATIENT DISCHARGE INSTRUCTIONS    Remember that side effects present at the end of your treatments will improve within a few weeks after the last treatment. Eat well balanced meals even though your treatments are finished. This will help speed the healing process. Continue any special diets prescribed to control side effects until these side effects have been resolved. Get plenty of rest.  If you have experienced fatigue and/or weakness, this may continue for several weeks after your last treatment. Continue with your daily activities according to the way you feel. Continue to be gentle with your skin. Follow your present skin care instructions until your follow-up visit. IF YOU DEVELOP ANY CHANGES IN YOUR SKIN IN THE AREA TREATED WITH RADIATION, PLEASE CALL THE RADIATION ONCOLOGY NURSE -[phone]. Protect your skin from any injury and avoid direct sun exposure in the treatment area. The skin in the treated area may always be more sensitive than the rest of your skin. Always use SPF 27 or higher sun block if you will be in the sun and cannot avoid exposure. Please contact your referring physician for a follow-up appointment in addition to your Radiation Oncology appointment.   Presence of pain:   Medication Taper: No    See Instructions Dated: N/A  Follow up orders: CT Abd/Pelvis

## 2022-12-15 NOTE — PROGRESS NOTES
Name: Sloan Newman  : 1955  MRN: [id number]    Oncology Navigation Follow-Up Note    Contact Type:  Radiation Oncology    Notes:   Met with Dhara Amin and wife, Kadeem Pathak, after last radiation treatment. [address], but since last treatment, Dr. Teto Mayo ok'd treatmlent. Medical Oncology notified. Dhara Amin was reminded of what to look for regarding infection:  Fever 101 or above to notify doctor  Any signs of infection, coughing up yellow, green  UTI symptoms  Anything that seems not normal to him    Is going to concert tonight-strongly advised to wear a mask! Will touch base with him tomorrow. To return to follow up with Dr. Steve Bae . Treatment may be delayed. Eladio at present feels well, no signs of infection. Was emotional during EOT-emotional support given. Very grateful to Radiation team. He did remarkably well. They know to all with any questions or concerns.     Electronically signed by Chaka Allred RN on 12/15/2022 at 4:22 PM

## 2022-12-20 ENCOUNTER — HOSPITAL ENCOUNTER (OUTPATIENT)
Dept: INFUSION THERAPY | Age: 67
Discharge: HOME | End: 2022-12-20
Payer: MEDICARE

## 2022-12-20 ENCOUNTER — OFFICE VISIT (OUTPATIENT)
Dept: ONCOLOGY | Age: 67
End: 2022-12-20
Payer: MEDICARE

## 2022-12-20 VITALS
SYSTOLIC BLOOD PRESSURE: 130 MMHG | OXYGEN SATURATION: 100 % | TEMPERATURE: 97.4 F | DIASTOLIC BLOOD PRESSURE: 74 MMHG | BODY MASS INDEX: 19.86 KG/M2 | HEART RATE: 73 BPM | HEIGHT: 69 IN | RESPIRATION RATE: 18 BRPM

## 2022-12-20 VITALS
SYSTOLIC BLOOD PRESSURE: 130 MMHG | TEMPERATURE: 97.4 F | HEART RATE: 73 BPM | WEIGHT: 129.6 LBS | RESPIRATION RATE: 18 BRPM | HEIGHT: 69 IN | DIASTOLIC BLOOD PRESSURE: 74 MMHG | OXYGEN SATURATION: 100 % | BODY MASS INDEX: 19.2 KG/M2

## 2022-12-20 DIAGNOSIS — C25.1 MALIGNANT NEOPLASM OF BODY OF PANCREAS (HCC): Primary | ICD-10-CM

## 2022-12-20 LAB
ALBUMIN SERPL-MCNC: 3.5 G/DL (ref 3.5–5.1)
ALP BLD-CCNC: 78 U/L (ref 38–126)
ALT SERPL-CCNC: 13 U/L (ref 11–66)
AST SERPL-CCNC: 37 U/L (ref 5–40)
BASOPHILS # BLD: 2.2 %
BASOPHILS ABSOLUTE: 0.1 THOU/MM3 (ref 0–0.1)
BILIRUB SERPL-MCNC: 0.3 MG/DL (ref 0.3–1.2)
BILIRUBIN DIRECT: < 0.2 MG/DL (ref 0–0.3)
BUN, WHOLE BLOOD: 15 MG/DL (ref 8–26)
CHLORIDE, WHOLE BLOOD: 106 MEQ/L (ref 98–109)
CREATININE, WHOLE BLOOD: 1.3 MG/DL (ref 0.5–1.2)
EOSINOPHIL # BLD: 4.4 %
EOSINOPHILS ABSOLUTE: 0.1 THOU/MM3 (ref 0–0.4)
ERYTHROCYTE [DISTWIDTH] IN BLOOD BY AUTOMATED COUNT: 16.4 % (ref 11.5–14.5)
ERYTHROCYTE [DISTWIDTH] IN BLOOD BY AUTOMATED COUNT: 52.1 FL (ref 35–45)
GFR SERPL CREATININE-BSD FRML MDRD: 59 ML/MIN/1.73M2
GLUCOSE, WHOLE BLOOD: 131 MG/DL (ref 70–108)
HCT VFR BLD CALC: 28.8 % (ref 42–52)
HEMOGLOBIN: 9.8 GM/DL (ref 14–18)
IMMATURE GRANS (ABS): 0.19 THOU/MM3 (ref 0–0.07)
IMMATURE GRANULOCYTES: 6 %
IONIZED CALCIUM, WHOLE BLOOD: 1.19 MMOL/L (ref 1.12–1.32)
LYMPHOCYTES # BLD: 12.7 %
LYMPHOCYTES ABSOLUTE: 0.4 THOU/MM3 (ref 1–4.8)
MAGNESIUM: 1.8 MG/DL (ref 1.6–2.4)
MCH RBC QN AUTO: 30.5 PG (ref 26–33)
MCHC RBC AUTO-ENTMCNC: 34 GM/DL (ref 32.2–35.5)
MCV RBC AUTO: 89.7 FL (ref 80–94)
MONOCYTES # BLD: 24.4 %
MONOCYTES ABSOLUTE: 0.8 THOU/MM3 (ref 0.4–1.3)
NUCLEATED RED BLOOD CELLS: 0 /100 WBC
PHOSPHORUS: 3.4 MG/DL (ref 2.4–4.7)
PLATELET # BLD: 398 THOU/MM3 (ref 130–400)
PMV BLD AUTO: 9.2 FL (ref 9.4–12.4)
POTASSIUM, WHOLE BLOOD: 3.6 MEQ/L (ref 3.5–4.9)
RBC # BLD: 3.21 MILL/MM3 (ref 4.7–6.1)
SEG NEUTROPHILS: 50.3 %
SEGMENTED NEUTROPHILS ABSOLUTE COUNT: 1.6 THOU/MM3 (ref 1.8–7.7)
SODIUM, WHOLE BLOOD: 137 MEQ/L (ref 138–146)
TOTAL CO2, WHOLE BLOOD: 20 MEQ/L (ref 23–33)
TOTAL PROTEIN: 6 G/DL (ref 6.1–8)
WBC # BLD: 3.2 THOU/MM3 (ref 4.8–10.8)

## 2022-12-20 PROCEDURE — 1123F ACP DISCUSS/DSCN MKR DOCD: CPT | Performed by: INTERNAL MEDICINE

## 2022-12-20 PROCEDURE — 6360000002 HC RX W HCPCS: Performed by: INTERNAL MEDICINE

## 2022-12-20 PROCEDURE — 99214 OFFICE O/P EST MOD 30 MIN: CPT | Performed by: INTERNAL MEDICINE

## 2022-12-20 PROCEDURE — 80047 BASIC METABLC PNL IONIZED CA: CPT

## 2022-12-20 PROCEDURE — 3078F DIAST BP <80 MM HG: CPT | Performed by: INTERNAL MEDICINE

## 2022-12-20 PROCEDURE — 2580000003 HC RX 258: Performed by: INTERNAL MEDICINE

## 2022-12-20 PROCEDURE — 80076 HEPATIC FUNCTION PANEL: CPT

## 2022-12-20 PROCEDURE — 99211 OFF/OP EST MAY X REQ PHY/QHP: CPT

## 2022-12-20 PROCEDURE — 3074F SYST BP LT 130 MM HG: CPT | Performed by: INTERNAL MEDICINE

## 2022-12-20 PROCEDURE — 83735 ASSAY OF MAGNESIUM: CPT

## 2022-12-20 PROCEDURE — 36591 DRAW BLOOD OFF VENOUS DEVICE: CPT

## 2022-12-20 PROCEDURE — 85025 COMPLETE CBC W/AUTO DIFF WBC: CPT

## 2022-12-20 PROCEDURE — 84100 ASSAY OF PHOSPHORUS: CPT

## 2022-12-20 RX ORDER — HEPARIN SODIUM (PORCINE) LOCK FLUSH IV SOLN 100 UNIT/ML 100 UNIT/ML
500 SOLUTION INTRAVENOUS PRN
Status: DISCONTINUED | OUTPATIENT
Start: 2022-12-20 | End: 2022-12-21 | Stop reason: HOSPADM

## 2022-12-20 RX ORDER — SODIUM CHLORIDE 0.9 % (FLUSH) 0.9 %
5-40 SYRINGE (ML) INJECTION PRN
Status: DISCONTINUED | OUTPATIENT
Start: 2022-12-20 | End: 2022-12-21 | Stop reason: HOSPADM

## 2022-12-20 RX ORDER — HEPARIN SODIUM (PORCINE) LOCK FLUSH IV SOLN 100 UNIT/ML 100 UNIT/ML
500 SOLUTION INTRAVENOUS PRN
Status: CANCELLED | OUTPATIENT
Start: 2022-12-20

## 2022-12-20 RX ORDER — SODIUM CHLORIDE 0.9 % (FLUSH) 0.9 %
5-40 SYRINGE (ML) INJECTION PRN
Status: CANCELLED | OUTPATIENT
Start: 2022-12-20

## 2022-12-20 RX ORDER — SODIUM CHLORIDE 9 MG/ML
25 INJECTION, SOLUTION INTRAVENOUS PRN
Status: CANCELLED | OUTPATIENT
Start: 2022-12-20

## 2022-12-20 RX ADMIN — SODIUM CHLORIDE, PRESERVATIVE FREE 10 ML: 5 INJECTION INTRAVENOUS at 09:00

## 2022-12-20 RX ADMIN — Medication 500 UNITS: at 09:00

## 2022-12-20 RX ADMIN — SODIUM CHLORIDE, PRESERVATIVE FREE 10 ML: 5 INJECTION INTRAVENOUS at 08:05

## 2022-12-20 RX ADMIN — SODIUM CHLORIDE, PRESERVATIVE FREE 20 ML: 5 INJECTION INTRAVENOUS at 08:06

## 2022-12-20 NOTE — PATIENT INSTRUCTIONS
Reviewed labs and recent medical history. Will hold chemotherapy this week related to counts. Encouraged patient to keep eating and drinking plenty of fluids.   Return to see MD next Wednesday 12/28/22 for chemotherapy (3 days)

## 2022-12-20 NOTE — PROGRESS NOTES
[address] [facility]  [address] [address]  Dept: [phone]  Loc: [facility] 1945 [address] [facility]  1955   No ref. provider found   Abram Shaw MD       Shira Burgess is a 69 y.o.  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  47-year-old male with 6 months of weight loss. He went to the dentist and had some bad teeth. He was told that he had to have these pulled. He was nervous about this and was not eating and then felt that he had an infection that was spreading through his body. He began having stomach pain and was treated for ulcers but this did not cause any improvement. Ultrasound was performed and was abnormal leading to a CT scan. Had decreased his beer intake from 12 pack of beer a day to 2. Felt restless, agitated, anxious. 8/25/2022. CAT scan of the abdomen performed locally showed a mass in the body of the pancreas 5 x 5.5 x 4.8 cm with involvement of the celiac axis and encasement of the superior mesenteric artery, narrows the celiac trunk and infiltrates the root of the mesentery. There were numerous enlarged peripancreatic and mesenteric lymph nodes measuring up to 14 mm. .  Labs in July were normal.  Just returned from a cruise to the [place]. Had noted dark urine for a week and appeared to be slightly jaundiced. 9/19/2022. Bilirubin was 4.5 with ALT of 136 and AST of 148. CA 19-9 was 321. Sugar was 111. CBC showed a white count 6.6 with a hemoglobin of 14 hematocrit 42 and a platelet count of 320,829.    9/19/2022. Endoscopy was performed. Endosonographic findings showed diffuse dilatation of the intrahepatic bile ducts.   There is an irregular mass in the genu of the pancreas and pancreatic body measuring approximately 4.5 x 3.8 cm. There was sonographic evidence of invasion into the superior mesenteric artery and a few abnormal lymph nodes were visualized in the peripancreatic region. Fine-needle aspiration of mass of the body of the pancreas at [facility] showed high-grade neuroendocrine carcinoma favoring small cell carcinoma. 9/19/2022. ERCP. There is no evidence of esophageal varices or gastric varices. There is stenosis in the main bile duct in the middle third. Sphincterotomy was performed. An uncovered metal stent was placed and he was discharged home. 9/21/2022. Patient followed up with his primary care physician Dr. Dianne Osorio. Patient said that he felt better but was continuing to lose weight.    9/28/2022. PET scan showed that there was an FDG avid pancreatic mass highly suspicious for malignancy that was better seen on recent CT scan. Maximum SUV was 5.7 with areas of photopenia in the mass associated with hypoattenuation likely secondary to necrosis. The urinary bladder was markedly distended and there was bilateral hydroureter. Prostate was enlarged with calcifications. There is no evidence of mesenteric retroperitoneal pelvic or inguinal lymphadenopathy that was FDG avid. Degenerative changes were seen in the lumbar spine and pelvis without evidence of hypermetabolic avidity    Comorbidities include hypertension,hypothyroidism, anxiety, alcohol use disorder, nicotine use disorder. The patient said that he used to weigh 197 pounds and now he weighs 137. He says that he feels cold all of the time. 10/4/2022. Initial clinical nurse oncology navigation. Encouraged physical activity, smoking cessation, minimization of alcohol take, consideration of being seen back at [facility] for their input. 10/14/2022. Consultation with Dr. Wendy Meneses radiation oncology.   Collaborated with Dr. Wendy Meneses who wants to give radiation and chemotherapy concurrently à la small cell carcinoma of the lung protocols. 10/ 26/ 2022. Chemotherapy teaching. 10/31/2022. Port placed by Dr. Calderon Lvein. 11/1/2022. Mr. Candace Bojorquez is here today to begin his chemotherapy. He is quite flat in his affect. His wife says that she has been present at all of the interactions and has been taking in the information which he refuses to review. Today we had a long discussion concerning the side effects of his chemotherapy that will be started today as well as his radiation therapy. He verbalized understanding and was engaged in the conversation although distant in his interaction. She has been quite tearful today. They know that they must call for any complications, questions or concerns. 11/29/2022. Mr. Candace Bojorquez has been doing much better since his treatment has been ongoing. He had significant drop in his counts from his chemotherapy but he had no problems with infection, bleeding or significant symptoms from his anemia. His treatments were held while his counts recovered. His lowest white count was 1.2. He gets bursts of energy and does a lot of work and then is quite tired. He has had diarrhea off and on. He has had no significant nausea and vomiting. He denies fevers, chills, sweats and denies pain. He broke off a tooth and is interested in going to the dentist.  He was educated that he needs to have his counts checked before any dental work is performed. INTERVAL NOTE    12/20/2022. He returns to the office with his wife to consider further chemotherapy. He has just completed his radiation. His counts are borderline low and his creatinine is elevated at 1.3 which is higher than his baseline of 0.9. He has lost about 4 pounds and currently weighs 129 pounds at 5 feet 9 inches tall. During his last cycle he started out with a white count of 6000 and he became quite neutropenic but did not become febrile.   For all of these reasons he needs another week off to recover before we continue his chemotherapy. He continues to have bursts of energy and then extremely fatigued. He denies any fevers, chills, sweats, bleeding, nausea, vomiting, constipation and diarrhea. Today is his birthday. His appetite is getting slightly better. MONITORING PARAMETERS    Physical examinations, CAT scans and PET scans. PAST MEDICAL HISTORY  Past Medical History:   Diagnosis Date    Hypertension     Hypothyroidism     Malignant neoplasm of pancreas, [facility]22        REVIEW OF SYSTEMS  Review of Systems   Constitutional:  Positive for appetite change and fatigue. Negative for chills, diaphoresis, fever and unexpected weight change. HENT:  Negative for hearing loss, mouth sores, rhinorrhea, sore throat and trouble swallowing. Eyes:  Negative for visual disturbance. Respiratory:  Negative for shortness of breath. Cardiovascular:  Negative for leg swelling. Gastrointestinal:  Negative for abdominal pain, constipation, diarrhea, nausea and vomiting. Genitourinary:  Negative for dysuria, hematuria and urgency. Musculoskeletal:  Positive for myalgias (generalized). Negative for back pain. Skin:  Positive for pallor. Neurological:  Positive for weakness. Negative for tremors, syncope, numbness and headaches. Hematological:  Negative for adenopathy. Does not bruise/bleed easily. Psychiatric/Behavioral:  Positive for sleep disturbance (unchanged). Negative for self-injury and suicidal ideas. The patient is not nervous/anxious. FAMILY HISTORY  Family History   Problem Relation Age of Onset    High Blood Pressure Mother     Lung Cancer Father         SOCIAL HISTORY  Social History     Socioeconomic History    Marital status:      Spouse name:  Angelia Raman    Number of children: 3    Years of education: Not on file    Highest education level: Not on file   Occupational History    Not on file   Tobacco Use    Smoking status: Every Day Types: Cigars     Start date: 1/1/2012     Passive exposure: Past (Dad smoked)    Smokeless tobacco: Never    Tobacco comments:     -3-4 cigars per day. denies cessation counseling 11/1/22. Declines counseling 11/2. Vaping Use    Vaping Use: Never used   Substance and Sexual Activity    Alcohol use: Yes     Comment: 2-3 beers per day since 2022   Previous drank 8-12    Drug use: No    Sexual activity: Not on file   Other Topics Concern    Not on file   Social History Narrative    Not on file     Social Determinants of Health     Financial Resource Strain: Not on file   Food Insecurity: Not on file   Transportation Needs: Not on file   Physical Activity: Inactive    Days of Exercise per Week: 0 days    Minutes of Exercise per Session: 0 min   Stress: Not on file   Social Connections: Not on file   Intimate Partner Violence: Not on file   Housing Stability: Not on file        CURRENT MEDICATIONS  Current Outpatient Medications   Medication Sig Dispense Refill    sucralfate (CARAFATE) 1 GM tablet Take 1 tablet by mouth in the morning, at noon, in the evening, and at bedtime      losartan (COZAAR) 100 MG tablet TAKE 1 TABLET DAILY 90 tablet 1    ondansetron (ZOFRAN) 4 MG tablet Take 1 tablet by mouth daily as needed for Nausea or Vomiting 30 tablet 0    prochlorperazine (COMPAZINE) 10 MG tablet Take 1 tablet by mouth every 6 hours as needed (for nausea and vomiting) 60 tablet 3    lidocaine-prilocaine (EMLA) 2.5-2.5 % cream Apply topically as needed. 5 g 3    levothyroxine (SYNTHROID) 200 MCG tablet take 1 tablet by mouth once daily 30 tablet 1    pantoprazole (PROTONIX) 40 MG tablet Take 1 tablet by mouth every morning (before breakfast) 90 tablet 1    dilTIAZem (DILACOR XR) 240 MG extended release capsule TAKE 1 CAPSULE DAILY 90 capsule 3    Multiple Vitamin (MULTIVITAMIN PO) Take 1 capsule by mouth daily. aspirin 81 MG EC tablet Take 81 mg by mouth daily.          No current facility-administered medications for this visit. Facility-Administered Medications Ordered in Other Visits   Medication Dose Route Frequency Provider Last Rate Last Admin    sodium chloride flush 0.9 % injection 5-40 mL  5-40 mL IntraVENous PRN Julieta Diaz MD   20 mL at 12/20/22 0806    heparin flush 100 UNIT/ML injection 500 Units  500 Units IntraCATHeter PRN Julieta Diaz MD            OARRS    PDMP Monitoring:    Last PDMP [place] as Reviewed [facility]):  Review User Review Instant Review Result   Kylie Padilla 10/5/2022 12:45 AM Reviewed PDMP [1]       Urine Drug Screenings (1 yr)    No resulted procedures found. Medication Contract and Consent for Opioid Use Documents Filed        No documents found                     PHYSICAL EXAM    Physical Exam  Vitals and nursing note reviewed. Exam conducted with a chaperone present. Constitutional:       General: He is not in acute distress. Appearance: He is ill-appearing. He is not toxic-appearing or diaphoretic. Comments: Mr. Dolly Bence is a thin, chronically ill-appearing, pale, energetic  male who is in no acute distress. He is here today with his wife. HENT:      Head: Normocephalic and atraumatic. Comments: Near-total alopecia and bilateral temporal wasting. Eyes:      General: No scleral icterus. Extraocular Movements: Extraocular movements intact. Pupils: Pupils are equal, round, and reactive to light. Pulmonary:      Effort: Pulmonary effort is normal.   Skin:     Coloration: Skin is pale. Skin is not jaundiced. Neurological:      Mental Status: He is alert. Cranial Nerves: No cranial nerve deficit. Motor: No weakness. Gait: Gait normal.   Psychiatric:         Mood and Affect: Mood normal.         Behavior: Behavior normal.         Thought Content:  Thought content normal.         Judgment: Judgment normal.        ECOG STATUS    0 : Fully active, able to carry all pre- disease performance without restriction    LABS/IMAGING    BMP today shows a sodium of 137. BUN is 15 creatinine is 1.3. Total protein 6.0 with an albumin of 3.5. Sugar is 131. Liver function tests are normal.  CBC shows white count 3.2 with a hemoglobin of 9.8 hematocrit 28.8 and a platelet count of 327,940. White blood cell differential count shows 50% polys, 12 lymphs, 24 monos, 4 eosinophils and 2 basophils    PATHOLOGY/GENETICS    Neuroendocrine cancer of the pancreas    ASSESSMENT and PLAN    1. Neuroendocrine cancer of the pancreas. He will continue with chemotherapy next week with cycle 3 out of 4 planned cycles if his counts are adequate. 2.  Profound weight loss. He weighs only 129 today. He was encouraged to eat plenty of lean protein. He will continue being physically active. We will continue to monitor his weight. 3.  Hypertension. Blood pressure today was 130/74. We will continue to monitor. 4.  GERD. He will continue on Protonix. The patient and his wife know to call if he has any change in his status, questions or concerns.         Jennyfer Sunshine MD 12/20/2022 8:31 AM

## 2022-12-20 NOTE — PLAN OF CARE
Problem: Safety - Adult  Goal: Free from fall injury  Outcome: Adequate for Discharge  Flowsheets (Taken 12/20/2022 1501)  Free From Fall Injury: Instruct family/caregiver on patient safety  Note: Free from falls while in O.P. Oncology. Problem: Discharge Planning  Goal: Discharge to home or other facility with appropriate resources  Outcome: Adequate for Discharge  Flowsheets (Taken 12/20/2022 1501)  Discharge to home or other facility with appropriate resources: Identify barriers to discharge with patient and caregiver  Note: Verbalize understanding of discharge instructions, follow up appointments, and when to call Physician. Problem: Infection - Adult  Goal: Absence of infection at discharge  Outcome: Adequate for Discharge  Flowsheets (Taken 12/20/2022 1501)  Absence of infection at discharge: Assess and monitor for signs and symptoms of infection  Note: Mediport site with no redness or warmth. Skin over port site intact with no signs of breakdown noted. Patient verbalizes signs/symptoms of port infection and when to notify the physician. Goal: Absence of fever/infection during anticipated neutropenic period  Outcome: Adequate for Discharge     Care plan reviewed with patient and family. Patient and family verbalize understanding of the plan of care and contribute to goal setting.

## 2022-12-20 NOTE — PROGRESS NOTES
Patient tolerated lab draw via [id number] without any complications. Treatment held today. Discharge instructions given to patient-verbalizes understanding. Ambulated off unit per self with belongings.

## 2022-12-20 NOTE — DISCHARGE INSTRUCTIONS
Reviewed labs and recent medical history. Will hold chemotherapy this week related to counts. Encouraged patient to keep eating and drinking plenty of fluids. Return to see MD next Wednesday 12/28/22 for chemotherapy (3 days)    You had labs drawn via medi-port while in Outpatient Oncology clinic, Treatment held today due to labs. Please call us at [phone] if you have any questions or concerns about your visit or medications that you received today. It is important that you drink 48-64 ounces of water everyday.

## 2022-12-28 ENCOUNTER — OFFICE VISIT (OUTPATIENT)
Dept: ONCOLOGY | Age: 67
End: 2022-12-28
Payer: MEDICARE

## 2022-12-28 ENCOUNTER — HOSPITAL ENCOUNTER (OUTPATIENT)
Dept: INFUSION THERAPY | Age: 67
Discharge: HOME | End: 2022-12-28
Payer: MEDICARE

## 2022-12-28 ENCOUNTER — CLINICAL DOCUMENTATION (OUTPATIENT)
Dept: CASE MANAGEMENT | Age: 67
End: 2022-12-28

## 2022-12-28 VITALS
TEMPERATURE: 97.7 F | RESPIRATION RATE: 18 BRPM | OXYGEN SATURATION: 99 % | HEART RATE: 90 BPM | WEIGHT: 131.4 LBS | DIASTOLIC BLOOD PRESSURE: 62 MMHG | BODY MASS INDEX: 19.46 KG/M2 | SYSTOLIC BLOOD PRESSURE: 101 MMHG | HEIGHT: 69 IN

## 2022-12-28 VITALS
OXYGEN SATURATION: 98 % | DIASTOLIC BLOOD PRESSURE: 60 MMHG | TEMPERATURE: 97.7 F | HEART RATE: 65 BPM | BODY MASS INDEX: 19.46 KG/M2 | WEIGHT: 131.4 LBS | SYSTOLIC BLOOD PRESSURE: 106 MMHG | HEIGHT: 69 IN | RESPIRATION RATE: 18 BRPM

## 2022-12-28 DIAGNOSIS — C25.1 MALIGNANT NEOPLASM OF BODY OF PANCREAS (HCC): Primary | ICD-10-CM

## 2022-12-28 DIAGNOSIS — C25.1 MALIGNANT NEOPLASM OF BODY OF PANCREAS (HCC): ICD-10-CM

## 2022-12-28 DIAGNOSIS — D50.8 OTHER IRON DEFICIENCY ANEMIA: ICD-10-CM

## 2022-12-28 LAB
ABSOLUTE IMMATURE GRANULOCYTE: 0.14 THOU/MM3 (ref 0–0.07)
ALBUMIN SERPL-MCNC: 2.7 G/DL (ref 3.5–5.1)
ALP BLD-CCNC: 138 U/L (ref 38–126)
ALT SERPL-CCNC: 7 U/L (ref 11–66)
AST SERPL-CCNC: 16 U/L (ref 5–40)
BASINOPHIL, AUTOMATED: 1 % (ref 0–3)
BASOPHILS ABSOLUTE: 0.1 THOU/MM3 (ref 0–0.1)
BILIRUB SERPL-MCNC: 0.5 MG/DL (ref 0.3–1.2)
BILIRUBIN DIRECT: < 0.2 MG/DL (ref 0–0.3)
BUN, WHOLE BLOOD: 19 MG/DL (ref 8–26)
CHLORIDE, WHOLE BLOOD: 105 MEQ/L (ref 98–109)
CREATININE, WHOLE BLOOD: 1.2 MG/DL (ref 0.5–1.2)
EOSINOPHILS ABSOLUTE: 0 THOU/MM3 (ref 0–0.4)
EOSINOPHILS RELATIVE PERCENT: 0 % (ref 0–4)
GFR SERPL CREATININE-BSD FRML MDRD: > 60 ML/MIN/1.73M2
GLUCOSE, WHOLE BLOOD: 140 MG/DL (ref 70–108)
HCT VFR BLD CALC: 26.8 % (ref 42–52)
HEMOGLOBIN: 9.1 GM/DL (ref 14–18)
IMMATURE GRANULOCYTES: 1 %
IONIZED CALCIUM, WHOLE BLOOD: 1.19 MMOL/L (ref 1.12–1.32)
IRON SATURATION: 9 % (ref 20–50)
IRON: 13 UG/DL (ref 65–195)
LYMPHOCYTES # BLD: 5 % (ref 15–47)
LYMPHOCYTES ABSOLUTE: 0.5 THOU/MM3 (ref 1–4.8)
MAGNESIUM: 1.8 MG/DL (ref 1.6–2.4)
MCH RBC QN AUTO: 30.4 PG (ref 26–33)
MCHC RBC AUTO-ENTMCNC: 34 GM/DL (ref 32.2–35.5)
MCV RBC AUTO: 90 FL (ref 80–94)
MONOCYTES ABSOLUTE: 1.2 THOU/MM3 (ref 0.4–1.3)
MONOCYTES: 10 % (ref 0–12)
PDW BLD-RTO: 16.4 % (ref 11.5–14.5)
PHOSPHORUS: 3 MG/DL (ref 2.4–4.7)
PLATELET # BLD: 260 THOU/MM3 (ref 130–400)
PMV BLD AUTO: 8.6 FL (ref 9.4–12.4)
POTASSIUM, WHOLE BLOOD: 4 MEQ/L (ref 3.5–4.9)
RBC # BLD: 2.99 MILL/MM3 (ref 4.7–6.1)
SEG NEUTROPHILS: 83 % (ref 43–75)
SEGMENTED NEUTROPHILS ABSOLUTE COUNT: 9.7 THOU/MM3 (ref 1.8–7.7)
SODIUM, WHOLE BLOOD: 135 MEQ/L (ref 138–146)
TOTAL CO2, WHOLE BLOOD: 21 MEQ/L (ref 23–33)
TOTAL IRON BINDING CAPACITY: 145 UG/DL (ref 171–450)
TOTAL PROTEIN: 6.1 G/DL (ref 6.1–8)
WBC # BLD: 11.6 THOU/MM3 (ref 4.8–10.8)

## 2022-12-28 PROCEDURE — 36591 DRAW BLOOD OFF VENOUS DEVICE: CPT

## 2022-12-28 PROCEDURE — 99211 OFF/OP EST MAY X REQ PHY/QHP: CPT

## 2022-12-28 PROCEDURE — 3078F DIAST BP <80 MM HG: CPT | Performed by: INTERNAL MEDICINE

## 2022-12-28 PROCEDURE — 83540 ASSAY OF IRON: CPT

## 2022-12-28 PROCEDURE — 99214 OFFICE O/P EST MOD 30 MIN: CPT | Performed by: INTERNAL MEDICINE

## 2022-12-28 PROCEDURE — 80076 HEPATIC FUNCTION PANEL: CPT

## 2022-12-28 PROCEDURE — 83550 IRON BINDING TEST: CPT

## 2022-12-28 PROCEDURE — 2580000003 HC RX 258: Performed by: INTERNAL MEDICINE

## 2022-12-28 PROCEDURE — 1123F ACP DISCUSS/DSCN MKR DOCD: CPT | Performed by: INTERNAL MEDICINE

## 2022-12-28 PROCEDURE — 96375 TX/PRO/DX INJ NEW DRUG ADDON: CPT

## 2022-12-28 PROCEDURE — 85025 COMPLETE CBC W/AUTO DIFF WBC: CPT

## 2022-12-28 PROCEDURE — 84100 ASSAY OF PHOSPHORUS: CPT

## 2022-12-28 PROCEDURE — 6360000002 HC RX W HCPCS: Performed by: INTERNAL MEDICINE

## 2022-12-28 PROCEDURE — 3074F SYST BP LT 130 MM HG: CPT | Performed by: INTERNAL MEDICINE

## 2022-12-28 PROCEDURE — 96415 CHEMO IV INFUSION ADDL HR: CPT

## 2022-12-28 PROCEDURE — 96366 THER/PROPH/DIAG IV INF ADDON: CPT

## 2022-12-28 PROCEDURE — 80047 BASIC METABLC PNL IONIZED CA: CPT

## 2022-12-28 PROCEDURE — 96417 CHEMO IV INFUS EACH ADDL SEQ: CPT

## 2022-12-28 PROCEDURE — 83735 ASSAY OF MAGNESIUM: CPT

## 2022-12-28 PROCEDURE — 96367 TX/PROPH/DG ADDL SEQ IV INF: CPT

## 2022-12-28 PROCEDURE — 96413 CHEMO IV INFUSION 1 HR: CPT

## 2022-12-28 RX ORDER — DIPHENHYDRAMINE HYDROCHLORIDE 50 MG/ML
50 INJECTION INTRAMUSCULAR; INTRAVENOUS
Status: CANCELLED | OUTPATIENT
Start: 2022-12-28

## 2022-12-28 RX ORDER — ONDANSETRON 2 MG/ML
8 INJECTION INTRAMUSCULAR; INTRAVENOUS
Status: CANCELLED | OUTPATIENT
Start: 2022-12-30

## 2022-12-28 RX ORDER — DIPHENHYDRAMINE HYDROCHLORIDE 50 MG/ML
50 INJECTION INTRAMUSCULAR; INTRAVENOUS
Status: CANCELLED | OUTPATIENT
Start: 2022-12-30

## 2022-12-28 RX ORDER — SODIUM CHLORIDE 0.9 % (FLUSH) 0.9 %
5-40 SYRINGE (ML) INJECTION PRN
Status: CANCELLED | OUTPATIENT
Start: 2022-12-30

## 2022-12-28 RX ORDER — SODIUM CHLORIDE 9 MG/ML
5-250 INJECTION, SOLUTION INTRAVENOUS PRN
Status: CANCELLED | OUTPATIENT
Start: 2022-12-29

## 2022-12-28 RX ORDER — FAMOTIDINE 10 MG/ML
20 INJECTION, SOLUTION INTRAVENOUS
Status: CANCELLED | OUTPATIENT
Start: 2022-12-28

## 2022-12-28 RX ORDER — DIPHENHYDRAMINE HYDROCHLORIDE 50 MG/ML
50 INJECTION INTRAMUSCULAR; INTRAVENOUS
Status: CANCELLED | OUTPATIENT
Start: 2022-12-29

## 2022-12-28 RX ORDER — MEPERIDINE HYDROCHLORIDE 50 MG/ML
12.5 INJECTION INTRAMUSCULAR; INTRAVENOUS; SUBCUTANEOUS PRN
Status: CANCELLED | OUTPATIENT
Start: 2022-12-30

## 2022-12-28 RX ORDER — FAMOTIDINE 10 MG/ML
20 INJECTION, SOLUTION INTRAVENOUS
Status: CANCELLED | OUTPATIENT
Start: 2022-12-29

## 2022-12-28 RX ORDER — ACETAMINOPHEN 325 MG/1
650 TABLET ORAL
Status: CANCELLED | OUTPATIENT
Start: 2022-12-29

## 2022-12-28 RX ORDER — ACETAMINOPHEN 325 MG/1
650 TABLET ORAL
Status: CANCELLED | OUTPATIENT
Start: 2022-12-28

## 2022-12-28 RX ORDER — HEPARIN SODIUM (PORCINE) LOCK FLUSH IV SOLN 100 UNIT/ML 100 UNIT/ML
500 SOLUTION INTRAVENOUS PRN
Status: CANCELLED | OUTPATIENT
Start: 2022-12-30

## 2022-12-28 RX ORDER — ONDANSETRON 2 MG/ML
8 INJECTION INTRAMUSCULAR; INTRAVENOUS
Status: CANCELLED | OUTPATIENT
Start: 2022-12-28

## 2022-12-28 RX ORDER — SODIUM CHLORIDE 9 MG/ML
5-250 INJECTION, SOLUTION INTRAVENOUS PRN
Status: CANCELLED | OUTPATIENT
Start: 2022-12-28

## 2022-12-28 RX ORDER — SODIUM CHLORIDE 9 MG/ML
5-250 INJECTION, SOLUTION INTRAVENOUS PRN
Status: CANCELLED | OUTPATIENT
Start: 2022-12-30

## 2022-12-28 RX ORDER — SODIUM CHLORIDE 9 MG/ML
25 INJECTION, SOLUTION INTRAVENOUS PRN
Status: CANCELLED | OUTPATIENT
Start: 2022-12-28

## 2022-12-28 RX ORDER — FAMOTIDINE 10 MG/ML
20 INJECTION, SOLUTION INTRAVENOUS
Status: CANCELLED | OUTPATIENT
Start: 2022-12-30

## 2022-12-28 RX ORDER — HEPARIN SODIUM (PORCINE) LOCK FLUSH IV SOLN 100 UNIT/ML 100 UNIT/ML
500 SOLUTION INTRAVENOUS PRN
Status: CANCELLED | OUTPATIENT
Start: 2022-12-29

## 2022-12-28 RX ORDER — SODIUM CHLORIDE 0.9 % (FLUSH) 0.9 %
5-40 SYRINGE (ML) INJECTION PRN
Status: CANCELLED | OUTPATIENT
Start: 2022-12-28

## 2022-12-28 RX ORDER — PALONOSETRON 0.05 MG/ML
0.25 INJECTION, SOLUTION INTRAVENOUS ONCE
Status: CANCELLED | OUTPATIENT
Start: 2022-12-28 | End: 2022-12-28

## 2022-12-28 RX ORDER — SODIUM CHLORIDE 9 MG/ML
5-40 INJECTION INTRAVENOUS PRN
Status: CANCELLED | OUTPATIENT
Start: 2022-12-30

## 2022-12-28 RX ORDER — SODIUM CHLORIDE 9 MG/ML
5-40 INJECTION INTRAVENOUS PRN
Status: CANCELLED | OUTPATIENT
Start: 2022-12-28

## 2022-12-28 RX ORDER — PALONOSETRON 0.05 MG/ML
0.25 INJECTION, SOLUTION INTRAVENOUS ONCE
Status: COMPLETED | OUTPATIENT
Start: 2022-12-28 | End: 2022-12-28

## 2022-12-28 RX ORDER — SODIUM CHLORIDE 9 MG/ML
5-250 INJECTION, SOLUTION INTRAVENOUS PRN
Status: DISCONTINUED | OUTPATIENT
Start: 2022-12-28 | End: 2022-12-29 | Stop reason: HOSPADM

## 2022-12-28 RX ORDER — ONDANSETRON 2 MG/ML
8 INJECTION INTRAMUSCULAR; INTRAVENOUS
Status: CANCELLED | OUTPATIENT
Start: 2022-12-29

## 2022-12-28 RX ORDER — HEPARIN SODIUM (PORCINE) LOCK FLUSH IV SOLN 100 UNIT/ML 100 UNIT/ML
500 SOLUTION INTRAVENOUS PRN
Status: CANCELLED | OUTPATIENT
Start: 2022-12-28

## 2022-12-28 RX ORDER — MEPERIDINE HYDROCHLORIDE 50 MG/ML
12.5 INJECTION INTRAMUSCULAR; INTRAVENOUS; SUBCUTANEOUS PRN
Status: CANCELLED | OUTPATIENT
Start: 2022-12-28

## 2022-12-28 RX ORDER — SODIUM CHLORIDE 0.9 % (FLUSH) 0.9 %
5-40 SYRINGE (ML) INJECTION PRN
Status: CANCELLED | OUTPATIENT
Start: 2022-12-29

## 2022-12-28 RX ORDER — SODIUM CHLORIDE 9 MG/ML
INJECTION, SOLUTION INTRAVENOUS CONTINUOUS
Status: CANCELLED | OUTPATIENT
Start: 2022-12-29

## 2022-12-28 RX ORDER — EPINEPHRINE 1 MG/ML
0.3 INJECTION, SOLUTION, CONCENTRATE INTRAVENOUS PRN
Status: CANCELLED | OUTPATIENT
Start: 2022-12-30

## 2022-12-28 RX ORDER — ALBUTEROL SULFATE 90 UG/1
4 AEROSOL, METERED RESPIRATORY (INHALATION) PRN
Status: CANCELLED | OUTPATIENT
Start: 2022-12-29

## 2022-12-28 RX ORDER — HEPARIN SODIUM (PORCINE) LOCK FLUSH IV SOLN 100 UNIT/ML 100 UNIT/ML
500 SOLUTION INTRAVENOUS PRN
Status: DISCONTINUED | OUTPATIENT
Start: 2022-12-28 | End: 2022-12-29 | Stop reason: HOSPADM

## 2022-12-28 RX ORDER — SODIUM CHLORIDE 9 MG/ML
INJECTION, SOLUTION INTRAVENOUS CONTINUOUS
Status: CANCELLED | OUTPATIENT
Start: 2022-12-28

## 2022-12-28 RX ORDER — SODIUM CHLORIDE 9 MG/ML
INJECTION, SOLUTION INTRAVENOUS CONTINUOUS
Status: CANCELLED | OUTPATIENT
Start: 2022-12-30

## 2022-12-28 RX ORDER — ALBUTEROL SULFATE 90 UG/1
4 AEROSOL, METERED RESPIRATORY (INHALATION) PRN
Status: CANCELLED | OUTPATIENT
Start: 2022-12-28

## 2022-12-28 RX ORDER — SODIUM CHLORIDE 9 MG/ML
5-40 INJECTION INTRAVENOUS PRN
Status: CANCELLED | OUTPATIENT
Start: 2022-12-29

## 2022-12-28 RX ORDER — ACETAMINOPHEN 325 MG/1
650 TABLET ORAL
Status: CANCELLED | OUTPATIENT
Start: 2022-12-30

## 2022-12-28 RX ORDER — SODIUM CHLORIDE 0.9 % (FLUSH) 0.9 %
5-40 SYRINGE (ML) INJECTION PRN
Status: DISCONTINUED | OUTPATIENT
Start: 2022-12-28 | End: 2022-12-29 | Stop reason: HOSPADM

## 2022-12-28 RX ORDER — ALBUTEROL SULFATE 90 UG/1
4 AEROSOL, METERED RESPIRATORY (INHALATION) PRN
Status: CANCELLED | OUTPATIENT
Start: 2022-12-30

## 2022-12-28 RX ORDER — EPINEPHRINE 1 MG/ML
0.3 INJECTION, SOLUTION, CONCENTRATE INTRAVENOUS PRN
Status: CANCELLED | OUTPATIENT
Start: 2022-12-29

## 2022-12-28 RX ORDER — EPINEPHRINE 1 MG/ML
0.3 INJECTION, SOLUTION, CONCENTRATE INTRAVENOUS PRN
Status: CANCELLED | OUTPATIENT
Start: 2022-12-28

## 2022-12-28 RX ORDER — MEPERIDINE HYDROCHLORIDE 50 MG/ML
12.5 INJECTION INTRAMUSCULAR; INTRAVENOUS; SUBCUTANEOUS PRN
Status: CANCELLED | OUTPATIENT
Start: 2022-12-29

## 2022-12-28 RX ADMIN — HEPARIN 500 UNITS: 100 SYRINGE at 16:38

## 2022-12-28 RX ADMIN — SODIUM CHLORIDE, PRESERVATIVE FREE 20 ML: 5 INJECTION INTRAVENOUS at 08:06

## 2022-12-28 RX ADMIN — PALONOSETRON 0.25 MG: 0.05 INJECTION, SOLUTION INTRAVENOUS at 09:02

## 2022-12-28 RX ADMIN — SODIUM CHLORIDE 174 MG: 0.9 INJECTION, SOLUTION INTRAVENOUS at 14:27

## 2022-12-28 RX ADMIN — FOSAPREPITANT 150 MG: 150 INJECTION, POWDER, LYOPHILIZED, FOR SOLUTION INTRAVENOUS at 10:30

## 2022-12-28 RX ADMIN — SODIUM CHLORIDE, PRESERVATIVE FREE 10 ML: 5 INJECTION INTRAVENOUS at 08:05

## 2022-12-28 RX ADMIN — SODIUM CHLORIDE 131 MG: 9 INJECTION, SOLUTION INTRAVENOUS at 11:10

## 2022-12-28 RX ADMIN — SODIUM CHLORIDE 20 ML/HR: 9 INJECTION, SOLUTION INTRAVENOUS at 09:02

## 2022-12-28 RX ADMIN — POTASSIUM CHLORIDE: 2 INJECTION, SOLUTION, CONCENTRATE INTRAVENOUS at 09:04

## 2022-12-28 RX ADMIN — SODIUM CHLORIDE, PRESERVATIVE FREE 10 ML: 5 INJECTION INTRAVENOUS at 09:00

## 2022-12-28 RX ADMIN — POTASSIUM CHLORIDE: 2 INJECTION, SOLUTION, CONCENTRATE INTRAVENOUS at 15:32

## 2022-12-28 RX ADMIN — DEXAMETHASONE SODIUM PHOSPHATE 12 MG: 4 INJECTION, SOLUTION INTRA-ARTICULAR; INTRALESIONAL; INTRAMUSCULAR; INTRAVENOUS; SOFT TISSUE at 10:10

## 2022-12-28 RX ADMIN — SODIUM CHLORIDE, PRESERVATIVE FREE 10 ML: 5 INJECTION INTRAVENOUS at 16:38

## 2022-12-28 NOTE — PLAN OF CARE
Problem: Safety - Adult  Goal: Free from fall injury  Outcome: Progressing  Flowsheets (Taken 12/28/2022 0912)  Free From Fall Injury: Instruct family/caregiver on patient safety  Note: No falls occurred with visit today. Verbalized understanding of fall prevention to ask for assistance with ambulation. Call light within reach. Problem: Discharge Planning  Goal: Discharge to home or other facility with appropriate resources  Outcome: Progressing  Flowsheets (Taken 12/28/2022 0912)  Discharge to home or other facility with appropriate resources: Identify barriers to discharge with patient and caregiver  Note: Discuss understanding of discharge instructions,follow-up appointments, and when to call the physician. Problem: Infection - Adult  Goal: Absence of infection at discharge  Outcome: Progressing  Flowsheets (Taken 12/28/2022 0912)  Absence of infection at discharge:   Assess and monitor for signs and symptoms of infection   Monitor all insertion sites i.e., indwelling lines, tubes and drains  Note: Mediport site with no redness or warmth. Skin over port site intact with no signs of breakdown noted. Patient verbalizes signs/symptoms of port infection and when to notify the physician.    Goal: Absence of fever/infection during anticipated neutropenic period  Outcome: Progressing     Problem: Chronic Conditions and Co-morbidities  Goal: Patient's chronic conditions and co-morbidity symptoms are monitored and maintained or improved  Outcome: Progressing  Flowsheets (Taken 12/28/2022 0912)  Care Plan - Patient's Chronic Conditions and Co-Morbidity Symptoms are Monitored and Maintained or Improved: Monitor and assess patient's chronic conditions and comorbid symptoms for stability, deterioration, or improvement  Note: Chemotherapy Teaching     What is Chemotherapy   Drug action [x]   Method of Administration [x]   Handouts given []     Side Effects  Nausea/vomiting [x]   Diarrhea [x]   Fatigue [x]   Signs / Symptoms of infection [x]   Neutropenia [x]   Thrombocytopenia [x]   Alopecia [x]   neuropathy [x]   [place] diet &  the importance of fluids [x]       Micellaneous  Importance of nutrition [x]   Importance of oral hygiene [x]   When to call the MD [x]   Monitoring labs [x]   Use of supportive services []     Explanation of Drug Regimen / Frequency  Cisplatin/VP16     Comments  Verbalized understanding to drug,action,side effects and when to call MD       Care plan reviewed with patient and spouse. Patient and spouse verbalize understanding of the plan of care and contribute to goal setting.

## 2022-12-28 NOTE — PROGRESS NOTES
Patient assessed for the following post chemotherapy:    Dizziness   No  Lightheadedness  No      Acute nausea/vomiting No  Headache   No  Chest pain/pressure  No  Rash/itching   No  Shortness of breath  No    Patient kept for 20 minutes observation post infusion chemotherapy. Patient tolerated chemotherapy treatment cisplatin/VP16 without any complications. Last vital signs:   /60   Pulse 65   Temp 97.7 °F (36.5 °C) (Oral)   Resp 18   Ht 5' 9\" (1.753 m)   Wt 131 lb 6.4 oz (59.6 kg)   SpO2 98%   BMI 19.40 kg/m²         Patient instructed if experience any of the above symptoms following today's infusion,he/she is to notify MD immediately or go to the emergency department. Discharge instructions given to patient. Verbalizes understanding. Ambulated off unit per self with belongings.

## 2022-12-28 NOTE — DISCHARGE INSTRUCTIONS
Reviewed labs and recent medical history. Okay for treatment today, 12/28/22  Return tomorrow and Friday for treatments. Continue to drink fluids with electrolytes (gatorade/powerade)  Recheck CBC on 1/10/23  Return to see MD in 28 days        Please contact your Oncologist if you have any questions regarding the chemotherapy Cisplatin/VP16 that you received today. Patient instructed if experience any of the symptoms following today's chemotherapy / to notify MD immediately or go to emergency department.     * dizziness/lightheadedness  *acute nausea/vomiting - not relieved with medication  *headache - not relieved from Tylenol/pain medication  *chest pain/pressure  *rash/itching  *shortness of breath        Drink fluids - 48oz fluids daily  Call if develop fever/ chills/ signs or symptoms of infection

## 2022-12-28 NOTE — PROGRESS NOTES
[address] [facility]  [address] [address]  Dept: [phone]  Loc: Shantanu Larose 1945 [address] [facility]  1955   No ref. provider found   Diane Beckett MD       Aurea Tobar is a 69 y.o.  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  59-year-old male with 6 months of weight loss. He went to the dentist and had some bad teeth. He was told that he had to have these pulled. He was nervous about this and was not eating and then felt that he had an infection that was spreading through his body. He began having stomach pain and was treated for ulcers but this did not cause any improvement. Ultrasound was performed and was abnormal leading to a CT scan. Had decreased his beer intake from 12 pack of beer a day to 2. Felt restless, agitated, anxious. 8/25/2022. CAT scan of the abdomen performed locally showed a mass in the body of the pancreas 5 x 5.5 x 4.8 cm with involvement of the celiac axis and encasement of the superior mesenteric artery, narrows the celiac trunk and infiltrates the root of the mesentery. There were numerous enlarged peripancreatic and mesenteric lymph nodes measuring up to 14 mm. .  Labs in July were normal.  Just returned from a cruise to the [place]. Had noted dark urine for a week and appeared to be slightly jaundiced. 9/19/2022. Bilirubin was 4.5 with ALT of 136 and AST of 148. CA 19-9 was 321. Sugar was 111. CBC showed a white count 6.6 with a hemoglobin of 14 hematocrit 42 and a platelet count of 426,410.    9/19/2022. Endoscopy was performed. Endosonographic findings showed diffuse dilatation of the intrahepatic bile ducts.   There is an irregular mass in the genu of the pancreas and pancreatic body measuring approximately 4.5 x 3.8 cm. There was sonographic evidence of invasion into the superior mesenteric artery and a few abnormal lymph nodes were visualized in the peripancreatic region. Fine-needle aspiration of mass of the body of the pancreas at [facility] showed high-grade neuroendocrine carcinoma favoring small cell carcinoma. 9/19/2022. ERCP. There is no evidence of esophageal varices or gastric varices. There is stenosis in the main bile duct in the middle third. Sphincterotomy was performed. An uncovered metal stent was placed and he was discharged home. 9/21/2022. Patient followed up with his primary care physician Dr. Larnell Riedel. Patient said that he felt better but was continuing to lose weight.    9/28/2022. PET scan showed that there was an FDG avid pancreatic mass highly suspicious for malignancy that was better seen on recent CT scan. Maximum SUV was 5.7 with areas of photopenia in the mass associated with hypoattenuation likely secondary to necrosis. The urinary bladder was markedly distended and there was bilateral hydroureter. Prostate was enlarged with calcifications. There is no evidence of mesenteric retroperitoneal pelvic or inguinal lymphadenopathy that was FDG avid. Degenerative changes were seen in the lumbar spine and pelvis without evidence of hypermetabolic avidity    Comorbidities include hypertension,hypothyroidism, anxiety, alcohol use disorder, nicotine use disorder. The patient said that he used to weigh 197 pounds and now he weighs 137. He says that he feels cold all of the time. 10/4/2022. Initial clinical nurse oncology navigation. Encouraged physical activity, smoking cessation, minimization of alcohol take, consideration of being seen back at [facility] for their input. 10/14/2022. Consultation with Dr. Estrella Griffith radiation oncology.   Collaborated with Dr. Estrella Griffith who wants to give radiation and chemotherapy concurrently à la small cell carcinoma of the lung protocols. 10/ 26/ 2022. Chemotherapy teaching. 10/31/2022. Port placed by Dr. Gabriela Lopez. 11/1/2022. Mr. Natanael Ornelas is here today to begin his chemotherapy. He is quite flat in his affect. His wife says that she has been present at all of the interactions and has been taking in the information which he refuses to review. Today we had a long discussion concerning the side effects of his chemotherapy that will be started today as well as his radiation therapy. He verbalized understanding and was engaged in the conversation although distant in his interaction. She has been quite tearful today. They know that they must call for any complications, questions or concerns. 11/29/2022. Mr. Natanael Ornelas has been doing much better since his treatment has been ongoing. He had significant drop in his counts from his chemotherapy but he had no problems with infection, bleeding or significant symptoms from his anemia. His treatments were held while his counts recovered. His lowest white count was 1.2. He gets bursts of energy and does a lot of work and then is quite tired. He has had diarrhea off and on. He has had no significant nausea and vomiting. He denies fevers, chills, sweats and denies pain. He broke off a tooth and is interested in going to the dentist.  He was educated that he needs to have his counts checked before any dental work is performed. 12/20/2022. He returns to the office with his wife to consider further chemotherapy. He has just completed his radiation. His counts are borderline low and his creatinine is elevated at 1.3 which is higher than his baseline of 0.9. He has lost about 4 pounds and currently weighs 129 pounds at 5 feet 9 inches tall. During his last cycle he started out with a white count of 6000 and he became quite neutropenic but did not become febrile.   For all of these reasons he needs another week off to recover before we continue his chemotherapy. He continues to have bursts of energy and then extremely fatigued. He denies any fevers, chills, sweats, bleeding, nausea, vomiting, constipation and diarrhea. Today is his birthday. His appetite is getting slightly better. INTERVAL NOTE    2022. Mr. 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 He is due to receive his chemotherapy this week. He has trouble to keep his weight on. On 1215 he weighed 134  Weighs 131. He is 5 feet 9 inches tall. 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 Denies significant pain. He has had no fevers, chills, sweats, bleeding, nausea or vomiting constipation or diarrhea. He says that his appetite is getting better. His counts are adequate for treatment today. MONITORING PARAMETERS    Physical examinations, CAT scans and PET scans. PAST MEDICAL HISTORY  Past Medical History:   Diagnosis Date    Hypertension     Hypothyroidism     Malignant neoplasm of pancreas, unspecified location of malignancy ([facility] [address] 2022        REVIEW OF SYSTEMS  Review of Systems   Constitutional:  Positive for appetite change and fatigue. Negative for chills, diaphoresis, fever and unexpected weight change. HENT:  Negative for hearing loss, mouth sores, rhinorrhea, sore throat and trouble swallowing. Eyes:  Negative for visual disturbance. Respiratory:  Negative for shortness of breath. Cardiovascular:  Negative for leg swelling. Gastrointestinal:  Negative for abdominal pain, constipation, diarrhea, nausea and vomiting. Genitourinary:  Negative for dysuria, hematuria and urgency. Musculoskeletal:  Positive for myalgias (generalized). Negative for back pain. Skin:  Positive for pallor. Neurological:  Positive for weakness. Negative for tremors, syncope, numbness and headaches. Hematological:  Negative for adenopathy.  Does not bruise/bleed easily. Psychiatric/Behavioral:  Positive for sleep disturbance (unchanged). Negative for self-injury and suicidal ideas. The patient is not nervous/anxious. FAMILY HISTORY  Family History   Problem Relation Age of Onset    High Blood Pressure Mother     Lung Cancer Father         SOCIAL HISTORY  Social History     Socioeconomic History    Marital status:      Spouse name: Reno Ruvalcaba    Number of children: 3    Years of education: Not on file    Highest education level: Not on file   Occupational History    Not on file   Tobacco Use    Smoking status: Every Day     Types: Cigars     Start date: 1/1/2012     Passive exposure: Past (Dad smoked)    Smokeless tobacco: Never    Tobacco comments:     -3-4 cigars per day. denies cessation counseling 11/1/22.  Declines counseling 12/28   Vaping Use    Vaping Use: Never used   Substance and Sexual Activity    Alcohol use: Yes     Comment: 2-3 beers per day since 2022   Previous drank 8-12    Drug use: No    Sexual activity: Not on file   Other Topics Concern    Not on file   Social History Narrative    Not on file     Social Determinants of Health     Financial Resource Strain: Not on file   Food Insecurity: Not on file   Transportation Needs: Not on file   Physical Activity: Inactive    Days of Exercise per Week: 0 days    Minutes of Exercise per Session: 0 min   Stress: Not on file   Social Connections: Not on file   Intimate Partner Violence: Not on file   Housing Stability: Not on file        CURRENT MEDICATIONS  Current Outpatient Medications   Medication Sig Dispense Refill    sucralfate (CARAFATE) 1 GM tablet Take 1 tablet by mouth in the morning, at noon, in the evening, and at bedtime      losartan (COZAAR) 100 MG tablet TAKE 1 TABLET DAILY 90 tablet 1    ondansetron (ZOFRAN) 4 MG tablet Take 1 tablet by mouth daily as needed for Nausea or Vomiting 30 tablet 0    prochlorperazine (COMPAZINE) 10 MG tablet Take 1 tablet by mouth every 6 hours as needed (for nausea and vomiting) 60 tablet 3    lidocaine-prilocaine (EMLA) 2.5-2.5 % cream Apply topically as needed. 5 g 3    levothyroxine (SYNTHROID) 200 MCG tablet take 1 tablet by mouth once daily 30 tablet 1    pantoprazole (PROTONIX) 40 MG tablet Take 1 tablet by mouth every morning (before breakfast) 90 tablet 1    dilTIAZem (DILACOR XR) 240 MG extended release capsule TAKE 1 CAPSULE DAILY 90 capsule 3    Multiple Vitamin (MULTIVITAMIN PO) Take 1 capsule by mouth daily. aspirin 81 MG EC tablet Take 81 mg by mouth daily. No current facility-administered medications for this visit. Facility-Administered Medications Ordered in Other Visits   Medication Dose Route Frequency Provider Last Rate Last Admin    sodium chloride flush 0.9 % injection 5-40 mL  5-40 mL IntraVENous PRN Juanita Morales MD   20 mL at 12/28/22 0806    heparin flush 100 UNIT/ML injection 500 Units  500 Units IntraCATHeter PRN Juanita Morales MD        alteplase (CATHFLO) 2 mg in sterile water 2 mL injection  2 mg IntraCATHeter PRN Juanita Morales MD            OARRS    PDMP Monitoring:    Last PDMP Viri Carroll as Reviewed [facility]):  Review User Review Instant Review Result   Caleb Andrew 10/5/2022 12:45 AM Reviewed PDMP [1]       Urine Drug Screenings (1 yr)    No resulted procedures found. Medication Contract and Consent for Opioid Use Documents Filed        No documents found                     PHYSICAL EXAM    Physical Exam  Vitals and nursing note reviewed. Exam conducted with a chaperone present. Constitutional:       General: He is not in acute distress. Appearance: He is ill-appearing. He is not toxic-appearing or diaphoretic. Comments: Mr. Bridgett Esteban is a extremely thin  male who is tired today. He is in no acute distress. He is here with his wife. HENT:      Head: Normocephalic and atraumatic. Comments: Marked bilateral temporal wasting.      Mouth/Throat:      Mouth: Mucous membranes are moist.      Pharynx: Oropharynx is clear. No oropharyngeal exudate or posterior oropharyngeal erythema. Eyes:      General: No scleral icterus. Extraocular Movements: Extraocular movements intact. Pupils: Pupils are equal, round, and reactive to light. Cardiovascular:      Rate and Rhythm: Normal rate and regular rhythm. Heart sounds: Normal heart sounds. Pulmonary:      Effort: Pulmonary effort is normal. No respiratory distress. Breath sounds: Normal breath sounds. No stridor. No wheezing, rhonchi or rales. Chest:      Chest wall: No tenderness. Abdominal:      General: There is distension. Palpations: Abdomen is soft. There is no mass. Tenderness: There is no abdominal tenderness. There is no guarding or rebound. Musculoskeletal:      Cervical back: Normal range of motion and neck supple. No rigidity or tenderness. Right lower leg: No edema. Left lower leg: No edema. Lymphadenopathy:      Cervical: No cervical adenopathy. Skin:     General: Skin is warm and dry. Coloration: Skin is pale. Skin is not jaundiced. Neurological:      General: No focal deficit present. Mental Status: He is alert and oriented to person, place, and time. Cranial Nerves: No cranial nerve deficit. Motor: No weakness. Gait: Gait normal.   Psychiatric:         Mood and Affect: Mood normal.         Behavior: Behavior normal.         Thought Content: Thought content normal.         Judgment: Judgment normal.      Comments: Tired today        ECOG STATUS    1:  Restricted in physically strenuous activity but ambulatory and able to carry out work of a light or sedentary nature, e.g., light house work, office work    LABS/IMAGING    BMP shows a sodium of 135 BUN is 19 creatinine is 1.2. Sugar was 140. Total protein was 6.1 with an albumin of 2.7. Alkaline phosphatase is 138.   Transaminases are normal.  CBC shows white count of 11.6 with a hemoglobin of 9.1 hematocrit 26.8 and platelet count of 598,864. White blood cell differential count showed 83% polys, 5 lymphs, 10 monos, 1 basophil      PATHOLOGY/GENETICS    Neuroendocrine cancer of the pancreas    ASSESSMENT and PLAN    1. Neuroendocrine cancer of the pancreas. He was okay for his third cycle of chemotherapy today. 2.  Profound weight loss. Today he weighs 131. Once again he was encouraged to eat plenty of lean protein. 3.  History of hypertension. Blood pressure today is 101/62. We will continue to monitor. 4.  GERD. He will continue on his Protonix. The patient and his wife know to call if he has any change in his status, questions or concerns.         Annamaria Crisostomo MD 12/28/2022 8:47 AM

## 2022-12-28 NOTE — PROGRESS NOTES
Name: Pushpa Mosher  : 1955  MRN: [id number]    Oncology Navigation Follow-Up Note    Contact Type:  Medical Oncology    Notes:   Met with Megha Card and his wife, Torito Mehta, today during his Cycle #3, Day 1 treatment for his pancreatic cancer. Counts good for treatment. Megha Card feels very fatigued-busy Allan and over worked himself at [employer]. Going to take it easy for rest of year as he was really wiped out for 3 days. To return 1/10 for labs as he tends to be neutropenic after treatments. Will return in 3 weeks for follow up. They know to call with any questions or concerns.     Electronically signed by Chencho Huff RN on 2022 at 3:39 PM

## 2022-12-28 NOTE — PATIENT INSTRUCTIONS
Reviewed labs and recent medical history. Okay for treatment today, 12/28/22  Return tomorrow and Friday for treatments.   Continue to drink fluids with electrolytes (gatorade/powerade)  Recheck CBC on 1/10/23  Return to see MD in 28 days

## 2022-12-29 ENCOUNTER — HOSPITAL ENCOUNTER (OUTPATIENT)
Dept: INFUSION THERAPY | Age: 67
Discharge: HOME | End: 2022-12-29
Payer: MEDICARE

## 2022-12-29 VITALS
HEART RATE: 60 BPM | DIASTOLIC BLOOD PRESSURE: 56 MMHG | RESPIRATION RATE: 18 BRPM | TEMPERATURE: 97.6 F | OXYGEN SATURATION: 100 % | SYSTOLIC BLOOD PRESSURE: 102 MMHG

## 2022-12-29 DIAGNOSIS — C25.1 MALIGNANT NEOPLASM OF BODY OF PANCREAS (HCC): Primary | ICD-10-CM

## 2022-12-29 PROCEDURE — 2580000003 HC RX 258: Performed by: INTERNAL MEDICINE

## 2022-12-29 PROCEDURE — 96413 CHEMO IV INFUSION 1 HR: CPT

## 2022-12-29 PROCEDURE — 6360000002 HC RX W HCPCS: Performed by: INTERNAL MEDICINE

## 2022-12-29 PROCEDURE — 96375 TX/PRO/DX INJ NEW DRUG ADDON: CPT

## 2022-12-29 RX ORDER — SODIUM CHLORIDE 0.9 % (FLUSH) 0.9 %
5-40 SYRINGE (ML) INJECTION PRN
Status: DISCONTINUED | OUTPATIENT
Start: 2022-12-29 | End: 2022-12-30 | Stop reason: HOSPADM

## 2022-12-29 RX ORDER — HEPARIN SODIUM (PORCINE) LOCK FLUSH IV SOLN 100 UNIT/ML 100 UNIT/ML
500 SOLUTION INTRAVENOUS PRN
Status: DISCONTINUED | OUTPATIENT
Start: 2022-12-29 | End: 2022-12-30 | Stop reason: HOSPADM

## 2022-12-29 RX ORDER — SODIUM CHLORIDE 9 MG/ML
5-250 INJECTION, SOLUTION INTRAVENOUS PRN
Status: DISCONTINUED | OUTPATIENT
Start: 2022-12-29 | End: 2022-12-30 | Stop reason: HOSPADM

## 2022-12-29 RX ORDER — DEXAMETHASONE SODIUM PHOSPHATE 4 MG/ML
8 INJECTION, SOLUTION INTRA-ARTICULAR; INTRALESIONAL; INTRAMUSCULAR; INTRAVENOUS; SOFT TISSUE ONCE
Status: COMPLETED | OUTPATIENT
Start: 2022-12-29 | End: 2022-12-29

## 2022-12-29 RX ADMIN — HEPARIN 500 UNITS: 100 SYRINGE at 14:49

## 2022-12-29 RX ADMIN — SODIUM CHLORIDE 20 ML/HR: 9 INJECTION, SOLUTION INTRAVENOUS at 13:32

## 2022-12-29 RX ADMIN — SODIUM CHLORIDE, PRESERVATIVE FREE 10 ML: 5 INJECTION INTRAVENOUS at 14:49

## 2022-12-29 RX ADMIN — DEXAMETHASONE SODIUM PHOSPHATE 8 MG: 4 INJECTION, SOLUTION INTRAMUSCULAR; INTRAVENOUS at 13:33

## 2022-12-29 RX ADMIN — SODIUM CHLORIDE 174 MG: 9 INJECTION, SOLUTION INTRAVENOUS at 13:37

## 2022-12-29 RX ADMIN — SODIUM CHLORIDE, PRESERVATIVE FREE 10 ML: 5 INJECTION INTRAVENOUS at 13:31

## 2022-12-29 NOTE — PROGRESS NOTES
Patient assessed for the following post chemotherapy:    Dizziness   No  Lightheadedness  No      Acute nausea/vomiting No  Headache   No  Chest pain/pressure  No  Rash/itching   No  Shortness of breath  No    Patient declined for 20 minutes observation post infusion chemotherapy. Patient tolerated chemotherapy treatment VP16 without any complications. Last vital signs:   BP (!) 102/56   Pulse 60   Temp 97.6 °F (36.4 °C) (Oral)   Resp 18   SpO2 100%         Patient instructed if experience any of the above symptoms following today's infusion,he/she is to notify MD immediately or go to the emergency department. Discharge instructions given to patient. Verbalizes understanding. Ambulated off unit per self with belongings.

## 2022-12-29 NOTE — PROGRESS NOTES
[facility] [address] [facility], [address]  Ph. [phone]  Fax [phone]    PATIENT: Addie Abbasi  : 1955  MRN: [id number]  DATE: 2022      DIAGNOSIS: C25 -- Malignant neoplasm of the pancreas; High grade neuroendocrine carcinoma (favor small cell); cT4 N2 M0, Stage III  Date of diagnosis: 2022      Treatment Course Number: 1    Treatment Site (s) Modality Dose (cGy) From To Fractions/  Elapsed Days   Pancreas Mass + Nodes 6MV Photons 4500 cGy 2022   Boost: Pancreas Mass + Clinically Involved Nodes  6MV Photons 540 cGy 2022 12/15/2022 3/ 2     Cumulative Dose to Pancreas Mass + Clinically Involved Nodes: 5040 cGy  Treatment Modifiers: Intensity Modulated Radiation Therapy/Volume Modulated Arc Therapy with Rapid Arc; Custom MLC Blocking; Custom VacFix Immobilization; Daily Cone Beam CT Guidance; Concurrent Chemotherapy      HISTORY OF PRESENT ILLNESS:   Nona Lozano initially presented to radiation oncology as a very nice and active 68-year-old gentleman Who had insidious onset of symptoms including unexplained weight loss, epigastric discomfort and abdominal bloating. His work-up included laboratory studies which were unremarkable and a CT of the abdomen and pelvis. The CT scan (see below) returned markedly abnormal showing a mass in the body of the pancreas encasing celiac and superior mesenteric arteries. Eric King was referred to [facility] for further evaluation. He underwent fine-needle aspiration in 2022 with the pathology unexpectedly returning showing high-grade neuroendocrine carcinoma favoring small cell carcinoma. Eric King underwent stent placement and was advised that the treatment recommendation was for initial chemotherapy and radiation therapy followed by response assessment.   Eric King wishes to undergo his treatment closer to his home. Staging PET scan was obtained and does not show any evidence of hematogenous metastasis (staging MRI scan of the brain is pending). Tamara Gatica was seen by Dr. Staci Bowles in medical oncology regarding systemic therapy. He was seen 10/14/2022 for evaluation and discussion regarding the role of radiation therapy in the management of his high-grade neuroendocrine carcinoma of the pancreas. After reviewing information including case reviews in the medical literature, he received a recommendation for concurrent chemoradiotherapy. Following review of the specifics of radiation therapy treatment, he was agreeable to proceeding. Pain Ratin/10  ECOG Performance Status: 0      Treatment Tolerance/Response:   Tamara Abbasi tolerated his radiation therapy very well without any obvious side effects. Tamara Gatica maintained good PO intake throughout treatment, lost a little weight, but started gaining weight at completion of treatment. Tamara Gatica did not have any unexpected side effects during the treatment course. Systemic Therapy: Concurrent chemotherapy (Carboplatin/Etoposide)      Follow Up Plan:   Tamara Gatica received post-treatment instructions regarding skin care, recommended activity level and fluid/nutritional needs. He is scheduled to return for checkup in a month with repeat CT of the abdomen/pelvis prior to appointment. As usual, he was instructed to call and arrange for an earlier appointment if needed for any problems, questions or concerns. Tamara Gatica will continue care in medical oncology and with all other physicians/providers. Dedra Serrato, PhD, MD  Radiation Oncology      CC:  Alex Blankenship; Dedrick Alexandra   ACC: Tumor Registry: [id number]      This document was created using a voice-recognition program.  Computer generated transcription errors may be present.

## 2022-12-29 NOTE — DISCHARGE INSTRUCTIONS
Please contact your Oncologist if you have any questions regarding the chemotherapy VP16 that you received today. Patient instructed if experience any of the symptoms following today's chemotherapy / to notify MD immediately or go to emergency department.     * dizziness/lightheadedness  *acute nausea/vomiting - not relieved with medication  *headache - not relieved from Tylenol/pain medication  *chest pain/pressure  *rash/itching  *shortness of breath        Drink fluids - 48oz fluids daily  Call if develop fever/ chills/ signs or symptoms of infection

## 2022-12-29 NOTE — PLAN OF CARE
Problem: Safety - Adult  Goal: Free from fall injury  Outcome: Adequate for Discharge  Flowsheets (Taken 12/29/2022 1602)  Free From Fall Injury: Instruct family/caregiver on patient safety     Problem: Discharge Planning  Goal: Discharge to home or other facility with appropriate resources  Outcome: Adequate for Discharge  Flowsheets (Taken 12/29/2022 1602)  Discharge to home or other facility with appropriate resources: Identify barriers to discharge with patient and caregiver     Problem: Chronic Conditions and Co-morbidities  Goal: Patient's chronic conditions and co-morbidity symptoms are monitored and maintained or improved  Outcome: Adequate for Discharge  Flowsheets (Taken 12/29/2022 1602)  Care Plan - Patient's Chronic Conditions and Co-Morbidity Symptoms are Monitored and Maintained or Improved: Collaborate with multidisciplinary team to address chronic and comorbid conditions and prevent exacerbation or deterioration  Note: Chemotherapy Teaching     What is Chemotherapy   Drug action [x]   Method of Administration [x]   Handouts given []     Side Effects  Nausea/vomiting [x]   Diarrhea [x]   Fatigue [x]   Signs / Symptoms of infection [x]   Neutropenia [x]   Thrombocytopenia [x]   Alopecia [x]   neuropathy [x]   [place] diet &  the importance of fluids [x]       Micellaneous  Importance of nutrition [x]   Importance of oral hygiene [x]   When to call the MD [x]   Monitoring labs [x]   Use of supportive services []     Explanation of Drug Regimen / Frequency  Etoposide     Comments  Verbalized understanding to drug,action,side effects and when to call MD         Problem: Infection - Adult  Goal: Absence of infection at discharge  Outcome: Adequate for Discharge  Flowsheets (Taken 12/29/2022 1602)  Absence of infection at discharge: Monitor all insertion sites i.e., indwelling lines, tubes and drains  Note: Mediport site with no redness or warmth. Skin over port site intact with no signs of breakdown noted. Patient verbalizes signs/symptoms of port infection and when to notify the physician. Goal: Absence of fever/infection during anticipated neutropenic period  Outcome: Adequate for Discharge     Care plan reviewed with patient and spouse. Patient and spouse verbalize understanding of the plan of care and contribute to goal setting.

## 2022-12-30 ENCOUNTER — HOSPITAL ENCOUNTER (OUTPATIENT)
Dept: INFUSION THERAPY | Age: 67
Discharge: HOME | End: 2022-12-30
Payer: MEDICARE

## 2022-12-30 VITALS
DIASTOLIC BLOOD PRESSURE: 67 MMHG | SYSTOLIC BLOOD PRESSURE: 130 MMHG | TEMPERATURE: 97.5 F | OXYGEN SATURATION: 100 % | RESPIRATION RATE: 18 BRPM | HEART RATE: 51 BPM

## 2022-12-30 DIAGNOSIS — C25.1 MALIGNANT NEOPLASM OF BODY OF PANCREAS (HCC): Primary | ICD-10-CM

## 2022-12-30 PROCEDURE — 2580000003 HC RX 258: Performed by: INTERNAL MEDICINE

## 2022-12-30 PROCEDURE — 6360000002 HC RX W HCPCS: Performed by: INTERNAL MEDICINE

## 2022-12-30 PROCEDURE — 96413 CHEMO IV INFUSION 1 HR: CPT

## 2022-12-30 PROCEDURE — 96375 TX/PRO/DX INJ NEW DRUG ADDON: CPT

## 2022-12-30 RX ORDER — SODIUM CHLORIDE 9 MG/ML
5-250 INJECTION, SOLUTION INTRAVENOUS PRN
Status: DISCONTINUED | OUTPATIENT
Start: 2022-12-30 | End: 2022-12-31 | Stop reason: HOSPADM

## 2022-12-30 RX ORDER — DEXAMETHASONE SODIUM PHOSPHATE 4 MG/ML
8 INJECTION, SOLUTION INTRA-ARTICULAR; INTRALESIONAL; INTRAMUSCULAR; INTRAVENOUS; SOFT TISSUE ONCE
Status: COMPLETED | OUTPATIENT
Start: 2022-12-30 | End: 2022-12-30

## 2022-12-30 RX ORDER — SODIUM CHLORIDE 0.9 % (FLUSH) 0.9 %
5-40 SYRINGE (ML) INJECTION PRN
OUTPATIENT
Start: 2022-12-30

## 2022-12-30 RX ORDER — SODIUM CHLORIDE 0.9 % (FLUSH) 0.9 %
5-40 SYRINGE (ML) INJECTION PRN
Status: DISCONTINUED | OUTPATIENT
Start: 2022-12-30 | End: 2022-12-31 | Stop reason: HOSPADM

## 2022-12-30 RX ORDER — HEPARIN SODIUM (PORCINE) LOCK FLUSH IV SOLN 100 UNIT/ML 100 UNIT/ML
500 SOLUTION INTRAVENOUS PRN
OUTPATIENT
Start: 2022-12-30

## 2022-12-30 RX ORDER — HEPARIN SODIUM (PORCINE) LOCK FLUSH IV SOLN 100 UNIT/ML 100 UNIT/ML
500 SOLUTION INTRAVENOUS PRN
Status: DISCONTINUED | OUTPATIENT
Start: 2022-12-30 | End: 2022-12-31 | Stop reason: HOSPADM

## 2022-12-30 RX ORDER — SODIUM CHLORIDE 9 MG/ML
25 INJECTION, SOLUTION INTRAVENOUS PRN
OUTPATIENT
Start: 2022-12-30

## 2022-12-30 RX ADMIN — SODIUM CHLORIDE 20 ML/HR: 9 INJECTION, SOLUTION INTRAVENOUS at 11:47

## 2022-12-30 RX ADMIN — SODIUM CHLORIDE, PRESERVATIVE FREE 10 ML: 5 INJECTION INTRAVENOUS at 13:22

## 2022-12-30 RX ADMIN — HEPARIN 500 UNITS: 100 SYRINGE at 13:22

## 2022-12-30 RX ADMIN — DEXAMETHASONE SODIUM PHOSPHATE 8 MG: 4 INJECTION, SOLUTION INTRAMUSCULAR; INTRAVENOUS at 11:50

## 2022-12-30 RX ADMIN — SODIUM CHLORIDE 174 MG: 0.9 INJECTION, SOLUTION INTRAVENOUS at 12:00

## 2022-12-30 RX ADMIN — SODIUM CHLORIDE, PRESERVATIVE FREE 20 ML: 5 INJECTION INTRAVENOUS at 11:46

## 2022-12-30 NOTE — PLAN OF CARE
Problem: Safety - Adult  Goal: Free from fall injury  Outcome: Adequate for Discharge  Flowsheets (Taken 12/30/2022 1327)  Free From Fall Injury: Instruct family/caregiver on patient safety  Note: Patient verbalizes understanding of fall precautions. Patient free from falls this visit. Problem: Discharge Planning  Goal: Discharge to home or other facility with appropriate resources  Outcome: Adequate for Discharge  Flowsheets (Taken 12/30/2022 1327)  Discharge to home or other facility with appropriate resources: Identify barriers to discharge with patient and caregiver  Note: Verbalized understanding of discharge instructions, follow-up appointments, and when to call the physician. Problem: Infection - Adult  Goal: Absence of infection at discharge  Outcome: Adequate for Discharge  Flowsheets (Taken 12/30/2022 1327)  Absence of infection at discharge:   Assess and monitor for signs and symptoms of infection   Monitor all insertion sites i.e., indwelling lines, tubes and drains  Note: Mediport site with no redness or warmth. Skin over port intact with no signs of breakdown noted. Patient verbalizes signs/symptoms of port infection and when to notify the physician.    Goal: Absence of fever/infection during anticipated neutropenic period  Outcome: Adequate for Discharge     Chemotherapy Teaching     What is Chemotherapy   Drug action [x]   Method of Administration [x]   Handouts given []     Side Effects  Nausea/vomiting [x]   Diarrhea [x]   Fatigue [x]   Signs / Symptoms of infection [x]   Neutropenia [x]   Thrombocytopenia [x]   Alopecia [x]   neuropathy [x]   Wise diet &  the importance of fluids [x]       Micellaneous  Importance of nutrition [x]   Importance of oral hygiene [x]   When to call the MD [x]   Monitoring labs [x]   Use of supportive services []     Explanation of Drug Regimen / Frequency  etoposide     Comments  Verbalized understanding to drug,action,side effects and when to call MD Care plan reviewed with patient and family. Patient and family verbalize understanding of the plan of care and contribute to goal setting.

## 2022-12-30 NOTE — DISCHARGE INSTRUCTIONS
Please contact your Oncologist if you have any questions regarding the chemotherapy Etopside that you received today. Patient instructed if experience any of the symptoms following today's chemotherapy / to notify MD immediately or go to emergency department.     * dizziness/lightheadedness  *acute nausea/vomiting - not relieved with medication  *headache - not relieved from Tylenol/pain medication  *chest pain/pressure  *rash/itching  *shortness of breath        Drink fluids - 48oz fluids daily  Call if develop fever/ chills/ signs or symptoms of infection

## 2022-12-30 NOTE — DISCHARGE SUMMARY
Patient assessed for the following post chemotherapy:    Dizziness   No  Lightheadedness  No      Acute nausea/vomiting No  Headache   No  Chest pain/pressure  No  Rash/itching   No  Shortness of breath  No    Patient kept for 20 minutes observation post infusion chemotherapy. Patient tolerated chemotherapy treatment Etopside without any complications. Last vital signs:   BP (!) 141/62   Pulse 52   Temp 97.4 °F (36.3 °C) (Oral)   Resp 18   SpO2 100%     Patient instructed if experience any of the above symptoms following today's infusion,he/she is to notify MD immediately or go to the emergency department. Discharge instructions given to patient. Verbalizes understanding. Ambulated off unit per self with spouse with belongings.

## 2023-01-01 ENCOUNTER — APPOINTMENT (OUTPATIENT)
Dept: ULTRASOUND IMAGING | Age: 68
DRG: 871 | End: 2023-01-01
Payer: MEDICARE

## 2023-01-01 ENCOUNTER — HOSPITAL ENCOUNTER (INPATIENT)
Age: 68
LOS: 11 days | Discharge: HOSPICE | DRG: 871 | End: 2023-04-10
Attending: EMERGENCY MEDICINE | Admitting: STUDENT IN AN ORGANIZED HEALTH CARE EDUCATION/TRAINING PROGRAM
Payer: MEDICARE

## 2023-01-01 ENCOUNTER — APPOINTMENT (OUTPATIENT)
Dept: GENERAL RADIOLOGY | Age: 68
DRG: 871 | End: 2023-01-01
Payer: MEDICARE

## 2023-01-01 ENCOUNTER — APPOINTMENT (OUTPATIENT)
Dept: CT IMAGING | Age: 68
DRG: 871 | End: 2023-01-01
Payer: MEDICARE

## 2023-01-01 VITALS
WEIGHT: 134.04 LBS | SYSTOLIC BLOOD PRESSURE: 116 MMHG | DIASTOLIC BLOOD PRESSURE: 79 MMHG | RESPIRATION RATE: 18 BRPM | HEART RATE: 101 BPM | OXYGEN SATURATION: 100 % | TEMPERATURE: 97.5 F | HEIGHT: 69 IN | BODY MASS INDEX: 19.85 KG/M2

## 2023-01-01 DIAGNOSIS — I95.9 HYPOTENSION, UNSPECIFIED HYPOTENSION TYPE: ICD-10-CM

## 2023-01-01 DIAGNOSIS — N18.32 STAGE 3B CHRONIC KIDNEY DISEASE (HCC): ICD-10-CM

## 2023-01-01 DIAGNOSIS — Z51.5 END OF LIFE CARE: ICD-10-CM

## 2023-01-01 DIAGNOSIS — R55 SYNCOPE AND COLLAPSE: Primary | ICD-10-CM

## 2023-01-01 LAB
ALBUMIN SERPL BCG-MCNC: 1.9 G/DL (ref 3.5–5.1)
ALBUMIN SERPL BCG-MCNC: 2.1 G/DL (ref 3.5–5.1)
ALBUMIN SERPL BCG-MCNC: 2.1 G/DL (ref 3.5–5.1)
ALBUMIN SERPL BCG-MCNC: 2.5 G/DL (ref 3.5–5.1)
ALP SERPL-CCNC: 186 U/L (ref 38–126)
ALP SERPL-CCNC: 228 U/L (ref 38–126)
ALP SERPL-CCNC: 261 U/L (ref 38–126)
ALP SERPL-CCNC: 329 U/L (ref 38–126)
ALT SERPL W/O P-5'-P-CCNC: 15 U/L (ref 11–66)
ALT SERPL W/O P-5'-P-CCNC: 15 U/L (ref 11–66)
ALT SERPL W/O P-5'-P-CCNC: 20 U/L (ref 11–66)
ALT SERPL W/O P-5'-P-CCNC: 24 U/L (ref 11–66)
AMORPH SED URNS QL MICRO: ABNORMAL
ANION GAP SERPL CALC-SCNC: 10 MEQ/L (ref 8–16)
ANION GAP SERPL CALC-SCNC: 10 MEQ/L (ref 8–16)
ANION GAP SERPL CALC-SCNC: 11 MEQ/L (ref 8–16)
ANION GAP SERPL CALC-SCNC: 12 MEQ/L (ref 8–16)
ANION GAP SERPL CALC-SCNC: 13 MEQ/L (ref 8–16)
ANION GAP SERPL CALC-SCNC: 13 MEQ/L (ref 8–16)
APTT PPP: 28.3 SECONDS (ref 22–38)
AST SERPL-CCNC: 13 U/L (ref 5–40)
AST SERPL-CCNC: 18 U/L (ref 5–40)
AST SERPL-CCNC: 18 U/L (ref 5–40)
AST SERPL-CCNC: 24 U/L (ref 5–40)
BACTERIA BLD AEROBE CULT: NORMAL
BACTERIA BLD AEROBE CULT: NORMAL
BACTERIA UR CULT: ABNORMAL
BACTERIA URNS QL MICRO: ABNORMAL /HPF
BASOPHILS ABSOLUTE: 0 THOU/MM3 (ref 0–0.1)
BASOPHILS ABSOLUTE: 0.1 THOU/MM3 (ref 0–0.1)
BASOPHILS NFR BLD AUTO: 0.1 %
BASOPHILS NFR BLD AUTO: 0.2 %
BASOPHILS NFR BLD AUTO: 0.2 %
BASOPHILS NFR BLD AUTO: 0.4 %
BILIRUB CONJ SERPL-MCNC: 0.3 MG/DL (ref 0–0.3)
BILIRUB SERPL-MCNC: 0.5 MG/DL (ref 0.3–1.2)
BILIRUB SERPL-MCNC: 0.5 MG/DL (ref 0.3–1.2)
BILIRUB SERPL-MCNC: 0.6 MG/DL (ref 0.3–1.2)
BILIRUB SERPL-MCNC: 0.7 MG/DL (ref 0.3–1.2)
BILIRUB UR QL STRIP.AUTO: NEGATIVE
BUN SERPL-MCNC: 42 MG/DL (ref 7–22)
BUN SERPL-MCNC: 43 MG/DL (ref 7–22)
BUN SERPL-MCNC: 52 MG/DL (ref 7–22)
BUN SERPL-MCNC: 55 MG/DL (ref 7–22)
BUN SERPL-MCNC: 56 MG/DL (ref 7–22)
BUN SERPL-MCNC: 61 MG/DL (ref 7–22)
BUN SERPL-MCNC: 65 MG/DL (ref 7–22)
BUN SERPL-MCNC: 71 MG/DL (ref 7–22)
BUN SERPL-MCNC: 71 MG/DL (ref 7–22)
BURR CELLS BLD QL SMEAR: ABNORMAL
CALCIUM SERPL-MCNC: 7.6 MG/DL (ref 8.5–10.5)
CALCIUM SERPL-MCNC: 7.7 MG/DL (ref 8.5–10.5)
CALCIUM SERPL-MCNC: 8 MG/DL (ref 8.5–10.5)
CALCIUM SERPL-MCNC: 8.1 MG/DL (ref 8.5–10.5)
CALCIUM SERPL-MCNC: 8.1 MG/DL (ref 8.5–10.5)
CALCIUM SERPL-MCNC: 8.2 MG/DL (ref 8.5–10.5)
CASTS #/AREA URNS LPF: ABNORMAL /LPF
CASTS 2: ABNORMAL /LPF
CHARACTER UR: ABNORMAL
CHLORIDE 24H UR-SRATE: 31 MEQ/L
CHLORIDE SERPL-SCNC: 100 MEQ/L (ref 98–111)
CHLORIDE SERPL-SCNC: 100 MEQ/L (ref 98–111)
CHLORIDE SERPL-SCNC: 101 MEQ/L (ref 98–111)
CHLORIDE SERPL-SCNC: 101 MEQ/L (ref 98–111)
CHLORIDE SERPL-SCNC: 102 MEQ/L (ref 98–111)
CHLORIDE SERPL-SCNC: 102 MEQ/L (ref 98–111)
CHLORIDE SERPL-SCNC: 103 MEQ/L (ref 98–111)
CO2 SERPL-SCNC: 20 MEQ/L (ref 23–33)
CO2 SERPL-SCNC: 21 MEQ/L (ref 23–33)
CO2 SERPL-SCNC: 22 MEQ/L (ref 23–33)
COLOR: ABNORMAL
CREAT SERPL-MCNC: 2.1 MG/DL (ref 0.4–1.2)
CREAT SERPL-MCNC: 2.1 MG/DL (ref 0.4–1.2)
CREAT SERPL-MCNC: 2.6 MG/DL (ref 0.4–1.2)
CREAT SERPL-MCNC: 2.6 MG/DL (ref 0.4–1.2)
CREAT SERPL-MCNC: 2.8 MG/DL (ref 0.4–1.2)
CREAT SERPL-MCNC: 2.9 MG/DL (ref 0.4–1.2)
CREAT SERPL-MCNC: 3.2 MG/DL (ref 0.4–1.2)
CREAT SERPL-MCNC: 3.2 MG/DL (ref 0.4–1.2)
CREAT SERPL-MCNC: 3.3 MG/DL (ref 0.4–1.2)
CRYSTALS URNS MICRO: ABNORMAL
DEPRECATED RDW RBC AUTO: 51.8 FL (ref 35–45)
DEPRECATED RDW RBC AUTO: 52.2 FL (ref 35–45)
DEPRECATED RDW RBC AUTO: 52.7 FL (ref 35–45)
DEPRECATED RDW RBC AUTO: 53.6 FL (ref 35–45)
DEPRECATED RDW RBC AUTO: 54.2 FL (ref 35–45)
DEPRECATED RDW RBC AUTO: 54.5 FL (ref 35–45)
EKG ATRIAL RATE: 83 BPM
EKG P-R INTERVAL: 120 MS
EKG Q-T INTERVAL: 354 MS
EKG QRS DURATION: 88 MS
EKG QTC CALCULATION (BAZETT): 415 MS
EKG R AXIS: 73 DEGREES
EKG T AXIS: 73 DEGREES
EKG VENTRICULAR RATE: 83 BPM
EOSINOPHIL NFR BLD AUTO: 0 %
EOSINOPHILS ABSOLUTE: 0 THOU/MM3 (ref 0–0.4)
EPITHELIAL CELLS, UA: ABNORMAL /HPF
ERYTHROCYTE [DISTWIDTH] IN BLOOD BY AUTOMATED COUNT: 15.6 % (ref 11.5–14.5)
ERYTHROCYTE [DISTWIDTH] IN BLOOD BY AUTOMATED COUNT: 16 % (ref 11.5–14.5)
ERYTHROCYTE [DISTWIDTH] IN BLOOD BY AUTOMATED COUNT: 16.4 % (ref 11.5–14.5)
ERYTHROCYTE [DISTWIDTH] IN BLOOD BY AUTOMATED COUNT: 16.4 % (ref 11.5–14.5)
ERYTHROCYTE [DISTWIDTH] IN BLOOD BY AUTOMATED COUNT: 16.5 % (ref 11.5–14.5)
ERYTHROCYTE [DISTWIDTH] IN BLOOD BY AUTOMATED COUNT: 16.6 % (ref 11.5–14.5)
GFR SERPL CREATININE-BSD FRML MDRD: 19 ML/MIN/1.73M2
GFR SERPL CREATININE-BSD FRML MDRD: 20 ML/MIN/1.73M2
GFR SERPL CREATININE-BSD FRML MDRD: 20 ML/MIN/1.73M2
GFR SERPL CREATININE-BSD FRML MDRD: 23 ML/MIN/1.73M2
GFR SERPL CREATININE-BSD FRML MDRD: 24 ML/MIN/1.73M2
GFR SERPL CREATININE-BSD FRML MDRD: 26 ML/MIN/1.73M2
GFR SERPL CREATININE-BSD FRML MDRD: 26 ML/MIN/1.73M2
GFR SERPL CREATININE-BSD FRML MDRD: 33 ML/MIN/1.73M2
GFR SERPL CREATININE-BSD FRML MDRD: 33 ML/MIN/1.73M2
GLUCOSE SERPL-MCNC: 101 MG/DL (ref 70–108)
GLUCOSE SERPL-MCNC: 115 MG/DL (ref 70–108)
GLUCOSE SERPL-MCNC: 141 MG/DL (ref 70–108)
GLUCOSE SERPL-MCNC: 194 MG/DL (ref 70–108)
GLUCOSE SERPL-MCNC: 210 MG/DL (ref 70–108)
GLUCOSE SERPL-MCNC: 215 MG/DL (ref 70–108)
GLUCOSE SERPL-MCNC: 237 MG/DL (ref 70–108)
GLUCOSE SERPL-MCNC: 84 MG/DL (ref 70–108)
GLUCOSE SERPL-MCNC: 96 MG/DL (ref 70–108)
GLUCOSE UR QL STRIP.AUTO: NEGATIVE MG/DL
HCT VFR BLD AUTO: 25.8 % (ref 42–52)
HCT VFR BLD AUTO: 26.1 % (ref 42–52)
HCT VFR BLD AUTO: 27 % (ref 42–52)
HCT VFR BLD AUTO: 28 % (ref 42–52)
HCT VFR BLD AUTO: 28 % (ref 42–52)
HCT VFR BLD AUTO: 32.5 % (ref 42–52)
HGB BLD-MCNC: 8 GM/DL (ref 14–18)
HGB BLD-MCNC: 8.3 GM/DL (ref 14–18)
HGB BLD-MCNC: 8.5 GM/DL (ref 14–18)
HGB BLD-MCNC: 8.8 GM/DL (ref 14–18)
HGB BLD-MCNC: 8.9 GM/DL (ref 14–18)
HGB BLD-MCNC: 9.9 GM/DL (ref 14–18)
HGB UR QL STRIP.AUTO: ABNORMAL
IMM GRANULOCYTES # BLD AUTO: 0.04 THOU/MM3 (ref 0–0.07)
IMM GRANULOCYTES # BLD AUTO: 0.23 THOU/MM3 (ref 0–0.07)
IMM GRANULOCYTES # BLD AUTO: 0.33 THOU/MM3 (ref 0–0.07)
IMM GRANULOCYTES # BLD AUTO: 0.34 THOU/MM3 (ref 0–0.07)
IMM GRANULOCYTES NFR BLD AUTO: 0.5 %
IMM GRANULOCYTES NFR BLD AUTO: 1 %
IMM GRANULOCYTES NFR BLD AUTO: 1.3 %
IMM GRANULOCYTES NFR BLD AUTO: 1.7 %
INR PPP: 1.26 (ref 0.85–1.13)
KETONES UR QL STRIP.AUTO: ABNORMAL
LACTATE SERPL-SCNC: 1.7 MMOL/L (ref 0.5–2)
LACTIC ACID, SEPSIS: 3 MMOL/L (ref 0.5–1.9)
LYMPHOCYTES ABSOLUTE: 0.2 THOU/MM3 (ref 1–4.8)
LYMPHOCYTES ABSOLUTE: 0.3 THOU/MM3 (ref 1–4.8)
LYMPHOCYTES NFR BLD AUTO: 1 %
LYMPHOCYTES NFR BLD AUTO: 1.4 %
LYMPHOCYTES NFR BLD AUTO: 1.5 %
LYMPHOCYTES NFR BLD AUTO: 2.9 %
MAGNESIUM SERPL-MCNC: 1.9 MG/DL (ref 1.6–2.4)
MCH RBC QN AUTO: 27.5 PG (ref 26–33)
MCH RBC QN AUTO: 27.9 PG (ref 26–33)
MCH RBC QN AUTO: 27.9 PG (ref 26–33)
MCH RBC QN AUTO: 28 PG (ref 26–33)
MCH RBC QN AUTO: 28.1 PG (ref 26–33)
MCH RBC QN AUTO: 28.3 PG (ref 26–33)
MCHC RBC AUTO-ENTMCNC: 30.5 GM/DL (ref 32.2–35.5)
MCHC RBC AUTO-ENTMCNC: 30.7 GM/DL (ref 32.2–35.5)
MCHC RBC AUTO-ENTMCNC: 31.4 GM/DL (ref 32.2–35.5)
MCHC RBC AUTO-ENTMCNC: 31.5 GM/DL (ref 32.2–35.5)
MCHC RBC AUTO-ENTMCNC: 31.8 GM/DL (ref 32.2–35.5)
MCHC RBC AUTO-ENTMCNC: 32.2 GM/DL (ref 32.2–35.5)
MCV RBC AUTO: 87.2 FL (ref 80–94)
MCV RBC AUTO: 88.5 FL (ref 80–94)
MCV RBC AUTO: 89.2 FL (ref 80–94)
MCV RBC AUTO: 89.5 FL (ref 80–94)
MCV RBC AUTO: 89.7 FL (ref 80–94)
MCV RBC AUTO: 91.5 FL (ref 80–94)
MISCELLANEOUS 2: ABNORMAL
MONOCYTES ABSOLUTE: 0.4 THOU/MM3 (ref 0.4–1.3)
MONOCYTES ABSOLUTE: 0.4 THOU/MM3 (ref 0.4–1.3)
MONOCYTES ABSOLUTE: 0.5 THOU/MM3 (ref 0.4–1.3)
MONOCYTES ABSOLUTE: 0.5 THOU/MM3 (ref 0.4–1.3)
MONOCYTES NFR BLD AUTO: 1.8 %
MONOCYTES NFR BLD AUTO: 1.9 %
MONOCYTES NFR BLD AUTO: 2.3 %
MONOCYTES NFR BLD AUTO: 4.6 %
NEUTROPHILS NFR BLD AUTO: 91.6 %
NEUTROPHILS NFR BLD AUTO: 94.8 %
NEUTROPHILS NFR BLD AUTO: 95.1 %
NEUTROPHILS NFR BLD AUTO: 95.7 %
NITRITE UR QL STRIP: NEGATIVE
NRBC BLD AUTO-RTO: 0 /100 WBC
NT-PROBNP SERPL IA-MCNC: 4197 PG/ML (ref 0–124)
ORGANISM: ABNORMAL
OSMOLALITY SERPL CALC.SUM OF ELEC: 284.9 MOSMOL/KG (ref 275–300)
OSMOLALITY SERPL CALC.SUM OF ELEC: 288.6 MOSMOL/KG (ref 275–300)
PH UR STRIP.AUTO: 5 [PH] (ref 5–9)
PHOSPHATE SERPL-MCNC: 5.2 MG/DL (ref 2.4–4.7)
PLATELET # BLD AUTO: 114 THOU/MM3 (ref 130–400)
PLATELET # BLD AUTO: 116 THOU/MM3 (ref 130–400)
PLATELET # BLD AUTO: 122 THOU/MM3 (ref 130–400)
PLATELET # BLD AUTO: 122 THOU/MM3 (ref 130–400)
PLATELET # BLD AUTO: 127 THOU/MM3 (ref 130–400)
PLATELET # BLD AUTO: 132 THOU/MM3 (ref 130–400)
PLATELET BLD QL SMEAR: ABNORMAL
PMV BLD AUTO: 11.2 FL (ref 9.4–12.4)
PMV BLD AUTO: 11.2 FL (ref 9.4–12.4)
PMV BLD AUTO: 11.6 FL (ref 9.4–12.4)
PMV BLD AUTO: 11.8 FL (ref 9.4–12.4)
PMV BLD AUTO: 11.8 FL (ref 9.4–12.4)
PMV BLD AUTO: 12.1 FL (ref 9.4–12.4)
POLYCHROMASIA BLD QL SMEAR: ABNORMAL
POLYCHROMASIA BLD QL SMEAR: ABNORMAL
POTASSIUM SERPL-SCNC: 4 MEQ/L (ref 3.5–5.2)
POTASSIUM SERPL-SCNC: 4 MEQ/L (ref 3.5–5.2)
POTASSIUM SERPL-SCNC: 4.2 MEQ/L (ref 3.5–5.2)
POTASSIUM SERPL-SCNC: 4.2 MEQ/L (ref 3.5–5.2)
POTASSIUM SERPL-SCNC: 4.4 MEQ/L (ref 3.5–5.2)
POTASSIUM SERPL-SCNC: 4.4 MEQ/L (ref 3.5–5.2)
POTASSIUM SERPL-SCNC: 4.5 MEQ/L (ref 3.5–5.2)
POTASSIUM SERPL-SCNC: 4.6 MEQ/L (ref 3.5–5.2)
POTASSIUM SERPL-SCNC: 4.6 MEQ/L (ref 3.5–5.2)
PROCALCITONIN SERPL IA-MCNC: > 100 NG/ML (ref 0.01–0.09)
PROT SERPL-MCNC: 5 G/DL (ref 6.1–8)
PROT SERPL-MCNC: 5.3 G/DL (ref 6.1–8)
PROT SERPL-MCNC: 5.3 G/DL (ref 6.1–8)
PROT SERPL-MCNC: 5.6 G/DL (ref 6.1–8)
PROT UR STRIP.AUTO-MCNC: 100 MG/DL
RBC # BLD AUTO: 2.91 MILL/MM3 (ref 4.7–6.1)
RBC # BLD AUTO: 2.96 MILL/MM3 (ref 4.7–6.1)
RBC # BLD AUTO: 3.05 MILL/MM3 (ref 4.7–6.1)
RBC # BLD AUTO: 3.13 MILL/MM3 (ref 4.7–6.1)
RBC # BLD AUTO: 3.14 MILL/MM3 (ref 4.7–6.1)
RBC # BLD AUTO: 3.55 MILL/MM3 (ref 4.7–6.1)
RBC URINE: ABNORMAL /HPF
RENAL EPI CELLS #/AREA URNS HPF: ABNORMAL /[HPF]
SCAN OF BLOOD SMEAR: NORMAL
SCAN OF BLOOD SMEAR: NORMAL
SEGMENTED NEUTROPHILS ABSOLUTE COUNT: 19.4 THOU/MM3 (ref 1.8–7.7)
SEGMENTED NEUTROPHILS ABSOLUTE COUNT: 21.2 THOU/MM3 (ref 1.8–7.7)
SEGMENTED NEUTROPHILS ABSOLUTE COUNT: 24.7 THOU/MM3 (ref 1.8–7.7)
SEGMENTED NEUTROPHILS ABSOLUTE COUNT: 7.1 THOU/MM3 (ref 1.8–7.7)
SODIUM SERPL-SCNC: 132 MEQ/L (ref 135–145)
SODIUM SERPL-SCNC: 133 MEQ/L (ref 135–145)
SODIUM SERPL-SCNC: 133 MEQ/L (ref 135–145)
SODIUM SERPL-SCNC: 134 MEQ/L (ref 135–145)
SODIUM SERPL-SCNC: 135 MEQ/L (ref 135–145)
SODIUM SERPL-SCNC: 137 MEQ/L (ref 135–145)
SODIUM SERPL-SCNC: 137 MEQ/L (ref 135–145)
SODIUM UR-SCNC: 50 MEQ/L
SP GR UR REFRACT.AUTO: 1.02 (ref 1–1.03)
T4 FREE: 1.32
TOXIC GRANULES BLD QL SMEAR: PRESENT
TOXIC GRANULES BLD QL SMEAR: PRESENT
TROPONIN T: < 0.01 NG/ML
TSH SERPL DL<=0.005 MIU/L-ACNC: 3.91 UIU/ML (ref 0.4–4.2)
UROBILINOGEN, URINE: 1 EU/DL (ref 0–1)
WBC # BLD AUTO: 13.3 THOU/MM3 (ref 4.8–10.8)
WBC # BLD AUTO: 13.6 THOU/MM3 (ref 4.8–10.8)
WBC # BLD AUTO: 20.5 THOU/MM3 (ref 4.8–10.8)
WBC # BLD AUTO: 22.3 THOU/MM3 (ref 4.8–10.8)
WBC # BLD AUTO: 25.8 THOU/MM3 (ref 4.8–10.8)
WBC # BLD AUTO: 7.7 THOU/MM3 (ref 4.8–10.8)
WBC #/AREA URNS HPF: ABNORMAL /HPF
WBC #/AREA URNS HPF: ABNORMAL /[HPF]
YEAST LIKE FUNGI URNS QL MICRO: ABNORMAL

## 2023-01-01 PROCEDURE — 36415 COLL VENOUS BLD VENIPUNCTURE: CPT

## 2023-01-01 PROCEDURE — 6370000000 HC RX 637 (ALT 250 FOR IP): Performed by: PHYSICIAN ASSISTANT

## 2023-01-01 PROCEDURE — P9047 ALBUMIN (HUMAN), 25%, 50ML: HCPCS

## 2023-01-01 PROCEDURE — 2580000003 HC RX 258: Performed by: PHYSICIAN ASSISTANT

## 2023-01-01 PROCEDURE — 2580000003 HC RX 258

## 2023-01-01 PROCEDURE — 99222 1ST HOSP IP/OBS MODERATE 55: CPT | Performed by: INTERNAL MEDICINE

## 2023-01-01 PROCEDURE — 97110 THERAPEUTIC EXERCISES: CPT

## 2023-01-01 PROCEDURE — 99232 SBSQ HOSP IP/OBS MODERATE 35: CPT | Performed by: INTERNAL MEDICINE

## 2023-01-01 PROCEDURE — 2580000003 HC RX 258: Performed by: EMERGENCY MEDICINE

## 2023-01-01 PROCEDURE — 51798 US URINE CAPACITY MEASURE: CPT

## 2023-01-01 PROCEDURE — 1200000000 HC SEMI PRIVATE

## 2023-01-01 PROCEDURE — 6360000002 HC RX W HCPCS

## 2023-01-01 PROCEDURE — 80053 COMPREHEN METABOLIC PANEL: CPT

## 2023-01-01 PROCEDURE — 6370000000 HC RX 637 (ALT 250 FOR IP): Performed by: INTERNAL MEDICINE

## 2023-01-01 PROCEDURE — 83735 ASSAY OF MAGNESIUM: CPT

## 2023-01-01 PROCEDURE — 99232 SBSQ HOSP IP/OBS MODERATE 35: CPT | Performed by: PHYSICIAN ASSISTANT

## 2023-01-01 PROCEDURE — 6370000000 HC RX 637 (ALT 250 FOR IP)

## 2023-01-01 PROCEDURE — 85025 COMPLETE CBC W/AUTO DIFF WBC: CPT

## 2023-01-01 PROCEDURE — 84484 ASSAY OF TROPONIN QUANT: CPT

## 2023-01-01 PROCEDURE — 99231 SBSQ HOSP IP/OBS SF/LOW 25: CPT | Performed by: NURSE PRACTITIONER

## 2023-01-01 PROCEDURE — 97530 THERAPEUTIC ACTIVITIES: CPT

## 2023-01-01 PROCEDURE — 2500000003 HC RX 250 WO HCPCS: Performed by: INTERNAL MEDICINE

## 2023-01-01 PROCEDURE — 97166 OT EVAL MOD COMPLEX 45 MIN: CPT

## 2023-01-01 PROCEDURE — 80048 BASIC METABOLIC PNL TOTAL CA: CPT

## 2023-01-01 PROCEDURE — 6360000002 HC RX W HCPCS: Performed by: PHYSICIAN ASSISTANT

## 2023-01-01 PROCEDURE — 99285 EMERGENCY DEPT VISIT HI MDM: CPT

## 2023-01-01 PROCEDURE — 83880 ASSAY OF NATRIURETIC PEPTIDE: CPT

## 2023-01-01 PROCEDURE — 87040 BLOOD CULTURE FOR BACTERIA: CPT

## 2023-01-01 PROCEDURE — 6360000002 HC RX W HCPCS: Performed by: EMERGENCY MEDICINE

## 2023-01-01 PROCEDURE — 85730 THROMBOPLASTIN TIME PARTIAL: CPT

## 2023-01-01 PROCEDURE — 82436 ASSAY OF URINE CHLORIDE: CPT

## 2023-01-01 PROCEDURE — 99222 1ST HOSP IP/OBS MODERATE 55: CPT | Performed by: PHYSICIAN ASSISTANT

## 2023-01-01 PROCEDURE — 2140000000 HC CCU INTERMEDIATE R&B

## 2023-01-01 PROCEDURE — 93005 ELECTROCARDIOGRAM TRACING: CPT | Performed by: EMERGENCY MEDICINE

## 2023-01-01 PROCEDURE — 71045 X-RAY EXAM CHEST 1 VIEW: CPT

## 2023-01-01 PROCEDURE — 99238 HOSP IP/OBS DSCHRG MGMT 30/<: CPT | Performed by: NURSE PRACTITIONER

## 2023-01-01 PROCEDURE — 76775 US EXAM ABDO BACK WALL LIM: CPT

## 2023-01-01 PROCEDURE — 51702 INSERT TEMP BLADDER CATH: CPT

## 2023-01-01 PROCEDURE — 2580000003 HC RX 258: Performed by: INTERNAL MEDICINE

## 2023-01-01 PROCEDURE — 85610 PROTHROMBIN TIME: CPT

## 2023-01-01 PROCEDURE — 83605 ASSAY OF LACTIC ACID: CPT

## 2023-01-01 PROCEDURE — 84443 ASSAY THYROID STIM HORMONE: CPT

## 2023-01-01 PROCEDURE — 84300 ASSAY OF URINE SODIUM: CPT

## 2023-01-01 PROCEDURE — 84145 PROCALCITONIN (PCT): CPT

## 2023-01-01 PROCEDURE — 97162 PT EVAL MOD COMPLEX 30 MIN: CPT

## 2023-01-01 PROCEDURE — 74176 CT ABD & PELVIS W/O CONTRAST: CPT

## 2023-01-01 PROCEDURE — 87086 URINE CULTURE/COLONY COUNT: CPT

## 2023-01-01 PROCEDURE — 99223 1ST HOSP IP/OBS HIGH 75: CPT

## 2023-01-01 PROCEDURE — 85027 COMPLETE CBC AUTOMATED: CPT

## 2023-01-01 PROCEDURE — 96361 HYDRATE IV INFUSION ADD-ON: CPT

## 2023-01-01 PROCEDURE — 96374 THER/PROPH/DIAG INJ IV PUSH: CPT

## 2023-01-01 PROCEDURE — 84100 ASSAY OF PHOSPHORUS: CPT

## 2023-01-01 PROCEDURE — 6360000002 HC RX W HCPCS: Performed by: NURSE PRACTITIONER

## 2023-01-01 PROCEDURE — 81001 URINALYSIS AUTO W/SCOPE: CPT

## 2023-01-01 PROCEDURE — 93010 ELECTROCARDIOGRAM REPORT: CPT | Performed by: INTERNAL MEDICINE

## 2023-01-01 PROCEDURE — 82248 BILIRUBIN DIRECT: CPT

## 2023-01-01 RX ORDER — ACETAMINOPHEN 650 MG/1
650 SUPPOSITORY RECTAL EVERY 6 HOURS PRN
Status: DISCONTINUED | OUTPATIENT
Start: 2023-01-01 | End: 2023-01-01 | Stop reason: HOSPADM

## 2023-01-01 RX ORDER — OXYCODONE HCL 20 MG/ML
5 CONCENTRATE, ORAL ORAL
Status: DISCONTINUED | OUTPATIENT
Start: 2023-01-01 | End: 2023-01-01 | Stop reason: HOSPADM

## 2023-01-01 RX ORDER — ACETAMINOPHEN 325 MG/1
650 TABLET ORAL EVERY 6 HOURS PRN
Status: DISCONTINUED | OUTPATIENT
Start: 2023-01-01 | End: 2023-01-01 | Stop reason: HOSPADM

## 2023-01-01 RX ORDER — HEPARIN SODIUM (PORCINE) LOCK FLUSH IV SOLN 100 UNIT/ML 100 UNIT/ML
500 SOLUTION INTRAVENOUS
Status: COMPLETED | OUTPATIENT
Start: 2023-01-01 | End: 2023-01-01

## 2023-01-01 RX ORDER — SODIUM BICARBONATE 650 MG/1
1300 TABLET ORAL 2 TIMES DAILY
Status: DISCONTINUED | OUTPATIENT
Start: 2023-01-01 | End: 2023-01-01

## 2023-01-01 RX ORDER — LORAZEPAM 2 MG/ML
0.5 CONCENTRATE ORAL EVERY 4 HOURS PRN
Status: DISCONTINUED | OUTPATIENT
Start: 2023-01-01 | End: 2023-01-01 | Stop reason: HOSPADM

## 2023-01-01 RX ORDER — LEVOTHYROXINE SODIUM 0.05 MG/1
50 TABLET ORAL DAILY
Status: DISCONTINUED | OUTPATIENT
Start: 2023-01-01 | End: 2023-01-01

## 2023-01-01 RX ORDER — PANTOPRAZOLE SODIUM 40 MG/1
40 TABLET, DELAYED RELEASE ORAL
Status: DISCONTINUED | OUTPATIENT
Start: 2023-01-01 | End: 2023-01-01

## 2023-01-01 RX ORDER — DEXTROSE AND SODIUM CHLORIDE 5; .45 G/100ML; G/100ML
INJECTION, SOLUTION INTRAVENOUS CONTINUOUS
Status: DISCONTINUED | OUTPATIENT
Start: 2023-01-01 | End: 2023-01-01

## 2023-01-01 RX ORDER — SODIUM CHLORIDE 9 MG/ML
INJECTION, SOLUTION INTRAVENOUS PRN
Status: DISCONTINUED | OUTPATIENT
Start: 2023-01-01 | End: 2023-01-01 | Stop reason: HOSPADM

## 2023-01-01 RX ORDER — SODIUM CHLORIDE, SODIUM LACTATE, POTASSIUM CHLORIDE, CALCIUM CHLORIDE 600; 310; 30; 20 MG/100ML; MG/100ML; MG/100ML; MG/100ML
INJECTION, SOLUTION INTRAVENOUS CONTINUOUS
Status: DISCONTINUED | OUTPATIENT
Start: 2023-01-01 | End: 2023-01-01

## 2023-01-01 RX ORDER — 0.9 % SODIUM CHLORIDE 0.9 %
2000 INTRAVENOUS SOLUTION INTRAVENOUS ONCE
Status: COMPLETED | OUTPATIENT
Start: 2023-01-01 | End: 2023-01-01

## 2023-01-01 RX ORDER — TAMSULOSIN HYDROCHLORIDE 0.4 MG/1
0.8 CAPSULE ORAL DAILY
Status: DISCONTINUED | OUTPATIENT
Start: 2023-01-01 | End: 2023-01-01

## 2023-01-01 RX ORDER — SODIUM CHLORIDE 0.9 % (FLUSH) 0.9 %
5-40 SYRINGE (ML) INJECTION EVERY 12 HOURS SCHEDULED
Status: DISCONTINUED | OUTPATIENT
Start: 2023-01-01 | End: 2023-01-01 | Stop reason: HOSPADM

## 2023-01-01 RX ORDER — ALBUMIN (HUMAN) 12.5 G/50ML
25 SOLUTION INTRAVENOUS ONCE
Status: COMPLETED | OUTPATIENT
Start: 2023-01-01 | End: 2023-01-01

## 2023-01-01 RX ORDER — SERTRALINE HYDROCHLORIDE 25 MG/1
25 TABLET, FILM COATED ORAL DAILY
Status: DISCONTINUED | OUTPATIENT
Start: 2023-01-01 | End: 2023-01-01

## 2023-01-01 RX ORDER — OXYCODONE HCL 20 MG/ML
5 CONCENTRATE, ORAL ORAL
Qty: 30 ML | Refills: 0
Start: 2023-01-01 | End: 2023-04-24

## 2023-01-01 RX ORDER — ONDANSETRON 2 MG/ML
4 INJECTION INTRAMUSCULAR; INTRAVENOUS EVERY 6 HOURS PRN
Status: DISCONTINUED | OUTPATIENT
Start: 2023-01-01 | End: 2023-01-01 | Stop reason: HOSPADM

## 2023-01-01 RX ORDER — SODIUM CHLORIDE 0.9 % (FLUSH) 0.9 %
5-40 SYRINGE (ML) INJECTION PRN
Status: DISCONTINUED | OUTPATIENT
Start: 2023-01-01 | End: 2023-01-01 | Stop reason: HOSPADM

## 2023-01-01 RX ORDER — LORAZEPAM 2 MG/ML
0.5 CONCENTRATE ORAL EVERY 4 HOURS PRN
Qty: 30 ML | Refills: 0
Start: 2023-01-01 | End: 2023-04-24

## 2023-01-01 RX ORDER — LEVOTHYROXINE SODIUM 0.1 MG/1
200 TABLET ORAL DAILY
Status: DISCONTINUED | OUTPATIENT
Start: 2023-01-01 | End: 2023-01-01

## 2023-01-01 RX ORDER — DEXAMETHASONE 4 MG/1
4 TABLET ORAL
Status: DISCONTINUED | OUTPATIENT
Start: 2023-01-01 | End: 2023-01-01

## 2023-01-01 RX ORDER — POLYETHYLENE GLYCOL 3350 17 G/17G
17 POWDER, FOR SOLUTION ORAL DAILY PRN
Status: DISCONTINUED | OUTPATIENT
Start: 2023-01-01 | End: 2023-01-01 | Stop reason: HOSPADM

## 2023-01-01 RX ORDER — ONDANSETRON 4 MG/1
4 TABLET, ORALLY DISINTEGRATING ORAL EVERY 8 HOURS PRN
Status: DISCONTINUED | OUTPATIENT
Start: 2023-01-01 | End: 2023-01-01 | Stop reason: HOSPADM

## 2023-01-01 RX ORDER — BUMETANIDE 0.25 MG/ML
2 INJECTION INTRAMUSCULAR; INTRAVENOUS ONCE
Status: COMPLETED | OUTPATIENT
Start: 2023-01-01 | End: 2023-01-01

## 2023-01-01 RX ADMIN — LEVOTHYROXINE SODIUM 50 MCG: 0.05 TABLET ORAL at 10:53

## 2023-01-01 RX ADMIN — PANTOPRAZOLE SODIUM 40 MG: 40 TABLET, DELAYED RELEASE ORAL at 06:26

## 2023-01-01 RX ADMIN — PIPERACILLIN AND TAZOBACTAM 3375 MG: 3; .375 INJECTION, POWDER, LYOPHILIZED, FOR SOLUTION INTRAVENOUS at 20:44

## 2023-01-01 RX ADMIN — SODIUM BICARBONATE 1300 MG: 650 TABLET ORAL at 21:05

## 2023-01-01 RX ADMIN — SODIUM BICARBONATE 1300 MG: 650 TABLET ORAL at 08:48

## 2023-01-01 RX ADMIN — SODIUM BICARBONATE 1300 MG: 650 TABLET ORAL at 08:09

## 2023-01-01 RX ADMIN — PIPERACILLIN AND TAZOBACTAM 3375 MG: 3; .375 INJECTION, POWDER, LYOPHILIZED, FOR SOLUTION INTRAVENOUS at 10:48

## 2023-01-01 RX ADMIN — DEXTROSE AND SODIUM CHLORIDE: 5; 450 INJECTION, SOLUTION INTRAVENOUS at 02:51

## 2023-01-01 RX ADMIN — SODIUM CHLORIDE, POTASSIUM CHLORIDE, SODIUM LACTATE AND CALCIUM CHLORIDE: 600; 310; 30; 20 INJECTION, SOLUTION INTRAVENOUS at 04:08

## 2023-01-01 RX ADMIN — DEXAMETHASONE 4 MG: 4 TABLET ORAL at 08:45

## 2023-01-01 RX ADMIN — PANTOPRAZOLE SODIUM 40 MG: 40 TABLET, DELAYED RELEASE ORAL at 08:09

## 2023-01-01 RX ADMIN — PIPERACILLIN AND TAZOBACTAM 3375 MG: 3; .375 INJECTION, POWDER, LYOPHILIZED, FOR SOLUTION INTRAVENOUS at 18:12

## 2023-01-01 RX ADMIN — SERTRALINE 25 MG: 25 TABLET, FILM COATED ORAL at 10:53

## 2023-01-01 RX ADMIN — SODIUM BICARBONATE 1300 MG: 650 TABLET ORAL at 18:01

## 2023-01-01 RX ADMIN — HEPARIN 500 UNITS: 100 SYRINGE at 12:46

## 2023-01-01 RX ADMIN — PIPERACILLIN AND TAZOBACTAM 3375 MG: 3; .375 INJECTION, POWDER, LYOPHILIZED, FOR SOLUTION INTRAVENOUS at 03:45

## 2023-01-01 RX ADMIN — PIPERACILLIN AND TAZOBACTAM 3375 MG: 3; .375 INJECTION, POWDER, LYOPHILIZED, FOR SOLUTION INTRAVENOUS at 11:42

## 2023-01-01 RX ADMIN — DEXAMETHASONE 4 MG: 4 TABLET ORAL at 10:53

## 2023-01-01 RX ADMIN — PIPERACILLIN AND TAZOBACTAM 3375 MG: 3; .375 INJECTION, POWDER, LYOPHILIZED, FOR SOLUTION INTRAVENOUS at 21:31

## 2023-01-01 RX ADMIN — TAMSULOSIN HYDROCHLORIDE 0.8 MG: 0.4 CAPSULE ORAL at 08:48

## 2023-01-01 RX ADMIN — SERTRALINE 25 MG: 25 TABLET, FILM COATED ORAL at 08:45

## 2023-01-01 RX ADMIN — PANTOPRAZOLE SODIUM 40 MG: 40 TABLET, DELAYED RELEASE ORAL at 06:22

## 2023-01-01 RX ADMIN — LEVOTHYROXINE SODIUM 200 MCG: 0.1 TABLET ORAL at 06:26

## 2023-01-01 RX ADMIN — SODIUM CHLORIDE, POTASSIUM CHLORIDE, SODIUM LACTATE AND CALCIUM CHLORIDE: 600; 310; 30; 20 INJECTION, SOLUTION INTRAVENOUS at 06:56

## 2023-01-01 RX ADMIN — TAMSULOSIN HYDROCHLORIDE 0.8 MG: 0.4 CAPSULE ORAL at 09:32

## 2023-01-01 RX ADMIN — PIPERACILLIN AND TAZOBACTAM 3375 MG: 3; .375 INJECTION, POWDER, LYOPHILIZED, FOR SOLUTION INTRAVENOUS at 10:43

## 2023-01-01 RX ADMIN — ALBUMIN (HUMAN) 25 G: 0.25 INJECTION, SOLUTION INTRAVENOUS at 02:57

## 2023-01-01 RX ADMIN — LEVOTHYROXINE SODIUM 200 MCG: 0.1 TABLET ORAL at 06:23

## 2023-01-01 RX ADMIN — SODIUM BICARBONATE 1300 MG: 650 TABLET ORAL at 09:31

## 2023-01-01 RX ADMIN — SERTRALINE 25 MG: 25 TABLET, FILM COATED ORAL at 09:43

## 2023-01-01 RX ADMIN — PIPERACILLIN AND TAZOBACTAM 3375 MG: 3; .375 INJECTION, POWDER, LYOPHILIZED, FOR SOLUTION INTRAVENOUS at 11:28

## 2023-01-01 RX ADMIN — SODIUM BICARBONATE 1300 MG: 650 TABLET ORAL at 20:58

## 2023-01-01 RX ADMIN — LEVOTHYROXINE SODIUM 200 MCG: 0.1 TABLET ORAL at 05:54

## 2023-01-01 RX ADMIN — LEVOTHYROXINE SODIUM 200 MCG: 0.1 TABLET ORAL at 09:53

## 2023-01-01 RX ADMIN — LEVOTHYROXINE SODIUM 200 MCG: 0.1 TABLET ORAL at 10:53

## 2023-01-01 RX ADMIN — LEVOTHYROXINE SODIUM 200 MCG: 0.1 TABLET ORAL at 06:33

## 2023-01-01 RX ADMIN — PIPERACILLIN AND TAZOBACTAM 3375 MG: 3; .375 INJECTION, POWDER, LYOPHILIZED, FOR SOLUTION INTRAVENOUS at 04:00

## 2023-01-01 RX ADMIN — SODIUM BICARBONATE 1300 MG: 650 TABLET ORAL at 21:31

## 2023-01-01 RX ADMIN — PIPERACILLIN AND TAZOBACTAM 3375 MG: 3; .375 INJECTION, POWDER, LYOPHILIZED, FOR SOLUTION INTRAVENOUS at 10:45

## 2023-01-01 RX ADMIN — DEXAMETHASONE 4 MG: 4 TABLET ORAL at 08:08

## 2023-01-01 RX ADMIN — DEXAMETHASONE 4 MG: 4 TABLET ORAL at 08:48

## 2023-01-01 RX ADMIN — LEVOTHYROXINE SODIUM 50 MCG: 0.05 TABLET ORAL at 06:33

## 2023-01-01 RX ADMIN — SERTRALINE 25 MG: 25 TABLET, FILM COATED ORAL at 08:48

## 2023-01-01 RX ADMIN — DEXAMETHASONE 4 MG: 4 TABLET ORAL at 09:40

## 2023-01-01 RX ADMIN — LEVOTHYROXINE SODIUM 50 MCG: 0.05 TABLET ORAL at 06:23

## 2023-01-01 RX ADMIN — PIPERACILLIN AND TAZOBACTAM 3375 MG: 3; .375 INJECTION, POWDER, LYOPHILIZED, FOR SOLUTION INTRAVENOUS at 02:44

## 2023-01-01 RX ADMIN — SERTRALINE 25 MG: 25 TABLET, FILM COATED ORAL at 08:09

## 2023-01-01 RX ADMIN — Medication: at 14:37

## 2023-01-01 RX ADMIN — SODIUM BICARBONATE 1300 MG: 650 TABLET ORAL at 21:27

## 2023-01-01 RX ADMIN — TAMSULOSIN HYDROCHLORIDE 0.8 MG: 0.4 CAPSULE ORAL at 08:09

## 2023-01-01 RX ADMIN — Medication 5 MG: at 04:51

## 2023-01-01 RX ADMIN — SODIUM BICARBONATE 1300 MG: 650 TABLET ORAL at 09:39

## 2023-01-01 RX ADMIN — LEVOTHYROXINE SODIUM 200 MCG: 0.1 TABLET ORAL at 08:08

## 2023-01-01 RX ADMIN — LEVOTHYROXINE SODIUM 50 MCG: 0.05 TABLET ORAL at 09:54

## 2023-01-01 RX ADMIN — SERTRALINE 25 MG: 25 TABLET, FILM COATED ORAL at 09:32

## 2023-01-01 RX ADMIN — PIPERACILLIN AND TAZOBACTAM 3375 MG: 3; .375 INJECTION, POWDER, LYOPHILIZED, FOR SOLUTION INTRAVENOUS at 10:49

## 2023-01-01 RX ADMIN — DEXAMETHASONE 4 MG: 4 TABLET ORAL at 09:31

## 2023-01-01 RX ADMIN — SERTRALINE 25 MG: 25 TABLET, FILM COATED ORAL at 09:40

## 2023-01-01 RX ADMIN — SODIUM CHLORIDE 2000 ML: 9 INJECTION, SOLUTION INTRAVENOUS at 00:13

## 2023-01-01 RX ADMIN — Medication 5 MG: at 09:49

## 2023-01-01 RX ADMIN — DEXAMETHASONE 4 MG: 4 TABLET ORAL at 09:43

## 2023-01-01 RX ADMIN — BUMETANIDE 2 MG: 0.25 INJECTION INTRAMUSCULAR; INTRAVENOUS at 11:43

## 2023-01-01 RX ADMIN — TAMSULOSIN HYDROCHLORIDE 0.8 MG: 0.4 CAPSULE ORAL at 09:39

## 2023-01-01 RX ADMIN — PANTOPRAZOLE SODIUM 40 MG: 40 TABLET, DELAYED RELEASE ORAL at 05:55

## 2023-01-01 RX ADMIN — SODIUM CHLORIDE, PRESERVATIVE FREE 10 ML: 5 INJECTION INTRAVENOUS at 21:31

## 2023-01-01 RX ADMIN — PANTOPRAZOLE SODIUM 40 MG: 40 TABLET, DELAYED RELEASE ORAL at 10:53

## 2023-01-01 RX ADMIN — ONDANSETRON 4 MG: 2 INJECTION INTRAMUSCULAR; INTRAVENOUS at 10:27

## 2023-01-01 RX ADMIN — PIPERACILLIN AND TAZOBACTAM 3375 MG: 3; .375 INJECTION, POWDER, LYOPHILIZED, FOR SOLUTION INTRAVENOUS at 02:30

## 2023-01-01 RX ADMIN — SODIUM CHLORIDE, PRESERVATIVE FREE 10 ML: 5 INJECTION INTRAVENOUS at 10:49

## 2023-01-01 RX ADMIN — PANTOPRAZOLE SODIUM 40 MG: 40 TABLET, DELAYED RELEASE ORAL at 06:32

## 2023-01-01 RX ADMIN — SODIUM CHLORIDE, POTASSIUM CHLORIDE, SODIUM LACTATE AND CALCIUM CHLORIDE: 600; 310; 30; 20 INJECTION, SOLUTION INTRAVENOUS at 10:47

## 2023-01-01 RX ADMIN — LEVOTHYROXINE SODIUM 50 MCG: 0.05 TABLET ORAL at 05:55

## 2023-01-01 RX ADMIN — LEVOTHYROXINE SODIUM 50 MCG: 0.05 TABLET ORAL at 08:31

## 2023-01-01 RX ADMIN — SODIUM CHLORIDE, PRESERVATIVE FREE 10 ML: 5 INJECTION INTRAVENOUS at 21:05

## 2023-01-01 RX ADMIN — PIPERACILLIN AND TAZOBACTAM 4500 MG: 4; .5 INJECTION, POWDER, LYOPHILIZED, FOR SOLUTION INTRAVENOUS at 05:00

## 2023-01-01 RX ADMIN — Medication 5 MG: at 02:50

## 2023-01-01 RX ADMIN — SODIUM CHLORIDE, POTASSIUM CHLORIDE, SODIUM LACTATE AND CALCIUM CHLORIDE: 600; 310; 30; 20 INJECTION, SOLUTION INTRAVENOUS at 04:28

## 2023-01-01 RX ADMIN — PIPERACILLIN AND TAZOBACTAM 3375 MG: 3; .375 INJECTION, POWDER, LYOPHILIZED, FOR SOLUTION INTRAVENOUS at 19:35

## 2023-01-01 RX ADMIN — SODIUM CHLORIDE: 9 INJECTION, SOLUTION INTRAVENOUS at 08:41

## 2023-01-01 RX ADMIN — PANTOPRAZOLE SODIUM 40 MG: 40 TABLET, DELAYED RELEASE ORAL at 06:23

## 2023-01-01 RX ADMIN — SODIUM BICARBONATE 1300 MG: 650 TABLET ORAL at 08:45

## 2023-01-01 RX ADMIN — LEVOTHYROXINE SODIUM 50 MCG: 0.05 TABLET ORAL at 06:26

## 2023-01-01 RX ADMIN — PIPERACILLIN AND TAZOBACTAM 3375 MG: 3; .375 INJECTION, POWDER, LYOPHILIZED, FOR SOLUTION INTRAVENOUS at 19:37

## 2023-01-01 RX ADMIN — HYDROCORTISONE SODIUM SUCCINATE 100 MG: 100 INJECTION, POWDER, FOR SOLUTION INTRAMUSCULAR; INTRAVENOUS at 00:19

## 2023-01-01 ASSESSMENT — PAIN - FUNCTIONAL ASSESSMENT
PAIN_FUNCTIONAL_ASSESSMENT: NONE - DENIES PAIN
PAIN_FUNCTIONAL_ASSESSMENT: NONE - DENIES PAIN

## 2023-01-01 ASSESSMENT — PAIN SCALES - WONG BAKER
WONGBAKER_NUMERICALRESPONSE: 0

## 2023-01-01 ASSESSMENT — PAIN DESCRIPTION - DESCRIPTORS: DESCRIPTORS: ACHING

## 2023-01-01 ASSESSMENT — PAIN SCALES - GENERAL
PAINLEVEL_OUTOF10: 8
PAINLEVEL_OUTOF10: 0
PAINLEVEL_OUTOF10: 5
PAINLEVEL_OUTOF10: 7

## 2023-01-01 ASSESSMENT — PAIN DESCRIPTION - LOCATION: LOCATION: GENERALIZED

## 2023-01-05 LAB — T4 FREE: 0.59

## 2023-01-10 ENCOUNTER — HOSPITAL ENCOUNTER (OUTPATIENT)
Dept: INFUSION THERAPY | Age: 68
Discharge: HOME | End: 2023-01-10
Payer: MEDICARE

## 2023-01-10 ENCOUNTER — TELEPHONE (OUTPATIENT)
Dept: FAMILY MEDICINE CLINIC | Age: 68
End: 2023-01-10

## 2023-01-10 VITALS
WEIGHT: 135.4 LBS | OXYGEN SATURATION: 100 % | DIASTOLIC BLOOD PRESSURE: 63 MMHG | HEART RATE: 62 BPM | BODY MASS INDEX: 20.06 KG/M2 | HEIGHT: 69 IN | TEMPERATURE: 97.6 F | SYSTOLIC BLOOD PRESSURE: 106 MMHG | RESPIRATION RATE: 16 BRPM

## 2023-01-10 DIAGNOSIS — C25.1 MALIGNANT NEOPLASM OF BODY OF PANCREAS (HCC): Primary | ICD-10-CM

## 2023-01-10 DIAGNOSIS — I95.1 ORTHOSTATIC HYPOTENSION: ICD-10-CM

## 2023-01-10 DIAGNOSIS — E03.9 HYPOTHYROIDISM, UNSPECIFIED TYPE: Primary | ICD-10-CM

## 2023-01-10 LAB
ABO: NORMAL
ANISOCYTOSIS: PRESENT
ANTIBODY SCREEN: NORMAL
ATYPICAL LYMPHOCYTES: ABNORMAL %
BASOPHILIA: ABNORMAL
BASOPHILS # BLD: 1.1 %
BASOPHILS ABSOLUTE: 0 THOU/MM3 (ref 0–0.1)
BUN, WHOLE BLOOD: 27 MG/DL (ref 8–26)
CHLORIDE, WHOLE BLOOD: 100 MEQ/L (ref 98–109)
CREATININE, WHOLE BLOOD: 2.2 MG/DL (ref 0.5–1.2)
EOSINOPHIL # BLD: 1.1 %
EOSINOPHILS ABSOLUTE: 0 THOU/MM3 (ref 0–0.4)
ERYTHROCYTE [DISTWIDTH] IN BLOOD BY AUTOMATED COUNT: 15.8 % (ref 11.5–14.5)
ERYTHROCYTE [DISTWIDTH] IN BLOOD BY AUTOMATED COUNT: 50 FL (ref 35–45)
GFR SERPL CREATININE-BSD FRML MDRD: 32 ML/MIN/1.73M2
GLUCOSE, WHOLE BLOOD: 91 MG/DL (ref 70–108)
HCT VFR BLD CALC: 23.2 % (ref 42–52)
HEMOGLOBIN: 8 GM/DL (ref 14–18)
IMMATURE GRANS (ABS): 0.16 THOU/MM3 (ref 0–0.07)
IMMATURE GRANULOCYTES: 8.7 %
IONIZED CALCIUM, WHOLE BLOOD: 1.16 MMOL/L (ref 1.12–1.32)
LYMPHOCYTES # BLD: 21.7 %
LYMPHOCYTES ABSOLUTE: 0.4 THOU/MM3 (ref 1–4.8)
MCH RBC QN AUTO: 30.5 PG (ref 26–33)
MCHC RBC AUTO-ENTMCNC: 34.5 GM/DL (ref 32.2–35.5)
MCV RBC AUTO: 88.5 FL (ref 80–94)
MONOCYTES # BLD: 31.5 %
MONOCYTES ABSOLUTE: 0.6 THOU/MM3 (ref 0.4–1.3)
NUCLEATED RED BLOOD CELLS: 0 /100 WBC
PLATELET # BLD: 132 THOU/MM3 (ref 130–400)
PMV BLD AUTO: 10.2 FL (ref 9.4–12.4)
POTASSIUM, WHOLE BLOOD: 3.1 MEQ/L (ref 3.5–4.9)
RBC # BLD: 2.62 MILL/MM3 (ref 4.7–6.1)
RH FACTOR: NORMAL
ROULEAUX: SLIGHT
SCAN OF BLOOD SMEAR: NORMAL
SEG NEUTROPHILS: 35.9 %
SEGMENTED NEUTROPHILS ABSOLUTE COUNT: 0.6 THOU/MM3 (ref 1.8–7.7)
SODIUM, WHOLE BLOOD: 132 MEQ/L (ref 138–146)
TOTAL CO2, WHOLE BLOOD: 19 MEQ/L (ref 23–33)
TOXIC GRANULATION: PRESENT
WBC # BLD: 1.8 THOU/MM3 (ref 4.8–10.8)

## 2023-01-10 PROCEDURE — 2580000003 HC RX 258: Performed by: PHYSICIAN ASSISTANT

## 2023-01-10 PROCEDURE — 36591 DRAW BLOOD OFF VENOUS DEVICE: CPT

## 2023-01-10 PROCEDURE — 2580000003 HC RX 258: Performed by: INTERNAL MEDICINE

## 2023-01-10 PROCEDURE — 96361 HYDRATE IV INFUSION ADD-ON: CPT

## 2023-01-10 PROCEDURE — 85025 COMPLETE CBC W/AUTO DIFF WBC: CPT

## 2023-01-10 PROCEDURE — 6370000000 HC RX 637 (ALT 250 FOR IP): Performed by: PHYSICIAN ASSISTANT

## 2023-01-10 PROCEDURE — 6360000002 HC RX W HCPCS: Performed by: INTERNAL MEDICINE

## 2023-01-10 PROCEDURE — 86850 RBC ANTIBODY SCREEN: CPT

## 2023-01-10 PROCEDURE — 80047 BASIC METABLC PNL IONIZED CA: CPT

## 2023-01-10 PROCEDURE — 86900 BLOOD TYPING SEROLOGIC ABO: CPT

## 2023-01-10 PROCEDURE — 99211 OFF/OP EST MAY X REQ PHY/QHP: CPT

## 2023-01-10 PROCEDURE — 86901 BLOOD TYPING SEROLOGIC RH(D): CPT

## 2023-01-10 PROCEDURE — 96360 HYDRATION IV INFUSION INIT: CPT

## 2023-01-10 RX ORDER — HEPARIN SODIUM (PORCINE) LOCK FLUSH IV SOLN 100 UNIT/ML 100 UNIT/ML
500 SOLUTION INTRAVENOUS PRN
Status: DISCONTINUED | OUTPATIENT
Start: 2023-01-10 | End: 2023-01-11 | Stop reason: HOSPADM

## 2023-01-10 RX ORDER — SODIUM CHLORIDE 0.9 % (FLUSH) 0.9 %
5-40 SYRINGE (ML) INJECTION PRN
Status: CANCELLED | OUTPATIENT
Start: 2023-01-10

## 2023-01-10 RX ORDER — SODIUM CHLORIDE 9 MG/ML
25 INJECTION, SOLUTION INTRAVENOUS PRN
Status: CANCELLED | OUTPATIENT
Start: 2023-01-10

## 2023-01-10 RX ORDER — 0.9 % SODIUM CHLORIDE 0.9 %
1000 INTRAVENOUS SOLUTION INTRAVENOUS ONCE
Status: CANCELLED
Start: 2023-01-10 | End: 2023-01-10

## 2023-01-10 RX ORDER — HEPARIN SODIUM (PORCINE) LOCK FLUSH IV SOLN 100 UNIT/ML 100 UNIT/ML
500 SOLUTION INTRAVENOUS PRN
Status: CANCELLED | OUTPATIENT
Start: 2023-01-10

## 2023-01-10 RX ORDER — POTASSIUM CHLORIDE 20 MEQ/1
40 TABLET, EXTENDED RELEASE ORAL ONCE
Status: COMPLETED | OUTPATIENT
Start: 2023-01-10 | End: 2023-01-10

## 2023-01-10 RX ORDER — SODIUM CHLORIDE 0.9 % (FLUSH) 0.9 %
5-40 SYRINGE (ML) INJECTION PRN
Status: DISCONTINUED | OUTPATIENT
Start: 2023-01-10 | End: 2023-01-11 | Stop reason: HOSPADM

## 2023-01-10 RX ORDER — 0.9 % SODIUM CHLORIDE 0.9 %
1000 INTRAVENOUS SOLUTION INTRAVENOUS ONCE
Status: COMPLETED | OUTPATIENT
Start: 2023-01-10 | End: 2023-01-10

## 2023-01-10 RX ADMIN — SODIUM CHLORIDE, PRESERVATIVE FREE 20 ML: 5 INJECTION INTRAVENOUS at 08:31

## 2023-01-10 RX ADMIN — SODIUM CHLORIDE, PRESERVATIVE FREE 10 ML: 5 INJECTION INTRAVENOUS at 08:30

## 2023-01-10 RX ADMIN — SODIUM CHLORIDE, PRESERVATIVE FREE 20 ML: 5 INJECTION INTRAVENOUS at 08:59

## 2023-01-10 RX ADMIN — HEPARIN 500 UNITS: 100 SYRINGE at 11:18

## 2023-01-10 RX ADMIN — SODIUM CHLORIDE, PRESERVATIVE FREE 10 ML: 5 INJECTION INTRAVENOUS at 08:58

## 2023-01-10 RX ADMIN — SODIUM CHLORIDE, PRESERVATIVE FREE 10 ML: 5 INJECTION INTRAVENOUS at 11:18

## 2023-01-10 RX ADMIN — SODIUM CHLORIDE 1000 ML: 9 INJECTION, SOLUTION INTRAVENOUS at 09:06

## 2023-01-10 RX ADMIN — POTASSIUM CHLORIDE 40 MEQ: 1500 TABLET, EXTENDED RELEASE ORAL at 10:31

## 2023-01-10 NOTE — TELEPHONE ENCOUNTER
----- Message from Cassia Rosales MD sent at 1/10/2023 12:55 PM EST -----  TSH very high, check if patient is taking his levothyroxine, and if so how is he taking it?

## 2023-01-10 NOTE — ONCOLOGY
Patient presented to Infusion center for nurse eval, lab check. Upon vital assessment patient's BP dropping from 91/58 mmHg to 76/53 mmHg. He affirms positional dizziness.  Preliminary Hgb 7.8.     -will begin 0.9 NS 1000 mL over 2 hours.   -type and screen and await final Hgb/Hct.   -Add BMP     Electronically signed by   Bette Koehler PA-C on 1/10/2023 at 8:57 AM

## 2023-01-10 NOTE — PLAN OF CARE
Problem: Safety - Adult  Goal: Free from fall injury  Outcome: Adequate for Discharge  Flowsheets (Taken 1/10/2023 1350)  Free From Fall Injury: Instruct family/caregiver on patient safety     Problem: Discharge Planning  Goal: Discharge to home or other facility with appropriate resources  Outcome: Adequate for Discharge  Flowsheets (Taken 1/10/2023 1350)  Discharge to home or other facility with appropriate resources: Identify barriers to discharge with patient and caregiver     Problem: Infection - Adult  Goal: Absence of infection at discharge  Outcome: Adequate for Discharge  Flowsheets (Taken 1/10/2023 1350)  Absence of infection at discharge: Monitor all insertion sites i.e., indwelling lines, tubes and drains  Note: Mediport site with no redness or warmth. Skin over port site intact with no signs of breakdown noted. Patient verbalizes signs/symptoms of port infection and when to notify the physician. Goal: Absence of fever/infection during anticipated neutropenic period  Outcome: Adequate for Discharge     Problem: Chronic Conditions and Co-morbidities  Goal: Patient's chronic conditions and co-morbidity symptoms are monitored and maintained or improved  Outcome: Adequate for Discharge  Flowsheets (Taken 1/10/2023 1350)  Care Plan - Patient's Chronic Conditions and Co-Morbidity Symptoms are Monitored and Maintained or Improved: Collaborate with multidisciplinary team to address chronic and comorbid conditions and prevent exacerbation or deterioration  Note: Patient received 1000 ml of 0.9NS IV infusion over 2 hours. Patient drank sips of water while in OP oncology. Encouraged patient to drink 48 to 64 ounces of fluids everyday. Oral potassium reviewed, patient verbalizes understanding of medication being administered and potential side effects. Care plan reviewed with patient and spouse. Patient and spouse verbalize understanding of the plan of care and contribute to goal setting.

## 2023-01-10 NOTE — PROGRESS NOTES
Patient assessed for the following post chemotherapy:    Dizziness   No  Lightheadedness  No      Acute nausea/vomiting No  Headache   No  Chest pain/pressure  No  Rash/itching   No  Shortness of breath  No    Patient kept for 20 minutes observation post infusion chemotherapy. Patient tolerated IV fluids and oral potassium without any complications. Last vital signs:   BP (!) 91/58   Pulse 88   Temp 97.5 °F (36.4 °C) (Oral)   Resp 16   Ht 5' 9\" (1.753 m)   Wt 135 lb 6.4 oz (61.4 kg)   SpO2 99%   BMI 20.00 kg/m²     Physician's instructions:   IV fluids today. Oral potassium today. No need for transfusions today. Re-enforce education about neutropenic precautions. Return to clinic tomorrow for labs and evaluate for possible needs. Patient instructed if experience any of the above symptoms following today's infusion and potassium PO, he is to notify MD immediately or go to the emergency department. Discharge instructions given to patient. Verbalizes understanding. Ambulated off unit per self, accompanied by spouse, with belongings.

## 2023-01-10 NOTE — DISCHARGE INSTRUCTIONS
Please contact your Oncologist if you have any questions regarding the IV hydration with oral potassium replacement that you received today. Patient instructed if experience any of the symptoms following today's IV hydration with oral potassium replacement to notify MD immediately or go to emergency department. * dizziness/lightheadedness  *acute nausea/vomiting - not relieved with medication  *headache - not relieved from Tylenol/pain medication  *chest pain/pressure  *rash/itching  *shortness of breath        Drink fluids - 48oz fluids daily  Call if develop fever/ chills/ signs or symptoms of infection     Physician's instructions:   IV fluids today. Oral potassium today. No need for transfusions today. Re-enforce education about neutropenic precautions. Return to clinic tomorrow for labs and evaluate for possible needs.

## 2023-01-10 NOTE — PROGRESS NOTES
Patient here for IV hydration of 1000 ml of 0.9NS over 2 hr's,  Due to dizziness and positive orthostatic bp.   Per Ambrosio CUEVAS

## 2023-01-11 ENCOUNTER — HOSPITAL ENCOUNTER (OUTPATIENT)
Dept: INFUSION THERAPY | Age: 68
Discharge: HOME | End: 2023-01-11
Payer: MEDICARE

## 2023-01-11 VITALS
DIASTOLIC BLOOD PRESSURE: 63 MMHG | BODY MASS INDEX: 19.99 KG/M2 | HEART RATE: 70 BPM | RESPIRATION RATE: 16 BRPM | TEMPERATURE: 97.4 F | OXYGEN SATURATION: 100 % | HEIGHT: 69 IN | WEIGHT: 135 LBS | SYSTOLIC BLOOD PRESSURE: 123 MMHG

## 2023-01-11 DIAGNOSIS — C25.1 MALIGNANT NEOPLASM OF BODY OF PANCREAS (HCC): ICD-10-CM

## 2023-01-11 DIAGNOSIS — C25.1 MALIGNANT NEOPLASM OF BODY OF PANCREAS (HCC): Primary | ICD-10-CM

## 2023-01-11 DIAGNOSIS — I95.1 ORTHOSTATIC HYPOTENSION: Primary | ICD-10-CM

## 2023-01-11 LAB
BUN, WHOLE BLOOD: 25 MG/DL (ref 8–26)
CHLORIDE, WHOLE BLOOD: 102 MEQ/L (ref 98–109)
CREATININE, WHOLE BLOOD: 2.2 MG/DL (ref 0.5–1.2)
GFR SERPL CREATININE-BSD FRML MDRD: 32 ML/MIN/1.73M2
GLUCOSE, WHOLE BLOOD: 113 MG/DL (ref 70–108)
IONIZED CALCIUM, WHOLE BLOOD: 1.17 MMOL/L (ref 1.12–1.32)
POTASSIUM, WHOLE BLOOD: 3.4 MEQ/L (ref 3.5–4.9)
SCAN OF BLOOD SMEAR: NORMAL
SODIUM, WHOLE BLOOD: 133 MEQ/L (ref 138–146)
TOTAL CO2, WHOLE BLOOD: 18 MEQ/L (ref 23–33)

## 2023-01-11 PROCEDURE — 2580000003 HC RX 258: Performed by: INTERNAL MEDICINE

## 2023-01-11 PROCEDURE — 99211 OFF/OP EST MAY X REQ PHY/QHP: CPT

## 2023-01-11 PROCEDURE — 85025 COMPLETE CBC W/AUTO DIFF WBC: CPT

## 2023-01-11 PROCEDURE — 6360000002 HC RX W HCPCS: Performed by: INTERNAL MEDICINE

## 2023-01-11 PROCEDURE — 36591 DRAW BLOOD OFF VENOUS DEVICE: CPT

## 2023-01-11 PROCEDURE — 80047 BASIC METABLC PNL IONIZED CA: CPT

## 2023-01-11 RX ORDER — HEPARIN SODIUM (PORCINE) LOCK FLUSH IV SOLN 100 UNIT/ML 100 UNIT/ML
500 SOLUTION INTRAVENOUS PRN
Status: CANCELLED | OUTPATIENT
Start: 2023-01-11

## 2023-01-11 RX ORDER — SODIUM CHLORIDE 0.9 % (FLUSH) 0.9 %
5-40 SYRINGE (ML) INJECTION PRN
Status: DISCONTINUED | OUTPATIENT
Start: 2023-01-11 | End: 2023-01-12 | Stop reason: HOSPADM

## 2023-01-11 RX ORDER — 0.9 % SODIUM CHLORIDE 0.9 %
1000 INTRAVENOUS SOLUTION INTRAVENOUS ONCE
Status: CANCELLED
Start: 2023-01-11 | End: 2023-01-11

## 2023-01-11 RX ORDER — LEVOTHYROXINE SODIUM 0.03 MG/1
25 TABLET ORAL DAILY
Qty: 30 TABLET | Refills: 1 | Status: SHIPPED | OUTPATIENT
Start: 2023-01-11

## 2023-01-11 RX ORDER — SODIUM CHLORIDE 0.9 % (FLUSH) 0.9 %
5-40 SYRINGE (ML) INJECTION PRN
Status: CANCELLED | OUTPATIENT
Start: 2023-01-11

## 2023-01-11 RX ORDER — HEPARIN SODIUM (PORCINE) LOCK FLUSH IV SOLN 100 UNIT/ML 100 UNIT/ML
500 SOLUTION INTRAVENOUS PRN
Status: DISCONTINUED | OUTPATIENT
Start: 2023-01-11 | End: 2023-01-12 | Stop reason: HOSPADM

## 2023-01-11 RX ORDER — SODIUM CHLORIDE 9 MG/ML
25 INJECTION, SOLUTION INTRAVENOUS PRN
Status: CANCELLED | OUTPATIENT
Start: 2023-01-11

## 2023-01-11 RX ORDER — LEVOTHYROXINE SODIUM 0.2 MG/1
200 TABLET ORAL DAILY
Qty: 30 TABLET | Refills: 1 | Status: SHIPPED | OUTPATIENT
Start: 2023-01-11

## 2023-01-11 RX ADMIN — SODIUM CHLORIDE, PRESERVATIVE FREE 20 ML: 5 INJECTION INTRAVENOUS at 11:31

## 2023-01-11 RX ADMIN — SODIUM CHLORIDE, PRESERVATIVE FREE 20 ML: 5 INJECTION INTRAVENOUS at 10:40

## 2023-01-11 RX ADMIN — SODIUM CHLORIDE, PRESERVATIVE FREE 10 ML: 5 INJECTION INTRAVENOUS at 10:39

## 2023-01-11 RX ADMIN — HEPARIN 500 UNITS: 100 SYRINGE at 11:31

## 2023-01-11 NOTE — TELEPHONE ENCOUNTER
Patient has been only taking 175 mcg daily. Doesn't recall you switching him on 10/12/22 at his office visit. Please advise.

## 2023-01-11 NOTE — PLAN OF CARE
Problem: Safety - Adult  Goal: Free from fall injury  Outcome: Adequate for Discharge  Flowsheets (Taken 1/11/2023 1532)  Free From Fall Injury:   Instruct family/caregiver on patient safety   Based on caregiver fall risk screen, instruct family/caregiver to ask for assistance with transferring infant if caregiver noted to have fall risk factors  Note: Free from falls while in O.P. Oncology. Problem: Discharge Planning  Goal: Discharge to home or other facility with appropriate resources  Outcome: Adequate for Discharge  Flowsheets (Taken 1/11/2023 1532)  Discharge to home or other facility with appropriate resources:   Identify barriers to discharge with patient and caregiver   Arrange for needed discharge resources and transportation as appropriate   Identify discharge learning needs (meds, wound care, etc)  Note: Verbalize understanding of discharge instructions, follow up appointments, and when to call Physician. Problem: Infection - Adult  Goal: Absence of infection at discharge  Outcome: Adequate for Discharge  Flowsheets (Taken 1/11/2023 1532)  Absence of infection at discharge:   Assess and monitor for signs and symptoms of infection   Monitor lab/diagnostic results   Monitor all insertion sites i.e., indwelling lines, tubes and drains  Note: Mediport site with no redness or warmth. Skin over port site intact with no signs of breakdown noted. Patient verbalizes signs/symptoms of port infection and when to notify the physician. Goal: Absence of fever/infection during anticipated neutropenic period  Outcome: Adequate for Discharge  Note: Discuss port maintenance,infection prevention, sign of infection and when to call MD.    Care plan reviewed with patient and spouse. Patient and spouse verbalize understanding of the plan of care and contribute to goal setting.

## 2023-01-11 NOTE — PROGRESS NOTES
Patient had lab drawn with hemoglobin of 7.4 and hematocrit 21.5. Patient stated that he is feeling much better today than yesterday. No complaints of fever or chills, denies bleeding. Eating and drinking adequately and stated that he slept much better last night. Dr. Yoni Curry stated to continue to monitor for symptoms and return next week for recheck of labs. Notified patient regarding above and verbalized understanding.

## 2023-01-11 NOTE — DISCHARGE INSTRUCTIONS
No treatments today. Patient tolerated lab draw without any complications. Patient verbalized understanding of discharge instructions. Ambulated off unit per self with belongings.

## 2023-01-11 NOTE — DISCHARGE SUMMARY
Patient tolerated lab draw without any complications. Patient verbalized understanding of discharge instructions. Ambulated off unit per self with belongings.

## 2023-01-11 NOTE — TELEPHONE ENCOUNTER
Patient called back and was informed that he will be increased to 225 mcg. He was told that this will be in a 200 and a 25 so he will need to take 2 pills. He verbalized understanding. Labs are mailed to home to repeat in 6 weeks. Scripts are sent to [facility] and medication list adjusted.

## 2023-01-11 NOTE — TELEPHONE ENCOUNTER
Increase levothyroxine to 2 2 5 mcg daily, 30, 1 refills   take first thing in the morning on an empty stomach, get TSH free T4 in 6 weeks

## 2023-01-12 LAB
BASOPHILS # BLD: 1.7 %
BASOPHILS ABSOLUTE: 0.1 THOU/MM3 (ref 0–0.1)
CRENATED RBC'S: ABNORMAL
EOSINOPHIL # BLD: 0.6 %
EOSINOPHILS ABSOLUTE: 0 THOU/MM3 (ref 0–0.4)
ERYTHROCYTE [DISTWIDTH] IN BLOOD BY AUTOMATED COUNT: 16.1 % (ref 11.5–14.5)
ERYTHROCYTE [DISTWIDTH] IN BLOOD BY AUTOMATED COUNT: 51.1 FL (ref 35–45)
HCT VFR BLD CALC: 22 % (ref 42–52)
HEMOGLOBIN: 7.4 GM/DL (ref 14–18)
IMMATURE GRANS (ABS): 0.77 THOU/MM3 (ref 0–0.07)
IMMATURE GRANULOCYTES: 22.3 %
LYMPHOCYTES # BLD: 11.8 %
LYMPHOCYTES ABSOLUTE: 0.4 THOU/MM3 (ref 1–4.8)
MCH RBC QN AUTO: 29.5 PG (ref 26–33)
MCHC RBC AUTO-ENTMCNC: 33.6 GM/DL (ref 32.2–35.5)
MCV RBC AUTO: 87.6 FL (ref 80–94)
MONOCYTES # BLD: 19.9 %
MONOCYTES ABSOLUTE: 0.7 THOU/MM3 (ref 0.4–1.3)
NUCLEATED RED BLOOD CELLS: 0 /100 WBC
PATHOLOGIST REVIEW: ABNORMAL
PLATELET # BLD: 182 THOU/MM3 (ref 130–400)
PMV BLD AUTO: 10 FL (ref 9.4–12.4)
POIKILOCYTES: ABNORMAL
RBC # BLD: 2.51 MILL/MM3 (ref 4.7–6.1)
SEG NEUTROPHILS: 43.7 %
SEGMENTED NEUTROPHILS ABSOLUTE COUNT: 1.5 THOU/MM3 (ref 1.8–7.7)
TOXIC GRANULATION: PRESENT
WBC # BLD: 3.5 THOU/MM3 (ref 4.8–10.8)

## 2023-01-18 ENCOUNTER — HOSPITAL ENCOUNTER (OUTPATIENT)
Dept: INFUSION THERAPY | Age: 68
Discharge: HOME | End: 2023-01-18
Payer: MEDICARE

## 2023-01-18 VITALS
TEMPERATURE: 97.5 F | WEIGHT: 137 LBS | RESPIRATION RATE: 16 BRPM | DIASTOLIC BLOOD PRESSURE: 69 MMHG | OXYGEN SATURATION: 100 % | SYSTOLIC BLOOD PRESSURE: 112 MMHG | HEIGHT: 69 IN | BODY MASS INDEX: 20.29 KG/M2

## 2023-01-18 DIAGNOSIS — C25.1 MALIGNANT NEOPLASM OF BODY OF PANCREAS (HCC): Primary | ICD-10-CM

## 2023-01-18 DIAGNOSIS — I95.1 ORTHOSTATIC HYPOTENSION: ICD-10-CM

## 2023-01-18 LAB
ANISOCYTOSIS: PRESENT
BASOPHILS # BLD: 0.8 %
BASOPHILS ABSOLUTE: 0.1 THOU/MM3 (ref 0–0.1)
BUN, WHOLE BLOOD: 21 MG/DL (ref 8–26)
CHLORIDE, WHOLE BLOOD: 107 MEQ/L (ref 98–109)
CREATININE, WHOLE BLOOD: 2 MG/DL (ref 0.5–1.2)
EOSINOPHIL # BLD: 0.3 %
EOSINOPHILS ABSOLUTE: 0 THOU/MM3 (ref 0–0.4)
ERYTHROCYTE [DISTWIDTH] IN BLOOD BY AUTOMATED COUNT: 18 % (ref 11.5–14.5)
ERYTHROCYTE [DISTWIDTH] IN BLOOD BY AUTOMATED COUNT: 60.1 FL (ref 35–45)
GFR SERPL CREATININE-BSD FRML MDRD: 35 ML/MIN/1.73M2
GLUCOSE, WHOLE BLOOD: 91 MG/DL (ref 70–108)
HCT VFR BLD CALC: 22.6 % (ref 42–52)
HEMOGLOBIN: 7.3 GM/DL (ref 14–18)
IMMATURE GRANS (ABS): 0.6 THOU/MM3 (ref 0–0.07)
IMMATURE GRANULOCYTES: 5.8 %
IONIZED CALCIUM, WHOLE BLOOD: 1.2 MMOL/L (ref 1.12–1.32)
LYMPHOCYTES # BLD: 6.7 %
LYMPHOCYTES ABSOLUTE: 0.7 THOU/MM3 (ref 1–4.8)
MCH RBC QN AUTO: 29.6 PG (ref 26–33)
MCHC RBC AUTO-ENTMCNC: 32.3 GM/DL (ref 32.2–35.5)
MCV RBC AUTO: 91.5 FL (ref 80–94)
MONOCYTES # BLD: 8.8 %
MONOCYTES ABSOLUTE: 0.9 THOU/MM3 (ref 0.4–1.3)
NUCLEATED RED BLOOD CELLS: 0 /100 WBC
PLATELET # BLD: 586 THOU/MM3 (ref 130–400)
PMV BLD AUTO: 9.1 FL (ref 9.4–12.4)
POIKILOCYTES: ABNORMAL
POTASSIUM, WHOLE BLOOD: 3.9 MEQ/L (ref 3.5–4.9)
RBC # BLD: 2.47 MILL/MM3 (ref 4.7–6.1)
ROULEAUX: SLIGHT
SEG NEUTROPHILS: 77.6 %
SEGMENTED NEUTROPHILS ABSOLUTE COUNT: 8 THOU/MM3 (ref 1.8–7.7)
SODIUM, WHOLE BLOOD: 137 MEQ/L (ref 138–146)
SPHEROCYTES: ABNORMAL
TOTAL CO2, WHOLE BLOOD: 18 MEQ/L (ref 23–33)
TOXIC GRANULATION: PRESENT
WBC # BLD: 10.3 THOU/MM3 (ref 4.8–10.8)

## 2023-01-18 PROCEDURE — 99211 OFF/OP EST MAY X REQ PHY/QHP: CPT

## 2023-01-18 PROCEDURE — 36591 DRAW BLOOD OFF VENOUS DEVICE: CPT

## 2023-01-18 PROCEDURE — 2580000003 HC RX 258: Performed by: INTERNAL MEDICINE

## 2023-01-18 PROCEDURE — 85025 COMPLETE CBC W/AUTO DIFF WBC: CPT

## 2023-01-18 PROCEDURE — 6360000002 HC RX W HCPCS: Performed by: INTERNAL MEDICINE

## 2023-01-18 PROCEDURE — 80047 BASIC METABLC PNL IONIZED CA: CPT

## 2023-01-18 RX ORDER — SODIUM CHLORIDE 0.9 % (FLUSH) 0.9 %
5-40 SYRINGE (ML) INJECTION PRN
Status: CANCELLED | OUTPATIENT
Start: 2023-01-18

## 2023-01-18 RX ORDER — HEPARIN SODIUM (PORCINE) LOCK FLUSH IV SOLN 100 UNIT/ML 100 UNIT/ML
500 SOLUTION INTRAVENOUS PRN
Status: DISCONTINUED | OUTPATIENT
Start: 2023-01-18 | End: 2023-01-19 | Stop reason: HOSPADM

## 2023-01-18 RX ORDER — SODIUM CHLORIDE 0.9 % (FLUSH) 0.9 %
5-40 SYRINGE (ML) INJECTION PRN
Status: DISCONTINUED | OUTPATIENT
Start: 2023-01-18 | End: 2023-01-19 | Stop reason: HOSPADM

## 2023-01-18 RX ORDER — 0.9 % SODIUM CHLORIDE 0.9 %
1000 INTRAVENOUS SOLUTION INTRAVENOUS ONCE
Status: CANCELLED
Start: 2023-01-18 | End: 2023-01-18

## 2023-01-18 RX ORDER — SODIUM CHLORIDE 9 MG/ML
25 INJECTION, SOLUTION INTRAVENOUS PRN
Status: CANCELLED | OUTPATIENT
Start: 2023-01-18

## 2023-01-18 RX ORDER — HEPARIN SODIUM (PORCINE) LOCK FLUSH IV SOLN 100 UNIT/ML 100 UNIT/ML
500 SOLUTION INTRAVENOUS PRN
Status: CANCELLED | OUTPATIENT
Start: 2023-01-18

## 2023-01-18 RX ADMIN — SODIUM CHLORIDE, PRESERVATIVE FREE 10 ML: 5 INJECTION INTRAVENOUS at 11:25

## 2023-01-18 RX ADMIN — SODIUM CHLORIDE, PRESERVATIVE FREE 20 ML: 5 INJECTION INTRAVENOUS at 10:26

## 2023-01-18 RX ADMIN — HEPARIN 500 UNITS: 100 SYRINGE at 11:25

## 2023-01-18 RX ADMIN — SODIUM CHLORIDE, PRESERVATIVE FREE 10 ML: 5 INJECTION INTRAVENOUS at 10:25

## 2023-01-18 NOTE — DISCHARGE INSTRUCTIONS
You received lab draw from [address] [facility] while in outpatient oncology clinic. Call if any uncontrolled nausea/vomiting  Call if any fevers/chills/ problems or concerns  Call if any bleeding  Call if any chest pain/pressure  Call if any severe shortness of breath  Drink at least (6) 8oz glasses of fluids daily     If develop any of above condition, please call your MD or go to Emergency Department.

## 2023-01-18 NOTE — PROGRESS NOTES
Patient tolerated  lab draw from [address] At [facility] without any complications. Discharge instructions given to patient-verbalizes understanding. Ambulated off unit per self with belongings.

## 2023-01-18 NOTE — PLAN OF CARE
Problem: Safety - Adult  Goal: Free from fall injury  Outcome: Progressing  Flowsheets (Taken 1/18/2023 1627)  Free From Fall Injury: Instruct family/caregiver on patient safety  Note: No falls occurred with visit today. Verbalized understanding of fall prevention to ask for assistance with ambulation. Call light within reach. Problem: Discharge Planning  Goal: Discharge to home or other facility with appropriate resources  Outcome: Progressing  Flowsheets (Taken 1/18/2023 1627)  Discharge to home or other facility with appropriate resources: Identify barriers to discharge with patient and caregiver  Note: Discuss understanding of discharge instructions,follow-up appointments, and when to call the physician. Problem: Infection - Adult  Goal: Absence of infection at discharge  Outcome: Progressing  Flowsheets (Taken 1/18/2023 1627)  Absence of infection at discharge:   Monitor all insertion sites i.e., indwelling lines, tubes and drains   Assess and monitor for signs and symptoms of infection  Note: Mediport site with no redness or warmth. Skin over port site intact with no signs of breakdown noted. Patient verbalizes signs/symptoms of port infection and when to notify the physician. Goal: Absence of fever/infection during anticipated neutropenic period  Outcome: Progressing     Problem: Chronic Conditions and Co-morbidities  Goal: Patient's chronic conditions and co-morbidity symptoms are monitored and maintained or improved  Outcome: Progressing  Flowsheets (Taken 1/18/2023 1627)  Care Plan - Patient's Chronic Conditions and Co-Morbidity Symptoms are Monitored and Maintained or Improved: Monitor and assess patient's chronic conditions and comorbid symptoms for stability, deterioration, or improvement  Note: Review lab results with patient and family. No transfusion today but return on Friday for repeat cbc. Care plan reviewed with patient and spouse.   Patient and spouse verbalize understanding of the plan of care and contribute to goal setting.

## 2023-01-19 DIAGNOSIS — C25.1 MALIGNANT NEOPLASM OF BODY OF PANCREAS (HCC): Primary | ICD-10-CM

## 2023-01-20 ENCOUNTER — CLINICAL DOCUMENTATION (OUTPATIENT)
Dept: CASE MANAGEMENT | Age: 68
End: 2023-01-20

## 2023-01-20 ENCOUNTER — HOSPITAL ENCOUNTER (OUTPATIENT)
Dept: INFUSION THERAPY | Age: 68
Discharge: HOME | End: 2023-01-20
Payer: MEDICARE

## 2023-01-20 VITALS
BODY MASS INDEX: 20.26 KG/M2 | SYSTOLIC BLOOD PRESSURE: 134 MMHG | RESPIRATION RATE: 16 BRPM | HEIGHT: 69 IN | TEMPERATURE: 97.7 F | OXYGEN SATURATION: 100 % | HEART RATE: 63 BPM | DIASTOLIC BLOOD PRESSURE: 74 MMHG | WEIGHT: 136.8 LBS

## 2023-01-20 DIAGNOSIS — C25.1 MALIGNANT NEOPLASM OF BODY OF PANCREAS (HCC): ICD-10-CM

## 2023-01-20 DIAGNOSIS — I95.1 ORTHOSTATIC HYPOTENSION: Primary | ICD-10-CM

## 2023-01-20 DIAGNOSIS — C25.1 MALIGNANT NEOPLASM OF BODY OF PANCREAS (HCC): Primary | ICD-10-CM

## 2023-01-20 LAB
ABO: NORMAL
ANTIBODY SCREEN: NORMAL
AUTO DIFF PNL BLD: ABNORMAL
BASOPHILS ABSOLUTE: 0.1 THOU/MM3 (ref 0–0.1)
BASOPHILS NFR BLD AUTO: 0.8 %
DEPRECATED RDW RBC AUTO: 62.2 FL (ref 35–45)
EOSINOPHIL NFR BLD AUTO: 0.2 %
EOSINOPHILS ABSOLUTE: 0 THOU/MM3 (ref 0–0.4)
ERYTHROCYTE [DISTWIDTH] IN BLOOD BY AUTOMATED COUNT: 18.6 % (ref 11.5–14.5)
HCT VFR BLD AUTO: 20.8 % (ref 42–52)
HGB BLD-MCNC: 6.9 GM/DL (ref 14–18)
IMM GRANULOCYTES # BLD AUTO: 0.2 THOU/MM3 (ref 0–0.07)
IMM GRANULOCYTES NFR BLD AUTO: 2.4 %
LYMPHOCYTES ABSOLUTE: 0.8 THOU/MM3 (ref 1–4.8)
LYMPHOCYTES NFR BLD AUTO: 9.6 %
MCH RBC QN AUTO: 30.8 PG (ref 26–33)
MCHC RBC AUTO-ENTMCNC: 33.2 GM/DL (ref 32.2–35.5)
MCV RBC AUTO: 92.9 FL (ref 80–94)
MONOCYTES ABSOLUTE: 0.8 THOU/MM3 (ref 0.4–1.3)
MONOCYTES NFR BLD AUTO: 9.5 %
NEUTROPHILS NFR BLD AUTO: 77.5 %
NRBC BLD AUTO-RTO: 0 /100 WBC
PATHOLOGIST REVIEW: ABNORMAL
PLATELET # BLD AUTO: 509 THOU/MM3 (ref 130–400)
PLATELET BLD QL SMEAR: ABNORMAL
PMV BLD AUTO: 8.9 FL (ref 9.4–12.4)
POIKILOCYTES: ABNORMAL
RBC # BLD AUTO: 2.24 MILL/MM3 (ref 4.7–6.1)
RH FACTOR: NORMAL
SCAN OF BLOOD SMEAR: NORMAL
SEGMENTED NEUTROPHILS ABSOLUTE COUNT: 6.5 THOU/MM3 (ref 1.8–7.7)
TOXIC GRANULES BLD QL SMEAR: PRESENT
WBC # BLD AUTO: 8.4 THOU/MM3 (ref 4.8–10.8)

## 2023-01-20 PROCEDURE — 36430 TRANSFUSION BLD/BLD COMPNT: CPT

## 2023-01-20 PROCEDURE — 86850 RBC ANTIBODY SCREEN: CPT

## 2023-01-20 PROCEDURE — 86900 BLOOD TYPING SEROLOGIC ABO: CPT

## 2023-01-20 PROCEDURE — 2580000003 HC RX 258: Performed by: INTERNAL MEDICINE

## 2023-01-20 PROCEDURE — 86923 COMPATIBILITY TEST ELECTRIC: CPT

## 2023-01-20 PROCEDURE — 6360000002 HC RX W HCPCS: Performed by: INTERNAL MEDICINE

## 2023-01-20 PROCEDURE — P9016 RBC LEUKOCYTES REDUCED: HCPCS

## 2023-01-20 PROCEDURE — 36591 DRAW BLOOD OFF VENOUS DEVICE: CPT

## 2023-01-20 PROCEDURE — 85025 COMPLETE CBC W/AUTO DIFF WBC: CPT

## 2023-01-20 PROCEDURE — 86901 BLOOD TYPING SEROLOGIC RH(D): CPT

## 2023-01-20 RX ORDER — ACETAMINOPHEN 325 MG/1
650 TABLET ORAL
OUTPATIENT
Start: 2023-01-20

## 2023-01-20 RX ORDER — SODIUM CHLORIDE 9 MG/ML
25 INJECTION, SOLUTION INTRAVENOUS PRN
Status: CANCELLED | OUTPATIENT
Start: 2023-01-20

## 2023-01-20 RX ORDER — SODIUM CHLORIDE 9 MG/ML
20 INJECTION, SOLUTION INTRAVENOUS CONTINUOUS
Status: CANCELLED | OUTPATIENT
Start: 2023-01-20

## 2023-01-20 RX ORDER — SODIUM CHLORIDE 0.9 % (FLUSH) 0.9 %
5-40 SYRINGE (ML) INJECTION PRN
Status: DISCONTINUED | OUTPATIENT
Start: 2023-01-20 | End: 2023-01-21 | Stop reason: HOSPADM

## 2023-01-20 RX ORDER — ACETAMINOPHEN 325 MG/1
650 TABLET ORAL
Status: CANCELLED | OUTPATIENT
Start: 2023-01-20

## 2023-01-20 RX ORDER — DIPHENHYDRAMINE HYDROCHLORIDE 50 MG/ML
50 INJECTION INTRAMUSCULAR; INTRAVENOUS
Status: CANCELLED | OUTPATIENT
Start: 2023-01-20

## 2023-01-20 RX ORDER — HEPARIN SODIUM (PORCINE) LOCK FLUSH IV SOLN 100 UNIT/ML 100 UNIT/ML
500 SOLUTION INTRAVENOUS PRN
Status: DISCONTINUED | OUTPATIENT
Start: 2023-01-20 | End: 2023-01-21 | Stop reason: HOSPADM

## 2023-01-20 RX ORDER — FAMOTIDINE 10 MG/ML
20 INJECTION, SOLUTION INTRAVENOUS
Status: CANCELLED | OUTPATIENT
Start: 2023-01-20

## 2023-01-20 RX ORDER — ALBUTEROL SULFATE 90 UG/1
4 AEROSOL, METERED RESPIRATORY (INHALATION) PRN
Status: CANCELLED | OUTPATIENT
Start: 2023-01-20

## 2023-01-20 RX ORDER — EPINEPHRINE 1 MG/ML
0.3 INJECTION, SOLUTION, CONCENTRATE INTRAVENOUS PRN
Status: CANCELLED | OUTPATIENT
Start: 2023-01-20

## 2023-01-20 RX ORDER — SODIUM CHLORIDE 9 MG/ML
25 INJECTION, SOLUTION INTRAVENOUS PRN
Status: DISCONTINUED | OUTPATIENT
Start: 2023-01-20 | End: 2023-01-21 | Stop reason: HOSPADM

## 2023-01-20 RX ORDER — HEPARIN SODIUM (PORCINE) LOCK FLUSH IV SOLN 100 UNIT/ML 100 UNIT/ML
500 SOLUTION INTRAVENOUS PRN
Status: CANCELLED | OUTPATIENT
Start: 2023-01-20

## 2023-01-20 RX ORDER — SODIUM CHLORIDE 0.9 % (FLUSH) 0.9 %
5-40 SYRINGE (ML) INJECTION PRN
Status: CANCELLED | OUTPATIENT
Start: 2023-01-20

## 2023-01-20 RX ORDER — SODIUM CHLORIDE 9 MG/ML
20 INJECTION, SOLUTION INTRAVENOUS CONTINUOUS
Status: DISCONTINUED | OUTPATIENT
Start: 2023-01-20 | End: 2023-01-21 | Stop reason: HOSPADM

## 2023-01-20 RX ORDER — DIPHENHYDRAMINE HYDROCHLORIDE 50 MG/ML
50 INJECTION INTRAMUSCULAR; INTRAVENOUS
OUTPATIENT
Start: 2023-01-20

## 2023-01-20 RX ORDER — SODIUM CHLORIDE 9 MG/ML
INJECTION, SOLUTION INTRAVENOUS CONTINUOUS
Status: CANCELLED | OUTPATIENT
Start: 2023-01-20

## 2023-01-20 RX ORDER — ONDANSETRON 2 MG/ML
8 INJECTION INTRAMUSCULAR; INTRAVENOUS
OUTPATIENT
Start: 2023-01-20

## 2023-01-20 RX ORDER — 0.9 % SODIUM CHLORIDE 0.9 %
1000 INTRAVENOUS SOLUTION INTRAVENOUS ONCE
Status: CANCELLED
Start: 2023-01-20 | End: 2023-01-20

## 2023-01-20 RX ORDER — ONDANSETRON 2 MG/ML
8 INJECTION INTRAMUSCULAR; INTRAVENOUS
Status: CANCELLED | OUTPATIENT
Start: 2023-01-20

## 2023-01-20 RX ORDER — ALBUTEROL SULFATE 90 UG/1
4 AEROSOL, METERED RESPIRATORY (INHALATION) PRN
OUTPATIENT
Start: 2023-01-20

## 2023-01-20 RX ORDER — SODIUM CHLORIDE 9 MG/ML
INJECTION, SOLUTION INTRAVENOUS CONTINUOUS
OUTPATIENT
Start: 2023-01-20

## 2023-01-20 RX ADMIN — SODIUM CHLORIDE, PRESERVATIVE FREE 10 ML: 5 INJECTION INTRAVENOUS at 10:24

## 2023-01-20 RX ADMIN — SODIUM CHLORIDE, PRESERVATIVE FREE 20 ML: 5 INJECTION INTRAVENOUS at 10:25

## 2023-01-20 RX ADMIN — HEPARIN 500 UNITS: 100 SYRINGE at 16:36

## 2023-01-20 RX ADMIN — SODIUM CHLORIDE 20 ML/HR: 9 INJECTION, SOLUTION INTRAVENOUS at 14:13

## 2023-01-20 RX ADMIN — SODIUM CHLORIDE, PRESERVATIVE FREE 20 ML: 5 INJECTION INTRAVENOUS at 16:36

## 2023-01-20 RX ADMIN — SODIUM CHLORIDE 20 ML/HR: 9 INJECTION, SOLUTION INTRAVENOUS at 11:45

## 2023-01-20 NOTE — PROGRESS NOTES
Name: Brenna Nicholson  : 1955  MRN: [id number]    Oncology Navigation Follow-Up Note    Contact Type:  Medical Oncology    Notes:   Amanda Foster here for blood work today. Hgb 6. 9-Blood today. He has been feeling good, not working though. Trying to eat iron rich foods-red meat and liver. HGB still went down a little. CT abdomen/pelvis . \  Appointment with Dr. Sonya Chapman and possible treatment . Encouraged to take it easy over weekend.     Electronically signed by Lisandro Andrews RN on 2023 at 12:05 PM

## 2023-01-20 NOTE — DISCHARGE INSTRUCTIONS
Patient tolerated mediport port flush with lab draw and transfusion of 2 units of packed red blood cells without any complications. Patient kept for 20 minutes observation post transfusion. Patient denies any fever/chills, pain on the sides of the torso or back, shortness of breath, difficulty swallowing, itchy skin, rash, nausea, vomiting, or blood in the urine or dark colored-urine. Denies any dizziness, lightheadedness, acute nausea or vomiting, headache, chest pain/pressure, rash/itching, or an increase in shortness of breath. Patient instructed, although extremely rare, delayed reactions can occur days or weeks after the transfusion such as anemia, low-graded fever, jaundice, low blood pressure, wheezing, anxiety, or red-colored urine. Patient instructed if they experience any of the above symptoms following today's transfusion, he is to notify their physician immediately or go to the emergency department.

## 2023-01-20 NOTE — CONSENT
Informed Consent for Blood Component Transfusion Note    I have discussed with the patient the rationale for blood component transfusion; its benefits in treating or preventing fatigue, organ damage, or death; and its risk which includes mild transfusion reactions, rare risk of blood borne infection, or more serious but rare reactions. I have discussed the alternatives to transfusion, including the risk and consequences of not receiving transfusion. The patient had an opportunity to ask questions and had agreed to proceed with transfusion of blood components.     Electronically signed by Beth Mccloud MD on 1/20/23 at 1:09 PM EST

## 2023-01-20 NOTE — PLAN OF CARE
Problem: Safety - Adult  Goal: Free from fall injury  Outcome: Adequate for Discharge  Flowsheets (Taken 1/20/2023 1459)  Free From Fall Injury: Instruct family/caregiver on patient safety     Problem: Discharge Planning  Goal: Discharge to home or other facility with appropriate resources  Outcome: Adequate for Discharge  Flowsheets (Taken 1/20/2023 1459)  Discharge to home or other facility with appropriate resources: Identify barriers to discharge with patient and caregiver     Problem: Chronic Conditions and Co-morbidities  Goal: Patient's chronic conditions and co-morbidity symptoms are monitored and maintained or improved  Outcome: Adequate for Discharge  Flowsheets (Taken 1/20/2023 1459)  Care Plan - Patient's Chronic Conditions and Co-Morbidity Symptoms are Monitored and Maintained or Improved: Collaborate with multidisciplinary team to address chronic and comorbid conditions and prevent exacerbation or deterioration  Note: Patient educated over blood product transfusion protocol:    Patient receiving 2 units of packed red blood cells:      -  Blood product transfusion information sheet given with questions answered. -  Blood consent signed  -  Take vital signs/ monitor lungs sounds prior to transfusion.  -  Monitor patient for 15 minutes after transfusion started. -  Take vital signs / monitor lungs sounds after first 15 minutes. -  Assess IV site. -  Monitor patient closely for potential transfusion reaction.  -  Take vital signs /monitor lung sounds after the completion of transfusion. Call MD if develop complications prior to discharge. Problem: Infection - Adult  Goal: Absence of infection at discharge  Outcome: Adequate for Discharge  Flowsheets (Taken 1/20/2023 1459)  Absence of infection at discharge: Monitor all insertion sites i.e., indwelling lines, tubes and drains  Note: Mediport site with no redness or warmth. Skin over port site intact with no signs of breakdown noted.  Patient verbalizes signs/symptoms of port infection and when to notify the physician.     Goal: Absence of fever/infection during anticipated neutropenic period  Outcome: Adequate for Discharge

## 2023-01-20 NOTE — PROGRESS NOTES
Patient tolerated mediport port flush with lab draw and transfusion of 2 units of packed red blood cells without any complications. Patient kept for 20 minutes observation post transfusion. Patient denies any fever/chills, pain on the sides of the torso or back, shortness of breath, difficulty swallowing, itchy skin, rash, nausea, vomiting, or blood in the urine or dark colored-urine. Denies any dizziness, lightheadedness, acute nausea or vomiting, headache, chest pain/pressure, rash/itching, or an increase in shortness of breath. Patient instructed, although extremely rare, delayed reactions can occur days or weeks after the transfusion such as anemia, low-graded fever, jaundice, low blood pressure, wheezing, anxiety, or red-colored urine. Patient instructed if they experience any of the above symptoms following today's transfusion, he is to notify their physician immediately or go to the emergency department. Discharge instructions given to patient. Verbalizes understanding. Ambulated off unit per self, accompanied by spouse, with belongings.

## 2023-01-23 ENCOUNTER — HOSPITAL ENCOUNTER (OUTPATIENT)
Dept: CT IMAGING | Age: 68
Discharge: HOME | End: 2023-01-23
Payer: MEDICARE

## 2023-01-23 DIAGNOSIS — C25.0 MALIGNANT NEOPLASM OF HEAD OF PANCREAS (HCC): ICD-10-CM

## 2023-01-23 PROCEDURE — 74177 CT ABD & PELVIS W/CONTRAST: CPT

## 2023-01-23 PROCEDURE — 6360000004 HC RX CONTRAST MEDICATION: Performed by: NURSE PRACTITIONER

## 2023-01-23 RX ORDER — PANTOPRAZOLE SODIUM 40 MG/1
TABLET, DELAYED RELEASE ORAL
Qty: 90 TABLET | Refills: 3 | Status: SHIPPED | OUTPATIENT
Start: 2023-01-23

## 2023-01-23 RX ADMIN — IOPAMIDOL 80 ML: 755 INJECTION, SOLUTION INTRAVENOUS at 15:08

## 2023-01-24 ENCOUNTER — HOSPITAL ENCOUNTER (OUTPATIENT)
Dept: INFUSION THERAPY | Age: 68
Discharge: HOME | End: 2023-01-24
Payer: MEDICARE

## 2023-01-24 ENCOUNTER — OFFICE VISIT (OUTPATIENT)
Dept: ONCOLOGY | Age: 68
End: 2023-01-24
Payer: MEDICARE

## 2023-01-24 VITALS
WEIGHT: 135.6 LBS | OXYGEN SATURATION: 100 % | TEMPERATURE: 98.5 F | HEART RATE: 76 BPM | SYSTOLIC BLOOD PRESSURE: 136 MMHG | DIASTOLIC BLOOD PRESSURE: 73 MMHG | RESPIRATION RATE: 16 BRPM | HEIGHT: 69 IN | BODY MASS INDEX: 20.08 KG/M2

## 2023-01-24 VITALS
DIASTOLIC BLOOD PRESSURE: 73 MMHG | RESPIRATION RATE: 16 BRPM | OXYGEN SATURATION: 100 % | BODY MASS INDEX: 20.08 KG/M2 | SYSTOLIC BLOOD PRESSURE: 136 MMHG | HEART RATE: 76 BPM | TEMPERATURE: 98.5 F | WEIGHT: 135.6 LBS | HEIGHT: 69 IN

## 2023-01-24 DIAGNOSIS — I95.1 ORTHOSTATIC HYPOTENSION: Primary | ICD-10-CM

## 2023-01-24 DIAGNOSIS — C25.1 MALIGNANT NEOPLASM OF BODY OF PANCREAS (HCC): Primary | ICD-10-CM

## 2023-01-24 DIAGNOSIS — C25.1 MALIGNANT NEOPLASM OF BODY OF PANCREAS (HCC): ICD-10-CM

## 2023-01-24 LAB
ABSOLUTE IMMATURE GRANULOCYTE: 0.05 THOU/MM3 (ref 0–0.07)
ALBUMIN SERPL BCG-MCNC: 2.8 G/DL (ref 3.5–5.1)
ALP SERPL-CCNC: 135 U/L (ref 38–126)
ALT SERPL W/O P-5'-P-CCNC: 8 U/L (ref 11–66)
AST SERPL-CCNC: 23 U/L (ref 5–40)
BASOPHILS ABSOLUTE: 0.1 THOU/MM3 (ref 0–0.1)
BASOPHILS NFR BLD AUTO: 1 % (ref 0–3)
BILIRUB CONJ SERPL-MCNC: < 0.2 MG/DL (ref 0–0.3)
BILIRUB SERPL-MCNC: 0.3 MG/DL (ref 0.3–1.2)
BUN BLDP-MCNC: 19 MG/DL (ref 8–26)
CHLORIDE BLD-SCNC: 112 MEQ/L (ref 98–109)
CREAT BLD-MCNC: 1.9 MG/DL (ref 0.5–1.2)
EOSINOPHIL NFR BLD AUTO: 0 % (ref 0–4)
EOSINOPHILS ABSOLUTE: 0 THOU/MM3 (ref 0–0.4)
ERYTHROCYTE [DISTWIDTH] IN BLOOD BY AUTOMATED COUNT: 17.8 % (ref 11.5–14.5)
GFR SERPL CREATININE-BSD FRML MDRD: 38 ML/MIN/1.73M2
GLUCOSE BLD-MCNC: 84 MG/DL (ref 70–108)
HCT VFR BLD AUTO: 27.4 % (ref 42–52)
HGB BLD-MCNC: 9 GM/DL (ref 14–18)
IMMATURE GRANULOCYTES: 1 %
IONIZED CALCIUM, WHOLE BLOOD: 1.26 MMOL/L (ref 1.12–1.32)
LYMPHOCYTES ABSOLUTE: 0.9 THOU/MM3 (ref 1–4.8)
LYMPHOCYTES NFR BLD AUTO: 11 % (ref 15–47)
MCH RBC QN AUTO: 30.6 PG (ref 26–33)
MCHC RBC AUTO-ENTMCNC: 32.8 GM/DL (ref 32.2–35.5)
MCV RBC AUTO: 93 FL (ref 80–94)
MONOCYTES ABSOLUTE: 1 THOU/MM3 (ref 0.4–1.3)
MONOCYTES NFR BLD AUTO: 12 % (ref 0–12)
NEUTROPHILS NFR BLD AUTO: 75 % (ref 43–75)
PLATELET # BLD AUTO: 311 THOU/MM3 (ref 130–400)
PMV BLD AUTO: 8.7 FL (ref 9.4–12.4)
POTASSIUM BLD-SCNC: 4.5 MEQ/L (ref 3.5–4.9)
PROT SERPL-MCNC: 5.7 G/DL (ref 6.1–8)
RBC # BLD AUTO: 2.94 MILL/MM3 (ref 4.7–6.1)
SEGMENTED NEUTROPHILS ABSOLUTE COUNT: 6.3 THOU/MM3 (ref 1.8–7.7)
SODIUM BLD-SCNC: 139 MEQ/L (ref 138–146)
TOTAL CO2, WHOLE BLOOD: 17 MEQ/L (ref 23–33)
WBC # BLD AUTO: 8.3 THOU/MM3 (ref 4.8–10.8)

## 2023-01-24 PROCEDURE — 3074F SYST BP LT 130 MM HG: CPT | Performed by: INTERNAL MEDICINE

## 2023-01-24 PROCEDURE — 36591 DRAW BLOOD OFF VENOUS DEVICE: CPT

## 2023-01-24 PROCEDURE — 80047 BASIC METABLC PNL IONIZED CA: CPT

## 2023-01-24 PROCEDURE — G8484 FLU IMMUNIZE NO ADMIN: HCPCS | Performed by: INTERNAL MEDICINE

## 2023-01-24 PROCEDURE — 1123F ACP DISCUSS/DSCN MKR DOCD: CPT | Performed by: INTERNAL MEDICINE

## 2023-01-24 PROCEDURE — G8420 CALC BMI NORM PARAMETERS: HCPCS | Performed by: INTERNAL MEDICINE

## 2023-01-24 PROCEDURE — 4004F PT TOBACCO SCREEN RCVD TLK: CPT | Performed by: INTERNAL MEDICINE

## 2023-01-24 PROCEDURE — 99211 OFF/OP EST MAY X REQ PHY/QHP: CPT

## 2023-01-24 PROCEDURE — 2580000003 HC RX 258: Performed by: INTERNAL MEDICINE

## 2023-01-24 PROCEDURE — 3078F DIAST BP <80 MM HG: CPT | Performed by: INTERNAL MEDICINE

## 2023-01-24 PROCEDURE — 99215 OFFICE O/P EST HI 40 MIN: CPT | Performed by: INTERNAL MEDICINE

## 2023-01-24 PROCEDURE — 3017F COLORECTAL CA SCREEN DOC REV: CPT | Performed by: INTERNAL MEDICINE

## 2023-01-24 PROCEDURE — G8427 DOCREV CUR MEDS BY ELIG CLIN: HCPCS | Performed by: INTERNAL MEDICINE

## 2023-01-24 PROCEDURE — 80076 HEPATIC FUNCTION PANEL: CPT

## 2023-01-24 PROCEDURE — 6360000002 HC RX W HCPCS: Performed by: INTERNAL MEDICINE

## 2023-01-24 PROCEDURE — 85025 COMPLETE CBC W/AUTO DIFF WBC: CPT

## 2023-01-24 RX ORDER — SODIUM CHLORIDE 0.9 % (FLUSH) 0.9 %
5-40 SYRINGE (ML) INJECTION PRN
OUTPATIENT
Start: 2023-01-24

## 2023-01-24 RX ORDER — HEPARIN SODIUM (PORCINE) LOCK FLUSH IV SOLN 100 UNIT/ML 100 UNIT/ML
500 SOLUTION INTRAVENOUS PRN
Status: DISCONTINUED | OUTPATIENT
Start: 2023-01-24 | End: 2023-01-25 | Stop reason: HOSPADM

## 2023-01-24 RX ORDER — HEPARIN SODIUM (PORCINE) LOCK FLUSH IV SOLN 100 UNIT/ML 100 UNIT/ML
500 SOLUTION INTRAVENOUS PRN
OUTPATIENT
Start: 2023-01-24

## 2023-01-24 RX ORDER — SODIUM CHLORIDE 0.9 % (FLUSH) 0.9 %
5-40 SYRINGE (ML) INJECTION PRN
Status: DISCONTINUED | OUTPATIENT
Start: 2023-01-24 | End: 2023-01-25 | Stop reason: HOSPADM

## 2023-01-24 RX ORDER — 0.9 % SODIUM CHLORIDE 0.9 %
1000 INTRAVENOUS SOLUTION INTRAVENOUS ONCE
Start: 2023-01-24 | End: 2023-01-24

## 2023-01-24 RX ORDER — SODIUM CHLORIDE 9 MG/ML
25 INJECTION, SOLUTION INTRAVENOUS PRN
OUTPATIENT
Start: 2023-01-24

## 2023-01-24 RX ADMIN — HEPARIN 500 UNITS: 100 SYRINGE at 09:01

## 2023-01-24 RX ADMIN — SODIUM CHLORIDE, PRESERVATIVE FREE 20 ML: 5 INJECTION INTRAVENOUS at 08:06

## 2023-01-24 RX ADMIN — SODIUM CHLORIDE, PRESERVATIVE FREE 10 ML: 5 INJECTION INTRAVENOUS at 08:05

## 2023-01-24 RX ADMIN — SODIUM CHLORIDE, PRESERVATIVE FREE 10 ML: 5 INJECTION INTRAVENOUS at 09:01

## 2023-01-24 ASSESSMENT — ENCOUNTER SYMPTOMS
SORE THROAT: 0
SHORTNESS OF BREATH: 0
TROUBLE SWALLOWING: 0
NAUSEA: 1
COUGH: 0
ABDOMINAL PAIN: 1
RHINORRHEA: 0

## 2023-01-24 NOTE — PATIENT INSTRUCTIONS
Reviewed labs and recent medical history  Discussed the findings on his CT Scan. Order placed for PET/CT scan to further evaluate findings. Holding chemotherapy today. Encouraged patient to continue to eat lean protein to help with fluid. Advised to use support stockings to help with pedal edema. Return to see MD in 1 week to review results.  (2 pm)

## 2023-01-24 NOTE — PROGRESS NOTES
Patient tolerated port flush with lab draw without any complications. Last vital signs:   /73   Pulse 76   Temp 98.5 °F (36.9 °C) (Oral)   Resp 16   Ht 5' 9\" (1.753 m)   Wt 135 lb 9.6 oz (61.5 kg)   SpO2 100%   BMI 20.02 kg/m²     Physician's instructions:  Reviewed labs and recent medical history  Discussed the findings on his CT Scan. Order placed for PET/CT scan to further evaluate findings. Holding chemotherapy today. Encouraged patient to continue to eat lean protein to help with fluid. Advised to use support stockings to help with pedal edema. Return to see MD in 1 week to review results. (2 pm)    Patient instructed if experience any symptoms following today's port flush with lab draw, he is to notify MD immediately or go to the emergency department. Discharge instructions given to patient. Verbalizes understanding. Ambulated off unit per self, accompanied by spouse, with belongings.

## 2023-01-24 NOTE — PLAN OF CARE
Problem: Safety - Adult  Goal: Free from fall injury  Outcome: Adequate for Discharge  Flowsheets (Taken 1/24/2023 0951)  Free From Fall Injury: Instruct family/caregiver on patient safety     Problem: Discharge Planning  Goal: Discharge to home or other facility with appropriate resources  Outcome: Adequate for Discharge  Flowsheets (Taken 1/24/2023 6116)  Discharge to home or other facility with appropriate resources: Identify barriers to discharge with patient and caregiver     Problem: Chronic Conditions and Co-morbidities  Goal: Patient's chronic conditions and co-morbidity symptoms are monitored and maintained or improved  Outcome: Adequate for Discharge  Flowsheets (Taken 1/24/2023 0951)  Care Plan - Patient's Chronic Conditions and Co-Morbidity Symptoms are Monitored and Maintained or Improved: Collaborate with multidisciplinary team to address chronic and comorbid conditions and prevent exacerbation or deterioration     Problem: Infection - Adult  Goal: Absence of infection at discharge  Outcome: Adequate for Discharge  Flowsheets (Taken 1/24/2023 0951)  Absence of infection at discharge: Monitor all insertion sites i.e., indwelling lines, tubes and drains  Note: Mediport site with no redness or warmth. Skin over port site intact with no signs of breakdown noted. Patient verbalizes signs/symptoms of port infection and when to notify the physician. Goal: Absence of fever/infection during anticipated neutropenic period  Outcome: Adequate for Discharge     Care plan reviewed with patient and spouse. Patient and spouse verbalize understanding of the plan of care and contribute to goal setting.

## 2023-01-24 NOTE — PROGRESS NOTES
[address] [facility]  [address] [place] [address]  Dept: [phone]  Loc: Shantanu Larose 1945 [address] [facility]  1955   No ref. provider found   Boby Dee MD       Geoffrey Shaw is a 69 y.o.  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  75-year-old male with 6 months of weight loss. He went to the dentist and had some bad teeth. He was told that he had to have these pulled. He was nervous about this and was not eating and then felt that he had an infection that was spreading through his body. He began having stomach pain and was treated for ulcers but this did not cause any improvement. Ultrasound was performed and was abnormal leading to a CT scan. Had decreased his beer intake from 12 pack of beer a day to 2. Felt restless, agitated, anxious. 8/25/2022. CAT scan of the abdomen performed locally showed a mass in the body of the pancreas 5 x 5.5 x 4.8 cm with involvement of the celiac axis and encasement of the superior mesenteric artery, narrows the celiac trunk and infiltrates the root of the mesentery. There were numerous enlarged peripancreatic and mesenteric lymph nodes measuring up to 14 mm. .  Labs in July were normal.  Just returned from a cruise to the [place]. Had noted dark urine for a week and appeared to be slightly jaundiced. 9/19/2022. Bilirubin was 4.5 with ALT of 136 and AST of 148. CA 19-9 was 321. Sugar was 111. CBC showed a white count 6.6 with a hemoglobin of 14 hematocrit 42 and a platelet count of 588,576.    9/19/2022. Endoscopy was performed. Endosonographic findings showed diffuse dilatation of the intrahepatic bile ducts.   There is an irregular mass in the genu of the pancreas and pancreatic body measuring approximately 4.5 x 3.8 cm. There was sonographic evidence of invasion into the superior mesenteric artery and a few abnormal lymph nodes were visualized in the peripancreatic region. Fine-needle aspiration of mass of the body of the pancreas at [facility] showed high-grade neuroendocrine carcinoma favoring small cell carcinoma. 9/19/2022. ERCP. There is no evidence of esophageal varices or gastric varices. There is stenosis in the main bile duct in the middle third. Sphincterotomy was performed. An uncovered metal stent was placed and he was discharged home. 9/21/2022. Patient followed up with his primary care physician Dr. Naomi Manzanares. Patient said that he felt better but was continuing to lose weight.    9/28/2022. PET scan showed that there was an FDG avid pancreatic mass highly suspicious for malignancy that was better seen on recent CT scan. Maximum SUV was 5.7 with areas of photopenia in the mass associated with hypoattenuation likely secondary to necrosis. The urinary bladder was markedly distended and there was bilateral hydroureter. Prostate was enlarged with calcifications. There is no evidence of mesenteric retroperitoneal pelvic or inguinal lymphadenopathy that was FDG avid. Degenerative changes were seen in the lumbar spine and pelvis without evidence of hypermetabolic avidity    Comorbidities include hypertension,hypothyroidism, anxiety, alcohol use disorder, nicotine use disorder. The patient said that he used to weigh 197 pounds and now he weighs 137. He says that he feels cold all of the time. 10/4/2022. Initial clinical nurse oncology navigation. Encouraged physical activity, smoking cessation, minimization of alcohol take, consideration of being seen back at [facility] for their input. 10/14/2022. Consultation with Dr. James Rodriguez radiation oncology.   Collaborated with Dr. James Rodriguez who wants to give radiation and chemotherapy concurrently à la small cell carcinoma of the lung protocols. 10/ 26/ 2022. Chemotherapy teaching. 10/31/2022. Port placed by Dr. Raghavendra Weaver. 11/1/2022. Mr. Pratima Marina is here today to begin his chemotherapy. He is quite flat in his affect. His wife says that she has been present at all of the interactions and has been taking in the information which he refuses to review. Today we had a long discussion concerning the side effects of his chemotherapy that will be started today as well as his radiation therapy. He verbalized understanding and was engaged in the conversation although distant in his interaction. She has been quite tearful today. They know that they must call for any complications, questions or concerns. 11/29/2022. Mr. Pratima Marina has been doing much better since his treatment has been ongoing. He had significant drop in his counts from his chemotherapy but he had no problems with infection, bleeding or significant symptoms from his anemia. His treatments were held while his counts recovered. His lowest white count was 1.2. He gets bursts of energy and does a lot of work and then is quite tired. He has had diarrhea off and on. He has had no significant nausea and vomiting. He denies fevers, chills, sweats and denies pain. He broke off a tooth and is interested in going to the dentist.  He was educated that he needs to have his counts checked before any dental work is performed. 12/20/2022. He returns to the office with his wife to consider further chemotherapy. He has just completed his radiation. His counts are borderline low and his creatinine is elevated at 1.3 which is higher than his baseline of 0.9. He has lost about 4 pounds and currently weighs 129 pounds at 5 feet 9 inches tall. During his last cycle he started out with a white count of 6000 and he became quite neutropenic but did not become febrile.   For all of these reasons he needs another week off to recover before we continue his chemotherapy. He continues to have bursts of energy and then extremely fatigued. He denies any fevers, chills, sweats, bleeding, nausea, vomiting, constipation and diarrhea. Today is his birthday. His appetite is getting slightly better. 2022. Mr. 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 He is due to receive his chemotherapy this week. He has trouble to keep his weight on. On 1215 he weighed 134  Weighs 131. He is 5 feet 9 inches tall. 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 Denies significant pain. He has had no fevers, chills, sweats, bleeding, nausea or vomiting constipation or diarrhea. He says that his appetite is getting better. His counts are adequate for treatment today. INTERVAL NOTE    2023. Mr. Lorena Zamorano returns to the office after having had a CAT scan of his abdomen and pelvis ordered by radiation oncology. This shows that the mass in the head of the pancreas measured 52 x 53 mm previously measuring 55 x 48 mm. It also showed a new lesion in the liver being 13 mm in the dome of the right lobe suspicious for metastasis. There is some mild decrease in the size of lymph nodes adjacent to the pancreas from 14 to 12 mm. Has enteric lymph nodes previously measured 816 mm and now measures 14 mm. A peripancreatic lymph node had decreased as noted above. Obviously Mr. Lorena Zamorano is upset by this information. He had been scheduled for another cycle of chemotherapy with cis-platinum and etoposide but he has had complications with each cycle and if this is not keeping his disease and check it does not make sense to go forward with such toxic therapy. He had required fluids and transfusions. His blood pressure was low he had orthostatic dizziness.   Today he says that he has had no fevers, chills, sweats, bleeding, nausea or vomiting, constipation or diarrhea. He says that his appetite is good. His ankles are swollen. He has a prominent abdomen and possibly has ascites. We discussed doing next generation sequencing with a liquid biopsy. His original tumor biopsy was an FNA that was done at [facility]. It is likely there will not be enough tissue to do PD-L1 testing. Guidelines suggest that pembrolizumab  can be done for MSI high or advanced TMB greater than 10 mutations per megabyte or deficiency and mismatch repair genes. FOLFOX or FOLFIRI certainly can be given as well. We will also recheck his CA 19 9. MONITORING PARAMETERS    Physical examinations, CAT scans and PET scans    PAST MEDICAL HISTORY  Past Medical History:   Diagnosis Date    History of blood transfusion     Hypertension     Hypothyroidism     Malignant neoplasm of pancreas, unspecified location of malignancy ([facility] [address] 09/2022        REVIEW OF SYSTEMS  Review of Systems   Constitutional:  Positive for appetite change and fatigue. Negative for chills, diaphoresis and fever. HENT:  Negative for dental problem, rhinorrhea, sore throat and trouble swallowing. Eyes:  Negative for visual disturbance. Respiratory:  Negative for cough and shortness of breath. Cardiovascular:  Positive for leg swelling. Gastrointestinal:  Positive for abdominal pain and nausea. Genitourinary:  Negative for dysuria, hematuria and urgency. Musculoskeletal:  Positive for myalgias (generalized). Negative for arthralgias. Skin:  Positive for pallor. Neurological:  Positive for weakness. Negative for light-headedness and headaches. Hematological:  Bruises/bleeds easily. Psychiatric/Behavioral:  Positive for sleep disturbance. Negative for self-injury and suicidal ideas (unchanged). The patient is not nervous/anxious.        FAMILY HISTORY  Family History   Problem Relation Age of Onset    High Blood Pressure Mother     Lung Cancer Father         SOCIAL HISTORY  Social History     Socioeconomic History    Marital status:      Spouse name: Jennifer Woo    Number of children: 3    Years of education: Not on file    Highest education level: Not on file   Occupational History    Not on file   Tobacco Use    Smoking status: Every Day     Types: Cigars     Start date: 1/1/2012     Passive exposure: Past (Dad smoked)    Smokeless tobacco: Never    Tobacco comments:     -3-4 cigars per day. denies cessation counseling 11/1/22. Declines counseling 12/28     Same as above 12/29     Same as above 1/18     Same as above 1/24   Vaping Use    Vaping Use: Never used   Substance and Sexual Activity    Alcohol use: Yes     Comment: 2-3 beers per day since 2022   Previous drank 8-12    Drug use: No    Sexual activity: Not on file   Other Topics Concern    Not on file   Social History Narrative    Not on file     Social Determinants of Health     Financial Resource Strain: Not on file   Food Insecurity: Not on file   Transportation Needs: Not on file   Physical Activity: Inactive    Days of Exercise per Week: 0 days    Minutes of Exercise per Session: 0 min   Stress: Not on file   Social Connections: Not on file   Intimate Partner Violence: Not on file   Housing Stability: Not on file        CURRENT MEDICATIONS  Current Outpatient Medications   Medication Sig Dispense Refill    pantoprazole (PROTONIX) 40 MG tablet take 1 tablet by mouth EVERY MORNING BEFORE BREAKFAST 90 tablet 3    levothyroxine (SYNTHROID) 25 MCG tablet Take 1 tablet by mouth daily 30 tablet 1    levothyroxine (SYNTHROID) 200 MCG tablet Take 1 tablet by mouth daily 30 tablet 1    losartan (COZAAR) 100 MG tablet TAKE 1 TABLET DAILY 90 tablet 1    lidocaine-prilocaine (EMLA) 2.5-2.5 % cream Apply topically as needed. 5 g 3    dilTIAZem (DILACOR XR) 240 MG extended release capsule TAKE 1 CAPSULE DAILY 90 capsule 3    Multiple Vitamin (MULTIVITAMIN PO) Take 1 capsule by mouth daily.         aspirin 81 MG EC tablet Take 81 mg by mouth daily.        sucralfate (CARAFATE) 1 GM tablet Take 1 tablet by mouth in the morning, at noon, in the evening, and at bedtime (Patient not taking: No sig reported)      ondansetron (ZOFRAN) 4 MG tablet Take 1 tablet by mouth daily as needed for Nausea or Vomiting (Patient not taking: No sig reported) 30 tablet 0    prochlorperazine (COMPAZINE) 10 MG tablet Take 1 tablet by mouth every 6 hours as needed (for nausea and vomiting) (Patient not taking: No sig reported) 60 tablet 3     No current facility-administered medications for this visit. Facility-Administered Medications Ordered in Other Visits   Medication Dose Route Frequency Provider Last Rate Last Admin    sodium chloride flush 0.9 % injection 5-40 mL  5-40 mL IntraVENous PRN Tara Garcia MD   20 mL at 01/24/23 0806    heparin flush 100 UNIT/ML injection 500 Units  500 Units IntraCATHeter PRN Tara Garcia MD            OARRS    PDMP Monitoring:    Last PDMP Joel Hoskins as Reviewed [facility]):  Review User Review Instant Review Result   Fausto Miller 10/5/2022 12:45 AM Reviewed PDMP [1]       Urine Drug Screenings (1 yr)    No resulted procedures found. Medication Contract and Consent for Opioid Use Documents Filed        No documents found                     PHYSICAL EXAM    Physical Exam  Vitals and nursing note reviewed. Exam conducted with a chaperone present. Constitutional:       Appearance: He is ill-appearing (chronically). He is not toxic-appearing or diaphoretic. Comments: Mr. Lorena Zamorano is an extremely thin  male who is upset with this information. He is here today with his wife. He is pale and appears to be chronically ill. He is 5 feet 9 inches and weighs 135 pounds. He actually has gained 4 pounds since last time I saw him. HENT:      Head: Normocephalic and atraumatic. Comments: Temporal wasting     Nose: Nose normal.      Mouth/Throat:      Mouth: Mucous membranes are moist.      Pharynx: Oropharynx is clear.  No oropharyngeal exudate or posterior oropharyngeal erythema. Comments: Wears dentures  Eyes:      General: No scleral icterus. Extraocular Movements: Extraocular movements intact. Conjunctiva/sclera: Conjunctivae normal.      Pupils: Pupils are equal, round, and reactive to light. Cardiovascular:      Rate and Rhythm: Normal rate. Pulses: Normal pulses. Pulmonary:      Effort: Pulmonary effort is normal. No respiratory distress. Breath sounds: No wheezing, rhonchi or rales. Comments: Decreased breath sounds throughout  Chest:      Chest wall: No tenderness. Abdominal:      General: Abdomen is flat. There is distension. Palpations: There is no mass. Tenderness: There is no abdominal tenderness. There is no guarding or rebound. Hernia: No hernia is present. Comments: Ascities   Musculoskeletal:         General: Normal range of motion. Cervical back: Normal range of motion and neck supple. No rigidity or tenderness. Right lower leg: Edema present. Left lower leg: Edema present. Lymphadenopathy:      Cervical: No cervical adenopathy. Skin:     General: Skin is warm and dry. Coloration: Skin is pale. Skin is not jaundiced. Neurological:      General: No focal deficit present. Mental Status: He is alert and oriented to person, place, and time. Cranial Nerves: No cranial nerve deficit. Motor: No weakness. Gait: Gait normal.      Comments: Hard of hearing   Psychiatric:         Mood and Affect: Mood normal.         Behavior: Behavior normal.         Thought Content: Thought content normal.         Judgment: Judgment normal.        ECOG STATUS    1:  Restricted in physically strenuous activity but ambulatory and able to carry out work of a light or sedentary nature, e.g., light house work, office work    LABS/IMAGING    CT ABDOMEN PELVIS W IV CONTRAST Additional Contrast? Oral    Result Date: 1/23/2023  1.  The mass at the head of the pancreas is ill-defined measuring approximately 52 x 53 mm (previously measured 55 x 48 mm). The prominent peripancreatic and mesenteric lymph nodes are not significantly changed however the degree of moderate diffuse mesenteric edema and moderate generalized intra-abdominal ascites has progressed. A new 13 mm hypoattenuating lesion in the dome of the right hepatic lobe (series 2, image 9) is worrisome for metastatic disease. Mild intra and extra hepatic biliary ductal dilatation and pneumobilia is observed. A common bile duct stent is present. 2. Moderate circumferential mucosal thickening predominantly involving the right colon possibly infectious/inflammatory although neoplasm is not excluded. No bowel obstruction or drainable fluid collection is identified. The appendix is normal. 3. Marked distention of the urinary bladder suggesting urinary retention with mild to moderate bilateral hydronephrosis/hydroureter. Correlate with urinalysis. The prostate gland is enlarged and heterogeneous in attenuation. Correlate with PSA levels **This report has been created using voice recognition software. It may contain minor errors which are inherent in voice recognition technology. ** Final report electronically signed by Dr Delfina Greene on 1/23/2023 3:58 PM     Laboratory data done today shows that his BUN is 19 and his creatinine is 1.9. His calcium is normal.  Total protein is 5.7. Sugar is 84. Albumin is only 2.8. Alkaline phosphatase is elevated at 135. AST is normal ALT is low at 8. CBC shows a white count of 8.3 with a hemoglobin of 9 hematocrit 27.4 and platelet count of 569,830. White blood cell differential count shows 75% polys, 11 lymphs, 12 monos and 1 basophil. PATHOLOGY/GENETICS    Neuroendocrine small cell carcinoma of the pancreas, unresectable with involvement of the SMA and lymph nodes      ASSESSMENT and PLAN    1.   Neuroendocrine small cell carcinoma of the pancreas, unresectable, with involvement of the SMA and with lymphadenopathy. He has completed 3 cycles of chemotherapy and concurrent radiation. Despite this and notes attendant toxicities, he has had the development of a lesion in his liver measuring 13 mm consistent with progressive disease. His tumor size has minimally decreased as have several of his lymph nodes. Because of the extreme toxicity of his disease meant we will not give him further combination chemotherapy with cisplatin and etoposide but will evaluate him further performing a liquid biopsy, a repeat CA 19 9 and a PET scan. Fully these data points will help guide us in the right direction. 2.  Abdominal swelling and peripheral edema. I have encouraged him to wear support stockings, cut down on the salt in his diet and monitor his weight. His albumin has been markedly low accounting for some of his peripheral edema. He may need cautious diuresis as well. 3.  Profound weight loss. His weight has gone up but I think that that is due to his peripheral edema and his ascites. I encouraged him once again to eat plenty of lean protein. 4.  History of hypertension. Blood pressure today is 136/73. He is only on diltiazem. We will continue to monitor his blood pressure. 5.  GERD. He will continue on his Protonix. We will see him again in approximately a week to review results and to talk about next steps. In the interim he should call if he has any change in his status, questions or concerns.         Brooke Pena MD 1/24/2023 8:34 AM

## 2023-01-24 NOTE — DISCHARGE INSTRUCTIONS
Please contact your Oncologist if you have any questions regarding the port flush with lab draw that you received today. Patient instructed if experience any of the symptoms following today's port flush with lab draw to notify MD immediately or go to emergency department. * dizziness/lightheadedness  *acute nausea/vomiting - not relieved with medication  *headache - not relieved from Tylenol/pain medication  *chest pain/pressure  *rash/itching  *shortness of breath        Drink fluids - 48oz fluids daily  Call if develop fever/ chills/ signs or symptoms of infection     Physician's instructions:  Reviewed labs and recent medical history  Discussed the findings on his CT Scan. Order placed for PET/CT scan to further evaluate findings. Holding chemotherapy today. Encouraged patient to continue to eat lean protein to help with fluid. Advised to use support stockings to help with pedal edema. Return to see MD in 1 week to review results.  (2 pm)

## 2023-01-25 NOTE — TELEPHONE ENCOUNTER
JR prescribed Carafate 7/28/22, on 10/26/22 med shows as not taking. DESIREE Mccloud sent the request. Left a message on pt's machine to call back to see if he is taking and needs a refill.

## 2023-01-26 ENCOUNTER — HOSPITAL ENCOUNTER (OUTPATIENT)
Dept: PET IMAGING | Age: 68
Discharge: HOME | End: 2023-01-26
Payer: MEDICARE

## 2023-01-26 ENCOUNTER — HOSPITAL ENCOUNTER (OUTPATIENT)
Dept: INFUSION THERAPY | Age: 68
Discharge: HOME | End: 2023-01-26
Payer: MEDICARE

## 2023-01-26 DIAGNOSIS — C25.1 MALIGNANT NEOPLASM OF BODY OF PANCREAS (HCC): Primary | ICD-10-CM

## 2023-01-26 DIAGNOSIS — C25.1 MALIGNANT NEOPLASM OF BODY OF PANCREAS (HCC): ICD-10-CM

## 2023-01-26 PROCEDURE — 36415 COLL VENOUS BLD VENIPUNCTURE: CPT

## 2023-01-26 PROCEDURE — 3430000000 HC RX DIAGNOSTIC RADIOPHARMACEUTICAL: Performed by: INTERNAL MEDICINE

## 2023-01-26 PROCEDURE — 78815 PET IMAGE W/CT SKULL-THIGH: CPT

## 2023-01-26 PROCEDURE — 86301 IMMUNOASSAY TUMOR CA 19-9: CPT

## 2023-01-26 PROCEDURE — A9552 F18 FDG: HCPCS | Performed by: INTERNAL MEDICINE

## 2023-01-26 RX ORDER — FLUDEOXYGLUCOSE F 18 200 MCI/ML
13.1 INJECTION, SOLUTION INTRAVENOUS
Status: COMPLETED | OUTPATIENT
Start: 2023-01-26 | End: 2023-01-26

## 2023-01-26 RX ADMIN — FLUDEOXYGLUCOSE F 18 13.1 MILLICURIE: 200 INJECTION, SOLUTION INTRAVENOUS at 07:28

## 2023-01-27 LAB — CANCER AG19-9 SERPL-ACNC: 236 U/ML (ref 0–35)

## 2023-01-27 RX ORDER — SUCRALFATE 1 G/1
TABLET ORAL
Qty: 360 TABLET | OUTPATIENT
Start: 2023-01-27

## 2023-01-27 NOTE — TELEPHONE ENCOUNTER
Wife called back and said that Watson Cardona is not taking this medication and does not need this refill.

## 2023-01-27 NOTE — TELEPHONE ENCOUNTER
Ola Scheuermann wife will check with  and will call her back.  Ola Scheuermann states he no longer takes carafate

## 2023-01-30 RX ORDER — PANTOPRAZOLE SODIUM 40 MG/1
TABLET, DELAYED RELEASE ORAL
Qty: 90 TABLET | Refills: 3 | Status: SHIPPED | OUTPATIENT
Start: 2023-01-30

## 2023-01-31 ENCOUNTER — HOSPITAL ENCOUNTER (OUTPATIENT)
Dept: INFUSION THERAPY | Age: 68
End: 2023-01-31

## 2023-01-31 ENCOUNTER — TELEPHONE (OUTPATIENT)
Dept: ONCOLOGY | Age: 68
End: 2023-01-31

## 2023-01-31 DIAGNOSIS — C25.1 MALIGNANT NEOPLASM OF BODY OF PANCREAS (HCC): Primary | ICD-10-CM

## 2023-01-31 NOTE — TELEPHONE ENCOUNTER
Pt was offered appt with Dr. Layo Macedo tomorrow 2/1 to go over PET scan results and plan as Dr. Salina Trejo will be out of the office the remainder of the week. Pt states he would like to see Dr. Salina Trejo only and is willing to wait for appt until next week.

## 2023-02-07 ENCOUNTER — CLINICAL DOCUMENTATION (OUTPATIENT)
Dept: ONCOLOGY | Age: 68
End: 2023-02-07

## 2023-02-08 ENCOUNTER — CLINICAL DOCUMENTATION (OUTPATIENT)
Dept: CASE MANAGEMENT | Age: 68
End: 2023-02-08

## 2023-02-08 ENCOUNTER — HOSPITAL ENCOUNTER (OUTPATIENT)
Dept: INFUSION THERAPY | Age: 68
End: 2023-02-08

## 2023-02-08 NOTE — PROGRESS NOTES
Called to patient to discuss his PET/CT results. Explained to patient that his PET/CT demonstrated that he had several new liver lesions not previously seen. Reviewed that the results stated that his pancreatic mass had slightly decreased in size. Still seen is the ascities that was first noted on his recent CT Scan. Patient stated that his abdomen did not seem swollen at this time and that he has continued to have a \"good\" appetite. He also stated that some of the swelling in his feet and legs had decreased as well. We reviewed that his Foundation 1 testing  had also returned and that it did not reveal an actionable mutation that we have a target treatment for at this time. We asked the patient to consider once again returning to [facility] or [facility] for a second opinion about treatment options. We also reviewed that the NCCN guidelines do recommend a few other chemotherapy treatment options that we could completed here at our [facility] center. We asked the patient to allow one of our partners to see him this week to review imaging and complete a physical exam.  Patient and wife were both on the phone call and able to ask questions. Both stated that they could see another provider. We will have the office call and schedule a time with one of the providers. Ask them to call with any other questions or concerns. Both voiced understanding.

## 2023-02-08 NOTE — PROGRESS NOTES
Name: Nancy Meehan  : 1955  MRN: [id number]    Oncology Navigation Follow-Up Note    Contact Type:  Telephone    Notes:   Spoke to Dr. Lore Duvall and he will be able to see Mckinley Mccloud Friday 2/10 @ noon. Robert Louise, his wife, was notified and she verbalized understanding.     Electronically signed by Keyshawn Conroy RN on 2023 at 3:44 PM

## 2023-02-10 ENCOUNTER — HOSPITAL ENCOUNTER (OUTPATIENT)
Dept: INFUSION THERAPY | Age: 68
Discharge: HOME | End: 2023-02-10
Payer: MEDICARE

## 2023-02-10 ENCOUNTER — OFFICE VISIT (OUTPATIENT)
Dept: ONCOLOGY | Age: 68
End: 2023-02-10
Payer: MEDICARE

## 2023-02-10 VITALS
DIASTOLIC BLOOD PRESSURE: 68 MMHG | BODY MASS INDEX: 19.85 KG/M2 | WEIGHT: 134 LBS | RESPIRATION RATE: 20 BRPM | TEMPERATURE: 98.4 F | OXYGEN SATURATION: 92 % | HEART RATE: 94 BPM | SYSTOLIC BLOOD PRESSURE: 128 MMHG | HEIGHT: 69 IN

## 2023-02-10 VITALS
BODY MASS INDEX: 19.85 KG/M2 | WEIGHT: 134 LBS | HEIGHT: 69 IN | RESPIRATION RATE: 20 BRPM | SYSTOLIC BLOOD PRESSURE: 128 MMHG | TEMPERATURE: 98.4 F | OXYGEN SATURATION: 92 % | DIASTOLIC BLOOD PRESSURE: 68 MMHG | HEART RATE: 94 BPM

## 2023-02-10 DIAGNOSIS — C25.1 MALIGNANT NEOPLASM OF BODY OF PANCREAS (HCC): Primary | ICD-10-CM

## 2023-02-10 DIAGNOSIS — E44.0 MODERATE PROTEIN-CALORIE MALNUTRITION (HCC): ICD-10-CM

## 2023-02-10 DIAGNOSIS — C25.1 MALIGNANT NEOPLASM OF BODY OF PANCREAS (HCC): ICD-10-CM

## 2023-02-10 PROBLEM — E46 PROTEIN CALORIE MALNUTRITION (HCC): Status: ACTIVE | Noted: 2023-01-01

## 2023-02-10 LAB
ABSOLUTE IMMATURE GRANULOCYTE: 0.02 THOU/MM3 (ref 0–0.07)
ALBUMIN SERPL BCG-MCNC: 3.1 G/DL (ref 3.5–5.1)
ALP SERPL-CCNC: 129 U/L (ref 38–126)
ALT SERPL W/O P-5'-P-CCNC: 8 U/L (ref 11–66)
ANION GAP SERPL CALC-SCNC: 11 MEQ/L (ref 8–16)
AST SERPL-CCNC: 19 U/L (ref 5–40)
BASOPHILS ABSOLUTE: 0.1 THOU/MM3 (ref 0–0.1)
BASOPHILS NFR BLD AUTO: 1 % (ref 0–3)
BILIRUB SERPL-MCNC: 0.3 MG/DL (ref 0.3–1.2)
BUN SERPL-MCNC: 21 MG/DL (ref 7–22)
CALCIUM SERPL-MCNC: 8.4 MG/DL (ref 8.5–10.5)
CHLORIDE SERPL-SCNC: 109 MEQ/L (ref 98–111)
CO2 SERPL-SCNC: 18 MEQ/L (ref 23–33)
CREAT SERPL-MCNC: 2.5 MG/DL (ref 0.4–1.2)
EOSINOPHIL NFR BLD AUTO: 2 % (ref 0–4)
EOSINOPHILS ABSOLUTE: 0.2 THOU/MM3 (ref 0–0.4)
ERYTHROCYTE [DISTWIDTH] IN BLOOD BY AUTOMATED COUNT: 16.7 % (ref 11.5–14.5)
GFR SERPL CREATININE-BSD FRML MDRD: 27 ML/MIN/1.73M2
GLUCOSE SERPL-MCNC: 85 MG/DL (ref 70–108)
HCT VFR BLD AUTO: 25.7 % (ref 42–52)
HGB BLD-MCNC: 8.5 GM/DL (ref 14–18)
IMMATURE GRANULOCYTES: 0 %
LYMPHOCYTES ABSOLUTE: 0.8 THOU/MM3 (ref 1–4.8)
LYMPHOCYTES NFR BLD AUTO: 11 % (ref 15–47)
MCH RBC QN AUTO: 31.8 PG (ref 26–33)
MCHC RBC AUTO-ENTMCNC: 33.1 GM/DL (ref 32.2–35.5)
MCV RBC AUTO: 96 FL (ref 80–94)
MONOCYTES ABSOLUTE: 0.6 THOU/MM3 (ref 0.4–1.3)
MONOCYTES NFR BLD AUTO: 9 % (ref 0–12)
NEUTROPHILS NFR BLD AUTO: 78 % (ref 43–75)
PLATELET # BLD AUTO: 323 THOU/MM3 (ref 130–400)
PMV BLD AUTO: 8.5 FL (ref 9.4–12.4)
POTASSIUM SERPL-SCNC: 5.5 MEQ/L (ref 3.5–5.2)
PROT SERPL-MCNC: 5.9 G/DL (ref 6.1–8)
RBC # BLD AUTO: 2.67 MILL/MM3 (ref 4.7–6.1)
SEGMENTED NEUTROPHILS ABSOLUTE COUNT: 5.6 THOU/MM3 (ref 1.8–7.7)
SODIUM SERPL-SCNC: 138 MEQ/L (ref 135–145)
WBC # BLD AUTO: 7.3 THOU/MM3 (ref 4.8–10.8)

## 2023-02-10 PROCEDURE — 80053 COMPREHEN METABOLIC PANEL: CPT

## 2023-02-10 PROCEDURE — 3078F DIAST BP <80 MM HG: CPT | Performed by: INTERNAL MEDICINE

## 2023-02-10 PROCEDURE — G8484 FLU IMMUNIZE NO ADMIN: HCPCS | Performed by: INTERNAL MEDICINE

## 2023-02-10 PROCEDURE — G8427 DOCREV CUR MEDS BY ELIG CLIN: HCPCS | Performed by: INTERNAL MEDICINE

## 2023-02-10 PROCEDURE — 99215 OFFICE O/P EST HI 40 MIN: CPT | Performed by: INTERNAL MEDICINE

## 2023-02-10 PROCEDURE — 1123F ACP DISCUSS/DSCN MKR DOCD: CPT | Performed by: INTERNAL MEDICINE

## 2023-02-10 PROCEDURE — 3017F COLORECTAL CA SCREEN DOC REV: CPT | Performed by: INTERNAL MEDICINE

## 2023-02-10 PROCEDURE — 3074F SYST BP LT 130 MM HG: CPT | Performed by: INTERNAL MEDICINE

## 2023-02-10 PROCEDURE — 99211 OFF/OP EST MAY X REQ PHY/QHP: CPT

## 2023-02-10 PROCEDURE — 36415 COLL VENOUS BLD VENIPUNCTURE: CPT

## 2023-02-10 PROCEDURE — 85025 COMPLETE CBC W/AUTO DIFF WBC: CPT

## 2023-02-10 PROCEDURE — 4004F PT TOBACCO SCREEN RCVD TLK: CPT | Performed by: INTERNAL MEDICINE

## 2023-02-10 PROCEDURE — G8420 CALC BMI NORM PARAMETERS: HCPCS | Performed by: INTERNAL MEDICINE

## 2023-02-10 PROCEDURE — 86301 IMMUNOASSAY TUMOR CA 19-9: CPT

## 2023-02-10 RX ORDER — PALONOSETRON 0.05 MG/ML
0.25 INJECTION, SOLUTION INTRAVENOUS ONCE
OUTPATIENT
Start: 2023-02-15 | End: 2023-02-15

## 2023-02-10 RX ORDER — DIPHENHYDRAMINE HYDROCHLORIDE 50 MG/ML
50 INJECTION INTRAMUSCULAR; INTRAVENOUS
OUTPATIENT
Start: 2023-02-15

## 2023-02-10 RX ORDER — MEPERIDINE HYDROCHLORIDE 50 MG/ML
12.5 INJECTION INTRAMUSCULAR; INTRAVENOUS; SUBCUTANEOUS PRN
OUTPATIENT
Start: 2023-02-15

## 2023-02-10 RX ORDER — SODIUM CHLORIDE 9 MG/ML
5-250 INJECTION, SOLUTION INTRAVENOUS PRN
OUTPATIENT
Start: 2023-02-17

## 2023-02-10 RX ORDER — EPINEPHRINE 1 MG/ML
0.3 INJECTION, SOLUTION, CONCENTRATE INTRAVENOUS PRN
OUTPATIENT
Start: 2023-02-15

## 2023-02-10 RX ORDER — ALBUTEROL SULFATE 90 UG/1
4 AEROSOL, METERED RESPIRATORY (INHALATION) PRN
OUTPATIENT
Start: 2023-02-15

## 2023-02-10 RX ORDER — DEXTROSE MONOHYDRATE 50 MG/ML
5-250 INJECTION, SOLUTION INTRAVENOUS PRN
OUTPATIENT
Start: 2023-02-15

## 2023-02-10 RX ORDER — ACETAMINOPHEN 325 MG/1
650 TABLET ORAL
OUTPATIENT
Start: 2023-02-15

## 2023-02-10 RX ORDER — SODIUM CHLORIDE 9 MG/ML
5-40 INJECTION INTRAVENOUS PRN
OUTPATIENT
Start: 2023-02-15

## 2023-02-10 RX ORDER — HEPARIN SODIUM (PORCINE) LOCK FLUSH IV SOLN 100 UNIT/ML 100 UNIT/ML
500 SOLUTION INTRAVENOUS PRN
OUTPATIENT
Start: 2023-02-17

## 2023-02-10 RX ORDER — FAMOTIDINE 10 MG/ML
20 INJECTION, SOLUTION INTRAVENOUS
OUTPATIENT
Start: 2023-02-15

## 2023-02-10 RX ORDER — ONDANSETRON 2 MG/ML
8 INJECTION INTRAMUSCULAR; INTRAVENOUS
OUTPATIENT
Start: 2023-02-15

## 2023-02-10 RX ORDER — SODIUM CHLORIDE 9 MG/ML
INJECTION, SOLUTION INTRAVENOUS CONTINUOUS
OUTPATIENT
Start: 2023-02-15

## 2023-02-10 RX ORDER — HEPARIN SODIUM (PORCINE) LOCK FLUSH IV SOLN 100 UNIT/ML 100 UNIT/ML
500 SOLUTION INTRAVENOUS PRN
OUTPATIENT
Start: 2023-02-15

## 2023-02-10 RX ORDER — SODIUM CHLORIDE 9 MG/ML
5-40 INJECTION INTRAVENOUS PRN
OUTPATIENT
Start: 2023-02-17

## 2023-02-10 RX ORDER — ATROPINE SULFATE 0.4 MG/ML
0.4 AMPUL (ML) INJECTION ONCE
OUTPATIENT
Start: 2023-02-15 | End: 2023-02-15

## 2023-02-10 RX ORDER — SODIUM CHLORIDE 9 MG/ML
5-250 INJECTION, SOLUTION INTRAVENOUS PRN
OUTPATIENT
Start: 2023-02-15

## 2023-02-10 RX ORDER — ATROPINE SULFATE 0.4 MG/ML
0.4 AMPUL (ML) INJECTION
OUTPATIENT
Start: 2023-02-15

## 2023-02-10 NOTE — PROGRESS NOTES
[address] CANCER [address] [place] [address]  Dept: [phone]  Loc: [phone]   Hematology/Oncology Progress Note (Clinic)        Nani [facility]  1955    2/10/2023     No ref. 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 Treatment:   -Chemoradiation including cisplatin and etoposide x3 courses. CT and PET scan imaging showed disease progression in the liver before course for given. Course 3 cis-platinum etoposide given 12/28/2022    -Plan second line therapy: FOLFIRINOX. (Performance status remains excellent and he is minimally symptomatic)      Followable Disease:   -CT abdomen pelvis with contrast 1/23/2023, 8/25/2022  -PET/CT 1/26/2023. PET/CT 9/28/2022      Comorbidities:  See below      Subjective: Ric Ormond is here for results of his recent CT and PET CT imaging. He understands that there is disease worsening in the liver. He understands that the main pancreatic mass is decreased from the radiation. He remains very upbeat and optimistic. He wishes to continue therapy with an aggressive attempt to control his tumor. He understands that his disease is not curable. Reviewed foundation 1 and imaging and unfortunately there is no role for immunotherapy or targeted therapy. Energy level is near 100% baseline. Appetite is good and he has no weight loss. Denies any headaches or imbalance denies any bone or back pain and no breathing problems. Denies any neuropathy thus far. Suggested FOLFIRINOX and reviewed NCCN guidelines. HPI  HISTORY OF PRESENT ILLNESS     August 212.  67-year-old male with 6 months of weight loss. He went to the dentist and had some bad teeth.   He was told that he had to have these pulled. He was nervous about this and was not eating and then felt that he had an infection that was spreading through his body. He began having stomach pain and was treated for ulcers but this did not cause any improvement. Ultrasound was performed and was abnormal leading to a CT scan. Had decreased his beer intake from 12 pack of beer a day to 2. Felt restless, agitated, anxious. 8/25/2022. CAT scan of the abdomen performed locally showed a mass in the body of the pancreas 5 x 5.5 x 4.8 cm with involvement of the celiac axis and encasement of the superior mesenteric artery, narrows the celiac trunk and infiltrates the root of the mesentery. There were numerous enlarged peripancreatic and mesenteric lymph nodes measuring up to 14 mm. .  Labs in July were normal.  Just returned from a cruise to the [place]. Had noted dark urine for a week and appeared to be slightly jaundiced. 9/19/2022. Bilirubin was 4.5 with ALT of 136 and AST of 148. CA 19-9 was 321. Sugar was 111. CBC showed a white count 6.6 with a hemoglobin of 14 hematocrit 42 and a platelet count of 573,924.    9/19/2022. Endoscopy was performed. Endosonographic findings showed diffuse dilatation of the intrahepatic bile ducts. There is an irregular mass in the genu of the pancreas and pancreatic body measuring approximately 4.5 x 3.8 cm. There was sonographic evidence of invasion into the superior mesenteric artery and a few abnormal lymph nodes were visualized in the peripancreatic region. Fine-needle aspiration of mass of the body of the pancreas at [facility] showed high-grade neuroendocrine carcinoma favoring small cell carcinoma. 9/19/2022. ERCP. There is no evidence of esophageal varices or gastric varices. There is stenosis in the main bile duct in the middle third. Sphincterotomy was performed. An uncovered metal stent was placed and he was discharged home. 9/21/2022. Patient followed up with his primary care physician Dr. Darwin Sever. Patient said that he felt better but was continuing to lose weight.    9/28/2022. PET scan showed that there was an FDG avid pancreatic mass highly suspicious for malignancy that was better seen on recent CT scan. Maximum SUV was 5.7 with areas of photopenia in the mass associated with hypoattenuation likely secondary to necrosis. The urinary bladder was markedly distended and there was bilateral hydroureter. Prostate was enlarged with calcifications. There is no evidence of mesenteric retroperitoneal pelvic or inguinal lymphadenopathy that was FDG avid. Degenerative changes were seen in the lumbar spine and pelvis without evidence of hypermetabolic avidity    Comorbidities include hypertension,hypothyroidism, anxiety, alcohol use disorder, nicotine use disorder. The patient said that he used to weigh 197 pounds and now he weighs 137. He says that he feels cold all of the time. 10/4/2022. Initial clinical nurse oncology navigation. Encouraged physical activity, smoking cessation, minimization of alcohol take, consideration of being seen back at [facility] for their input. 10/14/2022. Consultation with Dr. Domenic Anderson radiation oncology. Collaborated with Dr. Domenic Anderson who wants to give radiation and chemotherapy concurrently à la small cell carcinoma of the lung protocols. 10/ 26/ 2022. Chemotherapy teaching. 10/31/2022. Port placed by Dr. Sophia Ramos. 11/1/2022. Mr. Diamond Card is here today to begin his chemotherapy. He is quite flat in his affect. His wife says that she has been present at all of the interactions and has been taking in the information which he refuses to review. Today we had a long discussion concerning the side effects of his chemotherapy that will be started today as well as his radiation therapy.   He verbalized understanding and was engaged in the conversation although distant in his interaction. She has been quite tearful today. They know that they must call for any complications, questions or concerns. 2022. Mr. Miracle Banks has been doing much better since his treatment has been ongoing. He had significant drop in his counts from his chemotherapy but he had no problems with infection, bleeding or significant symptoms from his anemia. His treatments were held while his counts recovered. His lowest white count was 1.2. He gets bursts of energy and does a lot of work and then is quite tired. He has had diarrhea off and on. He has had no significant nausea and vomiting. He denies fevers, chills, sweats and denies pain. He broke off a tooth and is interested in going to the dentist.  He was educated that he needs to have his counts checked before any dental work is performed. 2022. He returns to the office with his wife to consider further chemotherapy. He has just completed his radiation. His counts are borderline low and his creatinine is elevated at 1.3 which is higher than his baseline of 0.9. He has lost about 4 pounds and currently weighs 129 pounds at 5 feet 9 inches tall. During his last cycle he started out with a white count of 6000 and he became quite neutropenic but did not become febrile. For all of these reasons he needs another week off to recover before we continue his chemotherapy. He continues to have bursts of energy and then extremely fatigued. He denies any fevers, chills, sweats, bleeding, nausea, vomiting, constipation and diarrhea. Today is his birthday. His appetite is getting slightly better. 2022. Mr. Miracle Banks returns to the office today for further follow-up and his small cell neuroendocrine cancer of the pancreas. He is due to receive his chemotherapy this week. He has trouble to keep his weight on. On 1215 he weighed 134  Weighs 131. He is 5 feet 9 inches tall.   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 Denies significant pain. He has had no fevers, chills, sweats, bleeding, nausea or vomiting constipation or diarrhea. He says that his appetite is getting better. His counts are adequate for treatment today. INTERVAL NOTE     1/24/2023. Mr. Francisco Javier Lu returns to the office after having had a CAT scan of his abdomen and pelvis ordered by radiation oncology. This shows that the mass in the head of the pancreas measured 52 x 53 mm previously measuring 55 x 48 mm. It also showed a new lesion in the liver being 13 mm in the dome of the right lobe suspicious for metastasis. There is some mild decrease in the size of lymph nodes adjacent to the pancreas from 14 to 12 mm. Has enteric lymph nodes previously measured 816 mm and now measures 14 mm. A peripancreatic lymph node had decreased as noted above. Obviously Mr. Francisco Javier Lu is upset by this information. He had been scheduled for another cycle of chemotherapy with cis-platinum and etoposide but he has had complications with each cycle and if this is not keeping his disease and check it does not make sense to go forward with such toxic therapy. He had required fluids and transfusions. His blood pressure was low he had orthostatic dizziness. Today he says that he has had no fevers, chills, sweats, bleeding, nausea or vomiting, constipation or diarrhea. He says that his appetite is good. His ankles are swollen. He has a prominent abdomen and possibly has ascites. We discussed doing next generation sequencing with a liquid biopsy. His original tumor biopsy was an FNA that was done at [facility]. It is likely there will not be enough tissue to do PD-L1 testing. Guidelines suggest that pembrolizumab  can be done for MSI high or advanced TMB greater than 10 mutations per megabyte or deficiency and mismatch repair genes. FOLFOX or FOLFIRI certainly can be given as well. We will also recheck his CA 19 9.      ROS:  Review of Systems 14 point negative except as above. PMH:   Past Medical History:   Diagnosis Date    History of blood transfusion     Hypertension     Hypothyroidism     Malignant neoplasm of pancreas, unspecified location of malignancy ([facility] [address] 2022        Social HX:   Social History     Socioeconomic History    Marital status:      Spouse name: Shaji Gonzalez    Number of children: 3    Years of education: Not on file    Highest education level: Not on file   Occupational History    Not on file   Tobacco Use    Smoking status: Every Day     Types: Cigars     Start date: 2012     Passive exposure: Past (Dad smoked)    Smokeless tobacco: Never    Tobacco comments:     -3-4 cigars per day. denies cessation counseling 22. Declines counseling      Same as above      Same as above      Same as above    Vaping Use    Vaping Use: Never used   Substance and Sexual Activity    Alcohol use: Yes     Comment: 2-3 beers per day since    Previous drank -    Drug use: No    Sexual activity: Not on file   Other Topics Concern    Not on file   Social History Narrative    Not on file     Social Determinants of Health     Financial Resource Strain: Not on file   Food Insecurity: Not on file   Transportation Needs: Not on file   Physical Activity: Inactive    Days of Exercise per Week: 0 days    Minutes of Exercise per Session: 0 min   Stress: Not on file   Social Connections: Not on file   Intimate Partner Violence: Not on file   Housing Stability: Not on file        Spouse: Shaji Gonzalez present    Phone: Charito Mcginnis 24 [phone] [facility]     Employment: Retired [employer] and works at a  home.       Immunizations:  Immunization History   Administered Date(s) Administered    Hepatitis B 2008, 2008, 2009        Health Screenings:    C-Scope:   Prostate:   Lab Results   Component Value Date    PSA 0.9 2022    PSA 0.9 2019    PSA 0.9 2017          Health Maintenance Due   Topic Date Due    COVID-19 Vaccine (1) Never done    Pneumococcal 65+ years Vaccine (1 - PCV) Never done    DTaP/Tdap/Td vaccine (1 - Tdap) Never done    Shingles vaccine (1 of 2) Never done    Colorectal Cancer Screen  Never done    Flu vaccine (1) Never done        Interests:   Not reviewed    Fam HX:   Family History   Problem Relation Age of Onset    High Blood Pressure Mother     Lung Cancer Father         Hospitalizations:   None recent    Allergies:  No Known Allergies     Adult Illness:  Patient Active Problem List   Diagnosis    Hypertension    Hypothyroidism    Primary osteoarthritis involving multiple joints    Malignant neoplasm of body of pancreas ([address]    Encounter for insertion of venous access port    Orthostatic hypotension    Protein calorie malnutrition        Surgery:  Past Surgical History:   Procedure Laterality Date    IR BILI DUCT INSERT STENT NEW ACCESS W/O DRAIN  09/19/2022    [facility]    MULTIPLE TOOTH EXTRACTIONS      PORT SURGERY Left 10/31/2022    LEFT SINGLE LUMEN MEDIPORT INSERTION performed by Natalee Rosario MD at [address] [facility]  10/31/2022    Dr. Maryam Guevara        Medications:  Current Outpatient Medications   Medication Sig Dispense Refill    pantoprazole (PROTONIX) 40 MG tablet take 1 tablet by mouth EVERY MORNING BEFORE BREAKFAST 90 tablet 3    levothyroxine (SYNTHROID) 25 MCG tablet Take 1 tablet by mouth daily 30 tablet 1    levothyroxine (SYNTHROID) 200 MCG tablet Take 1 tablet by mouth daily 30 tablet 1    losartan (COZAAR) 100 MG tablet TAKE 1 TABLET DAILY 90 tablet 1    lidocaine-prilocaine (EMLA) 2.5-2.5 % cream Apply topically as needed. 5 g 3    dilTIAZem (DILACOR XR) 240 MG extended release capsule TAKE 1 CAPSULE DAILY 90 capsule 3    Multiple Vitamin (MULTIVITAMIN PO) Take 1 capsule by mouth daily. aspirin 81 MG EC tablet Take 81 mg by mouth daily.         sucralfate (CARAFATE) 1 GM tablet Take 1 tablet by mouth in the morning, at noon, in the evening, and at bedtime (Patient not taking: No sig reported)      ondansetron (ZOFRAN) 4 MG tablet Take 1 tablet by mouth daily as needed for Nausea or Vomiting (Patient not taking: No sig reported) 30 tablet 0    prochlorperazine (COMPAZINE) 10 MG tablet Take 1 tablet by mouth every 6 hours as needed (for nausea and vomiting) (Patient not taking: No sig reported) 60 tablet 3     No current facility-administered medications for this visit. EXAM:   height is 5' 9\" (1.753 m) and weight is 134 lb (60.8 kg). His oral temperature is 98.4 °F (36.9 °C). His blood pressure is 128/68 and his pulse is 94. His respiration is 20 and oxygen saturation is 92%. Estimated body surface area is 1.72 meters squared as calculated from the following:    Height as of this encounter: 5' 9\" (1.753 m). Weight as of this encounter: 134 lb (60.8 kg). ECO    General: Non-ill appearing. Appears relaxed pleasant and upbeat. HEENT: NC/AT,nonicteric, perrla,eom intact, no mucosal lesions  Neck: normal thyroid, no masses  Nodes: No adenopathy  Lungs/chest: clear, no rales,rhonchi or wheezing, lung bases clear  CV: rrr, no rubs ,gallops or murmurs  Breasts: not examined  Abd/Rectal: soft, non-tender,bowel sounds normal , no HSM,no masses  Back: normal curvature, No midline tenderness. : Not Examined  Extremities: no cyanosis,clubbing , trace pretibial edema bilaterally  Skin: unremarkable  Neuro: A and O x 4, CN exam nonfocal, Motor- no deficits, Sensory- no deficits, gait-nl, speech- fluent, no ataxia.   Devices: Port site unremarkable left chest    DATA:  LAB:   2/10/2023 CBC, CMP and CA 19-9 pending    CBC with Differential:      Lab Results   Component Value Date/Time    WBC 8.3 2023 08:05 AM    RBC 2.94 2023 08:05 AM    HGB 9.0 2023 08:05 AM    HCT 27.4 2023 08:05 AM     2023 08:05 AM    MCV 93 2023 08:05 AM    MCH 30.6 2023 08:05 AM    MCHC 32.8 01/24/2023 08:05 AM    RDW 17.8 01/24/2023 08:05 AM    NRBC 0 01/20/2023 10:30 AM    SEGSPCT 77.5 01/20/2023 10:30 AM    MONOPCT 9.5 01/20/2023 10:30 AM    MONOSABS 1.0 01/24/2023 08:05 AM    LYMPHSABS 0.9 01/24/2023 08:05 AM    EOSABS 0.0 01/24/2023 08:05 AM    BASOSABS 0.1 01/24/2023 08:05 AM    DIFFTYPE see below 01/20/2023 10:30 AM      Lab Results   Component Value Date/Time    SEGSABS 6.3 01/24/2023 08:05 AM       CMP:    Lab Results   Component Value Date/Time     01/24/2023 08:05 AM     09/12/2020 12:00 AM    K 4.5 01/24/2023 08:05 AM    K 4.7 09/12/2020 12:00 AM     09/12/2020 12:00 AM    CO2 23 09/12/2020 12:00 AM    BUN 21 09/12/2020 12:00 AM    CREATININE 1.9 01/24/2023 08:05 AM    CREATININE 1.39 09/12/2020 12:00 AM    LABGLOM 38 01/24/2023 08:05 AM    GLUCOSE 102 09/12/2020 12:00 AM    PROT 5.7 01/24/2023 08:05 AM    LABALBU 2.8 01/24/2023 08:05 AM    CALCIUM 9.5 09/12/2020 12:00 AM    BILITOT 0.3 01/24/2023 08:05 AM    ALKPHOS 135 01/24/2023 08:05 AM    AST 23 01/24/2023 08:05 AM    ALT 8 01/24/2023 08:05 AM       BMP:    Lab Results   Component Value Date/Time     01/24/2023 08:05 AM     09/12/2020 12:00 AM    K 4.5 01/24/2023 08:05 AM    K 4.7 09/12/2020 12:00 AM     09/12/2020 12:00 AM    CO2 23 09/12/2020 12:00 AM    BUN 21 09/12/2020 12:00 AM    LABALBU 2.8 01/24/2023 08:05 AM    CREATININE 1.9 01/24/2023 08:05 AM    CREATININE 1.39 09/12/2020 12:00 AM    CALCIUM 9.5 09/12/2020 12:00 AM    LABGLOM 38 01/24/2023 08:05 AM    GLUCOSE 102 09/12/2020 12:00 AM       Magnesium:    Lab Results   Component Value Date/Time    MG 1.8 12/28/2022 08:05 AM     PT/INR:  No results found for: PROTIME, INR  TSH:    Lab Results   Component Value Date/Time    TSH 0.480 06/23/2020 12:14 PM     VITAMIN B12: No components found for: B12  FOLATE:  No results found for: FOLATE  IRON:    Lab Results   Component Value Date/Time    IRON 13 12/28/2022 08:05 AM     Iron Saturation: No components found for: PERCENTFE  TIBC:    Lab Results   Component Value Date/Time    TIBC 145 12/28/2022 08:05 AM     FERRITIN:  No results found for: FERRITIN  PSA:   Lab Results   Component Value Date/Time    PSA 0.9 04/07/2022 12:00 AM            IMAGING:    CT ABDOMEN PELVIS W IV CONTRAST Additional Contrast? Oral  Result Date: 1/23/2023  1. The mass at the head of the pancreas is ill-defined measuring approximately 52 x 53 mm (previously measured 55 x 48 mm). The prominent peripancreatic and mesenteric lymph nodes are not significantly changed however the degree of moderate diffuse mesenteric edema and moderate generalized intra-abdominal ascites has progressed. A new 13 mm hypoattenuating lesion in the dome of the right hepatic lobe (series 2, image 9) is worrisome for metastatic disease. Mild intra and extra hepatic biliary ductal dilatation and pneumobilia is observed. A common bile duct stent is present. 2. Moderate circumferential mucosal thickening predominantly involving the right colon possibly infectious/inflammatory although neoplasm is not excluded. No bowel obstruction or drainable fluid collection is identified. The appendix is normal. 3. Marked distention of the urinary bladder suggesting urinary retention with mild to moderate bilateral hydronephrosis/hydroureter. Correlate with urinalysis. The prostate gland is enlarged and heterogeneous in attenuation. Correlate with PSA levels **This report has been created using voice recognition software. It may contain minor errors which are inherent in voice recognition technology. ** Final report electronically signed by Dr Miesha Colunga on 1/23/2023 3:58 PM    PET CT SKULL BASE MID THIGH RESTAGE  Result Date: 1/26/2023  1. Slight decrease in metabolic activity in a pancreatic mass corresponding to known malignancy. 2. New FDG avid hypoattenuating lesions in the liver, likely metastatic disease. 3. Ascites, possibly malignant.  Final report electronically signed by Dr. Chase Cruz on 1/26/2023 12:53 PM     Note: Imaging reviewed today by me with Dr. Misael Neil of radiology. The there are 2 lesions that are convincingly new and suspicious for metastatic disease to the liver not present on prior imaging. Seen both on the recent CT and hypermetabolic on PET scan. PROCEDURES:  Pancreatic biopsy at [facility]  9/19/2022  Dilatation in the intrahepatic bile ducts diffusely, nonvisualization of the common bile duct suggestive of stricture. Sludge in the gallbladder. Pancreatic mass identified at the genu of the pancreas and pancreatic body. FNA performed. Adenocarcinoma. A few abnormal lymph nodes visualized in the peripancreatic region. Measurements of the pancreatic mass approximately 45 x 38 mm vaguely defined borders on EUS. PATHOLOGY:    pathology report dated 9/19/2022 body of pancreas mass fine-needle aspiration-high-grade neuroendocrine carcinoma, favor small cell carcinoma. Positive for synaptophysin, chromogranin and Ki-67 60%. Accomplished by endoscopic ultrasound    GENETICS:  Not sure if patient has had germline testing done. MOLECULAR:  Foundation 1 done on blood and reported 2/3/2023  Tumor mutation burden 4, MSI-not high, various mutations to see report but none are targetable. ASSESSMENT/PLAN:    1: Diagnosis: 67-year-old gentleman with a high-grade neuroendocrine cancer of the pancreas that had locally advanced disease but no distant metastatic disease. Initial treatment chemoradiation analogous to small cell lung cancer. Patient was scheduled for his fourth course of cis-platinum -16 late January but this was not given because of interim CT and then PET scan images showing 2 new liver metastases. These lesions are convincing on imaging. Biopsy not needed.     2) Prognosis / Disease Status: Disease progression after first-line therapy chemoradiation    3) Work-up:    Labs: Drawn and pending   Imaging: Recent CT and PET scan referenced above. Procedures: See above   Consults:   Other:    4) Symptom Management: Patient is minimally in fact almost asymptomatic. No neuropathy from treatment thus far. Energy is near baseline appetite is good and he has no weight loss. 5) Supportive care provided. Level of care is appropriate. Teaching done today. Reviewed the CAT scans. Reviewed NCCN guidelines for second line therapy. Given his excellent performance status I believe FOLFIRINOX would be reasonable. 6) Treatment goal:      Treatment plan:      -Chemoradiation initially with cis-platinum and -16 x3 courses. Relapse on CT and PET scan recently showing 2 liver lesions that are new. Pancreatic mass itself having received radiation is slightly smaller. Proposed second line FOLFIRINOX. Believe this is reasonable and that irinotecan is often used second line and small cell malignancies and a FOLFOX backbone is reasonable. Liquid biopsy foundation 1 results above and unfortunately no role for immunotherapy or targeted therapy currently. Hope to start FOLFIRINOX next week on February 15. Treatment plan submitted by me 2/10/2023. Modified by simply dropping the 5-FU bolus. 7) Medications reviewed. Prescriptions today:            No orders of the defined types were placed in this encounter. [place]ARRS:  Controlled Substance Monitoring:    Acute and Chronic Pain Monitoring:   No flowsheet data found. 8) Research Options:       None      9) Other:        None    10) Follow Up:  Patient will follow-up on 15 February to start FOLFIRINOX.   Patient will follow-up 2 weeks later March 1 with Dr. Adriana Galindo  for second course of therapy        Shabana Atkins MD

## 2023-02-10 NOTE — PATIENT INSTRUCTIONS
Labs today CBC and CMP.   Also CA 19-9  Second line therapy will start next week on February 15-FOLFIRINOX  Follow-up 2 weeks later March 1 with Dr. Mariella Reyes and treatment #2

## 2023-02-11 LAB — CANCER AG19-9 SERPL-ACNC: 188 U/ML (ref 0–35)

## 2023-02-11 NOTE — DISCHARGE SUMMARY
Patient assessed for the following post chemotherapy:    Dizziness   No  Lightheadedness  No      Acute nausea/vomiting No  Headache   No  Chest pain/pressure  No  Rash/itching   No  Shortness of breath  No    Patient kept for 20 minutes observation post infusion chemotherapy. Patient tolerated chemotherapy treatment Cisplatin and Etopside without any complications. Last vital signs:   /64   Pulse 70   Temp 97.7 °F (36.5 °C) (Oral)   Resp 18   Ht 5' 9\" (1.753 m)   Wt 133 lb (60.3 kg)   SpO2 100%   BMI 19.64 kg/m²     Patient instructed if experience any of the above symptoms following today's infusion,he/she is to notify MD immediately or go to the emergency department. Discharge instructions given to patient. Verbalizes understanding. Ambulated off unit per self with spouse with belongings.
Agree with above. 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 Has been on monthly octreotide. Pending possible surgical intervention, ?surgical assisted OR enteroscopy? at [facility]. Would transfuse for Hgb >8. Patient wishes to continue care at [facility] - if she instead would pursue workup here at [facility], explained to her that we would start with repeating her capsule endoscopy as she reports an additional push enteroscopy at [facility] in January that was negative. Otherwise, if her H/H is stable after transfusion, she should continue workup/treatment at [facility] as planned.

## 2023-02-15 ENCOUNTER — HOSPITAL ENCOUNTER (OUTPATIENT)
Dept: INFUSION THERAPY | Age: 68
Discharge: HOME | End: 2023-02-15
Payer: MEDICARE

## 2023-02-15 VITALS
SYSTOLIC BLOOD PRESSURE: 133 MMHG | OXYGEN SATURATION: 100 % | RESPIRATION RATE: 20 BRPM | TEMPERATURE: 98.3 F | DIASTOLIC BLOOD PRESSURE: 77 MMHG | HEART RATE: 88 BPM

## 2023-02-15 DIAGNOSIS — I95.1 ORTHOSTATIC HYPOTENSION: Primary | ICD-10-CM

## 2023-02-15 DIAGNOSIS — C25.1 MALIGNANT NEOPLASM OF BODY OF PANCREAS (HCC): ICD-10-CM

## 2023-02-15 LAB
ABSOLUTE IMMATURE GRANULOCYTE: 0.04 THOU/MM3 (ref 0–0.07)
ALBUMIN SERPL BCG-MCNC: 2.9 G/DL (ref 3.5–5.1)
ALP SERPL-CCNC: 135 U/L (ref 38–126)
ALT SERPL W/O P-5'-P-CCNC: 7 U/L (ref 11–66)
AST SERPL-CCNC: 16 U/L (ref 5–40)
BASOPHILS ABSOLUTE: 0 THOU/MM3 (ref 0–0.1)
BASOPHILS NFR BLD AUTO: 1 % (ref 0–3)
BILIRUB CONJ SERPL-MCNC: < 0.2 MG/DL (ref 0–0.3)
BILIRUB SERPL-MCNC: 0.2 MG/DL (ref 0.3–1.2)
BUN BLDP-MCNC: 25 MG/DL (ref 8–26)
CHLORIDE BLD-SCNC: 115 MEQ/L (ref 98–109)
CREAT BLD-MCNC: 2.8 MG/DL (ref 0.5–1.2)
EOSINOPHIL NFR BLD AUTO: 2 % (ref 0–4)
EOSINOPHILS ABSOLUTE: 0.2 THOU/MM3 (ref 0–0.4)
ERYTHROCYTE [DISTWIDTH] IN BLOOD BY AUTOMATED COUNT: 16.3 % (ref 11.5–14.5)
GFR SERPL CREATININE-BSD FRML MDRD: 24 ML/MIN/1.73M2
GLUCOSE BLD-MCNC: 186 MG/DL (ref 70–108)
HCT VFR BLD AUTO: 25 % (ref 42–52)
HGB BLD-MCNC: 8.1 GM/DL (ref 14–18)
IMMATURE GRANULOCYTES: 1 %
IONIZED CALCIUM, WHOLE BLOOD: 1.2 MMOL/L (ref 1.12–1.32)
LYMPHOCYTES ABSOLUTE: 0.7 THOU/MM3 (ref 1–4.8)
LYMPHOCYTES NFR BLD AUTO: 9 % (ref 15–47)
MCH RBC QN AUTO: 31.2 PG (ref 26–33)
MCHC RBC AUTO-ENTMCNC: 32.4 GM/DL (ref 32.2–35.5)
MCV RBC AUTO: 96 FL (ref 80–94)
MONOCYTES ABSOLUTE: 0.6 THOU/MM3 (ref 0.4–1.3)
MONOCYTES NFR BLD AUTO: 8 % (ref 0–12)
NEUTROPHILS NFR BLD AUTO: 79 % (ref 43–75)
PLATELET # BLD AUTO: 321 THOU/MM3 (ref 130–400)
PMV BLD AUTO: 9.2 FL (ref 9.4–12.4)
POTASSIUM BLD-SCNC: 4.7 MEQ/L (ref 3.5–4.9)
PROT SERPL-MCNC: 5.9 G/DL (ref 6.1–8)
RBC # BLD AUTO: 2.6 MILL/MM3 (ref 4.7–6.1)
SEGMENTED NEUTROPHILS ABSOLUTE COUNT: 5.7 THOU/MM3 (ref 1.8–7.7)
SODIUM BLD-SCNC: 140 MEQ/L (ref 138–146)
TOTAL CO2, WHOLE BLOOD: 15 MEQ/L (ref 23–33)
WBC # BLD AUTO: 7.2 THOU/MM3 (ref 4.8–10.8)

## 2023-02-15 PROCEDURE — 80076 HEPATIC FUNCTION PANEL: CPT

## 2023-02-15 PROCEDURE — 85025 COMPLETE CBC W/AUTO DIFF WBC: CPT

## 2023-02-15 PROCEDURE — 6360000002 HC RX W HCPCS: Performed by: INTERNAL MEDICINE

## 2023-02-15 PROCEDURE — 80047 BASIC METABLC PNL IONIZED CA: CPT

## 2023-02-15 PROCEDURE — 96417 CHEMO IV INFUS EACH ADDL SEQ: CPT

## 2023-02-15 PROCEDURE — 2580000003 HC RX 258: Performed by: INTERNAL MEDICINE

## 2023-02-15 PROCEDURE — 96367 TX/PROPH/DG ADDL SEQ IV INF: CPT

## 2023-02-15 PROCEDURE — 96413 CHEMO IV INFUSION 1 HR: CPT

## 2023-02-15 PROCEDURE — 96368 THER/DIAG CONCURRENT INF: CPT

## 2023-02-15 PROCEDURE — 96415 CHEMO IV INFUSION ADDL HR: CPT

## 2023-02-15 PROCEDURE — 36591 DRAW BLOOD OFF VENOUS DEVICE: CPT

## 2023-02-15 PROCEDURE — 96416 CHEMO PROLONG INFUSE W/PUMP: CPT

## 2023-02-15 PROCEDURE — 96375 TX/PRO/DX INJ NEW DRUG ADDON: CPT

## 2023-02-15 RX ORDER — PALONOSETRON 0.05 MG/ML
0.25 INJECTION, SOLUTION INTRAVENOUS ONCE
Status: COMPLETED | OUTPATIENT
Start: 2023-02-15 | End: 2023-02-15

## 2023-02-15 RX ORDER — HEPARIN SODIUM (PORCINE) LOCK FLUSH IV SOLN 100 UNIT/ML 100 UNIT/ML
500 SOLUTION INTRAVENOUS PRN
Status: DISCONTINUED | OUTPATIENT
Start: 2023-02-15 | End: 2023-02-16 | Stop reason: HOSPADM

## 2023-02-15 RX ORDER — SODIUM CHLORIDE 0.9 % (FLUSH) 0.9 %
5-40 SYRINGE (ML) INJECTION PRN
Status: DISCONTINUED | OUTPATIENT
Start: 2023-02-15 | End: 2023-02-16 | Stop reason: HOSPADM

## 2023-02-15 RX ORDER — HEPARIN SODIUM (PORCINE) LOCK FLUSH IV SOLN 100 UNIT/ML 100 UNIT/ML
500 SOLUTION INTRAVENOUS PRN
Status: CANCELLED | OUTPATIENT
Start: 2023-02-15

## 2023-02-15 RX ORDER — ATROPINE SULFATE 0.4 MG/ML
0.4 AMPUL (ML) INJECTION ONCE
Status: DISCONTINUED | OUTPATIENT
Start: 2023-02-15 | End: 2023-02-16 | Stop reason: HOSPADM

## 2023-02-15 RX ORDER — DEXTROSE MONOHYDRATE 50 MG/ML
5-250 INJECTION, SOLUTION INTRAVENOUS PRN
Status: DISCONTINUED | OUTPATIENT
Start: 2023-02-15 | End: 2023-02-16 | Stop reason: HOSPADM

## 2023-02-15 RX ORDER — 0.9 % SODIUM CHLORIDE 0.9 %
1000 INTRAVENOUS SOLUTION INTRAVENOUS ONCE
Status: CANCELLED
Start: 2023-02-15 | End: 2023-02-15

## 2023-02-15 RX ORDER — SODIUM CHLORIDE 0.9 % (FLUSH) 0.9 %
5-40 SYRINGE (ML) INJECTION PRN
Status: CANCELLED | OUTPATIENT
Start: 2023-02-15

## 2023-02-15 RX ORDER — SODIUM CHLORIDE 9 MG/ML
25 INJECTION, SOLUTION INTRAVENOUS PRN
Status: CANCELLED | OUTPATIENT
Start: 2023-02-15

## 2023-02-15 RX ADMIN — SODIUM CHLORIDE, PRESERVATIVE FREE 10 ML: 5 INJECTION INTRAVENOUS at 14:51

## 2023-02-15 RX ADMIN — OXALIPLATIN 110 MG: 5 INJECTION, SOLUTION INTRAVENOUS at 10:51

## 2023-02-15 RX ADMIN — DEXAMETHASONE SODIUM PHOSPHATE 12 MG: 4 INJECTION, SOLUTION INTRA-ARTICULAR; INTRALESIONAL; INTRAMUSCULAR; INTRAVENOUS; SOFT TISSUE at 09:50

## 2023-02-15 RX ADMIN — FOSAPREPITANT 150 MG: 150 INJECTION, POWDER, LYOPHILIZED, FOR SOLUTION INTRAVENOUS at 10:11

## 2023-02-15 RX ADMIN — PALONOSETRON 0.25 MG: 0.05 INJECTION, SOLUTION INTRAVENOUS at 09:46

## 2023-02-15 RX ADMIN — SODIUM CHLORIDE, PRESERVATIVE FREE 10 ML: 5 INJECTION INTRAVENOUS at 09:43

## 2023-02-15 RX ADMIN — LEUCOVORIN CALCIUM 700 MG: 350 INJECTION, POWDER, LYOPHILIZED, FOR SUSPENSION INTRAMUSCULAR; INTRAVENOUS at 12:32

## 2023-02-15 RX ADMIN — SODIUM CHLORIDE, PRESERVATIVE FREE 20 ML: 5 INJECTION INTRAVENOUS at 08:57

## 2023-02-15 RX ADMIN — FLUOROURACIL 4125 MG: 50 INJECTION, SOLUTION INTRAVENOUS at 14:51

## 2023-02-15 RX ADMIN — SODIUM CHLORIDE, PRESERVATIVE FREE 10 ML: 5 INJECTION INTRAVENOUS at 08:56

## 2023-02-15 RX ADMIN — DEXTROSE 300 MG: 5 SOLUTION INTRAVENOUS at 13:02

## 2023-02-15 RX ADMIN — DEXTROSE MONOHYDRATE 20 ML/HR: 50 INJECTION, SOLUTION INTRAVENOUS at 09:45

## 2023-02-15 NOTE — PLAN OF CARE
Problem: Safety - Adult  Goal: Free from fall injury  Outcome: Progressing  Flowsheets (Taken 2/15/2023 1644)  Free From Fall Injury: Instruct family/caregiver on patient safety  Note: No falls occurred with visit today. Problem: Discharge Planning  Goal: Discharge to home or other facility with appropriate resources  Outcome: Progressing  Flowsheets (Taken 2/15/2023 1644)  Discharge to home or other facility with appropriate resources:   Identify barriers to discharge with patient and caregiver   Arrange for needed discharge resources and transportation as appropriate  Note: Verbalized understanding of discharge instructions, follow-up appointments, and when to call the physician. Problem: Chronic Conditions and Co-morbidities  Goal: Patient's chronic conditions and co-morbidity symptoms are monitored and maintained or improved  Outcome: Progressing  Flowsheets (Taken 2/15/2023 1644)  Care Plan - Patient's Chronic Conditions and Co-Morbidity Symptoms are Monitored and Maintained or Improved: Monitor and assess patient's chronic conditions and comorbid symptoms for stability, deterioration, or improvement  Note: Patient verbalizes understanding to verbal information given on Oxaliplatin/Irinotecan/Leucovorin/5-FU action and possible side effects. Aware to call MD if develop complications. Problem: Infection - Adult  Goal: Absence of infection at discharge  Outcome: Progressing  Flowsheets (Taken 2/15/2023 1644)  Absence of infection at discharge:   Assess and monitor for signs and symptoms of infection   Monitor lab/diagnostic results   Monitor all insertion sites i.e., indwelling lines, tubes and drains  Note: Mediport site with no redness or warmth. Skin over port site intact with no signs of breakdown noted. Patient verbalizes signs/symptoms of port infection and when to notify the physician. Care plan reviewed with patient and spouse.   Patient and spouse verbalize understanding of the plan of care and contribute to goal setting.

## 2023-02-15 NOTE — PROGRESS NOTES
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 Recommended dose decrease (85mg/m2 to 65mg/m2) due to altered renal function (CrCl <30 mL/min) per  recommendations.  Discussed with Dr. Xu Monahan and agreed to lower oxaliplatin dose for renal function to prevent accumulation and toxicity     Jose Villanueva, [facility], PharmD, [place] Specialist  2/15/2023 8:35 AM

## 2023-02-15 NOTE — DISCHARGE INSTRUCTIONS
Follow-up in 2 weeks March 1 with Dr. Anibal Barragan  for second course of therapy    Please contact your Oncologist if you have any questions regarding the chemotherapy Oxaliplatin/Leucovorin/Irinotecan/Fluorouracil that you received today or if you have any issues with your chemo pump at home. Patient instructed if experience any of the symptoms following today's chemotherapy / to notify MD immediately or go to emergency department.     * dizziness/lightheadedness  *acute nausea/vomiting - not relieved with medication  *headache - not relieved from Tylenol/pain medication  *chest pain/pressure  *rash/itching  *shortness of breath        Drink fluids - 48oz fluids daily  Call if develop fever/ chills/ signs or symptoms of infection

## 2023-02-15 NOTE — PROGRESS NOTES
Patient assessed for the following post chemotherapy:    Dizziness   No  Lightheadedness  No      Acute nausea/vomiting No  Headache   No  Chest pain/pressure  No  Rash/itching   No  Shortness of breath  No    Patient kept for 20 minutes observation post infusion chemotherapy. Patient tolerated chemotherapy treatment Oxaliplatin/Irinotecan/Leucovorin/5-FU without any complications. CADD pump 5FU added via mediport to infuse at 5.4ml/hr over 46 hours. Connections secured and tape to chest. Patient and family instructed on pump- it's usage,what to do if alarm goes off, and who to call if any questions. Verified pump running before discharge. Last vital signs:   /77   Pulse 88   Temp 98.3 °F (36.8 °C) (Oral)   Resp 20   SpO2 100%       Patient instructed if experience any of the above symptoms following today's infusion,he/she is to notify MD immediately or go to the emergency department. Discharge instructions given to patient. Verbalizes understanding. Ambulated off unit with spouse and with belongings.

## 2023-02-17 ENCOUNTER — HOSPITAL ENCOUNTER (OUTPATIENT)
Dept: INFUSION THERAPY | Age: 68
Discharge: HOME | End: 2023-02-17
Payer: MEDICARE

## 2023-02-17 VITALS — HEIGHT: 69 IN | WEIGHT: 134 LBS | BODY MASS INDEX: 19.85 KG/M2

## 2023-02-17 DIAGNOSIS — I95.1 ORTHOSTATIC HYPOTENSION: Primary | ICD-10-CM

## 2023-02-17 DIAGNOSIS — C25.1 MALIGNANT NEOPLASM OF BODY OF PANCREAS (HCC): ICD-10-CM

## 2023-02-17 PROCEDURE — 96523 IRRIG DRUG DELIVERY DEVICE: CPT

## 2023-02-17 PROCEDURE — 6360000002 HC RX W HCPCS: Performed by: INTERNAL MEDICINE

## 2023-02-17 PROCEDURE — 2580000003 HC RX 258: Performed by: INTERNAL MEDICINE

## 2023-02-17 RX ORDER — HEPARIN SODIUM (PORCINE) LOCK FLUSH IV SOLN 100 UNIT/ML 100 UNIT/ML
500 SOLUTION INTRAVENOUS PRN
OUTPATIENT
Start: 2023-02-17

## 2023-02-17 RX ORDER — 0.9 % SODIUM CHLORIDE 0.9 %
1000 INTRAVENOUS SOLUTION INTRAVENOUS ONCE
Start: 2023-02-17 | End: 2023-02-17

## 2023-02-17 RX ORDER — HEPARIN SODIUM (PORCINE) LOCK FLUSH IV SOLN 100 UNIT/ML 100 UNIT/ML
500 SOLUTION INTRAVENOUS PRN
Status: DISCONTINUED | OUTPATIENT
Start: 2023-02-17 | End: 2023-02-18 | Stop reason: HOSPADM

## 2023-02-17 RX ORDER — SODIUM CHLORIDE 9 MG/ML
25 INJECTION, SOLUTION INTRAVENOUS PRN
OUTPATIENT
Start: 2023-02-17

## 2023-02-17 RX ORDER — SODIUM CHLORIDE 0.9 % (FLUSH) 0.9 %
5-40 SYRINGE (ML) INJECTION PRN
OUTPATIENT
Start: 2023-02-17

## 2023-02-17 RX ORDER — SODIUM CHLORIDE 0.9 % (FLUSH) 0.9 %
5-40 SYRINGE (ML) INJECTION PRN
Status: DISCONTINUED | OUTPATIENT
Start: 2023-02-17 | End: 2023-02-18 | Stop reason: HOSPADM

## 2023-02-17 RX ADMIN — SODIUM CHLORIDE, PRESERVATIVE FREE 20 ML: 5 INJECTION INTRAVENOUS at 14:20

## 2023-02-17 RX ADMIN — HEPARIN 500 UNITS: 100 SYRINGE at 14:20

## 2023-02-17 NOTE — PLAN OF CARE
Problem: Safety - Adult  Goal: Free from fall injury  Outcome: Not Progressing  Flowsheets (Taken 2/17/2023 1451)  Free From Fall Injury:   Instruct family/caregiver on patient safety   Based on caregiver fall risk screen, instruct family/caregiver to ask for assistance with transferring infant if caregiver noted to have fall risk factors  Note: Free from falls while in O.P. Oncology. Problem: Discharge Planning  Goal: Discharge to home or other facility with appropriate resources  Outcome: Not Progressing  Flowsheets (Taken 2/17/2023 1451)  Discharge to home or other facility with appropriate resources:   Identify barriers to discharge with patient and caregiver   Arrange for needed discharge resources and transportation as appropriate   Identify discharge learning needs (meds, wound care, etc)  Note: Verbalize understanding of discharge instructions, follow up appointments, and when to call Physician. Problem: Infection - Adult  Goal: Absence of infection at discharge  Outcome: Not Progressing  Flowsheets (Taken 2/17/2023 1451)  Absence of infection at discharge:   Assess and monitor for signs and symptoms of infection   Monitor lab/diagnostic results   Monitor all insertion sites i.e., indwelling lines, tubes and drains  Note: Mediport site with no redness or warmth. Skin over port site intact with no signs of breakdown noted. Patient verbalizes signs/symptoms of port infection and when to notify the physician.    Goal: Absence of fever/infection during anticipated neutropenic period  Outcome: Not Progressing  Flowsheets (Taken 2/17/2023 1451)  Absence of fever/infection during anticipated neutropenic period:   Monitor white blood cell count   Implement neutropenic guidelines  Note: Discuss port maintenance,infection prevention, sign of infection and when to call MD.    Problem: Safety - Adult  Goal: Free from fall injury  Outcome: Not Progressing  Flowsheets (Taken 2/17/2023 1451)  Free From Fall Injury:   Instruct family/caregiver on patient safety   Based on caregiver fall risk screen, instruct family/caregiver to ask for assistance with transferring infant if caregiver noted to have fall risk factors  Note: Free from falls while in O.P. Oncology. Problem: Discharge Planning  Goal: Discharge to home or other facility with appropriate resources  Outcome: Not Progressing  Flowsheets (Taken 2/17/2023 1451)  Discharge to home or other facility with appropriate resources:   Identify barriers to discharge with patient and caregiver   Arrange for needed discharge resources and transportation as appropriate   Identify discharge learning needs (meds, wound care, etc)  Note: Verbalize understanding of discharge instructions, follow up appointments, and when to call Physician. Problem: Infection - Adult  Goal: Absence of infection at discharge  Outcome: Not Progressing  Flowsheets (Taken 2/17/2023 1451)  Absence of infection at discharge:   Assess and monitor for signs and symptoms of infection   Monitor lab/diagnostic results   Monitor all insertion sites i.e., indwelling lines, tubes and drains  Note: Mediport site with no redness or warmth. Skin over port site intact with no signs of breakdown noted. Patient verbalizes signs/symptoms of port infection and when to notify the physician. Goal: Absence of fever/infection during anticipated neutropenic period  Outcome: Not Progressing  Flowsheets (Taken 2/17/2023 1451)  Absence of fever/infection during anticipated neutropenic period:   Monitor white blood cell count   Implement neutropenic guidelines  Note: Discuss port maintenance,infection prevention, sign of infection and when to call MD.    Care plan reviewed with patient and spouse. Patient and spouse verbalize understanding of the plan of care and contribute to goal setting.

## 2023-02-22 DIAGNOSIS — C25.1 MALIGNANT NEOPLASM OF BODY OF PANCREAS (HCC): Primary | ICD-10-CM

## 2023-02-22 RX ORDER — ATROPINE SULFATE 0.4 MG/ML
0.4 AMPUL (ML) INJECTION ONCE
OUTPATIENT
Start: 2023-03-01 | End: 2023-03-01

## 2023-02-22 RX ORDER — HEPARIN SODIUM (PORCINE) LOCK FLUSH IV SOLN 100 UNIT/ML 100 UNIT/ML
500 SOLUTION INTRAVENOUS PRN
OUTPATIENT
Start: 2023-03-01

## 2023-02-22 RX ORDER — SODIUM CHLORIDE 9 MG/ML
5-250 INJECTION, SOLUTION INTRAVENOUS PRN
OUTPATIENT
Start: 2023-03-01

## 2023-02-22 RX ORDER — ALBUTEROL SULFATE 90 UG/1
4 AEROSOL, METERED RESPIRATORY (INHALATION) PRN
OUTPATIENT
Start: 2023-03-01

## 2023-02-22 RX ORDER — MEPERIDINE HYDROCHLORIDE 50 MG/ML
12.5 INJECTION INTRAMUSCULAR; INTRAVENOUS; SUBCUTANEOUS PRN
OUTPATIENT
Start: 2023-03-01

## 2023-02-22 RX ORDER — ACETAMINOPHEN 325 MG/1
650 TABLET ORAL
OUTPATIENT
Start: 2023-03-01

## 2023-02-22 RX ORDER — FAMOTIDINE 10 MG/ML
20 INJECTION, SOLUTION INTRAVENOUS
OUTPATIENT
Start: 2023-03-01

## 2023-02-22 RX ORDER — ONDANSETRON 2 MG/ML
8 INJECTION INTRAMUSCULAR; INTRAVENOUS
OUTPATIENT
Start: 2023-03-01

## 2023-02-22 RX ORDER — DEXTROSE MONOHYDRATE 50 MG/ML
5-250 INJECTION, SOLUTION INTRAVENOUS PRN
OUTPATIENT
Start: 2023-03-01

## 2023-02-22 RX ORDER — DIPHENHYDRAMINE HYDROCHLORIDE 50 MG/ML
50 INJECTION INTRAMUSCULAR; INTRAVENOUS
OUTPATIENT
Start: 2023-03-01

## 2023-02-22 RX ORDER — EPINEPHRINE 1 MG/ML
0.3 INJECTION, SOLUTION, CONCENTRATE INTRAVENOUS PRN
OUTPATIENT
Start: 2023-03-01

## 2023-02-22 RX ORDER — SODIUM CHLORIDE 9 MG/ML
INJECTION, SOLUTION INTRAVENOUS CONTINUOUS
OUTPATIENT
Start: 2023-03-01

## 2023-02-22 RX ORDER — SODIUM CHLORIDE 9 MG/ML
5-40 INJECTION INTRAVENOUS PRN
OUTPATIENT
Start: 2023-03-01

## 2023-02-22 RX ORDER — ATROPINE SULFATE 0.4 MG/ML
0.4 AMPUL (ML) INJECTION
OUTPATIENT
Start: 2023-03-01

## 2023-02-22 RX ORDER — PALONOSETRON 0.05 MG/ML
0.25 INJECTION, SOLUTION INTRAVENOUS ONCE
OUTPATIENT
Start: 2023-03-01 | End: 2023-03-01

## 2023-03-01 ENCOUNTER — HOSPITAL ENCOUNTER (OUTPATIENT)
Dept: INFUSION THERAPY | Age: 68
Discharge: HOME | DRG: 682 | End: 2023-03-01
Payer: MEDICARE

## 2023-03-01 ENCOUNTER — HOSPITAL ENCOUNTER (INPATIENT)
Age: 68
LOS: 7 days | Discharge: HOME | DRG: 682 | End: 2023-03-08
Attending: STUDENT IN AN ORGANIZED HEALTH CARE EDUCATION/TRAINING PROGRAM
Payer: MEDICARE

## 2023-03-01 ENCOUNTER — APPOINTMENT (OUTPATIENT)
Dept: CT IMAGING | Age: 68
DRG: 682 | End: 2023-03-01
Payer: MEDICARE

## 2023-03-01 ENCOUNTER — OFFICE VISIT (OUTPATIENT)
Dept: ONCOLOGY | Age: 68
End: 2023-03-01

## 2023-03-01 VITALS
HEART RATE: 69 BPM | SYSTOLIC BLOOD PRESSURE: 113 MMHG | DIASTOLIC BLOOD PRESSURE: 62 MMHG | HEIGHT: 69 IN | TEMPERATURE: 97.1 F | RESPIRATION RATE: 18 BRPM | BODY MASS INDEX: 19.2 KG/M2 | OXYGEN SATURATION: 100 % | WEIGHT: 129.6 LBS

## 2023-03-01 VITALS
WEIGHT: 129.6 LBS | DIASTOLIC BLOOD PRESSURE: 62 MMHG | TEMPERATURE: 97.1 F | HEIGHT: 69 IN | BODY MASS INDEX: 19.2 KG/M2 | OXYGEN SATURATION: 100 % | RESPIRATION RATE: 18 BRPM | HEART RATE: 69 BPM | SYSTOLIC BLOOD PRESSURE: 113 MMHG

## 2023-03-01 DIAGNOSIS — C25.1 MALIGNANT NEOPLASM OF BODY OF PANCREAS (HCC): ICD-10-CM

## 2023-03-01 DIAGNOSIS — C25.1 MALIGNANT NEOPLASM OF BODY OF PANCREAS (HCC): Primary | ICD-10-CM

## 2023-03-01 DIAGNOSIS — D64.9 ANEMIA, UNSPECIFIED TYPE: Primary | ICD-10-CM

## 2023-03-01 DIAGNOSIS — I95.1 ORTHOSTATIC HYPOTENSION: Primary | ICD-10-CM

## 2023-03-01 DIAGNOSIS — N17.9 ACUTE RENAL FAILURE, UNSPECIFIED ACUTE RENAL FAILURE TYPE (HCC): ICD-10-CM

## 2023-03-01 LAB
ABO: NORMAL
ALBUMIN SERPL BCG-MCNC: 2.5 G/DL (ref 3.5–5.1)
ALBUMIN SERPL BCG-MCNC: 2.6 G/DL (ref 3.5–5.1)
ALP SERPL-CCNC: 182 U/L (ref 38–126)
ALT SERPL W/O P-5'-P-CCNC: 9 U/L (ref 11–66)
ANION GAP SERPL CALC-SCNC: 16 MEQ/L (ref 8–16)
ANION GAP SERPL CALC-SCNC: 17 MEQ/L (ref 8–16)
ANISOCYTOSIS BLD QL SMEAR: PRESENT
ANTIBODY SCREEN: NORMAL
AST SERPL-CCNC: 15 U/L (ref 5–40)
BACTERIA URNS QL MICRO: ABNORMAL /HPF
BASOPHILS ABSOLUTE: 0 THOU/MM3 (ref 0–0.1)
BASOPHILS ABSOLUTE: 0 THOU/MM3 (ref 0–0.1)
BASOPHILS NFR BLD AUTO: 0 %
BASOPHILS NFR BLD AUTO: 0.2 %
BILIRUB CONJ SERPL-MCNC: < 0.2 MG/DL (ref 0–0.3)
BILIRUB SERPL-MCNC: 0.5 MG/DL (ref 0.3–1.2)
BILIRUB UR QL STRIP.AUTO: NEGATIVE
BUN BLDP-MCNC: 61 MG/DL (ref 8–26)
BUN BLDP-MCNC: 65 MG/DL (ref 8–26)
BUN SERPL-MCNC: 60 MG/DL (ref 7–22)
BUN SERPL-MCNC: 62 MG/DL (ref 7–22)
BURR CELLS BLD QL SMEAR: ABNORMAL
BURR CELLS BLD QL SMEAR: ABNORMAL
CA-I BLD ISE-SCNC: 1.18 MMOL/L (ref 1.12–1.32)
CALCIUM SERPL-MCNC: 8.2 MG/DL (ref 8.5–10.5)
CALCIUM SERPL-MCNC: 8.2 MG/DL (ref 8.5–10.5)
CASTS #/AREA URNS LPF: ABNORMAL /LPF
CASTS 2: ABNORMAL /LPF
CHARACTER UR: CLEAR
CHLORIDE BLD-SCNC: 107 MEQ/L (ref 98–109)
CHLORIDE BLD-SCNC: 108 MEQ/L (ref 98–109)
CHLORIDE SERPL-SCNC: 101 MEQ/L (ref 98–111)
CHLORIDE SERPL-SCNC: 101 MEQ/L (ref 98–111)
CO2 SERPL-SCNC: 12 MEQ/L (ref 23–33)
CO2 SERPL-SCNC: 12 MEQ/L (ref 23–33)
COLOR: YELLOW
CREAT BLD-MCNC: 7.3 MG/DL (ref 0.5–1.2)
CREAT BLD-MCNC: 7.4 MG/DL (ref 0.5–1.2)
CREAT SERPL-MCNC: 7.1 MG/DL (ref 0.4–1.2)
CREAT SERPL-MCNC: 7.2 MG/DL (ref 0.4–1.2)
CRYSTALS URNS MICRO: ABNORMAL
DEPRECATED RDW RBC AUTO: 51.6 FL (ref 35–45)
DEPRECATED RDW RBC AUTO: 52 FL (ref 35–45)
EKG ATRIAL RATE: 59 BPM
EKG P AXIS: 32 DEGREES
EKG P-R INTERVAL: 152 MS
EKG Q-T INTERVAL: 424 MS
EKG QRS DURATION: 88 MS
EKG QTC CALCULATION (BAZETT): 419 MS
EKG R AXIS: 38 DEGREES
EKG T AXIS: 65 DEGREES
EKG VENTRICULAR RATE: 59 BPM
EOSINOPHIL NFR BLD AUTO: 0.4 %
EOSINOPHIL NFR BLD AUTO: 0.8 %
EOSINOPHILS ABSOLUTE: 0 THOU/MM3 (ref 0–0.4)
EOSINOPHILS ABSOLUTE: 0 THOU/MM3 (ref 0–0.4)
EPITHELIAL CELLS, UA: ABNORMAL /HPF
ERYTHROCYTE [DISTWIDTH] IN BLOOD BY AUTOMATED COUNT: 15.3 % (ref 11.5–14.5)
ERYTHROCYTE [DISTWIDTH] IN BLOOD BY AUTOMATED COUNT: 15.3 % (ref 11.5–14.5)
FOLATE SERPL-MCNC: > 20 NG/ML (ref 4.8–24.2)
GFR SERPL CREATININE-BSD FRML MDRD: 7 ML/MIN/1.73M2
GFR SERPL CREATININE-BSD FRML MDRD: 7 ML/MIN/1.73M2
GFR SERPL CREATININE-BSD FRML MDRD: 8 ML/MIN/1.73M2
GFR SERPL CREATININE-BSD FRML MDRD: 8 ML/MIN/1.73M2
GLUCOSE BLD-MCNC: 94 MG/DL (ref 70–108)
GLUCOSE BLD-MCNC: 99 MG/DL (ref 70–108)
GLUCOSE SERPL-MCNC: 101 MG/DL (ref 70–108)
GLUCOSE SERPL-MCNC: 113 MG/DL (ref 70–108)
GLUCOSE UR QL STRIP.AUTO: NEGATIVE MG/DL
HCT VFR BLD AUTO: 19.5 % (ref 42–52)
HCT VFR BLD AUTO: 20.5 % (ref 42–52)
HCT VFR BLD AUTO: 27.2 % (ref 42–52)
HGB BLD-MCNC: 6.6 GM/DL (ref 14–18)
HGB BLD-MCNC: 6.7 GM/DL (ref 14–18)
HGB BLD-MCNC: 8.4 GM/DL (ref 14–18)
HGB UR QL STRIP.AUTO: NEGATIVE
IMM GRANULOCYTES # BLD AUTO: 0.09 THOU/MM3 (ref 0–0.07)
IMM GRANULOCYTES # BLD AUTO: 0.24 THOU/MM3 (ref 0–0.07)
IMM GRANULOCYTES NFR BLD AUTO: 1.9 %
IMM GRANULOCYTES NFR BLD AUTO: 5.2 %
IONIZED CALCIUM, WHOLE BLOOD: 1.13 MMOL/L (ref 1.12–1.32)
IONIZED CALCIUM, WHOLE BLOOD: 1.13 MMOL/L (ref 1.12–1.32)
KETONES UR QL STRIP.AUTO: NEGATIVE
LYMPHOCYTES ABSOLUTE: 0.3 THOU/MM3 (ref 1–4.8)
LYMPHOCYTES ABSOLUTE: 0.3 THOU/MM3 (ref 1–4.8)
LYMPHOCYTES NFR BLD AUTO: 6.5 %
LYMPHOCYTES NFR BLD AUTO: 7.1 %
MAGNESIUM SERPL-MCNC: 1.4 MG/DL (ref 1.6–2.4)
MCH RBC QN AUTO: 30.5 PG (ref 26–33)
MCH RBC QN AUTO: 31.4 PG (ref 26–33)
MCHC RBC AUTO-ENTMCNC: 32.7 GM/DL (ref 32.2–35.5)
MCHC RBC AUTO-ENTMCNC: 33.8 GM/DL (ref 32.2–35.5)
MCV RBC AUTO: 92.9 FL (ref 80–94)
MCV RBC AUTO: 93.2 FL (ref 80–94)
MISCELLANEOUS 2: ABNORMAL
MONOCYTES ABSOLUTE: 1 THOU/MM3 (ref 0.4–1.3)
MONOCYTES ABSOLUTE: 1.1 THOU/MM3 (ref 0.4–1.3)
MONOCYTES NFR BLD AUTO: 21.8 %
MONOCYTES NFR BLD AUTO: 23 %
NEUTROPHILS NFR BLD AUTO: 66.1 %
NEUTROPHILS NFR BLD AUTO: 67 %
NITRITE UR QL STRIP: NEGATIVE
NRBC BLD AUTO-RTO: 0 /100 WBC
NRBC BLD AUTO-RTO: 0 /100 WBC
OSMOLALITY SERPL CALC.SUM OF ELEC: 276 MOSMOL/KG (ref 275–300)
OSMOLALITY UR: 287 MOSMOL/KG (ref 250–750)
PATHOLOGIST REVIEW: ABNORMAL
PH UR STRIP.AUTO: 5 [PH] (ref 5–9)
PHOSPHATE SERPL-MCNC: 3.8 MG/DL (ref 2.4–4.7)
PLATELET # BLD AUTO: 251 THOU/MM3 (ref 130–400)
PLATELET # BLD AUTO: 267 THOU/MM3 (ref 130–400)
PLATELET BLD QL SMEAR: ADEQUATE
PMV BLD AUTO: 9.4 FL (ref 9.4–12.4)
PMV BLD AUTO: 9.5 FL (ref 9.4–12.4)
POIKILOCYTES: ABNORMAL
POIKILOCYTES: ABNORMAL
POLYCHROMASIA BLD QL SMEAR: ABNORMAL
POTASSIUM BLD-SCNC: 4.4 MEQ/L (ref 3.5–4.9)
POTASSIUM BLD-SCNC: 4.5 MEQ/L (ref 3.5–4.9)
POTASSIUM SERPL-SCNC: 4.2 MEQ/L (ref 3.5–5.2)
POTASSIUM SERPL-SCNC: 4.6 MEQ/L (ref 3.5–5.2)
PROT SERPL-MCNC: 6.1 G/DL (ref 6.1–8)
PROT UR STRIP.AUTO-MCNC: ABNORMAL MG/DL
PROT UR-MCNC: 7.6 MG/DL
RBC # BLD AUTO: 2.1 MILL/MM3 (ref 4.7–6.1)
RBC # BLD AUTO: 2.2 MILL/MM3 (ref 4.7–6.1)
RBC URINE: ABNORMAL /HPF
RENAL EPI CELLS #/AREA URNS HPF: ABNORMAL /[HPF]
RH FACTOR: NORMAL
SCAN OF BLOOD SMEAR: NORMAL
SCAN OF BLOOD SMEAR: NORMAL
SEGMENTED NEUTROPHILS ABSOLUTE COUNT: 3 THOU/MM3 (ref 1.8–7.7)
SEGMENTED NEUTROPHILS ABSOLUTE COUNT: 3.2 THOU/MM3 (ref 1.8–7.7)
SMUDGE CELLS BLD QL SMEAR: PRESENT
SODIUM BLD-SCNC: 131 MEQ/L (ref 138–146)
SODIUM BLD-SCNC: 131 MEQ/L (ref 138–146)
SODIUM SERPL-SCNC: 129 MEQ/L (ref 135–145)
SODIUM SERPL-SCNC: 130 MEQ/L (ref 135–145)
SODIUM UR-SCNC: 41 MEQ/L
SP GR UR REFRACT.AUTO: 1.01 (ref 1–1.03)
TOTAL CO2, WHOLE BLOOD: 10 MEQ/L (ref 23–33)
TOTAL CO2, WHOLE BLOOD: 9 MEQ/L (ref 23–33)
TOXIC GRANULES BLD QL SMEAR: PRESENT
TOXIC GRANULES BLD QL SMEAR: PRESENT
UROBILINOGEN, URINE: 0.2 EU/DL (ref 0–1)
VIT B12 SERPL-MCNC: 1188 PG/ML (ref 211–911)
WBC # BLD AUTO: 4.6 THOU/MM3 (ref 4.8–10.8)
WBC # BLD AUTO: 4.8 THOU/MM3 (ref 4.8–10.8)
WBC #/AREA URNS HPF: ABNORMAL /HPF
WBC #/AREA URNS HPF: NEGATIVE /[HPF]
YEAST LIKE FUNGI URNS QL MICRO: ABNORMAL

## 2023-03-01 PROCEDURE — 51798 US URINE CAPACITY MEASURE: CPT

## 2023-03-01 PROCEDURE — 1200000003 HC TELEMETRY R&B

## 2023-03-01 PROCEDURE — 6370000000 HC RX 637 (ALT 250 FOR IP): Performed by: INTERNAL MEDICINE

## 2023-03-01 PROCEDURE — 93005 ELECTROCARDIOGRAM TRACING: CPT | Performed by: STUDENT IN AN ORGANIZED HEALTH CARE EDUCATION/TRAINING PROGRAM

## 2023-03-01 PROCEDURE — 81001 URINALYSIS AUTO W/SCOPE: CPT

## 2023-03-01 PROCEDURE — 83935 ASSAY OF URINE OSMOLALITY: CPT

## 2023-03-01 PROCEDURE — 86923 COMPATIBILITY TEST ELECTRIC: CPT

## 2023-03-01 PROCEDURE — 93010 ELECTROCARDIOGRAM REPORT: CPT | Performed by: INTERNAL MEDICINE

## 2023-03-01 PROCEDURE — 85025 COMPLETE CBC W/AUTO DIFF WBC: CPT

## 2023-03-01 PROCEDURE — 36591 DRAW BLOOD OFF VENOUS DEVICE: CPT

## 2023-03-01 PROCEDURE — 86901 BLOOD TYPING SEROLOGIC RH(D): CPT

## 2023-03-01 PROCEDURE — 82607 VITAMIN B-12: CPT

## 2023-03-01 PROCEDURE — 86850 RBC ANTIBODY SCREEN: CPT

## 2023-03-01 PROCEDURE — 74176 CT ABD & PELVIS W/O CONTRAST: CPT

## 2023-03-01 PROCEDURE — 80069 RENAL FUNCTION PANEL: CPT

## 2023-03-01 PROCEDURE — 82746 ASSAY OF FOLIC ACID SERUM: CPT

## 2023-03-01 PROCEDURE — 99223 1ST HOSP IP/OBS HIGH 75: CPT

## 2023-03-01 PROCEDURE — 36430 TRANSFUSION BLD/BLD COMPNT: CPT

## 2023-03-01 PROCEDURE — 85014 HEMATOCRIT: CPT

## 2023-03-01 PROCEDURE — 2580000003 HC RX 258: Performed by: INTERNAL MEDICINE

## 2023-03-01 PROCEDURE — 51702 INSERT TEMP BLADDER CATH: CPT

## 2023-03-01 PROCEDURE — 36415 COLL VENOUS BLD VENIPUNCTURE: CPT

## 2023-03-01 PROCEDURE — 82330 ASSAY OF CALCIUM: CPT

## 2023-03-01 PROCEDURE — 99285 EMERGENCY DEPT VISIT HI MDM: CPT

## 2023-03-01 PROCEDURE — 85018 HEMOGLOBIN: CPT

## 2023-03-01 PROCEDURE — 2580000003 HC RX 258

## 2023-03-01 PROCEDURE — 2500000003 HC RX 250 WO HCPCS

## 2023-03-01 PROCEDURE — 80076 HEPATIC FUNCTION PANEL: CPT

## 2023-03-01 PROCEDURE — 80047 BASIC METABLC PNL IONIZED CA: CPT

## 2023-03-01 PROCEDURE — 86900 BLOOD TYPING SEROLOGIC ABO: CPT

## 2023-03-01 PROCEDURE — 99233 SBSQ HOSP IP/OBS HIGH 50: CPT | Performed by: INTERNAL MEDICINE

## 2023-03-01 PROCEDURE — 84300 ASSAY OF URINE SODIUM: CPT

## 2023-03-01 PROCEDURE — 83735 ASSAY OF MAGNESIUM: CPT

## 2023-03-01 PROCEDURE — 6360000002 HC RX W HCPCS: Performed by: INTERNAL MEDICINE

## 2023-03-01 PROCEDURE — 84156 ASSAY OF PROTEIN URINE: CPT

## 2023-03-01 PROCEDURE — P9016 RBC LEUKOCYTES REDUCED: HCPCS

## 2023-03-01 PROCEDURE — 99211 OFF/OP EST MAY X REQ PHY/QHP: CPT

## 2023-03-01 PROCEDURE — 6360000002 HC RX W HCPCS

## 2023-03-01 RX ORDER — ASPIRIN 81 MG/1
81 TABLET ORAL DAILY
Status: DISCONTINUED | OUTPATIENT
Start: 2023-03-01 | End: 2023-03-08 | Stop reason: HOSPADM

## 2023-03-01 RX ORDER — SODIUM CHLORIDE 0.9 % (FLUSH) 0.9 %
5-40 SYRINGE (ML) INJECTION PRN
Status: DISCONTINUED | OUTPATIENT
Start: 2023-03-01 | End: 2023-03-02 | Stop reason: HOSPADM

## 2023-03-01 RX ORDER — DILTIAZEM HYDROCHLORIDE 240 MG/1
240 CAPSULE, COATED, EXTENDED RELEASE ORAL DAILY
Status: DISCONTINUED | OUTPATIENT
Start: 2023-03-02 | End: 2023-03-08 | Stop reason: HOSPADM

## 2023-03-01 RX ORDER — ALBUTEROL SULFATE 90 UG/1
4 AEROSOL, METERED RESPIRATORY (INHALATION) PRN
OUTPATIENT
Start: 2023-03-01

## 2023-03-01 RX ORDER — ONDANSETRON 4 MG/1
4 TABLET, ORALLY DISINTEGRATING ORAL EVERY 8 HOURS PRN
Status: DISCONTINUED | OUTPATIENT
Start: 2023-03-01 | End: 2023-03-08 | Stop reason: HOSPADM

## 2023-03-01 RX ORDER — SODIUM CHLORIDE 0.9 % (FLUSH) 0.9 %
5-40 SYRINGE (ML) INJECTION EVERY 12 HOURS SCHEDULED
Status: DISCONTINUED | OUTPATIENT
Start: 2023-03-01 | End: 2023-03-08 | Stop reason: HOSPADM

## 2023-03-01 RX ORDER — SODIUM CHLORIDE 0.9 % (FLUSH) 0.9 %
5-40 SYRINGE (ML) INJECTION PRN
OUTPATIENT
Start: 2023-03-01

## 2023-03-01 RX ORDER — TAMSULOSIN HYDROCHLORIDE 0.4 MG/1
0.8 CAPSULE ORAL DAILY
Status: DISCONTINUED | OUTPATIENT
Start: 2023-03-01 | End: 2023-03-08 | Stop reason: HOSPADM

## 2023-03-01 RX ORDER — DIPHENHYDRAMINE HYDROCHLORIDE 50 MG/ML
50 INJECTION INTRAMUSCULAR; INTRAVENOUS
OUTPATIENT
Start: 2023-03-01

## 2023-03-01 RX ORDER — 0.9 % SODIUM CHLORIDE 0.9 %
1000 INTRAVENOUS SOLUTION INTRAVENOUS ONCE
Status: DISCONTINUED | OUTPATIENT
Start: 2023-03-01 | End: 2023-03-01

## 2023-03-01 RX ORDER — HEPARIN SODIUM (PORCINE) LOCK FLUSH IV SOLN 100 UNIT/ML 100 UNIT/ML
500 SOLUTION INTRAVENOUS PRN
OUTPATIENT
Start: 2023-03-01

## 2023-03-01 RX ORDER — ACETAMINOPHEN 650 MG/1
650 SUPPOSITORY RECTAL EVERY 6 HOURS PRN
Status: DISCONTINUED | OUTPATIENT
Start: 2023-03-01 | End: 2023-03-08 | Stop reason: HOSPADM

## 2023-03-01 RX ORDER — 0.9 % SODIUM CHLORIDE 0.9 %
1000 INTRAVENOUS SOLUTION INTRAVENOUS ONCE
Start: 2023-03-01 | End: 2023-03-01

## 2023-03-01 RX ORDER — ACETAMINOPHEN 325 MG/1
650 TABLET ORAL
OUTPATIENT
Start: 2023-03-01

## 2023-03-01 RX ORDER — SODIUM CHLORIDE 0.9 % (FLUSH) 0.9 %
5-40 SYRINGE (ML) INJECTION PRN
Status: DISCONTINUED | OUTPATIENT
Start: 2023-03-01 | End: 2023-03-08 | Stop reason: HOSPADM

## 2023-03-01 RX ORDER — LOSARTAN POTASSIUM 100 MG/1
100 TABLET ORAL DAILY
Status: CANCELLED | OUTPATIENT
Start: 2023-03-01

## 2023-03-01 RX ORDER — POLYETHYLENE GLYCOL 3350 17 G/17G
17 POWDER, FOR SOLUTION ORAL DAILY PRN
Status: DISCONTINUED | OUTPATIENT
Start: 2023-03-01 | End: 2023-03-08 | Stop reason: HOSPADM

## 2023-03-01 RX ORDER — ONDANSETRON 2 MG/ML
4 INJECTION INTRAMUSCULAR; INTRAVENOUS EVERY 6 HOURS PRN
Status: DISCONTINUED | OUTPATIENT
Start: 2023-03-01 | End: 2023-03-08 | Stop reason: HOSPADM

## 2023-03-01 RX ORDER — EPINEPHRINE 1 MG/ML
0.3 INJECTION, SOLUTION, CONCENTRATE INTRAVENOUS PRN
OUTPATIENT
Start: 2023-03-01

## 2023-03-01 RX ORDER — LEVOTHYROXINE SODIUM 0.1 MG/1
200 TABLET ORAL
Status: DISCONTINUED | OUTPATIENT
Start: 2023-03-02 | End: 2023-03-08 | Stop reason: HOSPADM

## 2023-03-01 RX ORDER — SODIUM CHLORIDE 9 MG/ML
INJECTION, SOLUTION INTRAVENOUS PRN
Status: DISCONTINUED | OUTPATIENT
Start: 2023-03-01 | End: 2023-03-08 | Stop reason: HOSPADM

## 2023-03-01 RX ORDER — SODIUM CHLORIDE 9 MG/ML
25 INJECTION, SOLUTION INTRAVENOUS PRN
OUTPATIENT
Start: 2023-03-01

## 2023-03-01 RX ORDER — HEPARIN SODIUM 5000 [USP'U]/ML
5000 INJECTION, SOLUTION INTRAVENOUS; SUBCUTANEOUS EVERY 8 HOURS SCHEDULED
Status: DISCONTINUED | OUTPATIENT
Start: 2023-03-01 | End: 2023-03-08 | Stop reason: HOSPADM

## 2023-03-01 RX ORDER — HEPARIN SODIUM (PORCINE) LOCK FLUSH IV SOLN 100 UNIT/ML 100 UNIT/ML
500 SOLUTION INTRAVENOUS PRN
Status: DISCONTINUED | OUTPATIENT
Start: 2023-03-01 | End: 2023-03-02 | Stop reason: HOSPADM

## 2023-03-01 RX ORDER — FAMOTIDINE 10 MG/ML
20 INJECTION, SOLUTION INTRAVENOUS
OUTPATIENT
Start: 2023-03-01

## 2023-03-01 RX ORDER — PANTOPRAZOLE SODIUM 40 MG/1
40 TABLET, DELAYED RELEASE ORAL
Status: DISCONTINUED | OUTPATIENT
Start: 2023-03-02 | End: 2023-03-08 | Stop reason: HOSPADM

## 2023-03-01 RX ORDER — LEVOTHYROXINE SODIUM 0.03 MG/1
25 TABLET ORAL
Status: DISCONTINUED | OUTPATIENT
Start: 2023-03-02 | End: 2023-03-08 | Stop reason: HOSPADM

## 2023-03-01 RX ORDER — ONDANSETRON 2 MG/ML
8 INJECTION INTRAMUSCULAR; INTRAVENOUS
OUTPATIENT
Start: 2023-03-01

## 2023-03-01 RX ORDER — SODIUM CHLORIDE 9 MG/ML
INJECTION, SOLUTION INTRAVENOUS CONTINUOUS
OUTPATIENT
Start: 2023-03-01

## 2023-03-01 RX ORDER — ACETAMINOPHEN 325 MG/1
650 TABLET ORAL EVERY 6 HOURS PRN
Status: DISCONTINUED | OUTPATIENT
Start: 2023-03-01 | End: 2023-03-08 | Stop reason: HOSPADM

## 2023-03-01 RX ORDER — SODIUM CHLORIDE 9 MG/ML
20 INJECTION, SOLUTION INTRAVENOUS CONTINUOUS
OUTPATIENT
Start: 2023-03-01

## 2023-03-01 RX ADMIN — EPOETIN ALFA-EPBX 10000 UNITS: 10000 INJECTION, SOLUTION INTRAVENOUS; SUBCUTANEOUS at 23:51

## 2023-03-01 RX ADMIN — SODIUM CHLORIDE, PRESERVATIVE FREE 20 ML: 5 INJECTION INTRAVENOUS at 09:01

## 2023-03-01 RX ADMIN — SODIUM CHLORIDE, PRESERVATIVE FREE 10 ML: 5 INJECTION INTRAVENOUS at 22:14

## 2023-03-01 RX ADMIN — HEPARIN SODIUM 5000 UNITS: 5000 INJECTION INTRAVENOUS; SUBCUTANEOUS at 16:59

## 2023-03-01 RX ADMIN — HEPARIN SODIUM 5000 UNITS: 5000 INJECTION INTRAVENOUS; SUBCUTANEOUS at 22:12

## 2023-03-01 RX ADMIN — SODIUM BICARBONATE: 84 INJECTION, SOLUTION INTRAVENOUS at 15:19

## 2023-03-01 RX ADMIN — SODIUM CHLORIDE, PRESERVATIVE FREE 10 ML: 5 INJECTION INTRAVENOUS at 09:00

## 2023-03-01 RX ADMIN — TAMSULOSIN HYDROCHLORIDE 0.8 MG: 0.4 CAPSULE ORAL at 22:13

## 2023-03-01 ASSESSMENT — PAIN - FUNCTIONAL ASSESSMENT
PAIN_FUNCTIONAL_ASSESSMENT: NONE - DENIES PAIN
PAIN_FUNCTIONAL_ASSESSMENT: NONE - DENIES PAIN

## 2023-03-01 ASSESSMENT — PAIN SCALES - GENERAL
PAINLEVEL_OUTOF10: 0
PAINLEVEL_OUTOF10: 0

## 2023-03-01 NOTE — CONSENT
Informed Consent for Blood Component Transfusion Note    I have discussed with the patient the rationale for blood component transfusion; its benefits in treating or preventing fatigue, organ damage, or death; and its risk which includes mild transfusion reactions, rare risk of blood borne infection, or more serious but rare reactions. I have discussed the alternatives to transfusion, including the risk and consequences of not receiving transfusion. The patient had an opportunity to ask questions and had agreed to proceed with transfusion of blood components.     Electronically signed by Precious Ramesh DO on 3/1/23 at 11:37 AM EST

## 2023-03-01 NOTE — CONSULTS
[facility]    [address]  [address]  [place] [address]  [phone]     [facility] Nephrology Consult      3/1/2023 5:37 PM         Pt Name:    Aaron Abbasi  MRN:     [id number]   [id number]  YOB: 1955  Admit Date:    3/1/2023 10:57 AM  Primary Care Physician:  Juan Cody MD   Room Number:  6K-07/007-A   Reason for Consult:  Decreased kidney function  Requesting Providor: Nalini Joseph PA-C  Primary Nephrologist: none    ### ISOLATION ###:   none     Admit Chief Complaint: abnormal blood tests     History of Chief Complaint:   The patient is a 67 y.o. year old white male who has never seen a nephrologist before. He is receiving chemotherapy via left chest infusaport for pancreatic cancer. He had routine blood tests performed for an upcoming office visit that showed HgB of 6.0 and creatinine >7. He was told many years ago that his prostate was large but he has not been taking any prostate drugs. He has 5-7 times per night nocturia and daytime frequency. He has poor urine stream and has to sit on the toilet to empty his bladder Vs standing up. He has never had hematuria nor kidney stones nor STD. On February 10,2023 his eGFR was 23 ml/min. In December 2022 his eGFR was > 60 ml/min. As far as I can tell, he has not received platinum based chemotherapy. He has heard the name 5-FU but has not heard of Cisplatin.      Nephrology is following the patient for: acute kidney injury thought to be from bladder outlet obstruction and dehydration.     24 hour summary:   The patient was just examined. He feels a little better without the bladder pressure. Once the cerda was inserted 1000 ml urine was recovered. He denies N/V/C/D/SOB/CP.      Baseline eGFR 27-38 ml/min   Admit eGFR 7 ml/min   Current eGFR 7 ml/min       eGFR trend    Lab Results   Component Value Date/Time    LABGLOM 8 03/01/2023 02:55 PM    LABGLOM 8 03/01/2023 11:00 AM    LABGLOM 7 03/01/2023 10:11 AM     LABGLOM 7 03/01/2023 09:00 AM    LABGLOM 24 02/15/2023 08:56 AM    LABGLOM 27 02/10/2023 12:59 PM    LABGLOM 38 01/24/2023 08:05 AM    LABGLOM 35 01/18/2023 10:25 AM    LABGLOM 32 01/11/2023 10:42 AM    LABGLOM 32 01/10/2023 09:06 AM    LABGLOM >60 12/28/2022 08:05 AM    LABGLOM 59 12/20/2022 08:05 AM          Compliance with treatment plan: 100%     Allergies and Intolerances: ALLERGIES: Patient has no known allergies. MEDICATION INTOLERANCES: none  FOOD ALLERGIES: none  INSECT ALLERGIES: none  PLANT ALLERGIES: none     Past Medical History:  Past Medical History:   Diagnosis Date    History of blood transfusion     Hypertension     Hypothyroidism     Malignant neoplasm of pancreas, unspecified location of malignancy ([facility] [address] 09/2022        Past Surgical History:  Past Surgical History:   Procedure Laterality Date    IR BILI DUCT INSERT STENT NEW ACCESS W/O DRAIN  09/19/2022    [facility]    MULTIPLE TOOTH EXTRACTIONS      PORT SURGERY Left 10/31/2022    LEFT SINGLE LUMEN MEDIPORT INSERTION performed by Perico Snell MD at [facility]  10/31/2022    Dr. Donald Molina        Family History:  Family History   Problem Relation Age of Onset    High Blood Pressure Mother     Lung Cancer Father         Social History:  Social History     Socioeconomic History    Marital status:      Spouse name: Hendricks Councilman    Number of children: 3    Years of education: Not on file    Highest education level: Not on file   Occupational History    Not on file   Tobacco Use    Smoking status: Every Day     Types: Cigars     Start date: 1/1/2012     Passive exposure: Past (Dad smoked)    Smokeless tobacco: Never    Tobacco comments:     -3-4 cigars per day. denies cessation counseling 11/1/22.  Declines counseling 12/28     Same as above 12/29     Same as above 1/18     Same as above 1/24   Vaping Use    Vaping Use: Never used   Substance and Sexual Activity    Alcohol use: Yes     Comment: 2-3 beers per day since 2022   Previous drank 8-12    Drug use: No    Sexual activity: Not on file   Other Topics Concern    Not on file   Social History Narrative    Not on file     Social Determinants of Health     Financial Resource Strain: Not on file   Food Insecurity: Not on file   Transportation Needs: Not on file   Physical Activity: Inactive    Days of Exercise per Week: 0 days    Minutes of Exercise per Session: 0 min   Stress: Not on file   Social Connections: Not on file   Intimate Partner Violence: Not on file   Housing Stability: Not on file        Home Medications:  Prior to Admission medications    Medication Sig Start Date End Date Taking? Authorizing Provider   pantoprazole (PROTONIX) 40 MG tablet take 1 tablet by mouth EVERY MORNING BEFORE BREAKFAST 1/30/23   Govind Gleason MD   levothyroxine (SYNTHROID) 25 MCG tablet Take 1 tablet by mouth daily 1/11/23   Govind Gleason MD   levothyroxine (SYNTHROID) 200 MCG tablet Take 1 tablet by mouth daily 1/11/23   Govind Gleason MD   sucralfate (CARAFATE) 1 GM tablet Take 1 tablet by mouth in the morning, at noon, in the evening, and at bedtime  Patient not taking: No sig reported 10/26/22   Historical Provider, MD   losartan (COZAAR) 100 MG tablet TAKE 1 TABLET DAILY 11/4/22   Govind Gleason MD   ondansetron (ZOFRAN) 4 MG tablet Take 1 tablet by mouth daily as needed for Nausea or Vomiting  Patient not taking: No sig reported 11/1/22   Demond Mosher MD   prochlorperazine (COMPAZINE) 10 MG tablet Take 1 tablet by mouth every 6 hours as needed (for nausea and vomiting)  Patient not taking: No sig reported 11/1/22   Demond Mosher MD   lidocaine-prilocaine (EMLA) 2.5-2.5 % cream Apply topically as needed. Patient not taking: No sig reported 11/1/22   Demond Mosher MD   dilTIAZem (DILACOR XR) 240 MG extended release capsule TAKE 1 CAPSULE DAILY 5/12/22   Govind Gleason MD   Multiple Vitamin (MULTIVITAMIN PO) Take 1 capsule by mouth daily.       Historical Provider, MD   aspirin 81 MG EC tablet Take 81 mg by mouth daily. Historical Provider, MD        Lab data:  CBC:    Recent Labs     03/01/23  0900 03/01/23  1100 03/01/23  1456   WBC 4.8 4.6*  --    HGB 6.7* 6.6* 8.4*    251  --      CMP:    Recent Labs     03/01/23  1011 03/01/23  1100 03/01/23  1455   * 129* 130*   K 4.5 4.2 4.6   CL  --  101 101   CO2  --  12* 12*   BUN  --  60* 62*   CREATININE 7.3* 7.2* 7.1*   GLUCOSE  --  101 113*   CALCIUM  --  8.2* 8.2*     Urine:  Recent Labs     03/01/23  1540   COLORU YELLOW   PHUR 5.0   WBCUA 0-2   RBCUA NONE   YEAST NONE SEEN   BACTERIA NONE SEEN   LEUKOCYTESUR NEGATIVE   UROBILINOGEN 0.2   BILIRUBINUR NEGATIVE   BLOODU NEGATIVE      Sed Rate: No results for input(s): SEDRATE in the last 72 hours. Radiology       Review of Systems:  Source of data: patient and significant other    CONSTITUTIONAL  Fever: none, Chills: none, Weight loss: yes, Malaise: yes, Fatigue: yes, Diaphoresis: none, Weakness: none    SKIN  Rash: none, Itch: none, Open sores: none    HEENT:  Headache: none, Hearing loss: none, Tinnitus: none, Ear pain: none, Ear discharge: none,   Nasal bleeding: none, Congestion: none, Stridor: none, Sore throat: none. EYES  Blurred vision: none, Double vision: none, Photophobia: none, Eye pain: none, Eye discharge: none, Eye redness: none. CARDIOVASCULAR  Chest pain: none, Arm pain: none, Palpitations: none, Orthopnea: none, Claudication: none,  Leg swelling: none, PND: none. RESPIRATORY  Cough: none, Hemoptysis: none, Sputum production: none, Shortness of breath: none, Wheezing: none    GASTROINTESTINAL  Heartburn: none, Nausea: none, Vomiting: none, Abdominal yes,  none, Diarrhea: none,  Constipation: none, Blood in the stool: none, Melena: none, Rebound: none, Rovsing's: none. GENITOURINARY  Dysuria: none, Pyuria: none, Gross hematuria: none, Urgency: none, Flank pain: none, STD: none.     MUSCULOSKELETAL  Myalgia: none, Neck pain: none, Thoracic pain: none, Lumbar pain: none, Joint pain: none, Falls: none    ENDOCRINE/HEMATOLOGY/ALLERGIC  Easy bruising: none, Easy bleeding: none, Environmental allergies: none, Polydipsia: none. NEUROLOGICAL  Dizziness: none, Tingling: none, Numbness: none, Tremor: none, Sensory change: none, Speech change: none, Focal weakness: none, Seizures: none, Loss of consciousness: none,    PSYCHIATRIC  Depression: none, Suicidal ideation: none, Heroin abuse: none, Cocaine abuse: none, Marijuana abuse: none, Hallucinations: none, Anxiety: none, Memory loss: none       Physical Examination:  /68   Pulse 51   Temp 97.4 °F (36.3 °C) (Oral)   Resp 18   Ht 5' 9\" (1.753 m)   Wt 129 lb (58.5 kg)   SpO2 100%   BMI 19.05 kg/m²       General appearance: alert and cooperative with exam  HEENT: Head: Normocephalic, no lesions, without obvious abnormality. Neck: no adenopathy, no carotid bruit, no JVD, supple, symmetrical, trachea midline, and thyroid not enlarged, symmetric, no tenderness/mass/nodules  Lungs: clear to auscultation bilaterally  Heart: regular rate and rhythm, S1, S2 normal, no murmur, click, rub or gallop  Abdomen: soft, non-tender; bowel sounds normal; no masses,  no organomegaly  Extremities: extremities normal, atraumatic, no cyanosis or edema  Neurologic: Mental status: Alert, oriented, thought content appropriate  PSY: No evidence of depression. Mood is normal for the patient. Skin: Warm and dry. No unusual lesions or rashes noted. Muscles: Hand  is equal and strong bilaterally. Leg strength is equal and strong. Skeletal: No bony or skeletal abnormalities noted. Admission weight: 129 lb (58.5 kg)  Wt Readings from Last 3 Encounters:   03/01/23 129 lb (58.5 kg)   03/01/23 129 lb 9.6 oz (58.8 kg)   03/01/23 129 lb 9.6 oz (58.8 kg)     Body mass index is 19.05 kg/m².      Clinical Impressions:    Acute kidney injury thought to be from bladder outlet obstruction and dehydration. He has progressive kidney failure. In December 2022 his eGFR was > 60 ml/min. Bladder outlet obstruction. Suspect prostatic enlargement. Pancreatic cancer. Hypertension. Hypothyroidism. malnourished     Plan of Care    Maintain cerda. Will NOT obtain PSA now as cerda was just inserted. It would be wise to check PSA in the future. Adding flomax. Avoiding finasteride for now until after PSA evaluation. Continue IV hydration. I predict post obstructive diuresis. Will need to follow up with nephrology and perhaps urology upon discharge. Sam Perez,   [facility].

## 2023-03-01 NOTE — ED PROVIDER NOTES
[facility] [place] [facility] [place] RENAL TELEMETRY [facility] [place], [place]      EMERGENCY MEDICINE     Pt Name: José Hernandez  MRN: [id number]  [place] 1955  Date of evaluation: 3/1/2023  Provider: Blanche Arrington DO    CHIEF COMPLAINT       Chief Complaint   Patient presents with    Abnormal Lab     HISTORY OF PRESENT ILLNESS   José Hernandez is a pleasant 69 y.o. male who presents to the emergency department from from home, by private vehicle for evaluation of acute renal failure and anemia. Patient is currently receiving chemotherapy for cancer of the pancreas. Patient is receiving routine lab work during his chemotherapy appointment today. Follow-up patient's creatinine was now 7 at that the patient's hemoglobin was 6.6. Patient was advised to come to the emergency department for further evaluation and admission in the hospital.  Patient reports no significant acute complaints at this time.     PASTMEDICAL HISTORY     Past Medical History:   Diagnosis Date    History of blood transfusion     Hypertension     Hypothyroidism     Malignant neoplasm of pancreas, unspecified location of malignancy ([facility] 09/2022       Patient Active Problem List   Diagnosis Code    Hypertension I10    Hypothyroidism E03.9    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    Malignant neoplasm of body of pancreas ([facility] C25.1    Encounter for insertion of venous access port Z45.2    Orthostatic hypotension I95.1    Severe malnutrition ([address] E43    LIZA (acute kidney injury) ([address] N17.9    Anemia Y81.5    Metabolic acidosis N91.77    Urinary retention R33.9    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 Chino Woo       Discharge Medication List as of 3/8/2023 11:28 AM        CONTINUE these medications which have NOT CHANGED    Details   !! levothyroxine (SYNTHROID) 25 MCG tablet take 1 tablet by mouth once daily, Disp-90 tablet, R-1Normal      pantoprazole (PROTONIX) 40 MG tablet take 1 tablet by mouth EVERY MORNING BEFORE BREAKFAST, Disp-90 tablet, R-3Normal      !! levothyroxine (SYNTHROID) 200 MCG tablet Take 1 tablet by mouth daily, Disp-30 tablet, R-1Normal      dilTIAZem (DILACOR XR) 240 MG extended release capsule TAKE 1 CAPSULE DAILY, Disp-90 capsule, R-3Normal      Multiple Vitamin (MULTIVITAMIN PO) Take 1 capsule by mouth daily. !! - Potential duplicate medications found. Please discuss with provider. ALLERGIES     has No Known Allergies. FAMILY HISTORY     He indicated that his mother is alive. He indicated that his father is . He indicated that his brother is alive. SOCIAL HISTORY       Social History     Tobacco Use    Smoking status: Every Day     Types: Cigars     Start date: 2012     Passive exposure: Past (Dad smoked)    Smokeless tobacco: Never    Tobacco comments:     -3-4 cigars per day. denies cessation counseling 20.  Declines counseling      Same as above      Same as above      Same as above    Vaping Use    Vaping Use: Never used   Substance Use Topics    Alcohol use: Yes     Comment: 2-3 beers per day since    Previous drank -    Drug use: No       PHYSICAL EXAM       ED Triage Vitals [23 1103]   BP Temp Temp Source Heart Rate Resp SpO2 Height Weight   117/66 -- Oral 68 17 100 % 5' 9\" (1.753 m) 129 lb (58.5 kg)       Additional Vital Signs:  Vitals:    23 1127   BP: 120/71 Pulse: 68   Resp: 18   Temp: 98.4 °F (36.9 °C)   SpO2: 97%     Physical Exam  Constitutional:       General: He is not in acute distress. Appearance: Normal appearance. He is cachectic. He is ill-appearing. He is not toxic-appearing. HENT:      Head: Normocephalic and atraumatic. Right Ear: External ear normal.      Left Ear: External ear normal.      Nose: Nose normal.      Mouth/Throat:      Mouth: Mucous membranes are moist.      Pharynx: Oropharynx is clear. Eyes:      Extraocular Movements: Extraocular movements intact. Pupils: Pupils are equal, round, and reactive to light. Cardiovascular:      Rate and Rhythm: Normal rate and regular rhythm. Pulmonary:      Effort: Pulmonary effort is normal.      Breath sounds: Normal breath sounds. Abdominal:      General: Abdomen is flat. Palpations: Abdomen is soft. Musculoskeletal:         General: No swelling or tenderness. Normal range of motion. Right lower leg: No edema. Left lower leg: No edema. Skin:     General: Skin is warm and dry. Capillary Refill: Capillary refill takes less than 2 seconds. Neurological:      General: No focal deficit present. Mental Status: He is alert and oriented to person, place, and time. FORMAL DIAGNOSTIC RESULTS     RADIOLOGY: Interpretation per the Radiologist below, if available at the time of this note (none if blank):    XR ABDOMEN (KUB) (SINGLE AP VIEW)   Final Result   No evidence of distended bowel loops. **This report has been created using voice recognition software. It may contain minor errors which are inherent in voice recognition technology. **      Final report electronically signed by Dr. Letha Carpenter on 3/7/2023 7:16 AM      US RENAL LIMITED   Final Result       Persistent moderate severity bilateral hydronephrosis and hydroureter. Diffusely thickened bladder wall. **This report has been created using voice recognition software.  It may contain minor errors which are inherent in voice recognition technology. **      Final report electronically signed by Dr. Ryder Spencer on 3/6/2023 7:46 AM      CT ABDOMEN PELVIS WO CONTRAST Additional Contrast? None   Final Result   1. Pneumobilia. Self-expanding biliary stent in place. Interval development of a 5.5 cm necrotic mass in the right hepatic lobe since prior study which also contains air and some fluid. No pancreatic head mass, not well demonstrated on this study. 2. Small amount of ascites. Moderate gaseous distention of the colon. Cannot exclude colonic ileus. 3. Abnormal thickening of the bladder wall anteriorly which could be due to cystitis or neoplastic involvement of the bladder. 4. Moderate bilateral hydronephrosis, not appreciably changed from prior study. No definite obstructing lesion or calculus, however, can be appreciated on this study. **This report has been created using voice recognition software. It may contain minor errors which are inherent in voice recognition technology. **      Final report electronically signed by Dr. Anushka Roberts on 3/1/2023 6:59 PM          LABS: (none if blank)  Labs Reviewed   RENAL FUNCTION PANEL - Abnormal; Notable for the following components:       Result Value    BUN 60 (*)     Creatinine 7.2 (*)     Sodium 129 (*)     CO2 12 (*)     Calcium 8.2 (*)     Albumin 2.6 (*)     All other components within normal limits   CBC WITH AUTO DIFFERENTIAL - Abnormal; Notable for the following components:    WBC 4.6 (*)     RBC 2.10 (*)     Hemoglobin 6.6 (*)     Hematocrit 19.5 (*)     RDW-CV 15.3 (*)     RDW-SD 51.6 (*)     Lymphocytes Absolute 0.3 (*)     Immature Grans (Abs) 0.24 (*)     All other components within normal limits   GLOMERULAR FILTRATION RATE, ESTIMATED - Abnormal; Notable for the following components:    Est, Glom Filt Rate 8 (*)     All other components within normal limits   MAGNESIUM - Abnormal; Notable for the following components:    Magnesium 1.4 (*)     All other components within normal limits   VITAMIN B12 & FOLATE - Abnormal; Notable for the following components:    Vitamin B-12 1188 (*)     All other components within normal limits   BASIC METABOLIC PANEL W/ REFLEX TO MG FOR LOW K - Abnormal; Notable for the following components:    Sodium 130 (*)     CO2 12 (*)     Glucose 113 (*)     BUN 62 (*)     Creatinine 7.1 (*)     Calcium 8.2 (*)     All other components within normal limits   HEMOGLOBIN AND HEMATOCRIT - Abnormal; Notable for the following components:    Hemoglobin 7.6 (*)     Hematocrit 23.2 (*)     All other components within normal limits   HEMOGLOBIN AND HEMATOCRIT - Abnormal; Notable for the following components:    Hemoglobin 8.4 (*)     Hematocrit 27.2 (*)     All other components within normal limits   ANION GAP - Abnormal; Notable for the following components:    Anion Gap 17.0 (*)     All other components within normal limits   GLOMERULAR FILTRATION RATE, ESTIMATED - Abnormal; Notable for the following components:    Est, Glom Filt Rate 8 (*)     All other components within normal limits   URINE WITH REFLEXED MICRO - Abnormal; Notable for the following components:    Protein, UA TRACE (*)     All other components within normal limits   ALBUMIN - Abnormal; Notable for the following components:    Albumin 2.6 (*)     All other components within normal limits   BASIC METABOLIC PANEL W/ REFLEX TO MG FOR LOW K - Abnormal; Notable for the following components:    Sodium 132 (*)     CO2 12 (*)     Glucose 144 (*)     BUN 59 (*)     Creatinine 6.9 (*)     Calcium 7.8 (*)     All other components within normal limits   CBC WITH AUTO DIFFERENTIAL - Abnormal; Notable for the following components:    WBC 4.5 (*)     RBC 2.65 (*)     Hemoglobin 8.1 (*)     Hematocrit 24.2 (*)     RDW-CV 14.8 (*)     RDW-SD 49.3 (*)     MPV 9.2 (*)     Lymphocytes Absolute 0.3 (*)     All other components within normal limits HEMOGLOBIN AND HEMATOCRIT - Abnormal; Notable for the following components:    Hemoglobin 7.2 (*)     Hematocrit 21.8 (*)     All other components within normal limits   ANION GAP - Abnormal; Notable for the following components:    Anion Gap 20.0 (*)     All other components within normal limits   GLOMERULAR FILTRATION RATE, ESTIMATED - Abnormal; Notable for the following components:    Est, Glom Filt Rate 8 (*)     All other components within normal limits   HEMOGLOBIN AND HEMATOCRIT - Abnormal; Notable for the following components:    Hemoglobin 7.3 (*)     Hematocrit 21.7 (*)     All other components within normal limits   BASIC METABOLIC PANEL W/ REFLEX TO MG FOR LOW K - Abnormal; Notable for the following components:    Sodium 133 (*)     Chloride 97 (*)     CO2 19 (*)     Glucose 116 (*)     BUN 57 (*)     Creatinine 6.3 (*)     Calcium 7.5 (*)     All other components within normal limits   ANION GAP - Abnormal; Notable for the following components:    Anion Gap 17.0 (*)     All other components within normal limits   GLOMERULAR FILTRATION RATE, ESTIMATED - Abnormal; Notable for the following components:    Est, Glom Filt Rate 9 (*)     All other components within normal limits   BASIC METABOLIC PANEL - Abnormal; Notable for the following components:    Sodium 133 (*)     Potassium 3.0 (*)     Chloride 97 (*)     CO2 21 (*)     Glucose 125 (*)     BUN 53 (*)     Creatinine 5.6 (*)     Calcium 7.3 (*)     All other components within normal limits   MAGNESIUM - Abnormal; Notable for the following components:    Magnesium 1.2 (*)     All other components within normal limits   GLOMERULAR FILTRATION RATE, ESTIMATED - Abnormal; Notable for the following components:    Est, Glom Filt Rate 10 (*)     All other components within normal limits   BASIC METABOLIC PANEL - Abnormal; Notable for the following components:    BUN 42 (*)     Creatinine 4.4 (*)     Calcium 7.4 (*)     All other components within normal limits   MAGNESIUM - Abnormal; Notable for the following components:    Magnesium 1.5 (*)     All other components within normal limits   GLOMERULAR FILTRATION RATE, ESTIMATED - Abnormal; Notable for the following components:    Est, Glom Filt Rate 14 (*)     All other components within normal limits   BASIC METABOLIC PANEL - Abnormal; Notable for the following components:    Sodium 133 (*)     BUN 36 (*)     Creatinine 3.5 (*)     Calcium 7.3 (*)     All other components within normal limits   CBC - Abnormal; Notable for the following components:    RBC 2.44 (*)     Hemoglobin 7.4 (*)     Hematocrit 23.0 (*)     MCV 94.3 (*)     RDW-CV 15.0 (*)     RDW-SD 51.1 (*)     MPV 9.2 (*)     All other components within normal limits   GLOMERULAR FILTRATION RATE, ESTIMATED - Abnormal; Notable for the following components:    Est, Glom Filt Rate 18 (*)     All other components within normal limits   BASIC METABOLIC PANEL - Abnormal; Notable for the following components:    Sodium 134 (*)     BUN 29 (*)     Creatinine 3.3 (*)     Calcium 7.5 (*)     All other components within normal limits   GLOMERULAR FILTRATION RATE, ESTIMATED - Abnormal; Notable for the following components:    Est, Glom Filt Rate 19 (*)     All other components within normal limits   BASIC METABOLIC PANEL - Abnormal; Notable for the following components:    BUN 29 (*)     Creatinine 3.1 (*)     Calcium 7.4 (*)     All other components within normal limits   GLOMERULAR FILTRATION RATE, ESTIMATED - Abnormal; Notable for the following components:    Est, Glom Filt Rate 21 (*)     All other components within normal limits   BASIC METABOLIC PANEL - Abnormal; Notable for the following components:    BUN 24 (*)     Creatinine 2.7 (*)     Calcium 7.5 (*)     All other components within normal limits   CBC WITH AUTO DIFFERENTIAL - Abnormal; Notable for the following components:    RBC 2.29 (*)     Hemoglobin 7.0 (*)     Hematocrit 21.8 (*)     MCV 95.2 (*) MCHC 32.1 (*)     RDW-CV 14.8 (*)     RDW-SD 51.7 (*)     MPV 9.1 (*)     Segs Absolute 8.3 (*)     Lymphocytes Absolute 0.6 (*)     All other components within normal limits   GLOMERULAR FILTRATION RATE, ESTIMATED - Abnormal; Notable for the following components:    Est, Glom Filt Rate 25 (*)     All other components within normal limits   BASIC METABOLIC PANEL - Abnormal; Notable for the following components:    Potassium 3.3 (*)     Creatinine 2.4 (*)     Calcium 7.0 (*)     All other components within normal limits   CBC - Abnormal; Notable for the following components:    RBC 2.34 (*)     Hemoglobin 7.1 (*)     Hematocrit 22.3 (*)     MCV 95.3 (*)     MCHC 31.8 (*)     RDW-CV 14.9 (*)     RDW-SD 51.7 (*)     MPV 9.0 (*)     All other components within normal limits   GLOMERULAR FILTRATION RATE, ESTIMATED - Abnormal; Notable for the following components:    Est, Glom Filt Rate 28 (*)     All other components within normal limits   SCAN OF BLOOD SMEAR   ANION GAP   OSMOLALITY   SODIUM, URINE, RANDOM   OSMOLALITY, URINE   PROTEIN, URINE, RANDOM   CALCIUM, IONIZED   PHOSPHORUS   ANION GAP   PHOSPHORUS   ANION GAP   ANION GAP   ANION GAP   ANION GAP   ANION GAP   SCAN OF BLOOD SMEAR   ANION GAP   TYPE AND SCREEN   PREPARE RBC (CROSSMATCH)    Narrative:     [id number]     transfused       (Any cultures that may have been sent were not resulted at the time of this patient visit)    [address] / ED COURSE:     1) Number and Complexity of Problems            Problem List This Visit:         Chief Complaint   Patient presents with    Abnormal Lab            Differential Diagnosis includes (but not limited to): Bone marrow failure, pancytopenia, reaction to chemotherapy        Diagnoses Considered but I have low suspicion of:    Active bleeding             Pertinent Comorbid Conditions:    Current chemotherapy treatment for pancreatic cancer    2)  Data Reviewed (none if left blank)          My Independent interpretations:     EKG:      Sinus bradycardia    Imaging: none    Labs:      Hemoglobin of 6.6                 Decision Rules/Clinical Scores utilized:  none            External Documentation Reviewed:         Previous patient encounter documents & history available on EMR was reviewed              See Formal Diagnostic Results above for the lab and radiology tests and orders. 3)  Treatment and Disposition         ED Reassessment: Stable         Case discussed with consulting clinician:  Dr. Michaela Jennings         Shared Decision-Making was performed and disposition discussed with the        Patient/Family and questions answered          Social determinants of health impacting treatment or disposition: Active cancer receiving chemotherapy         Code Status:  fall      Summary of Patient Presentation:      MDM  Number of Diagnoses or Management Options  Acute renal failure, unspecified acute renal failure type [facility]): new, needed workup  Anemia, unspecified type: established, worsening     Amount and/or Complexity of Data Reviewed  Clinical lab tests: ordered and reviewed  Tests in the medicine section of CPT®: ordered and reviewed  Decide to obtain previous medical records or to obtain history from someone other than the patient: yes  Review and summarize past medical records: yes  Discuss the patient with other providers: yes  Independent visualization of images, tracings, or specimens: yes    Risk of Complications, Morbidity, and/or Mortality  Presenting problems: moderate  Diagnostic procedures: low  Management options: low  General comments: Patient had work-up in the emergency department for his outpatient laboratory abnormalities. Repeat hemoglobin was 6.6. Creatinine was 7.2. Patient was consented and will receive a unit of blood in the emergency department. A liter of fluids was started for the patient's acute renal failure.   As a result of the above issues, case discussed with the hospitalist for admission. They agreed to admit the patient to their service. Patient will be admitted at this time. Patient Progress  Patient progress: stable  /   Vitals Reviewed:    Vitals:    03/07/23 2341 03/08/23 0401 03/08/23 0926 03/08/23 1127   BP: (!) 110/55 (!) 115/53 117/72 120/71   Pulse: 75 70 78 68   Resp:  17 16 18   Temp: 99.3 °F (37.4 °C) 98.2 °F (36.8 °C) 99 °F (37.2 °C) 98.4 °F (36.9 °C)   TempSrc: Oral Oral Oral Oral   SpO2: 96% 96% 98% 97%   Weight:       Height:           The patient was seen and examined. Appropriate diagnostic testing was performed and results reviewed with the patient. The results of pertinent diagnostic studies and exam findings were discussed. The patients provisional diagnosis and plan of care were discussed with the patient and present family who expressed understanding. ED Medications administered this visit:  (None if blank)  Medications   potassium chloride (KLOR-CON M) extended release tablet 40 mEq (40 mEq Oral Given 3/3/23 1200)   potassium chloride (KLOR-CON M) extended release tablet 40 mEq (40 mEq Oral Given 3/3/23 1729)   magnesium sulfate 2000 mg in water 50 mL IVPB (0 mg IntraVENous Stopped 3/3/23 1547)   magnesium sulfate 2000 mg in water 50 mL IVPB (0 mg IntraVENous Stopped 3/4/23 1401)   potassium chloride (KLOR-CON M) extended release tablet 40 mEq (40 mEq Oral Given 3/4/23 1115)   potassium chloride (KLOR-CON M) extended release tablet 40 mEq (40 mEq Oral Given 3/8/23 1127)   heparin flush 100 UNIT/ML injection 500 Units (500 Units IntraCATHeter Given 3/8/23 1107)         PROCEDURES: (None if blank)  Procedures:     CRITICAL CARE: (None if blank)      DISCHARGE PRESCRIPTIONS: (None if blank)  Discharge Medication List as of 3/8/2023 11:28 AM        START taking these medications    Details   tamsulosin (FLOMAX) 0.4 MG capsule Take 2 capsules by mouth daily, Disp-30 capsule, R-3Normal             FINAL IMPRESSION      1. Anemia, unspecified type    2. Acute renal failure, unspecified acute renal failure type [facility])          DISPOSITION/PLAN   DISPOSITION Admitted 03/01/2023 12:28:25 PM      OUTPATIENT FOLLOW UP THE PATIENT:  Deyvi Cooper, APRN - CNP  [address]    Follow up  Office will call patient with appointment time and date.     Daniel Hernandez MD  [address] [place] [address] [facility]  [phone]    Follow up on 3/14/2023  appointment time 11:30am    Cj Adams, DO       Cj Adams, DO  03/01/23 [address] [facility], DO  03/10/23 5771

## 2023-03-01 NOTE — ED NOTES
No transfusion reaction observed at this time. Pt tolerating blood transfusion well.  [address] [facility], [place],  [address], [facility]  03/01/23 1316

## 2023-03-01 NOTE — PROCEDURES
A Bladder scan was performed at 1435 . The residual amount was measured to be >999 ML. Report of results was given to [place].

## 2023-03-01 NOTE — DISCHARGE INSTRUCTIONS
No treatment today, patient to go to ED due to abnormal labs. Patient tolerated lab draw without any complications. Patient verbalized understanding of discharge instructions. Ambulated off unit per self with belongings.

## 2023-03-01 NOTE — PROGRESS NOTES
Patient had critical creatinine and hemoglobin. Dr. Freddy Glasgow instructed patient to go to ED for evaluation. Patient mediport is still accessed for ED. Patient will be taken to ED by private vehicle. Patient and spouse verbalized understanding of all discharge instructions. Patient left floor with all belongings and ambulated off floor with belongings.

## 2023-03-01 NOTE — ED NOTES
Pt transported to [facility] with this RN, charge RN made aware.       [place], [address] [place] [address]  03/01/23 0533

## 2023-03-01 NOTE — PATIENT INSTRUCTIONS
Reviewed labs and recent medical history. He has a low blood count. Transfuse 2 units of blood today. Will hold chemotherapy today. Return to repeat labs on Friday for possible more blood transfusion. May be able to get chemotherapy on Monday, 3/6/23  See MD on Monday for treatment. Schedule a time in infusion center.

## 2023-03-01 NOTE — H&P
Hospitalist History & Physical    Patient:  Nguyễn Abbasi    Unit/Bed:42/042A  YOB: 1955  MRN: [id number]   Acct: [de-identified]   PCP: Shyann Hernandez MD  Code Status: Prior    Date of Service: Pt seen/examined on 03/01/23 and admitted to Inpatient with expected LOS greater than two midnights due to medical therapy. Chief Complaint: abnormal labs    Assessment/Plan:    LIZA on CKD: Cr 7.2 on admission. Last Cr on 2/15 was 2.8. Baseline Cr prior to chemo around 0.9. Suspect 2/2 intrinsic insult 2/2 due to chemo. Hold losartan. Avoid other nephrotoxic agents. UA and bladder scan pending. Nephrology consulted- recommendations for strict I&O and 3 amps sodium bicarb infusion. Repeat BMP in 4 hrs. Continue to monitor. Addendum: Bladder Scan: 999+ ml in bladder    Acute Urinary Retention: Bladder scan showed 999+ ml, subsequent cerda catheter placed- >1900 ml output from cerda following placement. CT scan abd/pelvis for further evaluation of urinary retention. Acute on Chronic Anemia: Hgb 6.6 on admission. Receive 1 U pRBCs. Hold ASA. Baseline appears 8-9s. No signs of overt bleeding. monitor H&H Q12 hours and transfuse as needed. Metastatic Pancreatic CA: Diagnosed around September 2022, started chemotherapy on 11/1/22- originally on cis-platinum. Discontinued due to complications and started on Folfirinox in January. Notified Dr. Anibal Barragan of patient's hospitalization. Hyponatremia: 129 on admission. Likely combination of poor PO intake and #1. Urine studies pending. Nephrology following. Continue to monitor. Essential HTN: continue diltiazem with hold parameters. Hold losartan given #1. Continue to monitor.      Hypothyroidism: continue synthroid    GERD: continue pantoprazole    Malnourishment: consult dietitian      History of Present Illness:  Grzegorz Zhao is a 69 y.o. male with PMHx of HTN, metastatic pancreatic CA, hypothyroidism, and GERD who presented to [facility] with chief complaint of abnormal labs. Patient was getting routine labs due to his chemotherapy and subsequently sent to the ER for further evaluation. He reports he has been very fatigued lately, but not having any other pain. Does admit to some mild shortness of breath and believes it is 2/2 to the anemia. Patient reports he has been trying to maintain frequent and healthy meals, but does have a lower appetite. States he has been urinating frequently. Denies any lightheadedness/dizziness, chest pain, abdominal pain, N/V/D, urinary symptoms, and leg swelling. Review of Systems: Pertinent positives as noted in the HPI. All other systems reviewed and negative.     Past Medical History:        Diagnosis Date    History of blood transfusion     Hypertension     Hypothyroidism     Malignant neoplasm of pancreas, unspecified location of malignancy ([facility] [address] 09/2022       Past Surgical History:        Procedure Laterality Date    IR BILI DUCT INSERT STENT NEW ACCESS W/O DRAIN  09/19/2022    [facility]    MULTIPLE TOOTH EXTRACTIONS      PORT SURGERY Left 10/31/2022    LEFT SINGLE LUMEN MEDIPORT INSERTION performed by Parvin Ni MD at [facility]  10/31/2022    Dr. Reinaldo Tian Medications:   Current Facility-Administered Medications on File Prior to Encounter   Medication Dose Route Frequency Provider Last Rate Last Admin    sodium chloride flush 0.9 % injection 5-40 mL  5-40 mL IntraVENous PRN Digna Hamm MD   20 mL at 03/01/23 0901    heparin flush 100 UNIT/ML injection 500 Units  500 Units IntraCATHeter PRN Digna Hamm MD         Current Outpatient Medications on File Prior to Encounter   Medication Sig Dispense Refill    pantoprazole (PROTONIX) 40 MG tablet take 1 tablet by mouth EVERY MORNING BEFORE BREAKFAST 90 tablet 3    levothyroxine (SYNTHROID) 25 MCG tablet Take 1 tablet by mouth daily 30 tablet 1    levothyroxine (SYNTHROID) 200 MCG tablet Take 1 tablet by mouth daily 30 tablet 1    sucralfate (CARAFATE) 1 GM tablet Take 1 tablet by mouth in the morning, at noon, in the evening, and at bedtime (Patient not taking: No sig reported)      losartan (COZAAR) 100 MG tablet TAKE 1 TABLET DAILY 90 tablet 1    ondansetron (ZOFRAN) 4 MG tablet Take 1 tablet by mouth daily as needed for Nausea or Vomiting (Patient not taking: No sig reported) 30 tablet 0    prochlorperazine (COMPAZINE) 10 MG tablet Take 1 tablet by mouth every 6 hours as needed (for nausea and vomiting) (Patient not taking: No sig reported) 60 tablet 3    lidocaine-prilocaine (EMLA) 2.5-2.5 % cream Apply topically as needed. (Patient not taking: No sig reported) 5 g 3    dilTIAZem (DILACOR XR) 240 MG extended release capsule TAKE 1 CAPSULE DAILY 90 capsule 3    Multiple Vitamin (MULTIVITAMIN PO) Take 1 capsule by mouth daily. aspirin 81 MG EC tablet Take 81 mg by mouth daily. Allergies:    Patient has no known allergies. Social History:    reports that he has been smoking cigars. He started smoking about 11 years ago. He has been exposed to tobacco smoke. He has never used smokeless tobacco. He reports current alcohol use. He reports that he does not use drugs. Family History:       Problem Relation Age of Onset    High Blood Pressure Mother     Lung Cancer Father        Diet:  No diet orders on file      Physical Exam:  /68 Comment: Pre-transfusion vitals  Pulse 56 Comment: Pre-transfusion vitals  Temp 97 °F (36.1 °C) (Axillary) Comment: Pre-transfusion vitals  Resp 19 Comment: Pre-transfusion vitals  Ht 5' 9\" (1.753 m)   Wt 129 lb (58.5 kg)   SpO2 100% Comment: Pre-transfusion vitals  BMI 19.05 kg/m²   General:   Chronically ill appearing male, no acute distress. HEENT:  normocephalic and atraumatic. No scleral icterus. PERR. Neck: supple. No JVD. Trachea midline/  Lungs: clear to auscultation. No retractions  Cardiac: RRR without murmur. Abdomen: soft.  Slightly tender over suprapubic region. Abdomen distended. Bowel sounds positive. Extremities:  No clubbing, cyanosis, or edema x 4. Vasculature: capillary refill < 3 seconds. Palpable LE pulses bilaterally. Skin:  warm and dry. No rashes or lesions. Psych:  Alert and oriented x3. Affect appropriate  Neurologic:  No focal deficit. No seizures. Data: (All radiographs, tracings, PFTs, and imaging are personally viewed and interpreted unless otherwise noted)  Labs:   Recent Labs     03/01/23  0900 03/01/23  1100   WBC 4.8 4.6*   HGB 6.7* 6.6*   HCT 20.5* 19.5*    251     Recent Labs     03/01/23  0900 03/01/23  1011 03/01/23  1100   * 131* 129*   K 4.4 4.5 4.2   CL  --   --  101   CO2  --   --  12*   BUN  --   --  60*   CREATININE 7.4* 7.3* 7.2*   CALCIUM  --   --  8.2*   PHOS  --   --  3.8     Recent Labs     03/01/23  0900   AST 15   ALT 9*   BILIDIR <0.2   BILITOT 0.5   ALKPHOS 182*     No results for input(s): INR in the last 72 hours. No results for input(s): Adonica Hoose in the last 72 hours. Urinalysis:   No results found for: NITRU, [address], BACTERIA, RBCUA, BLOODU, SPECGRAV, GLUCOSEU    EKG:  sinus bradycardia with sinus arrhythmia     Radiology:  No orders to display     No results found. Tele:   [x] yes             [] no      Thank you Justin Stokes MD for the opportunity to be involved in this patient's care.     Electronically signed by Sharron Driver PA-C on 3/1/2023 at 12:42 PM

## 2023-03-01 NOTE — ED NOTES
Pt's port accessed at the oncology office. Blood work obtained and sent to lab. EKG completed.       [facility]  03/01/23 1941

## 2023-03-01 NOTE — FLOWSHEET NOTE
03/01/23 1420   Safe Environment   Safety Measures Other (comment)  (VN completed admission)   VN called into patients room and introduced myself and role. Patient answered and permitted video. Video activated. . Patient resting comfortably in bed. VN completed admission documents with pt at this time. Patient voiced no needs or concerns at this time. Call light within reach.

## 2023-03-01 NOTE — ED NOTES
Pt and family updated on POC. Blood consent obtained. No needs or concerns expressed at this time.  GENNY GoodsonSilverthorne, [facility]  03/01/23 0018

## 2023-03-01 NOTE — ED TRIAGE NOTES
Pt presents to the ER from the oncology office for abnormal labs. Pt states his \"kidney function was bad\" and \"blood count was low. \" Pt did not receive his chemo. Pt has liver and pancreatic cancer. Pt denies CP or SOB. Pt was sent here to be admitted. Pt is alert and oriented, respirations even and unlabored.  VSS

## 2023-03-01 NOTE — ED NOTES
ED to inpatient nurses report    Chief Complaint   Patient presents with    Abnormal Lab      Present to ED from home  LOC: alert and orientated to name, place, date  Vital signs   Vitals:    03/01/23 1103 03/01/23 1203 03/01/23 1222   BP: 117/66 114/67 114/68   Pulse: 68 56 56   Resp: 17 18 19   Temp: 97.3 °F (36.3 °C)  97 °F (36.1 °C)   TempSrc: Axillary  Axillary   SpO2: 100% 100% 100%   Weight: 129 lb (58.5 kg)     Height: 5' 9\" (1.753 m)        Oxygen Baseline RA    Current needs required RA Bipap/Cpap No  LDAs:    Mobility: Requires assistance * 1  Pending ED orders: complete  Present condition: stable    Electronically signed by Alexis Guevara RN on 3/1/2023 at 12:29 PM     Alexis Guevara RN  03/01/23 7295

## 2023-03-01 NOTE — PROGRESS NOTES
[address] [facility]  [address] [address]  Dept: [phone]  Loc: Shantanu Larose 1945 [address] [facility]  1955   No ref. provider found   Daniel Hernandez MD       Tasha Dotson is a 69 y.o.  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  67-year-old male with 6 months of weight loss. He went to the dentist and had some bad teeth. He was told that he had to have these pulled. He was nervous about this and was not eating and then felt that he had an infection that was spreading through his body. He began having stomach pain and was treated for ulcers but this did not cause any improvement. Ultrasound was performed and was abnormal leading to a CT scan. Had decreased his beer intake from 12 pack of beer a day to 2. Felt restless, agitated, anxious. 8/25/2022. CAT scan of the abdomen performed locally showed a mass in the body of the pancreas 5 x 5.5 x 4.8 cm with involvement of the celiac axis and encasement of the superior mesenteric artery, narrows the celiac trunk and infiltrates the root of the mesentery. There were numerous enlarged peripancreatic and mesenteric lymph nodes measuring up to 14 mm. .  Labs in July were normal.  Just returned from a cruise to the [place]. Had noted dark urine for a week and appeared to be slightly jaundiced. 9/19/2022. Bilirubin was 4.5 with ALT of 136 and AST of 148. CA 19-9 was 321. Sugar was 111. CBC showed a white count 6.6 with a hemoglobin of 14 hematocrit 42 and a platelet count of 914,910.    9/19/2022. Endoscopy was performed. Endosonographic findings showed diffuse dilatation of the intrahepatic bile ducts.   There is an irregular mass in the genu of the pancreas and pancreatic body measuring approximately 4.5 x 3.8 cm. There was sonographic evidence of invasion into the superior mesenteric artery and a few abnormal lymph nodes were visualized in the peripancreatic region. Fine-needle aspiration of mass of the body of the pancreas at [facility] showed high-grade neuroendocrine carcinoma favoring small cell carcinoma. 9/19/2022. ERCP. There is no evidence of esophageal varices or gastric varices. There is stenosis in the main bile duct in the middle third. Sphincterotomy was performed. An uncovered metal stent was placed and he was discharged home. 9/21/2022. Patient followed up with his primary care physician Dr. Sharan Magana. Patient said that he felt better but was continuing to lose weight.    9/28/2022. PET scan showed that there was an FDG avid pancreatic mass highly suspicious for malignancy that was better seen on recent CT scan. Maximum SUV was 5.7 with areas of photopenia in the mass associated with hypoattenuation likely secondary to necrosis. The urinary bladder was markedly distended and there was bilateral hydroureter. Prostate was enlarged with calcifications. There is no evidence of mesenteric retroperitoneal pelvic or inguinal lymphadenopathy that was FDG avid. Degenerative changes were seen in the lumbar spine and pelvis without evidence of hypermetabolic avidity    Comorbidities include hypertension,hypothyroidism, anxiety, alcohol use disorder, nicotine use disorder. The patient said that he used to weigh 197 pounds and now he weighs 137. He says that he feels cold all of the time. 10/4/2022. Initial clinical nurse oncology navigation. Encouraged physical activity, smoking cessation, minimization of alcohol take, consideration of being seen back at [facility] for their input. 10/14/2022. Consultation with Dr. Sammy De Anda radiation oncology.   Collaborated with Dr. Sammy De Anda who wants to give radiation and chemotherapy concurrently à la small cell carcinoma of the lung protocols. 10/ 26/ 2022. Chemotherapy teaching. 10/31/2022. Port placed by Dr. Per Ryan. 11/1/2022. Mr. Juliana Stephenson is here today to begin his chemotherapy. He is quite flat in his affect. His wife says that she has been present at all of the interactions and has been taking in the information which he refuses to review. Today we had a long discussion concerning the side effects of his chemotherapy that will be started today as well as his radiation therapy. He verbalized understanding and was engaged in the conversation although distant in his interaction. She has been quite tearful today. They know that they must call for any complications, questions or concerns. 11/29/2022. Mr. Juliana Stephenson has been doing much better since his treatment has been ongoing. He had significant drop in his counts from his chemotherapy but he had no problems with infection, bleeding or significant symptoms from his anemia. His treatments were held while his counts recovered. His lowest white count was 1.2. He gets bursts of energy and does a lot of work and then is quite tired. He has had diarrhea off and on. He has had no significant nausea and vomiting. He denies fevers, chills, sweats and denies pain. He broke off a tooth and is interested in going to the dentist.  He was educated that he needs to have his counts checked before any dental work is performed. 12/20/2022. He returns to the office with his wife to consider further chemotherapy. He has just completed his radiation. His counts are borderline low and his creatinine is elevated at 1.3 which is higher than his baseline of 0.9. He has lost about 4 pounds and currently weighs 129 pounds at 5 feet 9 inches tall. During his last cycle he started out with a white count of 6000 and he became quite neutropenic but did not become febrile.   For all of these reasons he needs another week off to recover before we continue his chemotherapy. He continues to have bursts of energy and then extremely fatigued. He denies any fevers, chills, sweats, bleeding, nausea, vomiting, constipation and diarrhea. Today is his birthday. His appetite is getting slightly better. 2022. Mr. 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 He is due to receive his chemotherapy this week. He has trouble to keep his weight on. On 1215 he weighed 134  Weighs 131. He is 5 feet 9 inches tall. 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 Denies significant pain. He has had no fevers, chills, sweats, bleeding, nausea or vomiting constipation or diarrhea. He says that his appetite is getting better. His counts are adequate for treatment today. 2023. Mr. Roslyn Alejandra returns to the office after having had a CAT scan of his abdomen and pelvis ordered by radiation oncology. This shows that the mass in the head of the pancreas measured 52 x 53 mm previously measuring 55 x 48 mm. It also showed a new lesion in the liver being 13 mm in the dome of the right lobe suspicious for metastasis. There is some mild decrease in the size of lymph nodes adjacent to the pancreas from 14 to 12 mm. Has enteric lymph nodes previously measured 816 mm and now measures 14 mm. A peripancreatic lymph node had decreased as noted above. Obviously Mr. Roslyn Alejandra is upset by this information. He had been scheduled for another cycle of chemotherapy with cis-platinum and etoposide but he has had complications with each cycle and if this is not keeping his disease and check it does not make sense to go forward with such toxic therapy. He had required fluids and transfusions. His blood pressure was low he had orthostatic dizziness.   Today he says that he has had no fevers, chills, sweats, bleeding, nausea or vomiting, constipation or diarrhea. He says that his appetite is good. His ankles are swollen. He has a prominent abdomen and possibly has ascites. We discussed doing next generation sequencing with a liquid biopsy. His original tumor biopsy was an FNA that was done at [facility]. It is likely there will not be enough tissue to do PD-L1 testing. Guidelines suggest that pembrolizumab  can be done for MSI high or advanced TMB greater than 10 mutations per megabyte or deficiency and mismatch repair genes. FOLFOX or FOLFIRI certainly can be given as well. We will also recheck his CA 19 9. INTERVAL NOTE    3/1/2023. The last time I saw Mr. Gary Wild was on 1/24/2023. At that time we had discussed the fact that he had progressive disease. He had new lesions in his liver. Foundation 1 testing was done as well as repeat CA 19-9 and PET scan. He had developed ascites and peripheral edema. I encouraged him to wear support stockings, cut down on the salt in his diet and monitor his weight. His albumin had been markedly low. He had profound weight loss unable. My plan at that time was to see him in 1 week. Due to my illness, I was unable to see him. He was seen by Dr. Maggie Simpson. The patient was started on FOLFIRINOX. He tolerated it fairly well receiving his dose on 2/15/2023. Today he comes in feeling ill. Call in the interim for any assistance. He was drinking a lot of fluid. Wife is forcing him to eat. He had lost another 5 pounds. Laboratory data was markedly abnormal with a creatinine of 7.4 and a CO2 of 9. His serum sodium is 131 and his hemoglobin and hematocrit were markedly low with a hemoglobin of 6.6 19.6. With these findings he was sent to the emergency department for further evaluation and care.     MONITORING PARAMETERS    Physical examinations, CAT scans and PET scans    PAST MEDICAL HISTORY  Past Medical History:   Diagnosis Date    History of blood transfusion     Hypertension     Hypothyroidism Malignant neoplasm of pancreas, unspecified location of malignancy ([facility] [address] 09/2022        REVIEW OF SYSTEMS  Review of Systems   Constitutional:  Positive for appetite change and fatigue. Negative for chills, diaphoresis and fever. HENT:  Negative for dental problem, rhinorrhea, sore throat and trouble swallowing. Eyes:  Negative for visual disturbance. Respiratory:  Negative for cough and shortness of breath. Cardiovascular:  Positive for leg swelling. Gastrointestinal:  Positive for abdominal pain and nausea. Genitourinary:  Negative for dysuria, hematuria and urgency. Musculoskeletal:  Positive for myalgias (generalized). Negative for arthralgias. Skin:  Positive for pallor. Neurological:  Positive for weakness. Negative for light-headedness and headaches. Hematological:  Bruises/bleeds easily. Psychiatric/Behavioral:  Positive for sleep disturbance. Negative for self-injury and suicidal ideas (unchanged). The patient is not nervous/anxious. FAMILY HISTORY  Family History   Problem Relation Age of Onset    High Blood Pressure Mother     Lung Cancer Father         SOCIAL HISTORY  Social History     Socioeconomic History    Marital status:      Spouse name: Ward Mccallum    Number of children: 3    Years of education: Not on file    Highest education level: Not on file   Occupational History    Not on file   Tobacco Use    Smoking status: Every Day     Types: Cigars     Start date: 1/1/2012     Passive exposure: Past (Dad smoked)    Smokeless tobacco: Never    Tobacco comments:     -3-4 cigars per day. denies cessation counseling 11/1/22.  Declines counseling 12/28     Same as above 12/29     Same as above 1/18     Same as above 1/24   Vaping Use    Vaping Use: Never used   Substance and Sexual Activity    Alcohol use: Yes     Comment: 2-3 beers per day since 2022   Previous drank 8-12    Drug use: No    Sexual activity: Not on file   Other Topics Concern    Not on file   Social History Narrative    Not on file     Social Determinants of Health     Financial Resource Strain: Not on file   Food Insecurity: Not on file   Transportation Needs: Not on file   Physical Activity: Inactive    Days of Exercise per Week: 0 days    Minutes of Exercise per Session: 0 min   Stress: Not on file   Social Connections: Not on file   Intimate Partner Violence: Not on file   Housing Stability: Not on file        CURRENT MEDICATIONS  Current Outpatient Medications   Medication Sig Dispense Refill    pantoprazole (PROTONIX) 40 MG tablet take 1 tablet by mouth EVERY MORNING BEFORE BREAKFAST 90 tablet 3    levothyroxine (SYNTHROID) 25 MCG tablet Take 1 tablet by mouth daily 30 tablet 1    levothyroxine (SYNTHROID) 200 MCG tablet Take 1 tablet by mouth daily 30 tablet 1    losartan (COZAAR) 100 MG tablet TAKE 1 TABLET DAILY 90 tablet 1    dilTIAZem (DILACOR XR) 240 MG extended release capsule TAKE 1 CAPSULE DAILY 90 capsule 3    Multiple Vitamin (MULTIVITAMIN PO) Take 1 capsule by mouth daily. aspirin 81 MG EC tablet Take 81 mg by mouth daily. sucralfate (CARAFATE) 1 GM tablet Take 1 tablet by mouth in the morning, at noon, in the evening, and at bedtime (Patient not taking: No sig reported)      ondansetron (ZOFRAN) 4 MG tablet Take 1 tablet by mouth daily as needed for Nausea or Vomiting (Patient not taking: No sig reported) 30 tablet 0    prochlorperazine (COMPAZINE) 10 MG tablet Take 1 tablet by mouth every 6 hours as needed (for nausea and vomiting) (Patient not taking: No sig reported) 60 tablet 3    lidocaine-prilocaine (EMLA) 2.5-2.5 % cream Apply topically as needed. (Patient not taking: No sig reported) 5 g 3     No current facility-administered medications for this visit.      Facility-Administered Medications Ordered in Other Visits   Medication Dose Route Frequency Provider Last Rate Last Admin    sodium chloride flush 0.9 % injection 5-40 mL  5-40 mL IntraVENous PRN Ventura Torrez MD   20 mL at 03/01/23 0901    heparin flush 100 UNIT/ML injection 500 Units  500 Units IntraCATHeter SHANNONN MD ALCON PadillaRRS    PDMP Monitoring:    Last PDMP Marylee Liter as Reviewed [facility]):  Review User Review Instant Review Result   Fartun Allen 10/5/2022 12:45 AM Reviewed PDMP [1]       Urine Drug Screenings (1 yr)    No resulted procedures found. Medication Contract and Consent for Opioid Use Documents Filed        No documents found                     PHYSICAL EXAM    Physical Exam  Vitals and nursing note reviewed. Exam conducted with a chaperone present. Constitutional:       General: He is not in acute distress. Appearance: He is ill-appearing. He is not toxic-appearing or diaphoretic. Comments: Mr. Sonia Morales is a cachectic, pale, frail  male who appears to be chronically ill. He is here today with his wife. HENT:      Head: Normocephalic and atraumatic. Comments: He has marked bilateral temporal wasting. He has near-total alopecia secondary to chemotherapy. He appears sallow     Nose: Nose normal.      Mouth/Throat:      Mouth: Mucous membranes are dry. Pharynx: Oropharynx is clear. No oropharyngeal exudate or posterior oropharyngeal erythema. Eyes:      General: No scleral icterus. Extraocular Movements: Extraocular movements intact. Conjunctiva/sclera: Conjunctivae normal.      Pupils: Pupils are equal, round, and reactive to light. Cardiovascular:      Rate and Rhythm: Normal rate and regular rhythm. Heart sounds: Murmur (2 out of 6 systolic murmur) heard. Pulmonary:      Breath sounds: No wheezing, rhonchi or rales. Comments: He had decreased breath sounds in his bases bilaterally. Abdominal:      General: There is distension. Tenderness: There is abdominal tenderness. Musculoskeletal:         General: Normal range of motion. Cervical back: Normal range of motion and neck supple. Right lower leg: No edema.       Left lower leg: No edema. Lymphadenopathy:      Cervical: No cervical adenopathy. Skin:     General: Skin is warm and dry. Coloration: Skin is pale. Skin is not jaundiced. Neurological:      General: No focal deficit present. Mental Status: He is alert and oriented to person, place, and time. Mental status is at baseline. Motor: Weakness present. Gait: Gait normal.   Psychiatric:         Mood and Affect: Mood normal.         Behavior: Behavior normal.         Thought Content: Thought content normal.         Judgment: Judgment normal.        ECOG STATUS    3: Capable of only limited self-care; confined to bed or chair more than 50% of waking hours    LABS/IMAGING      Laboratory data done today shows that his serum sodium is 109. BUN was 61 with creatinine of 7.4. Calcium is normal.  Total protein was normal.  Sugar was 94. Albumin is only 2.5. Alkaline phosphatase is elevated at 182. The rest of his liver function tests are normal.  CBC shows a white count of 4.8 with a hemoglobin of 6.7 hematocrit 20.5 and a platelet count of 242,883. White blood cell differential count showed 67% polys, 3 monos, 7 lymphs. PATHOLOGY/GENETICS    Neuroendocrine cancer of the pancreas. ASSESSMENT and PLAN    1. Neuroendocrine cancer of the pancreas, he is status post 1 cycle of FOLFIRINOX. Today his laboratory data shows acute renal failure with acidosis. He is markedly anemic. He was sent to the emergency department for further evaluation and care.         Nargis Jordan MD 3/1/2023 9:59 AM

## 2023-03-01 NOTE — PALLIATIVE CARE
Initial Evaluation          Patient:   Amandeep Abbasi  YOB: 1955  Age:  69 y.o. Room:  [address] [place] [address]  MRN:  [id number]   Acct: [de-identified]    Date of Admission:  3/1/2023 10:57 AM  Date of Service:  3/1/2023  Completed By:  Fredy Granados RN                 Reason for Palliative Care Evaluation:-             [x] Code Status Discussion              [x] Goals of Care              [] Pain/Symptom Management               [] Emotional Support              [] Other:                        Advance Directives:-none on file  [] [facility] DNR Form  [] Living Will  [] Medical POA             Current Code Status:-  [x] Full Resuscitation  [] DNR-Comfort Care-Arrest  [] DNR-Comfort Care       [] Limited Resuscitation             [] No CPR            [] No shock            [] No ET intubation/reintubation            [] No resuscitative medications            [] Other limitation:              Palliative Performance Status:        [] 100%  Full ambulation; normal activity and work; no evidence of disease; able to do own self care; normal intake; fully conscious     [] 90%   Full ambulation; normal activity and work; some evidence of disease; able to do own self care; normal intake; fully conscious    [] 80%   Full ambulation; normal activity with effort; some evidence of disease; able to do own self care; normal or reduced intake; fully conscious    [] 70%  Ambulation reduced; unable to perform normal job/work; significant  disease; able to do own self care; normal or reduced intake; fully conscious      [x] 60%  Ambulation reduced; Significant disease;Can't do hobbies/housework; intake normal or reduced; occasional assist; LOC full/confusion        [] 50%  Mainly sit/lie;  Extensive disease; Can't do any work; Considerable assist; intake normal or reduced; LOC full/confusion        [] 40%  Mainly in bed; Extensive disease; Mainly assist; intake normal or reduced; LOC full/confusion         [] 30%  Bed Bound; Extensive disease; Total care; intake reduced; LOC full/confusion        [] 20%  Bed Bound; Extensive disease; Total care; intake minimal; Drowsy/coma        [] 10%  Bed Bound; Extensive disease; Total care; Mouth care only; Drowsy/coma        [] 0  Death        Goals of care evaluation:-        The patient goals of care are to provide comfort care/supportive services/palliation & relieve suffering:  Goals of care discussed with:  [] Patient independently  [x] Patient and Family  [] Family or Healthcare DPOA independently  [] Unable to discuss with patient, family/DPOA not present         Family/Patient Discussion:  Patient with diagnosis of pancreatic cancer with metastasis to the liver (diagnosed in Sept. 2022). Patient has a history of CKD, HTN, hypothyroidism, GERD and malnourishment. Patient is admitted for abnormal labwork (creatinine 7.2  hemoglobin 6.6). Patient has just been admitted to the unit. Patient's wife and daughter are at the bedside. Palliative care introduced. Patient and family share that the patient has never been admitted to the hospital.  Patient/family are hoping for short length of stay. Patient c/o feeling cold and warm blankets applied. Emotional support provided. Plan/Follow-Up:  Palliative care will follow up with the patient/family tomorrow.           Electronically signed by Jose Escalante RN on 3/1/2023 at 2:45 PM           Palliative Care Office: [phone]

## 2023-03-01 NOTE — ED NOTES
1 Unit RBC started at this time. This RN at bedside to monitor for transfusion reaction.      [facility]  03/01/23 5724

## 2023-03-01 NOTE — PLAN OF CARE
Problem: Safety - Adult  Goal: Free from fall injury  Outcome: Adequate for Discharge  Flowsheets (Taken 3/1/2023 1357)  Free From Fall Injury:   Instruct family/caregiver on patient safety   Based on caregiver fall risk screen, instruct family/caregiver to ask for assistance with transferring infant if caregiver noted to have fall risk factors  Note: Free from falls while in O.P. Oncology. Problem: Discharge Planning  Goal: Discharge to home or other facility with appropriate resources  Outcome: Adequate for Discharge  Flowsheets (Taken 3/1/2023 1357)  Discharge to home or other facility with appropriate resources:   Identify barriers to discharge with patient and caregiver   Arrange for needed discharge resources and transportation as appropriate   Identify discharge learning needs (meds, wound care, etc)  Note: Verbalize understanding of discharge instructions, follow up appointments, and when to call Physician. Problem: Infection - Adult  Goal: Absence of infection at discharge  Outcome: Adequate for Discharge  Flowsheets (Taken 3/1/2023 1357)  Absence of infection at discharge:   Assess and monitor for signs and symptoms of infection   Monitor lab/diagnostic results   Monitor all insertion sites i.e., indwelling lines, tubes and drains  Note: Mediport site with no redness or warmth. Skin over port site intact with no signs of breakdown noted. Patient verbalizes signs/symptoms of port infection and when to notify the physician. Goal: Absence of fever/infection during anticipated neutropenic period  Outcome: Adequate for Discharge  Flowsheets (Taken 3/1/2023 1357)  Absence of fever/infection during anticipated neutropenic period:   Monitor white blood cell count   Implement neutropenic guidelines  Note: Discuss port maintenance,infection prevention, sign of infection and when to call MD.    Care plan reviewed with patient and spouse.   Patient and spouse verbalize understanding of the plan of care and contribute to goal setting.

## 2023-03-01 NOTE — PLAN OF CARE
Problem: Discharge Planning  Goal: Discharge to home or other facility with appropriate resources  Outcome: Progressing     Problem: Skin/Tissue Integrity  Goal: Absence of new skin breakdown  Description: 1. Monitor for areas of redness and/or skin breakdown  2. Assess vascular access sites hourly  3. Every 4-6 hours minimum:  Change oxygen saturation probe site  4. Every 4-6 hours:  If on nasal continuous positive airway pressure, respiratory therapy assess nares and determine need for appliance change or resting period.   Outcome: Progressing     Problem: Safety - Adult  Goal: Free from fall injury  Outcome: Progressing     Problem: Cardiovascular - Adult  Goal: Maintains optimal cardiac output and hemodynamic stability  Outcome: Progressing  Flowsheets (Taken 3/1/2023 2047)  Maintains optimal cardiac output and hemodynamic stability:   Monitor blood pressure and heart rate   Monitor urine output and notify Licensed Independent Practitioner for values outside of normal range   Assess for signs of decreased cardiac output   Administer fluid and/or volume expanders as ordered     Problem: Skin/Tissue Integrity - Adult  Goal: Skin integrity remains intact  Outcome: Progressing  Goal: Oral mucous membranes remain intact  Outcome: Progressing     Problem: Gastrointestinal - Adult  Goal: Minimal or absence of nausea and vomiting  Outcome: Progressing  Flowsheets (Taken 3/1/2023 1355)  Minimal or absence of nausea and vomiting:   Administer IV fluids as ordered to ensure adequate hydration   Administer ordered antiemetic medications as needed   Provide nonpharmacologic comfort measures as appropriate  Goal: Maintains or returns to baseline bowel function  Outcome: Progressing  Flowsheets (Taken 3/1/2023 1358)  Maintains or returns to baseline bowel function:   Assess bowel function   Encourage oral fluids to ensure adequate hydration   Administer IV fluids as ordered to ensure adequate hydration   Administer ordered medications as needed   Encourage mobilization and activity  Goal: Maintains adequate nutritional intake  Outcome: Progressing  Flowsheets (Taken 3/1/2023 8786)  Maintains adequate nutritional intake:   Monitor percentage of each meal consumed   Assist with meals as needed   Monitor intake and output, weight and lab values     Problem: Genitourinary - Adult  Goal: Absence of urinary retention  Outcome: Progressing     Problem: Infection - Adult  Goal: Absence of infection at discharge  Outcome: Progressing  Goal: Absence of infection during hospitalization  Outcome: Progressing     Problem: Metabolic/Fluid and Electrolytes - Adult  Goal: Electrolytes maintained within normal limits  Outcome: Progressing  Goal: Hemodynamic stability and optimal renal function maintained  Outcome: Progressing     Problem: Hematologic - Adult  Goal: Maintains hematologic stability  Outcome: Progressing     Problem: Pain  Goal: Verbalizes/displays adequate comfort level or baseline comfort level  Outcome: Progressing     Problem: Chronic Conditions and Co-morbidities  Goal: Patient's chronic conditions and co-morbidity symptoms are monitored and maintained or improved  Outcome: Progressing     Problem: ABCDS Injury Assessment  Goal: Absence of physical injury  Outcome: Progressing

## 2023-03-01 NOTE — ED NOTES
Pre-transfusion VS obtained. Blood released at this time.       [facility]  03/01/23 1176

## 2023-03-02 PROBLEM — E87.20 METABOLIC ACIDOSIS: Status: ACTIVE | Noted: 2023-03-02

## 2023-03-02 PROBLEM — E43 SEVERE MALNUTRITION (HCC): Status: ACTIVE | Noted: 2023-02-10

## 2023-03-02 PROBLEM — R33.9 URINARY RETENTION: Status: ACTIVE | Noted: 2023-03-02

## 2023-03-02 LAB
ALBUMIN SERPL BCG-MCNC: 2.6 G/DL (ref 3.5–5.1)
ANION GAP SERPL CALC-SCNC: 17 MEQ/L (ref 8–16)
ANION GAP SERPL CALC-SCNC: 20 MEQ/L (ref 8–16)
BASOPHILS ABSOLUTE: 0 THOU/MM3 (ref 0–0.1)
BASOPHILS NFR BLD AUTO: 0.2 %
BUN SERPL-MCNC: 57 MG/DL (ref 7–22)
BUN SERPL-MCNC: 59 MG/DL (ref 7–22)
CALCIUM SERPL-MCNC: 7.5 MG/DL (ref 8.5–10.5)
CALCIUM SERPL-MCNC: 7.8 MG/DL (ref 8.5–10.5)
CHLORIDE SERPL-SCNC: 100 MEQ/L (ref 98–111)
CHLORIDE SERPL-SCNC: 97 MEQ/L (ref 98–111)
CO2 SERPL-SCNC: 12 MEQ/L (ref 23–33)
CO2 SERPL-SCNC: 19 MEQ/L (ref 23–33)
CREAT SERPL-MCNC: 6.3 MG/DL (ref 0.4–1.2)
CREAT SERPL-MCNC: 6.9 MG/DL (ref 0.4–1.2)
DEPRECATED RDW RBC AUTO: 49.3 FL (ref 35–45)
EOSINOPHIL NFR BLD AUTO: 0.2 %
EOSINOPHILS ABSOLUTE: 0 THOU/MM3 (ref 0–0.4)
ERYTHROCYTE [DISTWIDTH] IN BLOOD BY AUTOMATED COUNT: 14.8 % (ref 11.5–14.5)
GFR SERPL CREATININE-BSD FRML MDRD: 8 ML/MIN/1.73M2
GFR SERPL CREATININE-BSD FRML MDRD: 9 ML/MIN/1.73M2
GLUCOSE SERPL-MCNC: 116 MG/DL (ref 70–108)
GLUCOSE SERPL-MCNC: 144 MG/DL (ref 70–108)
HCT VFR BLD AUTO: 21.8 % (ref 42–52)
HCT VFR BLD AUTO: 23.2 % (ref 42–52)
HCT VFR BLD AUTO: 24.2 % (ref 42–52)
HGB BLD-MCNC: 7.2 GM/DL (ref 14–18)
HGB BLD-MCNC: 7.6 GM/DL (ref 14–18)
HGB BLD-MCNC: 8.1 GM/DL (ref 14–18)
IMM GRANULOCYTES # BLD AUTO: 0.06 THOU/MM3 (ref 0–0.07)
IMM GRANULOCYTES NFR BLD AUTO: 1.3 %
LYMPHOCYTES ABSOLUTE: 0.3 THOU/MM3 (ref 1–4.8)
LYMPHOCYTES NFR BLD AUTO: 7 %
MCH RBC QN AUTO: 30.6 PG (ref 26–33)
MCHC RBC AUTO-ENTMCNC: 33.5 GM/DL (ref 32.2–35.5)
MCV RBC AUTO: 91.3 FL (ref 80–94)
MONOCYTES ABSOLUTE: 0.6 THOU/MM3 (ref 0.4–1.3)
MONOCYTES NFR BLD AUTO: 14.3 %
NEUTROPHILS NFR BLD AUTO: 77 %
NRBC BLD AUTO-RTO: 0 /100 WBC
PLATELET # BLD AUTO: 291 THOU/MM3 (ref 130–400)
PMV BLD AUTO: 9.2 FL (ref 9.4–12.4)
POTASSIUM SERPL-SCNC: 3.8 MEQ/L (ref 3.5–5.2)
POTASSIUM SERPL-SCNC: 4 MEQ/L (ref 3.5–5.2)
RBC # BLD AUTO: 2.65 MILL/MM3 (ref 4.7–6.1)
SEGMENTED NEUTROPHILS ABSOLUTE COUNT: 3.5 THOU/MM3 (ref 1.8–7.7)
SODIUM SERPL-SCNC: 132 MEQ/L (ref 135–145)
SODIUM SERPL-SCNC: 133 MEQ/L (ref 135–145)
WBC # BLD AUTO: 4.5 THOU/MM3 (ref 4.8–10.8)

## 2023-03-02 PROCEDURE — 99222 1ST HOSP IP/OBS MODERATE 55: CPT | Performed by: PHYSICIAN ASSISTANT

## 2023-03-02 PROCEDURE — 2500000003 HC RX 250 WO HCPCS: Performed by: INTERNAL MEDICINE

## 2023-03-02 PROCEDURE — 2580000003 HC RX 258

## 2023-03-02 PROCEDURE — 85014 HEMATOCRIT: CPT

## 2023-03-02 PROCEDURE — 85025 COMPLETE CBC W/AUTO DIFF WBC: CPT

## 2023-03-02 PROCEDURE — 80048 BASIC METABOLIC PNL TOTAL CA: CPT

## 2023-03-02 PROCEDURE — 82040 ASSAY OF SERUM ALBUMIN: CPT

## 2023-03-02 PROCEDURE — 36415 COLL VENOUS BLD VENIPUNCTURE: CPT

## 2023-03-02 PROCEDURE — 6370000000 HC RX 637 (ALT 250 FOR IP)

## 2023-03-02 PROCEDURE — 99232 SBSQ HOSP IP/OBS MODERATE 35: CPT | Performed by: INTERNAL MEDICINE

## 2023-03-02 PROCEDURE — 6360000002 HC RX W HCPCS

## 2023-03-02 PROCEDURE — 85018 HEMOGLOBIN: CPT

## 2023-03-02 PROCEDURE — 1200000003 HC TELEMETRY R&B

## 2023-03-02 PROCEDURE — 2500000003 HC RX 250 WO HCPCS

## 2023-03-02 PROCEDURE — 2580000003 HC RX 258: Performed by: INTERNAL MEDICINE

## 2023-03-02 PROCEDURE — 99233 SBSQ HOSP IP/OBS HIGH 50: CPT | Performed by: PHYSICIAN ASSISTANT

## 2023-03-02 PROCEDURE — 6370000000 HC RX 637 (ALT 250 FOR IP): Performed by: INTERNAL MEDICINE

## 2023-03-02 RX ORDER — SODIUM BICARBONATE 650 MG/1
1300 TABLET ORAL 2 TIMES DAILY
Status: DISCONTINUED | OUTPATIENT
Start: 2023-03-02 | End: 2023-03-02

## 2023-03-02 RX ORDER — SODIUM BICARBONATE 650 MG/1
1300 TABLET ORAL 3 TIMES DAILY
Status: DISCONTINUED | OUTPATIENT
Start: 2023-03-02 | End: 2023-03-07

## 2023-03-02 RX ADMIN — HEPARIN SODIUM 5000 UNITS: 5000 INJECTION INTRAVENOUS; SUBCUTANEOUS at 15:40

## 2023-03-02 RX ADMIN — HEPARIN SODIUM 5000 UNITS: 5000 INJECTION INTRAVENOUS; SUBCUTANEOUS at 05:08

## 2023-03-02 RX ADMIN — PANTOPRAZOLE SODIUM 40 MG: 40 TABLET, DELAYED RELEASE ORAL at 05:09

## 2023-03-02 RX ADMIN — DILTIAZEM HYDROCHLORIDE 240 MG: 240 CAPSULE, EXTENDED RELEASE ORAL at 10:21

## 2023-03-02 RX ADMIN — SODIUM BICARBONATE 1300 MG: 650 TABLET ORAL at 10:21

## 2023-03-02 RX ADMIN — SODIUM BICARBONATE 1300 MG: 650 TABLET ORAL at 19:44

## 2023-03-02 RX ADMIN — SODIUM BICARBONATE: 84 INJECTION, SOLUTION INTRAVENOUS at 19:24

## 2023-03-02 RX ADMIN — SODIUM BICARBONATE: 84 INJECTION, SOLUTION INTRAVENOUS at 05:00

## 2023-03-02 RX ADMIN — LEVOTHYROXINE SODIUM 25 MCG: 0.03 TABLET ORAL at 05:11

## 2023-03-02 RX ADMIN — SODIUM BICARBONATE 1300 MG: 650 TABLET ORAL at 15:40

## 2023-03-02 RX ADMIN — HEPARIN SODIUM 5000 UNITS: 5000 INJECTION INTRAVENOUS; SUBCUTANEOUS at 22:37

## 2023-03-02 RX ADMIN — LEVOTHYROXINE SODIUM 200 MCG: 0.1 TABLET ORAL at 05:09

## 2023-03-02 RX ADMIN — TAMSULOSIN HYDROCHLORIDE 0.8 MG: 0.4 CAPSULE ORAL at 10:21

## 2023-03-02 ASSESSMENT — PAIN SCALES - GENERAL
PAINLEVEL_OUTOF10: 0
PAINLEVEL_OUTOF10: 0

## 2023-03-02 NOTE — CARE COORDINATION
Case Management Assessment  Initial Evaluation    Date/Time of Evaluation: 3/2/2023 11:08 AM  Assessment Completed by: Sherryle Saver, RN    If patient is discharged prior to next notation, then this note serves as note for discharge by case management. Patient Name: Nathaniel Guillaume                   YOB: 1955  Diagnosis: Acute kidney injury [facility]) [N17.9]  Acute renal failure, unspecified acute renal failure type ([facility] [address] [N17.9]  Anemia, unspecified type [D64.9]                   Date / Time: 3/1/2023 10:57 AM  [facility]: [facility]     Patient Admission Status: Inpatient   Readmission Risk Low 0-14, Mod 15-19), High > 20: Readmission Risk Score: 20.8    Current PCP: Gillian Loya MD  PCP verified by CM? Yes    Chart Reviewed: Yes      History Provided by: Patient  Patient Orientation: Alert and Oriented    Patient Cognition: Alert    Hospitalization in the last 30 days (Readmission):  No    If yes, Readmission Assessment in CM Navigator will be completed. Advance Directives:      Code Status: Full Code   Patient's Primary Decision Maker is: Patient Declined (Legal Next of Kin Remains as Decision Maker)    Primary Decision Maker: Ladi Abbasi - Spouse - [phone]    Discharge Planning:    Patient lives with: Spouse/Significant Other Type of Home: House  Primary Care Giver: Self  Patient Support Systems include: Spouse/Significant Other, Family Members   Current Financial resources: Medicare  Current community resources: None  Current services prior to admission: None            Current DME:              Type of Home Care services:  None    ADLS  Prior functional level: Independent in ADLs/IADLs  Current functional level: Independent in ADLs/IADLs    Family can provide assistance at DC: Yes  Would you like Case Management to discuss the discharge plan with any other family members/significant others, and if so, who?  Yes (wife present)  Plans to Return to Present Housing:    Other Identified Issues/Barriers to RETURNING to current housing: n/a  Potential Assistance needed at discharge: N/A            Potential DME:    Patient expects to discharge to: House  Plan for transportation at discharge: Friends    Financial    Payor: Princess Romanet / Plan: [place] / Product Type: *No Product type* /     Does insurance require precert for SNF: Yes    Potential assistance Purchasing Medications: No  Meds-to-Beds request: Yes      [facility] [id number] [place] - [address]  [address] [place] [address]  Phone: [phone] Fax: Debbie Velazquez [facility], [address] [phone] - f [phone]  [address] [place] [address]  Phone: [phone] Fax: [phone]      Notes:    Factors facilitating achievement of predicted outcomes: Family support, Motivated, Cooperative, and Pleasant    Barriers to discharge: n/a    Additional Case Management Notes: Presented to ED for abnormal labs ran by Oncologist. Receiving chemo for pancreatic cancer. Creatinine was 7.2, Hgb 6.6 upon arrival. Hospitalist and hem/onc following. Nephro consult. Palliative care. Dietitian consult. [place]. PT/OT. Did receive blood yesterday. Recent Creatinine 6.9 and Hgb 8.1. Na+ bicarb @ 75ml/hr. Retacrit. Protonix. Flomax. PRN pain and antiemetic. Procedure:   3/1 CT abdomen/pelvis: 1. Pneumobilia. Self-expanding biliary stent in place. Interval development of a 5.5 cm necrotic mass in the right hepatic lobe since prior study which also contains air and some fluid. No pancreatic head mass, not well demonstrated on this study. 2. Small amount of ascites. Moderate gaseous distention of the colon. Cannot exclude colonic ileus. 3. Abnormal thickening of the bladder wall anteriorly which could be due to cystitis or neoplastic involvement of the bladder.  4. Moderate bilateral hydronephrosis, not appreciably changed from prior study. No definite obstructing lesion or calculus, however, can be appreciated on this study. The Plan for Transition of Care is related to the following treatment goals of Acute kidney injury [facility]) [N17.9]  Acute renal failure, unspecified acute renal failure type ([facility] [address] [N17.9]  Anemia, unspecified type [D64.9]    Patient Goals/Plan/Treatment Preferences: Spoke with Mary Waldron and wife. Plan is to return home at discharge. He does not use dme or HH. Denies needs. Transportation/Food Security/Housekeeping Addressed: No issues identified.      Angel Cary RN  Case Management Department

## 2023-03-02 NOTE — PLAN OF CARE
Problem: Discharge Planning  Goal: Discharge to home or other facility with appropriate resources  3/2/2023 0206 by Milton Castellano RN  Outcome: Progressing     Problem: Skin/Tissue Integrity  Goal: Absence of new skin breakdown  Description: 1. Monitor for areas of redness and/or skin breakdown  2. Assess vascular access sites hourly  3. Every 4-6 hours minimum:  Change oxygen saturation probe site  4. Every 4-6 hours:  If on nasal continuous positive airway pressure, respiratory therapy assess nares and determine need for appliance change or resting period.   3/2/2023 0206 by Milton Castellano RN  Outcome: Progressing     Problem: Safety - Adult  Goal: Free from fall injury  3/2/2023 0206 by Milton Castellano RN  Outcome: Progressing     Problem: Cardiovascular - Adult  Goal: Maintains optimal cardiac output and hemodynamic stability  3/2/2023 0206 by Milton Castellano RN  Outcome: Progressing     Problem: Skin/Tissue Integrity - Adult  Goal: Skin integrity remains intact  3/2/2023 0206 by Milton Castellano RN  Outcome: Progressing  Flowsheets (Taken 3/1/2023 2157)  Skin Integrity Remains Intact: Monitor for areas of redness and/or skin breakdown     Problem: Skin/Tissue Integrity - Adult  Goal: Oral mucous membranes remain intact  3/2/2023 0206 by Milton Castellano RN  Outcome: Progressing     Problem: Gastrointestinal - Adult  Goal: Minimal or absence of nausea and vomiting  3/2/2023 0206 by Milton Castellano RN  Outcome: Progressing     Problem: Gastrointestinal - Adult  Goal: Maintains or returns to baseline bowel function  3/2/2023 0206 by Milton Castellano RN  Outcome: Progressing     Problem: Gastrointestinal - Adult  Goal: Maintains adequate nutritional intake  3/2/2023 0206 by Milton Castellano RN  Outcome: Progressing     Problem: Genitourinary - Adult  Goal: Absence of urinary retention  3/2/2023 0206 by Milton Castellano RN  Outcome: Progressing     Problem: Infection - Adult  Goal: Absence of infection at discharge  3/2/2023 0206 by Meño Hallman RN  Outcome: Progressing     Problem: Infection - Adult  Goal: Absence of infection during hospitalization  3/2/2023 0206 by Meño Hallman RN  Outcome: Progressing

## 2023-03-02 NOTE — PROGRESS NOTES
Hospitalist Progress Note      Patient:  Savannah Abbasi    Unit/Bed:6K-07/007-A  YOB: 1955  MRN: [id number]   Acct: [de-identified]   PCP: Richar Hamilton MD  Date of Admission: 3/1/2023    Assessment/Plan:    LIZA on CKD: Nephrology consulted. Initially thought this was mainly due to bladder outlet obstruction. Creatinine on admission, 7.1. Cerda placed, 2L output initially. Creatinine today 6.9. With this information, nephrology believes LIZA appears to be multifactorial.  Cannot rule out platin (cisplatin etc.)  based nephrotoxicity/ATN in setting of underlying bilateral hydronephrosis. BMP recheck this afternoon. Nephrology and this provider spoke to the patient about the potential for RRT. Urinary Retention: Urology consulted. Cerda placed, plan to keep cerda at DC. Flomax, continue at DC. 1900cc documented output after cerda. Will do renal US again in 2-3 days. Severe Metabolic Acidosis: CO2 12 on arrival and CO2 12 today. Nephrology increased IV bicarb to 100 cc/hr and started PO Bicarb. Recheck BMP pending. Anemia, acute on chronic: Hgb baseline appears 8-9s. Hgb 6.6 on admission. Received 1 U pRBCs. Hgb has been ~7 since. Neuroendocrine CA of the pancreas: Oncology consulted. He has undergone therapy with 3 cycles of cisplatin continue regimen with etoposide was completed in December. Rescanning showed that he had progressive disease. Status post 1 cycle of FOLFIRINOX on 2/15/2023. Hyponatremia: 129 on admission. Likely combination of poor PO intake and LIZA. Nephrology following. Continue to monitor. Essential HTN: continue diltiazem with hold parameters. Hold losartan given #1. Continue to monitor.     Hypothyroidism: continue synthroid  GERD: continue pantoprazole  Malnourishment: consult dietitian    Chief Complaint: abnormal labs    Initial H and P:-    Initial H&P \"Aaron Abbasi is a 69 y.o. male with PMHx of HTN, metastatic pancreatic CA, hypothyroidism, and GERD who presented to [facility] with chief complaint of abnormal labs. Patient was getting routine labs due to his chemotherapy and subsequently sent to the ER for further evaluation. He reports he has been very fatigued lately, but not having any other pain. Does admit to some mild shortness of breath and believes it is 2/2 to the anemia. Patient reports he has been trying to maintain frequent and healthy meals, but does have a lower appetite. States he has been urinating frequently. Denies any lightheadedness/dizziness, chest pain, abdominal pain, N/V/D, urinary symptoms, and leg swelling. \"     Subjective (past 24 hours):   No acute events overnight. Pt resting in the chair and states that he is very tired and did not sleep well last night. Pt was tearful during evaluation. Past medical history, family history, social history and allergies reviewed again and is unchanged since admission. ROS (All review of systems completed. Pertinent positives noted.  Otherwise All other systems reviewed and negative.)     Medications:  Reviewed    Infusion Medications    sodium chloride      sodium chloride      sodium bicarbonate infusion 100 mL/hr at 03/02/23 1018     Scheduled Medications    sodium bicarbonate  1,300 mg Oral TID    [Held by provider] aspirin  81 mg Oral Daily    dilTIAZem  240 mg Oral Daily    levothyroxine  25 mcg Oral QAM AC    levothyroxine  200 mcg Oral QAM AC    pantoprazole  40 mg Oral QAM AC    sodium chloride flush  5-40 mL IntraVENous 2 times per day    heparin (porcine)  5,000 Units SubCUTAneous 3 times per day    epoetin fay-epbx  10,000 Units SubCUTAneous Once per day on Mon Wed Fri    tamsulosin  0.8 mg Oral Daily     PRN Meds: sodium chloride, sodium chloride flush, sodium chloride, ondansetron **OR** ondansetron, polyethylene glycol, acetaminophen **OR** acetaminophen      Intake/Output Summary (Last 24 hours) at 3/2/2023 9898  Last data filed at 3/2/2023 1303  Gross per 24 hour   Intake 1619.92 ml   Output 4425 ml   Net -2805.08 ml       Diet:  ADULT DIET; Regular  ADULT ORAL NUTRITION SUPPLEMENT; Breakfast, Dinner; Standard High Calorie/High Protein Oral Supplement    Physical Exam:  BP (!) 102/57   Pulse 81   Temp 98.4 °F (36.9 °C) (Oral)   Resp 18   Ht 5' 9\" (1.753 m)   Wt 129 lb 9.6 oz (58.8 kg)   SpO2 100%   BMI 19.14 kg/m²   General appearance: No apparent distress, appears stated age and cooperative. Acutely ill-appearing. Very thin. HEENT: Pupils equal, round, and reactive to light. Conjunctivae/corneas clear. Neck: Supple, with full range of motion. No jugular venous distention. Trachea midline. Respiratory:  Normal respiratory effort on RA. Clear to auscultation, bilaterally without Rales/Wheezes/Rhonchi. Cardiovascular: Regular rate and rhythm with normal S1/S2 without murmurs, rubs or gallops. Abdomen: Soft, non-tender, non-distended with normal bowel sounds. Musculoskeletal: passive and active ROM x 4 extremities. Skin: Skin color, texture, turgor normal.  No rashes or lesions. Neurologic:  Neurovascularly intact without any focal sensory/motor deficits. Cranial nerves: II-XII intact, grossly non-focal.  Psychiatric: Alert and oriented, tearful   Capillary Refill: Brisk,< 3 seconds   Peripheral Pulses: +2 palpable, equal bilaterally     Labs:   Recent Labs     03/01/23  0900 03/01/23  1100 03/01/23  1456 03/02/23  0106 03/02/23  0534 03/02/23  1254   WBC 4.8 4.6*  --   --  4.5*  --    HGB 6.7* 6.6*   < > 7.6* 8.1* 7.2*   HCT 20.5* 19.5*   < > 23.2* 24.2* 21.8*    251  --   --  291  --     < > = values in this interval not displayed.      Recent Labs     03/01/23  1100 03/01/23  1455 03/02/23  0533   * 130* 132*   K 4.2 4.6 3.8    101 100   CO2 12* 12* 12*   BUN 60* 62* 59*   CREATININE 7.2* 7.1* 6.9*   CALCIUM 8.2* 8.2* 7.8*   PHOS 3.8  --   --      Recent Labs     03/01/23  0900   AST 15   ALT 9*   BILIDIR <0.2 BILITOT 0.5   ALKPHOS 182*       Urinalysis:      Lab Results   Component Value Date/Time    NITRU NEGATIVE 03/01/2023 03:40 PM    WBCUA 0-2 03/01/2023 03:40 PM    BACTERIA NONE SEEN 03/01/2023 03:40 PM    RBCUA NONE 03/01/2023 03:40 PM    BLOODU NEGATIVE 03/01/2023 03:40 PM    GLUCOSEU NEGATIVE 03/01/2023 03:40 PM       Radiology:  CT ABDOMEN PELVIS WO CONTRAST Additional Contrast? None   Final Result   1. Pneumobilia. Self-expanding biliary stent in place. Interval development of a 5.5 cm necrotic mass in the right hepatic lobe since prior study which also contains air and some fluid. No pancreatic head mass, not well demonstrated on this study. 2. Small amount of ascites. Moderate gaseous distention of the colon. Cannot exclude colonic ileus. 3. Abnormal thickening of the bladder wall anteriorly which could be due to cystitis or neoplastic involvement of the bladder. 4. Moderate bilateral hydronephrosis, not appreciably changed from prior study. No definite obstructing lesion or calculus, however, can be appreciated on this study. **This report has been created using voice recognition software. It may contain minor errors which are inherent in voice recognition technology. **      Final report electronically signed by Dr. Leyla Hopper on 3/1/2023 6:59 PM        Electronically signed by FAITH Villagomez on 3/2/2023 at 1:55 PM

## 2023-03-02 NOTE — PROGRESS NOTES
Comprehensive Nutrition Assessment    Type and Reason for Visit:  Initial, Consult (poor oral intake)    Nutrition Recommendations/Plan:   Consider renal MVI, folbee plus. ONS: Ensure Enlive BID (monitoring renal function), patient likes chocolate flavored ONS best. Home Ensure use periodically. Continue current diet. Malnutrition Assessment:  Malnutrition Status:  Severe malnutrition (03/02/23 1131)    Context:  Chronic Illness     Findings of the 6 clinical characteristics of malnutrition:  Energy Intake:  75% or less estimated energy requirements for 1 month or longer  Weight Loss:  Greater than 20% over 1 year (26.8% in the last 11 months)     Body Fat Loss:  Severe body fat loss Orbital, Fat Overlying Ribs, Triceps   Muscle Mass Loss:  Severe muscle mass loss Temples (temporalis), Clavicles (pectoralis & deltoids), Thigh (quadraceps), Calf (gastrocnemius)  Fluid Accumulation:  Mild Generalized   Strength:  Not Performed    Nutrition Assessment:     Pt. severely malnourished AEB criteria listed above. At risk for further nutritional compromise r/t catabolic disease-neuroendocrin tumor of pancreas with metastasis, recently changed chemotherapy, admit with LIZA, bladder outlet obstruction with urinary retention, dehydration, and underlying medical condition (hx: CKD, HTN, hypothroidism, smoking, daily alcohol use). Nutrition Related Findings:    Pt. Report/Treatments/Miscellaneous: Patient and wife seen, patient very sleepy, wife did most of talking. Poor oral intake, unplanned weight loss. New chemotherapy started in the last 1-1.5 weeks, drastic decline in appetite/intake, diarrhea, not feeling well. GI Status: diarrhea recently ? Chemotherapy induced  Pertinent Labs: 3/1/23:  Alk Phos 182, 3/2/23: Sodium 132, Potassium 3.8, BUN 59, Creatinine 6.9, Glucose 144, Hgb 8.1  Pertinent Meds: synthroid, protonix, sodium bicarbonate in D5 at 100 ml/hr     Wound Type: None       Current Nutrition Intake & Therapies:    Average Meal Intake: Unable to assess (not recorded)     ADULT DIET; Regular  ADULT ORAL NUTRITION SUPPLEMENT; Breakfast, Dinner; Standard High Calorie/High Protein Oral Supplement    Anthropometric Measures:  Height: 5' 9\" (175.3 cm)  Ideal Body Weight (IBW): 160 lbs (73 kg)    Admission Body Weight: 129 lb 9.6 oz (58.8 kg) (3/1/23 at MD office generalized edema)  Current Body Weight: 129 lb 9.6 oz (58.8 kg) (at MD office 3/1/23 generalized edema, in bedside chair at present),      Current BMI (kg/m2): 19.1  Usual Body Weight:  (~ 200#. Per EMR:6/23/2020: 193#10oz,  4/12/22: 177#, 10/12/22: 136#11oz, 12/1/22: 138#14oz)                       BMI Categories: Underweight (BMI less than 22) age over 72    Estimated Daily Nutrient Needs:  Energy Requirements Based On: Kcal/kg  Weight Used for Energy Requirements:  (59 kg)  Energy (kcal/day): ~ 7101-0272 kcals (30-35 kcals/kg/day)  Weight Used for Protein Requirements:  (59 kg)  Protein (g/day): ~ 47-59 grams  (0.1-1 grams/kg/day) pending renal status         Nutrition Diagnosis:   Severe malnutrition, In context of chronic illness related to inadequate protein-energy intake as evidenced by Criteria as identified in malnutrition assessment    Nutrition Interventions:   Food and/or Nutrient Delivery: Continue Current Diet, Continue Oral Nutrition Supplement  Nutrition Education/Counseling: Education initiated (Encouraged oral intake, small frequent meals, and ONS use.)  Coordination of Nutrition Care: Continue to monitor while inpatient       Goals:     Goals: PO intake 75% or greater, by next RD assessment       Nutrition Monitoring and Evaluation:      Food/Nutrient Intake Outcomes: Food and Nutrient Intake, Supplement Intake  Physical Signs/Symptoms Outcomes: Biochemical Data, GI Status, Fluid Status or Edema, Nutrition Focused Physical Findings, Weight    Discharge Planning:     Too soon to determine     Willi Arms, RD, LD  Contact: [phone]

## 2023-03-02 NOTE — CONSULTS
[facility]    Patient - Klaudia Hill   MRN -  [id number]   [place] # - [de-identified]   - 1955      Date of Admission -  3/1/2023 10:57 AM  Date of evaluation -  3/2/2023  Room - -007-A   Hospital Day - 1  Referred by- FAITH Marlow Primary Care Physician - Kishore Galicia MD       Reason for Consult    Continuity of care  Metastatic cancer   [address] Problems    Diagnosis Date Noted    Acute kidney injury [facility]) [N17.9] 2023     Priority: Medium    Anemia [D64.9] 2023     Priority: Medium    Severe malnutrition ([address] [E43] 02/10/2023     Priority: Medium     HPI   Klaudia Hill is a 69 y.o. male admitted for abnormal labs. The patient is followed in the clinic per Dr. 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 He was recently noted to have disease progression following three cycles of cisplatin and etoposide. He was recommended second line therapy with FOLFIRINOX. He received cycle #1 of FOLFIRINOX on 2/15/23 to 23. He returned for cycle #2 on 3/1/23 and lab work revealed creatinine of 7.4, Hgb 6.6. He was directed to the ED for further evaluation. The patient was admitted under the Hospitalist Service. Nephrology and Urology consulted. The patient was noted to have significant urinary retention with greater than 999 mL of urine in bladder and following cerda placement greater than 1900 mL of output. CT of the abdomen/pelvis was obtained showing:   Impression   1. Pneumobilia. Self-expanding biliary stent in place. Interval development of a 5.5 cm necrotic mass in the right hepatic lobe since prior study which also contains air and some fluid. No pancreatic head mass, not well demonstrated on this study. 2. Small amount of ascites. Moderate gaseous distention of the colon. Cannot exclude colonic ileus.    3. Abnormal thickening of the bladder wall anteriorly which could be due to cystitis or neoplastic involvement of the bladder. 4. Moderate bilateral hydronephrosis, not appreciably changed from prior study. No definite obstructing lesion or calculus, however, can be appreciated on this study. Medical Oncology consult was requested to follow up on above. The patient is currently sitting up in bedside chair, his wife is at the bedside. The patient reports feeling better overnight. He affirms generalized fatigue and weakness. Wife notes episodes of lightheadedness with ambulation. Denies chest pain, shortness of breath, cough, abdominal pain, nausea, vomiting. Henson remains in place draining clear yellow urine. He reports episode of loose bowels. Oncology History    Metastatic neuroendocrine carcinoma   On second line chemotherapy with FOLFIRINOX started on 2/15/23.    Meds    Current Medications    sodium bicarbonate  1,300 mg Oral TID    [Held by provider] aspirin  81 mg Oral Daily    dilTIAZem  240 mg Oral Daily    levothyroxine  25 mcg Oral QAM AC    levothyroxine  200 mcg Oral QAM AC    pantoprazole  40 mg Oral QAM AC    sodium chloride flush  5-40 mL IntraVENous 2 times per day    heparin (porcine)  5,000 Units SubCUTAneous 3 times per day    epoetin fay-epbx  10,000 Units SubCUTAneous Once per day on Mon Wed Fri    tamsulosin  0.8 mg Oral Daily     sodium chloride, sodium chloride flush, sodium chloride, ondansetron **OR** ondansetron, polyethylene glycol, acetaminophen **OR** acetaminophen  IV Drips/Infusions   sodium chloride      sodium chloride      sodium bicarbonate infusion 100 mL/hr at 03/02/23 1018     Past Medical History         Diagnosis Date    History of blood transfusion     Hypertension     Hypothyroidism     Malignant neoplasm of pancreas, unspecified location of malignancy ([facility] [address] 09/2022      Past Surgical History           Procedure Laterality Date    IR BILI DUCT INSERT STENT NEW ACCESS W/O DRAIN 09/19/2022    [facility]    MULTIPLE TOOTH EXTRACTIONS      PORT SURGERY Left 10/31/2022    LEFT SINGLE LUMEN MEDIPORT INSERTION performed by Martell Bentley MD at [facility]  10/31/2022    Dr. Quan December; Regular  ADULT ORAL NUTRITION SUPPLEMENT; Breakfast, Dinner; Standard High Calorie/High Protein Oral Supplement  Allergies    Patient has no known allergies. Social History     Social History     Socioeconomic History    Marital status:      Spouse name: Elizabeth Mail    Number of children: 3    Years of education: Not on file    Highest education level: Not on file   Occupational History    Not on file   Tobacco Use    Smoking status: Every Day     Types: Cigars     Start date: 1/1/2012     Passive exposure: Past (Dad smoked)    Smokeless tobacco: Never    Tobacco comments:     -3-4 cigars per day. denies cessation counseling 11/1/22. Declines counseling 12/28     Same as above 12/29     Same as above 1/18     Same as above 1/24   Vaping Use    Vaping Use: Never used   Substance and Sexual Activity    Alcohol use: Yes     Comment: 2-3 beers per day since 2022   Previous drank 8-12    Drug use: No    Sexual activity: Not on file   Other Topics Concern    Not on file   Social History Narrative    Not on file     Social Determinants of Health     Financial Resource Strain: Not on file   Food Insecurity: Not on file   Transportation Needs: Not on file   Physical Activity: Inactive    Days of Exercise per Week: 0 days    Minutes of Exercise per Session: 0 min   Stress: Not on file   Social Connections: Not on file   Intimate Partner Violence: Not on file   Housing Stability: Not on file     Family History          Problem Relation Age of Onset    High Blood Pressure Mother     Lung Cancer Father      ROS     Review of Systems   Pertinent review of systems noted in HPI, all other ROS negative.    Vitals     height is 5' 9\" (1.753 m) and weight is 129 lb 9.6 oz (58.8 kg). His temperature is 97.5 °F (36.4 °C). His blood pressure is 104/52 (abnormal) and his pulse is 69. His respiration is 18 and oxygen saturation is 100%. Exam   Physical Exam   General appearance: No apparent distress, chronically ill appearing, cachectic, and cooperative. HEENT: Pupils equal, round, and reactive to light. Conjunctivae/corneas clear. Oral mucosa moist.   Neck: Supple, with full range of motion. Trachea midline. Respiratory:  Normal respiratory effort. Clear to auscultation bilaterally. No wheezes, rales or rhonchi. Cardiovascular: Regular rate and rhythm with normal S1/S2  Abdomen: Soft, no tenderness with palpation. : cerda in place with clear yellow urine in bag. Musculoskeletal: No clubbing, cyanosis or edema bilaterally. Skin: Skin color, texture, turgor normal.  No rashes or lesions. Neurologic:  Neurovascularly intact without any focal sensory/motor deficits. Psychiatric: Alert and oriented    Labs   CBC  Recent Labs     03/01/23  0900 03/01/23  1100 03/01/23  1456 03/02/23  0106 03/02/23  0534 03/02/23  1254   WBC 4.8 4.6*  --   --  4.5*  --    RBC 2.20* 2.10*  --   --  2.65*  --    HGB 6.7* 6.6*   < > 7.6* 8.1* 7.2*   HCT 20.5* 19.5*   < > 23.2* 24.2* 21.8*   MCV 93.2 92.9  --   --  91.3  --    MCH 30.5 31.4  --   --  30.6  --    MCHC 32.7 33.8  --   --  33.5  --     251  --   --  291  --    MPV 9.5 9.4  --   --  9.2*  --     < > = values in this interval not displayed. BMP  Recent Labs     03/01/23  1100 03/01/23  1455 03/02/23  0533   * 130* 132*   K 4.2 4.6 3.8    101 100   CO2 12* 12* 12*   BUN 60* 62* 59*   CREATININE 7.2* 7.1* 6.9*   GLUCOSE 101 113* 144*   MG 1.4*  --   --    PHOS 3.8  --   --    CALCIUM 8.2* 8.2* 7.8*     LFT  Recent Labs     03/01/23  0900   AST 15   ALT 9*   BILITOT 0.5   ALKPHOS 182*     INR  No results for input(s): INR, PROTIME in the last 72 hours. PTT  No results for input(s):  APTT in the last 72 hours. Radiology        CT ABDOMEN PELVIS WO CONTRAST Additional Contrast? None    Result Date: 3/1/2023  CT ABDOMEN AND PELVIS WITHOUT CONTRAST ENHANCEMENT: CLINICAL INFORMATION: Concern for metastatic spread of pancreatic CA, urinary retention COMPARISON: 1/23/2023 TECHNIQUE: Multiple axial 5 mm images of the abdomen, pelvis, and lung bases were obtained without the administration of oral or intravenous contrast material. Computer generated sagittal and coronal images of the abdomen and pelvis were also reconstructed. ALL CT SCANS AT THIS FACILITY use dose modulation, iterative reconstruction, and/or weight-based dosing when appropriate to reduce radiation dose to as low as reasonably achievable. FINDINGS: LUNG BASES: Unremarkable. No infiltrates. No effusions. No lung nodules. LIVER/GALLBLADDER: A self-expanding metallic stent is present in the common bile duct. Moderate pneumobilia is seen in the liver. Note that there is a necrotic mass in the right hepatic lobe measuring 5.5 cm in diameter which also contains air and fluid,  no doubt communicating with the biliary tree. This lesion was not present on the prior study. Liver is relatively small in size. The gallbladder is not seen. Cloteal Dorman PANCREAS, SPLEEN AND ADRENAL GLANDS: The pancreas is poorly defined. The previously described pancreatic head mass is not clearly depicted in the absence of IV contrast material. The spleen and adrenal glands are unremarkable. KIDNEYS:  There is mild bilateral hydronephrosis and dilatation of the ureters. No calculi are seen. The severity of hydronephrosis. .. Not appreciably changed from prior study. BOWEL/PERITONEUM: No small bowel dilatation is seen. There is moderate gaseous distention involving most of the colon. Detail on this study is very limited due to the absence of intravenous contrast material and relative possibility of intra-abdominal adipose tissue. No free air.  Small amount of ascites adjacent to liver and right paracolic gutter. No abnormally enlarged para-aortic lymph nodes are seen. BONES: No evidence for acute fracture or bone destruction. PELVIS: There is a Cerda catheter in urinary bladder. A few air bubbles are seen in the urinary bladder. There is marked thickening of the wall of the urinary bladder anteriorly which could related to neoplasm or cystitis. This appearance of the bladder was not present on the prior study. 1. Pneumobilia. Self-expanding biliary stent in place. Interval development of a 5.5 cm necrotic mass in the right hepatic lobe since prior study which also contains air and some fluid. No pancreatic head mass, not well demonstrated on this study. 2. Small amount of ascites. Moderate gaseous distention of the colon. Cannot exclude colonic ileus. 3. Abnormal thickening of the bladder wall anteriorly which could be due to cystitis or neoplastic involvement of the bladder. 4. Moderate bilateral hydronephrosis, not appreciably changed from prior study. No definite obstructing lesion or calculus, however, can be appreciated on this study. **This report has been created using voice recognition software. It may contain minor errors which are inherent in voice recognition technology. ** Final report electronically signed by Dr. Katie Quezada on 3/1/2023 6:59 PM       Assessment/Recommendations    Metastatic Neuroendocrine Carcinoma of the Pancreas  History as above. Recent disease progression noted following three cycles of cisplatin and etoposide. He received first cycle of 2nd line chemotherapy FOLFIRINOX 2/15/23 - 2/17/23. Due for cycle #2 on 3/1/23. No plan for inpatient chemotherapy and with current renal status. Reviewed CT of abdomen/pelvis with the patient and his wife. 2. LIZA  Cr 7.4 on 3/1/23, 6.9 on 3/2/23. Multifactorial with possible ATN from nephrotoxic chemotherapy and postrenal with urinary retention, hydronephrosis. Nephrology following. On IV hydration, cerda in place.  On IV bicarb infusion. 3. Urinary Retention, Bilateral Hydronephrosis  Urology following, cerda in place, recommending discharge with cerda. To have outpatient cystoscopy. 4. Acute on Chronic Normocytic Anemia  Stable overnight. Hgb 6.6 on 3/1/23 and improved to 8.1 this am. Received 1 unit of PRBCs. Hgb slowly trending down with chemotherapy, baseline 7-9's. Denies any abnormal bleeding.    -Recommend transfusion for Hgb 7.0 or less     Will follow hospital course. Case discussed with nurse and patient/family - patient's wife at bedside. Questions and concerns addressed. Plan made in collaboration with Dr. Castillo Murray.      Electronically signed by   Jade Saenz PA-C on 3/2/2023 at 4:13 PM

## 2023-03-02 NOTE — PALLIATIVE CARE
Follow Up / Progress Note        Patient:   Isacc Abbasi  YOB: 1955  Age:  69 y.o. Room:  [facility]-  MRN:  [id number]            Family/Patient Discussion:  Patient sitting up in the chair. Wife is present at the bedside. Patient shares that it was a \"rough\" night as he had difficulty sleeping. Wife stayed at the bedside t/o the night also. Encouraged wife to take care of herself throughout this hospitalization. Both wife and patient share how the patient is a very active patient and he was feeling well until approximately 2 weeks ago after his dose of chemotherapy. Patient is hopeful that his kidney issues will resolve soon. Discussion about code status levels and what each level entails. Discussed complications of rib fractures, brain and organ damage associated with resuscitative measures. Patient and wife verbalize understanding that the patient is a full code and if a change is desired, staff must be alerted. Much emotional support provided. Plan/Follow-Up:  Palliative care contact information provided to patient/wife. Palliative care will remain available if needs arise and staff may call prn.       Electronically signed by Wali Hernández RN on 3/2/2023 at 12:54 PM             Palliative Care Office: [phone]

## 2023-03-02 NOTE — PROGRESS NOTES
Notified Missael Joyner NP of patient's repeat Hgb is 7.6 from 8.4. He was 6.6 on admission, gave one unit in ER went to 8.4. He is diagnosed with pancreatic cancer with liver mets.  Came in with 6.6 hgb and 7.2 creat

## 2023-03-02 NOTE — PROGRESS NOTES
[facility]   Nephrology progress note  3/2/2023, 12:08 PM      Pt Name:    Malena Gonzales  MRN:     [id number]     YOB: 1955  Admit Date:    3/1/2023 10:57 AM    Chief Complaint: Nephrology following for LIZA    Subjective:  Patient was seen and examined this morning  Ill-appearing  No chest pain or shortness of breath  Feels okay    Objective:  24HR INTAKE/OUTPUT:    Intake/Output Summary (Last 24 hours) at 3/2/2023 1208  Last data filed at 3/2/2023 6197  Gross per 24 hour   Intake 1909.25 ml   Output 3800 ml   Net -1890.75 ml         I/O last 3 completed shifts: In: 1909.3 [P.O.:480; I.V.:1139.9; Blood:289.3]  Out: 3800 [Urine:3800]  No intake/output data recorded.    Admission weight: 129 lb (58.5 kg)  Wt Readings from Last 3 Encounters:   03/02/23 129 lb 9.6 oz (58.8 kg)   03/01/23 129 lb 9.6 oz (58.8 kg)   03/01/23 129 lb 9.6 oz (58.8 kg)        Vitals :   Vitals:    03/02/23 0346 03/02/23 0708 03/02/23 1103 03/02/23 1115   BP: (!) 107/51 (!) 157/91 (!) 102/57    Pulse: 96 (!) 130 81    Resp: 15  18    Temp: 99.7 °F (37.6 °C) 98.8 °F (37.1 °C) 98.4 °F (36.9 °C)    TempSrc: Oral  Oral    SpO2: 100% 99% 100%    Weight:    129 lb 9.6 oz (58.8 kg)   Height:           Physical examination  General Appearance: alert and cooperative with exam, appears comfortable, no distress  Mouth/Throat: + Dry mucosa moist  Neck: No JVD  Lungs: Air entry B/L, no rales, no use of accessory muscles  Heart:  S1, S2 heard  GI: soft, non-tender, no guarding  Extremities: no LE edema    Medications:  Infusion:    sodium chloride      sodium chloride      sodium bicarbonate infusion 100 mL/hr at 03/02/23 1018     Meds:    sodium bicarbonate  1,300 mg Oral BID    [Held by provider] aspirin  81 mg Oral Daily    dilTIAZem  240 mg Oral Daily    levothyroxine  25 mcg Oral QAM AC    levothyroxine  200 mcg Oral QAM AC    pantoprazole  40 mg Oral QAM AC    sodium chloride flush  5-40 mL IntraVENous 2 times per day    heparin (porcine)  5,000 Units SubCUTAneous 3 times per day    epoetin fay-epbx  10,000 Units SubCUTAneous Once per day on Mon Wed Fri    tamsulosin  0.8 mg Oral Daily     Meds prn: sodium chloride, sodium chloride flush, sodium chloride, ondansetron **OR** ondansetron, polyethylene glycol, acetaminophen **OR** acetaminophen     Lab Data :  CBC:   Recent Labs     03/01/23  0900 03/01/23  1100 03/01/23  1456 03/02/23  0106 03/02/23  0534   WBC 4.8 4.6*  --   --  4.5*   HGB 6.7* 6.6* 8.4* 7.6* 8.1*   HCT 20.5* 19.5* 27.2* 23.2* 24.2*    251  --   --  291     CMP:  Recent Labs     03/01/23  1100 03/01/23  1455 03/02/23  0533   * 130* 132*   K 4.2 4.6 3.8    101 100   CO2 12* 12* 12*   BUN 60* 62* 59*   CREATININE 7.2* 7.1* 6.9*   GLUCOSE 101 113* 144*   CALCIUM 8.2* 8.2* 7.8*   MG 1.4*  --   --    PHOS 3.8  --   --      Hepatic:   Recent Labs     03/01/23  0900 03/01/23  1100 03/02/23  0533   LABALBU 2.5* 2.6* 2.6*   AST 15  --   --    ALT 9*  --   --    BILITOT 0.5  --   --    ALKPHOS 182*  --   --          Assessment and Plan:  LIZA on what appears to be underlying CKD. Creatinine was 1.3 back in 2020  Serum creatinine 7.2, down to 6.9 this morning  Unclear baseline. LIZA appears to be multifactorial.  Cannot rule out platin (cisplatin etc.)  based nephrotoxicity/ATN in setting of underlying bilateral hydronephrosis  Continue IV fluids with bicarbonate. Discussed with patient and family member. No acute need for renal replacement therapy but will likely need if no significant improvement in renal function  Severe metabolic acidosis. Increase IV bicarbonate drip 100 mL/h. Add oral sodium bicarbonate  Bilateral hydronephrosis.   Consult urology  Mild hyponatremia secondary to impaired free water excretion  Azotemia without enid uremia  Anemia  Neuro endocrine tumor of the pancreas    D/W patient and family member    Panda Plummer MD  [facility]    This report has been created using voice recognition software.  It may contain minor errors which are inherent in voice recognition technology

## 2023-03-02 NOTE — FLOWSHEET NOTE
03/02/23 1102   Safe Environment   Safety Measures Other (comment)  (Virtual nurse round complete)   Per audio response provider at bedside at this time.

## 2023-03-02 NOTE — ONCOLOGY
Medical Oncology Note    Lorie Wilson  is a 75-year-old male with neuroendocrine tumor of the pancreas admitted to the hospital today with acute renal failure. It appears that he has bladder outlet obstruction with urinary retention hopefully as the underlying major cause of his renal failure. He has undergone therapy with 3 cycles of cisplatin continue regimen with etoposide was completed in December. Rescanning showed that he had progressive disease. He was recently reevaluated and was found to have progressive disease. He was recently started on FOLFIRINOX chemotherapy with oxaliplatin. He thus has been exposed to multiple nephrotoxic agents. We will be glad to follow him with you during his hospital stay. Please call anytime.   Thank you

## 2023-03-02 NOTE — CONSULTS
[address] [address] [address]  [address] [facility] [address]  Dept: [phone]  Loc: [phone]  Visit Date: 3/1/2023    Urology Consult Note    Reason for Consult:  hydronephrosis   Requesting Physician:  Dr Luis Graf    History Obtained From:  patient, electronic medical record    Chief Complaint: abnormal labs    HISTORY OF PRESENT ILLNESS:                The patient is a 69 y.o. male with significant past medical history of HTN, metastatic pancreatic CA, hypothyroidism, and GERD who presented to [facility] with chief complaint of abnormal labs. CRT was 7.2 on admission. Also with bladder scan >999ml, cerda inserted with 1900ml amount out  Pt and family deny problems voiding prior, has not seen urology    Past Medical History:        Diagnosis Date    History of blood transfusion     Hypertension     Hypothyroidism     Malignant neoplasm of pancreas, unspecified location of malignancy ([address] 09/2022     Past Surgical History:        Procedure Laterality Date    IR BILI DUCT INSERT STENT NEW ACCESS W/O DRAIN  09/19/2022    [address]    MULTIPLE TOOTH EXTRACTIONS      PORT SURGERY Left 10/31/2022    LEFT SINGLE LUMEN MEDIPORT INSERTION performed by Sanjuana Montes MD at [facility]  10/31/2022    Dr. Davina Gaston     Allergies:  Patient has no known allergies. Social History:  Social History     Socioeconomic History    Marital status:      Spouse name: [facility]    Number of children: 3    Years of education: Not on file    Highest education level: Not on file   Occupational History    Not on file   Tobacco Use    Smoking status: Every Day     Types: Cigars     Start date: 1/1/2012     Passive exposure: Past (Dad smoked)    Smokeless tobacco: Never    Tobacco comments:     -3-4 cigars per day. denies cessation counseling 11/1/22.  Declines counseling 12/28     Same as above 12/29     Same as above 1/18     Same as above 1/24   Vaping Use    Vaping Use: Never used   Substance and Sexual Activity    Alcohol use: Yes     Comment: 2-3 beers per day since 2022   Previous drank 8-12    Drug use: No    Sexual activity: Not on file   Other Topics Concern    Not on file   Social History Narrative    Not on file     Social Determinants of Health     Financial Resource Strain: Not on file   Food Insecurity: Not on file   Transportation Needs: Not on file   Physical Activity: Inactive    Days of Exercise per Week: 0 days    Minutes of Exercise per Session: 0 min   Stress: Not on file   Social Connections: Not on file   Intimate Partner Violence: Not on file   Housing Stability: Not on file     Family History:       Problem Relation Age of Onset    High Blood Pressure Mother     Lung Cancer Father        ROS:  Constitutional: Negative for chills, fatigue, fever, or weight loss. Eyes: Denies reported visual changes. ENT: Denies headache, difficulty swallowing, earache, and nosebleeds. Cardiovascular: Negative for chest pain, palpitations, tachycardia or edema. Respiratory: Denies cough or SOB. GI:The patient denies abdominal or flank pain, anorexia, nausea or vomiting. : see HPI. Musculoskeletal: Patient denies low back pain or painful or reduced range of ROM. Neurological: The patient denies any symptoms of neurological impairment or TIA. Psychiatric: Denies anxiety or depression. Skin: Denies rash or lesions. All remaining ROS negative. PHYSICAL EXAM:  VITALS:  BP (!) 157/91   Pulse (!) 130   Temp 98.8 °F (37.1 °C)   Resp 15   Ht 5' 9\" (1.753 m)   Wt 129 lb (58.5 kg)   SpO2 99%   BMI 19.05 kg/m² . Nursing note and vitals reviewed. Constitutional: Alert and oriented times x3, no acute distress, and cooperative to examination with appropriate mood and affect. HEENT:   Head:         Normocephalic and atraumatic. Mouth/Throat:          Mucous membranes are normal.   Eyes:         EOM are normal. No scleral icterus.   Nose:    The external appearance of the nose is normal  Ears: The ears appear normal to external inspection. Cardiovascular:       Normal rate, regular rhythm. Pulmonary/Chest:  Normal respiratory rate and rhthym. No use of accessory muscles. Abdominal:          Soft. No tenderness. Genitalia:    Normal uncircumcised penis  Urethral meatus is normal in size and location  Scrotal contents normal to inspection and palpation   Normal testes palpated bilaterally  Henson draining yellow urine  Neurological:    Alert and oriented.      DATA:  CBC:   Lab Results   Component Value Date/Time    WBC 4.5 03/02/2023 05:34 AM    RBC 2.65 03/02/2023 05:34 AM    HGB 8.1 03/02/2023 05:34 AM    HCT 24.2 03/02/2023 05:34 AM    MCV 91.3 03/02/2023 05:34 AM    MCH 30.6 03/02/2023 05:34 AM    MCHC 33.5 03/02/2023 05:34 AM    RDW 16.3 02/15/2023 08:56 AM     03/02/2023 05:34 AM    MPV 9.2 03/02/2023 05:34 AM     BMP:    Lab Results   Component Value Date/Time     03/02/2023 05:33 AM    K 3.8 03/02/2023 05:33 AM     03/02/2023 05:33 AM    CO2 12 03/02/2023 05:33 AM    BUN 59 03/02/2023 05:33 AM    CREATININE 6.9 03/02/2023 05:33 AM    CALCIUM 7.8 03/02/2023 05:33 AM    LABGLOM 8 03/02/2023 05:33 AM    GLUCOSE 144 03/02/2023 05:33 AM     BUN/Creatinine:    Lab Results   Component Value Date/Time    BUN 59 03/02/2023 05:33 AM    CREATININE 6.9 03/02/2023 05:33 AM     Magnesium:    Lab Results   Component Value Date/Time    MG 1.4 03/01/2023 11:00 AM     Phosphorus:    Lab Results   Component Value Date/Time    PHOS 3.8 03/01/2023 11:00 AM     PT/INR:  No results found for: PROTIME, INR  U/A:    Lab Results   Component Value Date/Time    COLORU YELLOW 03/01/2023 03:40 PM    PHUR 5.0 03/01/2023 03:40 PM    WBCUA 0-2 03/01/2023 03:40 PM    RBCUA NONE 03/01/2023 03:40 PM    YEAST NONE SEEN 03/01/2023 03:40 PM    BACTERIA NONE SEEN 03/01/2023 03:40 PM    LEUKOCYTESUR NEGATIVE 03/01/2023 03:40 PM    UROBILINOGEN 0.2 03/01/2023 03:40 PM    BILIRUBINUR NEGATIVE 03/01/2023 03:40 PM    BLOODU NEGATIVE 03/01/2023 03:40 PM    GLUCOSEU NEGATIVE 03/01/2023 03:40 PM       Imaging: The patient has had a CT Without and With Contrast which I have independently reviewed along with its accompanying report. The study demonstrates   Narrative   CT ABDOMEN AND PELVIS WITHOUT CONTRAST ENHANCEMENT:       CLINICAL INFORMATION: Concern for metastatic spread of pancreatic CA, urinary retention       COMPARISON: 1/23/2023       TECHNIQUE: Multiple axial 5 mm images of the abdomen, pelvis, and lung bases were obtained without the administration of oral or intravenous contrast material. Computer generated sagittal and coronal images of the abdomen and pelvis were also    reconstructed. ALL CT SCANS AT THIS FACILITY use dose modulation, iterative reconstruction, and/or weight-based dosing when appropriate to reduce radiation dose to as low as reasonably achievable. FINDINGS:        LUNG BASES: Unremarkable. No infiltrates. No effusions. No lung nodules. LIVER/GALLBLADDER: A self-expanding metallic stent is present in the common bile duct. Moderate pneumobilia is seen in the liver. Note that there is a necrotic mass in the right hepatic lobe measuring 5.5 cm in diameter which also contains air and fluid,    no doubt communicating with the biliary tree. This lesion was not present on the prior study. Liver is relatively small in size. The gallbladder is not seen. [place] PANCREAS, SPLEEN AND ADRENAL GLANDS: The pancreas is poorly defined. The previously described pancreatic head mass is not clearly depicted in the absence of IV contrast material. The spleen and adrenal glands are unremarkable. KIDNEYS:  There is mild bilateral hydronephrosis and dilatation of the ureters. No calculi are seen. The severity of hydronephrosis. .. Not appreciably changed from prior study. BOWEL/PERITONEUM: No small bowel dilatation is seen.  There is moderate gaseous distention involving most of the colon. Detail on this study is very limited due to the absence of intravenous contrast material and relative possibility of intra-abdominal    adipose tissue. No free air. Small amount of ascites adjacent to liver and right paracolic gutter. No abnormally enlarged para-aortic lymph nodes are seen. BONES: No evidence for acute fracture or bone destruction. PELVIS: There is a Cerda catheter in urinary bladder. A few air bubbles are seen in the urinary bladder. There is marked thickening of the wall of the urinary bladder anteriorly which could related to neoplasm or cystitis. This appearance of the bladder    was not present on the prior study. Impression   1. Pneumobilia. Self-expanding biliary stent in place. Interval development of a 5.5 cm necrotic mass in the right hepatic lobe since prior study which also contains air and some fluid. No pancreatic head mass, not well demonstrated on this study. 2. Small amount of ascites. Moderate gaseous distention of the colon. Cannot exclude colonic ileus. 3. Abnormal thickening of the bladder wall anteriorly which could be due to cystitis or neoplastic involvement of the bladder. 4. Moderate bilateral hydronephrosis, not appreciably changed from prior study. No definite obstructing lesion or calculus, however, can be appreciated on this study. IMPRESSION/Plan:    ELYSIA 1-2 days to recheck hydro  D/c with cerda  Plan for cytoscopy outpatient  Flomax started by nephrology, cont at d/c    Moderate bilateral hydronephrosis - seen on previous Ct  in Jan, unchanged.   Urinary retention - bladder scan >999ml, 1900ml documented output after cerda 3800UOP 24/hrs,  likely contributing to hydro  LIZA - CRT 7.2 on arrival, 6.9 today  Metastatic pancreatic ca - medicine managing, oncology on board    Will follow     Thank you for including us in the care of [address] [place] [address] - CNP, APRN  03/02/23 10:02 AM  Urology

## 2023-03-03 ENCOUNTER — HOSPITAL ENCOUNTER (OUTPATIENT)
Dept: INFUSION THERAPY | Age: 68
End: 2023-03-03

## 2023-03-03 PROBLEM — C7B.8 NEUROENDOCRINE CARCINOMA METASTATIC TO LIVER (HCC): Status: ACTIVE | Noted: 2023-03-03

## 2023-03-03 PROBLEM — D64.9 ACUTE ON CHRONIC ANEMIA: Status: ACTIVE | Noted: 2023-01-01

## 2023-03-03 PROBLEM — N13.30 HYDRONEPHROSIS, BILATERAL: Status: ACTIVE | Noted: 2023-03-03

## 2023-03-03 PROBLEM — N17.0 ATN (ACUTE TUBULAR NECROSIS) (HCC): Status: ACTIVE | Noted: 2023-01-01

## 2023-03-03 PROBLEM — R79.89 PRERENAL AZOTEMIA: Status: ACTIVE | Noted: 2023-01-01

## 2023-03-03 PROBLEM — E83.42 HYPOMAGNESEMIA: Status: ACTIVE | Noted: 2023-03-03

## 2023-03-03 PROBLEM — C7A.8 NEUROENDOCRINE CARCINOMA METASTATIC TO LIVER (HCC): Status: ACTIVE | Noted: 2023-03-03

## 2023-03-03 PROBLEM — E87.6 HYPOKALEMIA: Status: ACTIVE | Noted: 2023-03-03

## 2023-03-03 LAB
ANION GAP SERPL CALC-SCNC: 15 MEQ/L (ref 8–16)
BUN SERPL-MCNC: 53 MG/DL (ref 7–22)
CALCIUM SERPL-MCNC: 7.3 MG/DL (ref 8.5–10.5)
CHLORIDE SERPL-SCNC: 97 MEQ/L (ref 98–111)
CO2 SERPL-SCNC: 21 MEQ/L (ref 23–33)
CREAT SERPL-MCNC: 5.6 MG/DL (ref 0.4–1.2)
GFR SERPL CREATININE-BSD FRML MDRD: 10 ML/MIN/1.73M2
GLUCOSE SERPL-MCNC: 125 MG/DL (ref 70–108)
HCT VFR BLD AUTO: 21.7 % (ref 42–52)
HGB BLD-MCNC: 7.3 GM/DL (ref 14–18)
MAGNESIUM SERPL-MCNC: 1.2 MG/DL (ref 1.6–2.4)
PHOSPHATE SERPL-MCNC: 3.1 MG/DL (ref 2.4–4.7)
POTASSIUM SERPL-SCNC: 3 MEQ/L (ref 3.5–5.2)
SODIUM SERPL-SCNC: 133 MEQ/L (ref 135–145)

## 2023-03-03 PROCEDURE — 6370000000 HC RX 637 (ALT 250 FOR IP): Performed by: INTERNAL MEDICINE

## 2023-03-03 PROCEDURE — 6360000002 HC RX W HCPCS

## 2023-03-03 PROCEDURE — 99232 SBSQ HOSP IP/OBS MODERATE 35: CPT | Performed by: INTERNAL MEDICINE

## 2023-03-03 PROCEDURE — 2580000003 HC RX 258: Performed by: INTERNAL MEDICINE

## 2023-03-03 PROCEDURE — 2500000003 HC RX 250 WO HCPCS: Performed by: INTERNAL MEDICINE

## 2023-03-03 PROCEDURE — 1200000003 HC TELEMETRY R&B

## 2023-03-03 PROCEDURE — 97535 SELF CARE MNGMENT TRAINING: CPT

## 2023-03-03 PROCEDURE — 6370000000 HC RX 637 (ALT 250 FOR IP)

## 2023-03-03 PROCEDURE — 84100 ASSAY OF PHOSPHORUS: CPT

## 2023-03-03 PROCEDURE — 99232 SBSQ HOSP IP/OBS MODERATE 35: CPT | Performed by: PHYSICIAN ASSISTANT

## 2023-03-03 PROCEDURE — 80048 BASIC METABOLIC PNL TOTAL CA: CPT

## 2023-03-03 PROCEDURE — 83735 ASSAY OF MAGNESIUM: CPT

## 2023-03-03 PROCEDURE — 85018 HEMOGLOBIN: CPT

## 2023-03-03 PROCEDURE — 99232 SBSQ HOSP IP/OBS MODERATE 35: CPT | Performed by: UROLOGY

## 2023-03-03 PROCEDURE — 36415 COLL VENOUS BLD VENIPUNCTURE: CPT

## 2023-03-03 PROCEDURE — 97166 OT EVAL MOD COMPLEX 45 MIN: CPT

## 2023-03-03 PROCEDURE — 6360000002 HC RX W HCPCS: Performed by: INTERNAL MEDICINE

## 2023-03-03 PROCEDURE — 85014 HEMATOCRIT: CPT

## 2023-03-03 RX ORDER — MAGNESIUM SULFATE HEPTAHYDRATE 40 MG/ML
2000 INJECTION, SOLUTION INTRAVENOUS ONCE
Status: COMPLETED | OUTPATIENT
Start: 2023-03-03 | End: 2023-03-03

## 2023-03-03 RX ORDER — POTASSIUM CHLORIDE 20 MEQ/1
40 TABLET, EXTENDED RELEASE ORAL ONCE
Status: COMPLETED | OUTPATIENT
Start: 2023-03-03 | End: 2023-03-03

## 2023-03-03 RX ORDER — LEVOTHYROXINE SODIUM 0.03 MG/1
TABLET ORAL
Qty: 90 TABLET | Refills: 1 | Status: SHIPPED | OUTPATIENT
Start: 2023-03-03

## 2023-03-03 RX ORDER — SODIUM CHLORIDE 9 MG/ML
INJECTION, SOLUTION INTRAVENOUS CONTINUOUS
Status: DISCONTINUED | OUTPATIENT
Start: 2023-03-03 | End: 2023-03-08 | Stop reason: HOSPADM

## 2023-03-03 RX ADMIN — SODIUM BICARBONATE: 84 INJECTION, SOLUTION INTRAVENOUS at 07:49

## 2023-03-03 RX ADMIN — MAGNESIUM SULFATE HEPTAHYDRATE 2000 MG: 40 INJECTION, SOLUTION INTRAVENOUS at 13:18

## 2023-03-03 RX ADMIN — LEVOTHYROXINE SODIUM 25 MCG: 0.03 TABLET ORAL at 09:27

## 2023-03-03 RX ADMIN — HEPARIN SODIUM 5000 UNITS: 5000 INJECTION INTRAVENOUS; SUBCUTANEOUS at 07:45

## 2023-03-03 RX ADMIN — POTASSIUM CHLORIDE 40 MEQ: 1500 TABLET, EXTENDED RELEASE ORAL at 17:29

## 2023-03-03 RX ADMIN — LEVOTHYROXINE SODIUM 200 MCG: 0.1 TABLET ORAL at 09:29

## 2023-03-03 RX ADMIN — PANTOPRAZOLE SODIUM 40 MG: 40 TABLET, DELAYED RELEASE ORAL at 07:45

## 2023-03-03 RX ADMIN — HEPARIN SODIUM 5000 UNITS: 5000 INJECTION INTRAVENOUS; SUBCUTANEOUS at 17:29

## 2023-03-03 RX ADMIN — SODIUM BICARBONATE 1300 MG: 650 TABLET ORAL at 09:27

## 2023-03-03 RX ADMIN — HEPARIN SODIUM 5000 UNITS: 5000 INJECTION INTRAVENOUS; SUBCUTANEOUS at 21:31

## 2023-03-03 RX ADMIN — DILTIAZEM HYDROCHLORIDE 240 MG: 240 CAPSULE, EXTENDED RELEASE ORAL at 09:28

## 2023-03-03 RX ADMIN — EPOETIN ALFA-EPBX 10000 UNITS: 10000 INJECTION, SOLUTION INTRAVENOUS; SUBCUTANEOUS at 09:29

## 2023-03-03 RX ADMIN — SODIUM BICARBONATE 1300 MG: 650 TABLET ORAL at 21:31

## 2023-03-03 RX ADMIN — POTASSIUM CHLORIDE 40 MEQ: 1500 TABLET, EXTENDED RELEASE ORAL at 12:00

## 2023-03-03 RX ADMIN — TAMSULOSIN HYDROCHLORIDE 0.8 MG: 0.4 CAPSULE ORAL at 09:28

## 2023-03-03 RX ADMIN — SODIUM BICARBONATE 1300 MG: 650 TABLET ORAL at 13:19

## 2023-03-03 RX ADMIN — SODIUM CHLORIDE: 9 INJECTION, SOLUTION INTRAVENOUS at 13:10

## 2023-03-03 NOTE — CARE COORDINATION
3/3/23, 2:04 PM EST    DISCHARGE ON GOING [address] day: 2  Location: -07/007-A Reason for admit: Acute kidney injury ([facility] [address] [N17.9]  Acute renal failure, unspecified acute renal failure type ([address] [N17.9]  Anemia, unspecified type [D64.9]   Procedure:   3/1 CT abdomen/pelvis: 1. Pneumobilia. Self-expanding biliary stent in place. Interval development of a 5.5 cm necrotic mass in the right hepatic lobe since prior study which also contains air and some fluid. No pancreatic head mass, not well demonstrated on this study. 2. Small amount of ascites. Moderate gaseous distention of the colon. Cannot exclude colonic ileus. 3. Abnormal thickening of the bladder wall anteriorly which could be due to cystitis or neoplastic involvement of the bladder. 4. Moderate bilateral hydronephrosis, not appreciably changed from prior study. No definite obstructing lesion or calculus, however, can be appreciated on this study. Barriers to Discharge: Hospitalist, nephro, oncology, urology following. Henson placed for retention, 1900mls removed. Bicarb gtt transitioned to PO. Flomax. 0.9% @ 75ml/hr. Retacrit. K+ replacement. Creatinine 5.6, was 6.9 yesterday. Per nephro no need for RRT at this time. PCP: Ta Salguero MD  Readmission Risk Score: 21.7%  Patient Goals/Plan/Treatment Preferences: Plans home with wife, denies needs.

## 2023-03-03 NOTE — PROGRESS NOTES
Hospitalist Progress Note      Patient:  Ashley Abbasi    Unit/Bed:6K-07/007-A  YOB: 1955  MRN: [id number]   Acct: [de-identified]   PCP: Sayda Kumar MD  Date of Admission: 3/1/2023    Assessment/Plan:    LIZA on CKD: Nephrology consulted. Initially thought this was mainly due to bladder outlet obstruction. Creatinine on admission, 7.1. Cerda placed, 2L output initially. Creatinine today 5.6. With this information, nephrology believes LIZA appears to be multifactorial.  Cannot rule out platin (cisplatin etc.)  based nephrotoxicity/ATN in setting of underlying bilateral hydronephrosis. Nephrology and this provider spoke to the patient about the potential for RRT. Urinary Retention: Urology consulted. Cerda placed, plan to keep cerda at DC. Flomax, continue at DC. 1900cc documented output after cerda. Will do renal US again in 2-3 days. Severe Metabolic Acidosis: CO2 12 on arrival and CO2 21 today. Nephrology increased IV bicarb to 100 cc/hr and started PO Bicarb. Hypokalemia: Potassium 3.0 today, nephrology managing. Potassium replacement. Anemia, acute on chronic: Hgb baseline appears 8-9s. Hgb 6.6 on admission. Received 1 U pRBCs. Hgb has been ~7 since. Neuroendocrine CA of the pancreas: Oncology consulted. He has undergone therapy with 3 cycles of cisplatin continue regimen with etoposide was completed in December. Rescanning showed that he had progressive disease. Status post 1 cycle of FOLFIRINOX on 2/15/2023. Hyponatremia: 129 on admission. Likely combination of poor PO intake and LIZA. Nephrology following. Continue to monitor. Essential HTN: continue diltiazem with hold parameters. Hold losartan given #1. Continue to monitor. Hypothyroidism: continue synthroid  GERD: continue pantoprazole  Malnourishment: consult dietitian    Case discussed with Oncology PA.      Chief Complaint: abnormal labs    Initial H and P:-    Initial H&P \"Aaron Abbasi is a 69 y.o. male with PMHx of HTN, metastatic pancreatic CA, hypothyroidism, and GERD who presented to [facility] with chief complaint of abnormal labs. Patient was getting routine labs due to his chemotherapy and subsequently sent to the ER for further evaluation. He reports he has been very fatigued lately, but not having any other pain. Does admit to some mild shortness of breath and believes it is 2/2 to the anemia. Patient reports he has been trying to maintain frequent and healthy meals, but does have a lower appetite. States he has been urinating frequently. Denies any lightheadedness/dizziness, chest pain, abdominal pain, N/V/D, urinary symptoms, and leg swelling. \"     Subjective (past 24 hours):   No acute events overnight. Pt resting in the bed. Pt is eating and drinking well. Pt understands that he cannot have chemo at this time. Past medical history, family history, social history and allergies reviewed again and is unchanged since admission. ROS (All review of systems completed. Pertinent positives noted.  Otherwise All other systems reviewed and negative.)     Medications:  Reviewed    Infusion Medications    sodium chloride 75 mL/hr at 03/03/23 1310    sodium chloride      sodium chloride       Scheduled Medications    potassium chloride  40 mEq Oral Once    magnesium sulfate  2,000 mg IntraVENous Once    sodium bicarbonate  1,300 mg Oral TID    [Held by provider] aspirin  81 mg Oral Daily    dilTIAZem  240 mg Oral Daily    levothyroxine  25 mcg Oral QAM AC    levothyroxine  200 mcg Oral QAM AC    pantoprazole  40 mg Oral QAM AC    sodium chloride flush  5-40 mL IntraVENous 2 times per day    heparin (porcine)  5,000 Units SubCUTAneous 3 times per day    epoetin [id number]  10,000 Units SubCUTAneous Once per day on Mon Wed Fri    tamsulosin  0.8 mg Oral Daily     PRN Meds: sodium chloride, sodium chloride flush, sodium chloride, ondansetron **OR** ondansetron, polyethylene glycol, acetaminophen **OR** acetaminophen      Intake/Output Summary (Last 24 hours) at 3/3/2023 1325  Last data filed at 3/3/2023 0919  Gross per 24 hour   Intake 2467 ml   Output 2500 ml   Net -33 ml       Diet:  ADULT DIET; Regular  ADULT ORAL NUTRITION SUPPLEMENT; Breakfast, Dinner; Standard High Calorie/High Protein Oral Supplement    Physical Exam:  BP (!) 114/59   Pulse 72   Temp 97.9 °F (36.6 °C) (Oral)   Resp 18   Ht 5' 9\" (1.753 m)   Wt 129 lb (58.5 kg)   SpO2 100%   BMI 19.05 kg/m²   General appearance: No apparent distress, appears stated age and cooperative. Acutely ill-appearing. Very thin. HEENT: Pupils equal, round, and reactive to light. Conjunctivae/corneas clear. Neck: Supple, with full range of motion. No jugular venous distention. Trachea midline. Respiratory:  Normal respiratory effort on RA. Clear to auscultation, bilaterally without Rales/Wheezes/Rhonchi. Cardiovascular: Regular rate and rhythm with normal S1/S2 without murmurs, rubs or gallops. Abdomen: Soft, non-tender, non-distended with normal bowel sounds. Musculoskeletal: passive and active ROM x 4 extremities. Skin: Skin color, texture, turgor normal.  No rashes or lesions. Neurologic:  Neurovascularly intact without any focal sensory/motor deficits. Cranial nerves: II-XII intact, grossly non-focal.  Psychiatric: Alert and oriented, tearful   Capillary Refill: Brisk,< 3 seconds   Peripheral Pulses: +2 palpable, equal bilaterally     Labs:   Recent Labs     03/01/23  0900 03/01/23  1100 03/01/23  1456 03/02/23  0534 03/02/23  1254 03/03/23  0108   WBC 4.8 4.6*  --  4.5*  --   --    HGB 6.7* 6.6*   < > 8.1* 7.2* 7.3*   HCT 20.5* 19.5*   < > 24.2* 21.8* 21.7*    251  --  291  --   --     < > = values in this interval not displayed.      Recent Labs     03/01/23  1100 03/01/23  1455 03/02/23  0533 03/02/23  1613 03/03/23  0108   *   < > 132* 133* 133*   K 4.2   < > 3.8 4.0 3.0*      < > 100 97* 97*   CO2 12*   < > 12* 19* 21*   BUN 60*   < > 59* 57* 53*   CREATININE 7.2*   < > 6.9* 6.3* 5.6*   CALCIUM 8.2*   < > 7.8* 7.5* 7.3*   PHOS 3.8  --   --   --  3.1    < > = values in this interval not displayed. Recent Labs     03/01/23  0900   AST 15   ALT 9*   BILIDIR <0.2   BILITOT 0.5   ALKPHOS 182*       Urinalysis:      Lab Results   Component Value Date/Time    NITRU NEGATIVE 03/01/2023 03:40 PM    WBCUA 0-2 03/01/2023 03:40 PM    BACTERIA NONE SEEN 03/01/2023 03:40 PM    RBCUA NONE 03/01/2023 03:40 PM    BLOODU NEGATIVE 03/01/2023 03:40 PM    GLUCOSEU NEGATIVE 03/01/2023 03:40 PM       Radiology:  CT ABDOMEN PELVIS WO CONTRAST Additional Contrast? None   Final Result   1. Pneumobilia. Self-expanding biliary stent in place. Interval development of a 5.5 cm necrotic mass in the right hepatic lobe since prior study which also contains air and some fluid. No pancreatic head mass, not well demonstrated on this study. 2. Small amount of ascites. Moderate gaseous distention of the colon. Cannot exclude colonic ileus. 3. Abnormal thickening of the bladder wall anteriorly which could be due to cystitis or neoplastic involvement of the bladder. 4. Moderate bilateral hydronephrosis, not appreciably changed from prior study. No definite obstructing lesion or calculus, however, can be appreciated on this study. **This report has been created using voice recognition software. It may contain minor errors which are inherent in voice recognition technology. **      Final report electronically signed by Dr. Abel Hadley on 3/1/2023 6:59 PM        Electronically signed by FAITH Worthington on 3/3/2023 at 1:25 PM

## 2023-03-03 NOTE — PLAN OF CARE
Problem: Discharge Planning  Goal: Discharge to home or other facility with appropriate resources  Outcome: Progressing  Flowsheets (Taken 3/2/2023 0915 by Leora Howard, RN)  Discharge to home or other facility with appropriate resources: Identify barriers to discharge with patient and caregiver     Problem: Skin/Tissue Integrity  Goal: Absence of new skin breakdown  Description: 1. Monitor for areas of redness and/or skin breakdown  2. Assess vascular access sites hourly  3. Every 4-6 hours minimum:  Change oxygen saturation probe site  4. Every 4-6 hours:  If on nasal continuous positive airway pressure, respiratory therapy assess nares and determine need for appliance change or resting period.   Outcome: Progressing     Problem: Safety - Adult  Goal: Free from fall injury  Outcome: Progressing     Problem: Cardiovascular - Adult  Goal: Maintains optimal cardiac output and hemodynamic stability  Outcome: Progressing    Problem: Skin/Tissue Integrity - Adult  Goal: Skin integrity remains intact  Outcome: Progressing    Problem: Skin/Tissue Integrity - Adult  Goal: Oral mucous membranes remain intact  Outcome: Progressing    Problem: Gastrointestinal - Adult  Goal: Minimal or absence of nausea and vomiting  Outcome: Progressing    Problem: Gastrointestinal - Adult  Goal: Maintains or returns to baseline bowel function  Outcome: Progressing    Problem: Gastrointestinal - Adult  Goal: Maintains adequate nutritional intake  Outcome: Progressing    Problem: Genitourinary - Adult  Goal: Absence of urinary retention  Outcome: Progressing  Flowsheets (Taken 3/2/2023 0915 by Leora Howard, RN)  Absence of urinary retention: Assess patients ability to void and empty bladder  Note: Henson catheter in place, draining    Problem: Infection - Adult  Goal: Absence of infection at discharge  Outcome: Progressing    Problem: Metabolic/Fluid and Electrolytes - Adult  Goal: Electrolytes maintained within normal limits  Outcome: Progressing    Problem: Hematologic - Adult  Goal: Maintains hematologic stability  Outcome: Progressing    Problem: Pain  Goal: Verbalizes/displays adequate comfort level or baseline comfort level  Outcome: Progressing  Note: Denies pain     Problem: Chronic Conditions and Co-morbidities  Goal: Patient's chronic conditions and co-morbidity symptoms are monitored and maintained or improved  Outcome: Progressing    Problem: ABCDS Injury Assessment  Goal: Absence of physical injury  Outcome: Progressing     Problem: Nutrition Deficit:  Goal: Optimize nutritional status  Outcome: Progressing

## 2023-03-03 NOTE — PROGRESS NOTES
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) [C7A.8, C7B.8] 2023     Priority: Medium    Acute on chronic anemia [D64.9] 2023     Priority: Medium    ATN (acute tubular necrosis) (HCC) [N17.0] 2023     Priority: Medium    Prerenal azotemia [R79.89] 2023     Priority: Medium    Hydronephrosis, bilateral [N13.30] 2023     Priority: Medium    Hypokalemia [E87.6] 2023     Priority: Medium    Hypomagnesemia [E83.42] 2023     Priority: Medium    Metabolic acidosis [E87.20] 2023     Priority: Medium    Urinary retention [R33.9] 2023     Priority: Medium    LIZA (acute kidney injury) (HCC) [N17.9] 2023     Priority: Medium    Anemia [D64.9] 2023     Priority: Medium    Severe malnutrition (HCC) [E43] 02/10/2023     Priority: Medium     HPI/Subjective   Aaron Abbasi is a 67 y.o. male admitted for abnormal labs. The patient is followed in the clinic per Dr. 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 He was recently noted to have disease progression following three cycles of cisplatin and etoposide. He was recommended second line therapy with FOLFIRINOX. He received cycle #1 of FOLFIRINOX on 2/15/23 to 23. He returned for cycle #2 on 3/1/23 and lab work revealed creatinine of 7.4, Hgb 6.6. He was directed to the ED for further evaluation. The patient was admitted under the Hospitalist Service.  This service was provided using video with synchronous audio and visual.   She is a new patient to me but seen previously by [facility] endocrinology while inpatient. She is in [place] and her identity has been established. Provider is located in office.  Alise Parra understands that we are providing virtual options for visits due to the coronavirus pandemic and she consents to and requests a virtual (telephone or video) visit. Without the patient being seen and evaluated in person, there is a risk that the information and/or assessment may be incomplete or inaccurate.      History of Present Illness:    Ms. Parra is a 54 year old referred initially by Taco Mckeon MD for dm2.   First office visit with me: 8/29/2022     Pertinent endocrine medications:  Levothyroxine 75 mcg daily   tradjenta 5 mg daily   Humalog 10 units with breakfast, 15 units with lunch, 14 units with dinner.  2:50 over 150 (maximum correction is 6 units)    Change to:  Levothyroxine 75 mcg daily   tradjenta 5 mg daily   Humalog 8 units with breakfast, 12 units with lunch, 11 units with dinner.  1:50 over 150       Current prednisone: off since 10/4/2022    Duration of diabetes:  4/2022  Known diabetic complications/associated conditions: none  Complicating factors: prednisone (s/p heart transplant 7/2022), she will likely have her dose decreased tomorrow to 20 mg daily (plan is to be off by mid October)  History of gastroparesis: none  History of pancreatitis: none  Hypoglycemic awareness: she is not confident she always feels her low glucoses  Personal or family history of MEN2/MTC: mother (hypothyroidism)    Foot issues/history:  Foot pain: none  Peripheral neuropathy: none  Poor circulation: none  Callus:none  Foot deformity: none  Foot infections/ulcerations: none  Amputations: none    Prior medications tried for DM and reason for stopping (if known/notated):  tradjenta (when was on higher doses of insulin as well as inpatient)    Blood  Nephrology and Urology consulted. The patient was noted to have significant urinary retention with greater than 999 mL of urine in bladder and following cerda placement greater than 1900 mL of output. CT of the abdomen/pelvis was obtained showing:   Impression   1. Pneumobilia. Self-expanding biliary stent in place. Interval development of a 5.5 cm necrotic mass in the right hepatic lobe since prior study which also contains air and some fluid. No pancreatic head mass, not well demonstrated on this study. 2. Small amount of ascites. Moderate gaseous distention of the colon. Cannot exclude colonic ileus. 3. Abnormal thickening of the bladder wall anteriorly which could be due to cystitis or neoplastic involvement of the bladder. 4. Moderate bilateral hydronephrosis, not appreciably changed from prior study. No definite obstructing lesion or calculus, however, can be appreciated on this study. Medical Oncology consult was requested to follow up on above. Interval History 3/3/2023:   The patient is resting in bed, his wife is at the bedside. Patient reports feeling better overnight, feels \"great\". Denies lightheadedness, dizziness, chest pain, shortness of breath, abdominal pain, nausea or vomiting. Cerda remains in place. He states he ambulated in hallway earlier today. Oncology History   Metastatic neuroendocrine carcinoma   On second line chemotherapy with FOLFIRINOX started on 2/15/23.     Meds    Current Medications    potassium chloride  40 mEq Oral Once    magnesium sulfate  2,000 mg IntraVENous Once    sodium bicarbonate  1,300 mg Oral TID    [Held by provider] aspirin  81 mg Oral Daily    dilTIAZem  240 mg Oral Daily    levothyroxine  25 mcg Oral QAM AC    levothyroxine  200 mcg Oral QAM AC    pantoprazole  40 mg Oral QAM AC    sodium chloride flush  5-40 mL IntraVENous 2 times per day    heparin (porcine)  5,000 Units SubCUTAneous 3 times per day    epoetin fay-epbx  10,000 Units glucose monitoring: checks four times daily     I have reviewed the patient's blood sugar readings and gone through the different readings at different times of day with the patient to establish trends and patterns.    fasting 106-139  noontime  (the 290 is an outlier, next highest was 186)  dinner  (55 was an outlier)  bedtime  (58 was an outlier)    Rotation of sites: encouraged to use varied sites and avoid striae/incisions, etc.    Activity Type(s): limited due to recent surgery  Diet: working on    Last eye exam: summer 2021  Last dental exam:  Pre covid  Last foot exam: overdue      Primary care provider: Taco Mckeon MD  Tobacco history: never  Statin: yes  ACE/ARB: none  ASA: yes  The ASCVD Risk score (Kolton DK, et al., 2019) failed to calculate for the following reasons:    The valid HDL cholesterol range is 20 to 100 mg/dL    The valid total cholesterol range is 130 to 320 mg/dL    Discussed/reviewed driving/exercise safety as well as hypoglycemic management. Good foot care reviewed as well.       Wt Readings from Last 4 Encounters:   10/11/22 74.4 kg (164 lb)   10/11/22 74.7 kg (164 lb 10.9 oz)   09/27/22 74.8 kg (165 lb)   09/27/22 75 kg (165 lb 5.5 oz)     BP Readings from Last 3 Encounters:   10/11/22 123/78   10/11/22 139/70   09/27/22 133/82     Estimated body mass index is 25.69 kg/m² as calculated from the following:    Height as of 10/11/22: 5' 7\" (1.702 m).    Weight as of 10/11/22: 74.4 kg (164 lb).    No results found for: [id number]  Hemoglobin A1C (%)   Date Value   10/11/2022 6.3 (H)   07/20/2022 7.5 (H)   04/15/2022 7.0 (H)     TSH (mcUnits/mL)   Date Value   10/11/2022 0.594   04/17/2022 1.050   04/16/2022 1.679   03/30/2022 2.000   03/27/2022 2.760   03/08/2022 14.400 (H)   09/24/2021 2.790   07/27/2021 11.900 (H)   10/09/2020 0.333 (L)   09/11/2020 0.016 (L)     Microalbumin/ Creatinine Ratio (mg/g)   Date Value   04/18/2022 1,866.3 (H)     TSH (mcUnits/mL)   Date  SubCUTAneous Once per day on Mon Wed Fri    tamsulosin  0.8 mg Oral Daily     sodium chloride, sodium chloride flush, sodium chloride, ondansetron **OR** ondansetron, polyethylene glycol, acetaminophen **OR** acetaminophen  IV Drips/Infusions   sodium chloride 75 mL/hr at 03/03/23 1310    sodium chloride      sodium chloride       Past Medical History         Diagnosis Date    History of blood transfusion     Hypertension     Hypothyroidism     Malignant neoplasm of pancreas, unspecified location of malignancy ([facility] [address] 09/2022      Past Surgical History           Procedure Laterality Date    IR BILI DUCT INSERT STENT NEW ACCESS W/O DRAIN  09/19/2022    [address]    MULTIPLE TOOTH EXTRACTIONS      PORT SURGERY Left 10/31/2022    LEFT SINGLE LUMEN MEDIPORT INSERTION performed by Michelle Cantor MD at [address]  10/31/2022    Dr. Eason Shows; Regular  ADULT ORAL NUTRITION SUPPLEMENT; Breakfast, Dinner; Standard High Calorie/High Protein Oral Supplement  Allergies    Patient has no known allergies. Social History     Social History     Socioeconomic History    Marital status:      Spouse name: [facility]    Number of children: 3    Years of education: Not on file    Highest education level: Not on file   Occupational History    Not on file   Tobacco Use    Smoking status: Every Day     Types: Cigars     Start date: 1/1/2012     Passive exposure: Past (Dad smoked)    Smokeless tobacco: Never    Tobacco comments:     -3-4 cigars per day. denies cessation counseling 11/1/22.  Declines counseling 12/28     Same as above 12/29     Same as above 1/18     Same as above 1/24   Vaping Use    Vaping Use: Never used   Substance and Sexual Activity    Alcohol use: Yes     Comment: 2-3 beers per day since 2022   Previous drank 8-12    Drug use: No    Sexual activity: Not on file   Other Topics Concern    Not on file   Social History Narrative    Not on file     Social Value   10/11/2022 0.594     Glomerular Filtration Rate (no units)   Date Value   10/11/2022 >90      GOT/AST (Units/L)   Date Value   07/19/2022 23     Alkaline Phosphatase (Units/L)   Date Value   07/19/2022 102     GPT/ALT (Units/L)   Date Value   07/19/2022 42     Bilirubin, Total (mg/dL)   Date Value   07/19/2022 0.5     FASTING STATUS (hrs)   Date Value   05/17/2018 12 HRS     Cholesterol (mg/dL)   Date Value   04/18/2022 66     HDL (mg/dL)   Date Value   04/18/2022 11 (L)     Triglycerides (mg/dL)   Date Value   04/18/2022 90     LDL (mg/dL)   Date Value   04/18/2022 37     Non-HDL Cholesterol (mg/dL)   Date Value   04/18/2022 55     Cholesterol/ HDL Ratio (no units)   Date Value   04/18/2022 6.0 (H)        Medication list reconciled to the best of my ability with the patient  Please note: The medication list is a snapshot of the patient's actual medication use as reported to our office, which may be different from the patient's prescribed therapy.  Every effort is made to update the electronic medical record and to inform the primary care provider/referring provider when the patient's home regimen differs from what is recorded in the medical record.    Allergies/problem list/medications/surgical/family/social history reviewed via electronic medical record and confirmed when needed with Alise Parra.   Details pertinent to today's visit are added in above.      Review of Systems:  Review of Systems   All other systems reviewed and are negative.       Physical Exam:  Visit Vitals  LMP 09/14/2015 (Exact Date)     Physical Exam  Constitutional:       General: She is not in acute distress.     Appearance: Normal appearance. She is not ill-appearing.   HENT:      Head: Normocephalic and atraumatic.   Eyes:      General: No scleral icterus.     Conjunctiva/sclera: Conjunctivae normal.      Comments: No chemosis, no lid lag, no proptosis.   Pulmonary:      Effort: Pulmonary effort is normal. No respiratory distress.    Neurological:      General: No focal deficit present.      Mental Status: She is alert and oriented to person, place, and time.   Psychiatric:         Mood and Affect: Mood normal.         Behavior: Behavior normal.         Thought Content: Thought content normal.         Judgment: Judgment normal.           Assessment & Plan:    Type 2 diabetes mellitus (CMS/HCC)    Diabetes Mellitus: improving. She has been off prednisone for 10 days. We will decrease her insulin and start the carla process. She will  a sensor today - instructions placed in AVS to link to our clinic. Follow-up with me in 3 months with lab prior    We discussed relevant details regarding medications as well as follow-up testing plus appointments as needed.  Additional relevant information placed within the AVS.    I answered all of her questions and she is comfortable with the plan as outlined. she voices understanding of the follow-up plan including labs, office visits, and was provided with an after visit summary.  We discussed signs and symptoms that should provoke a phone call. she will call our office with any further questions, concerns, or problems.        Hypothyroidism    TSH (mcUnits/mL)   Date Value   10/11/2022 0.594   04/17/2022 1.050   04/16/2022 1.679   03/30/2022 2.000   03/27/2022 2.760       No change to levothyroxine. I provided written instructions with respect to taking thyroid medication and provided caution regarding high dose biotin with respect to lab draws and timing.       Multiple thyroid nodules  We discussed indications for fine needle aspiration.  We discussed the potential outcomes from fine needle aspiration (benign, non-diagnostic, indeterminate, and malignant) as well as the follow-up plan for those potential outcomes. Will arrange fine needle aspiration with hubert hercules.    Long term (current) use of insulin (CMS/HCC)  We will decrease her humalog and arrange ongoing follow-up with virgilio  Determinants of Health     Financial Resource Strain: Not on file   Food Insecurity: Not on file   Transportation Needs: Not on file   Physical Activity: Inactive    Days of Exercise per Week: 0 days    Minutes of Exercise per Session: 0 min   Stress: Not on file   Social Connections: Not on file   Intimate Partner Violence: Not on file   Housing Stability: Not on file     Family History          Problem Relation Age of Onset    High Blood Pressure Mother     Lung Cancer Father      ROS     Review of Systems   Pertinent review of systems noted in HPI, all other ROS negative. Vitals     height is 5' 9\" (1.753 m) and weight is 129 lb (58.5 kg). His oral temperature is 97.9 °F (36.6 °C). His blood pressure is 114/59 (abnormal) and his pulse is 72. His respiration is 18 and oxygen saturation is 100%. Exam   Physical Exam   General appearance: No apparent distress, chronically ill appearing, cachetic, and cooperative. HEENT: Pupils equal, round, and reactive to light. Conjunctivae/corneas clear. Oral mucosa moist.   Neck: Supple, with full range of motion. Trachea midline. Respiratory:  Normal respiratory effort. Clear to auscultation, bilaterally without Rales/Wheezes/Rhonchi. Cardiovascular: Regular rate and rhythm with normal S1/S2   Abdomen: Soft, non-tender with palpation with active bowel sounds. : cerda in place draining clear yellow urine. Musculoskeletal: No clubbing, cyanosis or edema bilaterally. Skin: Skin color, texture, turgor normal.  No rashes or lesions. Neurologic:  Neurovascularly intact without any focal sensory/motor deficits.    Psychiatric: Alert and oriented       Labs   CBC  Recent Labs     03/01/23  0900 03/01/23  1100 03/01/23  1456 03/02/23  0534 03/02/23  1254 03/03/23  0108   WBC 4.8 4.6*  --  4.5*  --   --    RBC 2.20* 2.10*  --  2.65*  --   --    HGB 6.7* 6.6*   < > 8.1* 7.2* 7.3*   HCT 20.5* 19.5*   < > 24.2* 21.8* 21.7*   MCV 93.2 92.9  --  91.3  --   --    MCH 30.5           Alicia Riggs MD    A copy of this note will be forwarded to the referring provider.   31.4  --  30.6  --   --    MCHC 32.7 33.8  --  33.5  --   --     251  --  291  --   --    MPV 9.5 9.4  --  9.2*  --   --     < > = values in this interval not displayed. BMP  Recent Labs     03/01/23  1100 03/01/23  1455 03/02/23  0533 03/02/23  1613 03/03/23  0108   *   < > 132* 133* 133*   K 4.2   < > 3.8 4.0 3.0*      < > 100 97* 97*   CO2 12*   < > 12* 19* 21*   BUN 60*   < > 59* 57* 53*   CREATININE 7.2*   < > 6.9* 6.3* 5.6*   GLUCOSE 101   < > 144* 116* 125*   MG 1.4*  --   --   --  1.2*   PHOS 3.8  --   --   --  3.1   CALCIUM 8.2*   < > 7.8* 7.5* 7.3*    < > = values in this interval not displayed. LFT  Recent Labs     03/01/23  0900   AST 15   ALT 9*   BILITOT 0.5   ALKPHOS 182*         Radiology      CT ABDOMEN PELVIS WO CONTRAST Additional Contrast? None    Result Date: 3/1/2023  CT ABDOMEN AND PELVIS WITHOUT CONTRAST ENHANCEMENT: CLINICAL INFORMATION: Concern for metastatic spread of pancreatic CA, urinary retention COMPARISON: 1/23/2023 TECHNIQUE: Multiple axial 5 mm images of the abdomen, pelvis, and lung bases were obtained without the administration of oral or intravenous contrast material. Computer generated sagittal and coronal images of the abdomen and pelvis were also reconstructed. ALL CT SCANS AT THIS FACILITY use dose modulation, iterative reconstruction, and/or weight-based dosing when appropriate to reduce radiation dose to as low as reasonably achievable. FINDINGS: LUNG BASES: Unremarkable. No infiltrates. No effusions. No lung nodules. LIVER/GALLBLADDER: A self-expanding metallic stent is present in the common bile duct. Moderate pneumobilia is seen in the liver. Note that there is a necrotic mass in the right hepatic lobe measuring 5.5 cm in diameter which also contains air and fluid,  no doubt communicating with the biliary tree. This lesion was not present on the prior study. Liver is relatively small in size. The gallbladder is not seen. Camilo Chime  PANCREAS, SPLEEN AND ADRENAL GLANDS: The pancreas is poorly defined. The previously described pancreatic head mass is not clearly depicted in the absence of IV contrast material. The spleen and adrenal glands are unremarkable. KIDNEYS:  There is mild bilateral hydronephrosis and dilatation of the ureters. No calculi are seen. The severity of hydronephrosis. .. Not appreciably changed from prior study. BOWEL/PERITONEUM: No small bowel dilatation is seen. There is moderate gaseous distention involving most of the colon. Detail on this study is very limited due to the absence of intravenous contrast material and relative possibility of intra-abdominal adipose tissue. No free air. Small amount of ascites adjacent to liver and right paracolic gutter. No abnormally enlarged para-aortic lymph nodes are seen. BONES: No evidence for acute fracture or bone destruction. PELVIS: There is a Henson catheter in urinary bladder. A few air bubbles are seen in the urinary bladder. There is marked thickening of the wall of the urinary bladder anteriorly which could related to neoplasm or cystitis. This appearance of the bladder was not present on the prior study. 1. Pneumobilia. Self-expanding biliary stent in place. Interval development of a 5.5 cm necrotic mass in the right hepatic lobe since prior study which also contains air and some fluid. No pancreatic head mass, not well demonstrated on this study. 2. Small amount of ascites. Moderate gaseous distention of the colon. Cannot exclude colonic ileus. 3. Abnormal thickening of the bladder wall anteriorly which could be due to cystitis or neoplastic involvement of the bladder. 4. Moderate bilateral hydronephrosis, not appreciably changed from prior study. No definite obstructing lesion or calculus, however, can be appreciated on this study. **This report has been created using voice recognition software. It may contain minor errors which are inherent in voice recognition technology. ** Final report electronically signed by Dr. Ariane Lewis on 3/1/2023 6:59 PM      Assessment/Recommendations    Metastatic Neuroendocrine Carcinoma of the Pancreas  History as above. Recent disease progression noted following three cycles of cisplatin and etoposide. He received first cycle of 2nd line chemotherapy FOLFIRINOX 2/15/23 - 2/17/23. Due for cycle #2 on 3/1/23. No plan for inpatient chemotherapy and with current renal status. Reviewed with the patient and his wife, creatinine would need to improve to be a candidate for systemic therapy. Patient would like to continue with treatment if able. Palliative care following. 2. LIZA  Cr 7.4 on 3/1/23, 6.9 on 3/2/23, 5.6 on 3/3/23. Multifactorial with possible ATN from nephrotoxic chemotherapy and postrenal with urinary retention, hydronephrosis. Received IV bicarbonate for metabolic acidosis. Nephrology following. On IV hydration, cerda in place. 3. Urinary Retention, Bilateral Hydronephrosis  Noted to have 999 mL on bladder scan with additional 1900 mL removed with cerda placement. Urology following, cerda in place, recommending discharge with cerda. To have outpatient cystoscopy. 4. Acute on Chronic Normocytic Anemia  Hgb 7.3 on 3/3/23. Hgb 6.6 on 3/1/23 and received 1 unit of PRBCs. Hgb slowly trending down with chemotherapy, baseline 7-9's. Denies any abnormal bleeding. Started on Retacrit per Nephrology. -Recommend transfusion for Hgb 7.0 or less        Will continue to follow hospital course. Case discussed with nurse and patient/family - wife/Hospitalist Izabela Lynch PA-C. Questions and concerns addressed.   Plan made in collaboration with Dr. Willy Irby    Electronically signed by   Yobani Read PA-C on 3/3/2023 at 1:49 PM

## 2023-03-03 NOTE — PROGRESS NOTES
[facility]   Nephrology progress note  3/3/2023, 11:50 AM      Pt Name:    Daisy Mazariegos  MRN:     [id number]     YOB: 1955  Admit Date:    3/1/2023 10:57 AM    Chief Complaint: Nephrology following for LIZA    Subjective:  Patient was seen and examined this morning  Feeling better  Discussed with wife this morning  No chest pain or shortness of breath  Feels okay      Objective:  24HR INTAKE/OUTPUT:    Intake/Output Summary (Last 24 hours) at 3/3/2023 1150  Last data filed at 3/3/2023 0919  Gross per 24 hour   Intake 2467 ml   Output 3125 ml   Net -658 ml           I/O last 3 completed shifts:   In: 2869.9 [P.O.:480; I.V.:2389.9]  Out: 7307 [FICLN:7896]  I/O this shift:  In: 1217 [I.V.:1217]  Out: 550 [Urine:550]   Admission weight: 129 lb (58.5 kg)  Wt Readings from Last 3 Encounters:   03/03/23 129 lb (58.5 kg)   03/01/23 129 lb 9.6 oz (58.8 kg)   03/01/23 129 lb 9.6 oz (58.8 kg)        Vitals :   Vitals:    03/02/23 1942 03/02/23 2307 03/03/23 0340 03/03/23 0915   BP: 113/65 129/67 (!) 111/57 120/68   Pulse: 68 82 80 71   Resp: 14 14 14 16   Temp: 97.5 °F (36.4 °C) 98.6 °F (37 °C) 98.5 °F (36.9 °C) 97.4 °F (36.3 °C)   TempSrc: Oral Oral Oral Oral   SpO2: 100% 100% 99% 100%   Weight:   129 lb (58.5 kg)    Height:           Physical examination  General Appearance: alert and cooperative with exam, appears comfortable, no distress  Mouth/Throat: + Dry mucosa moist  Neck: No JVD  Lungs: Air entry B/L, no rales, no use of accessory muscles  Heart:  S1, S2 heard  GI: soft, non-tender, no guarding  Extremities: no LE edema    Medications:  Infusion:    sodium chloride      sodium chloride      sodium bicarbonate infusion 100 mL/hr at 03/03/23 0749     Meds:    potassium chloride  40 mEq Oral Once    sodium bicarbonate  1,300 mg Oral TID    [Held by provider] aspirin  81 mg Oral Daily    dilTIAZem  240 mg Oral Daily    levothyroxine  25 mcg Oral QAM AC    levothyroxine  200 mcg Oral QAM AC    pantoprazole  40 mg Oral QAM AC    sodium chloride flush  5-40 mL IntraVENous 2 times per day    heparin (porcine)  5,000 Units SubCUTAneous 3 times per day    epoetin fay-epbx  10,000 Units SubCUTAneous Once per day on Mon Wed Fri    tamsulosin  0.8 mg Oral Daily     Meds prn: sodium chloride, sodium chloride flush, sodium chloride, ondansetron **OR** ondansetron, polyethylene glycol, acetaminophen **OR** acetaminophen     Lab Data :  CBC:   Recent Labs     03/01/23  0900 03/01/23  1100 03/01/23  1456 03/02/23  0534 03/02/23  1254 03/03/23  0108   WBC 4.8 4.6*  --  4.5*  --   --    HGB 6.7* 6.6*   < > 8.1* 7.2* 7.3*   HCT 20.5* 19.5*   < > 24.2* 21.8* 21.7*    251  --  291  --   --     < > = values in this interval not displayed. CMP:  Recent Labs     03/01/23  1100 03/01/23  1455 03/02/23  0533 03/02/23  1613 03/03/23  0108   *   < > 132* 133* 133*   K 4.2   < > 3.8 4.0 3.0*      < > 100 97* 97*   CO2 12*   < > 12* 19* 21*   BUN 60*   < > 59* 57* 53*   CREATININE 7.2*   < > 6.9* 6.3* 5.6*   GLUCOSE 101   < > 144* 116* 125*   CALCIUM 8.2*   < > 7.8* 7.5* 7.3*   MG 1.4*  --   --   --  1.2*   PHOS 3.8  --   --   --  3.1    < > = values in this interval not displayed. Hepatic:   Recent Labs     03/01/23  0900 03/01/23  1100 03/02/23  0533   LABALBU 2.5* 2.6* 2.6*   AST 15  --   --    ALT 9*  --   --    BILITOT 0.5  --   --    ALKPHOS 182*  --   --            Assessment and Plan:  LIZA on what appears to be underlying CKD. Creatinine was 1.3 back in 2020  Serum creatinine 7.2, down to 6.9 this morning  Unclear baseline. LIZA appears to be multifactorial.  Cannot rule out platin (cisplatin etc.)  based nephrotoxicity/ATN in setting of underlying bilateral hydronephrosis  Continue IV fluids  No need for RRT yet  Discussed with patient and family member. Severe metabolic acidosis: improved, stop IV bicarb, continue with oral bicarbonate  Bilateral hydronephrosis.   Urology input appreciated, continue with Flomax, continue cerda on discharge per urology, will need cysto as outpatient  Mild hyponatremia secondary to impaired free water excretion  Azotemia without enid uremia  Anemia  Neuro endocrine tumor of the pancreas  Hypokalemia; will replace with K-dur 40 meq one dose now and then repeat in 6 hrs  Hypomagnesemia: Iv magnesium ordered    D/W patient and family member    Lauren Lopez MD  [facility]    This report has been created using voice recognition software.  It may contain minor errors which are inherent in voice recognition technology

## 2023-03-03 NOTE — PROGRESS NOTES
Physician Progress Note      PATIENT:               DALLAS HERNANDEZ  [facility] #:                  [id number]  :                       1955  ADMIT DATE:       3/1/2023 10:57 AM  DISCH DATE:  RESPONDING  PROVIDER #:        Norma CUEVAS        QUERY TEXT:    Stage of Chronic Kidney Disease: Please provide further specificity, if known.    Clinical indicators include: ckd, creatinine, bun  Options provided:  -- Chronic kidney disease stage 1  -- Chronic kidney disease stage 2  -- Chronic kidney disease stage 3  -- Chronic kidney disease stage 3a  -- Chronic kidney disease stage 3b  -- Chronic kidney disease stage 4  -- Chronic kidney disease stage 5  -- Chronic kidney disease stage 5, requiring dialysis  -- End stage renal disease  -- Other - I will add my own diagnosis  -- Disagree - Not applicable / Not valid  -- Disagree - Clinically Unable to determine / Unknown        PROVIDER RESPONSE TEXT:    Provider was unable to determine a response for this query.          QUERY TEXT:    Norma,    Patient admitted with LIZA. Pt noted to have BPH with retention treating with   urinary catheter & Cancer on Chemo. Per Nephrology: acute kidney injury   thought to be from bladder outlet obstruction and dehydration. If possible,   please document in progress notes and discharge summary if you are evaluating   and/or treating any of the following:    The medical record reflects the following:  Risk Factors:  He was told many years ago that his prostate was large but he   has not been taking any prostate drugs. He has 5-7 times per night nocturia   and daytime frequency. He has poor urine stream and has to sit on the toilet   to empty his bladder Vs standing up.  Clinical Indicators: Per Nephrology: acute kidney injury thought to be from   bladder outlet obstruction and dehydration.  Treatment: cerda catheter inserted.  Options provided:  -- LIZA related to BPH with partial urinary obstruction  -- LIZA related to Chemo  --  LIZA related to Dehydration. -- LIZA related to BPH with partial urinary obstruction, dehydration & chemo. -- Other - I will add my own diagnosis  -- Disagree - Not applicable / Not valid  -- Disagree - Clinically unable to determine / Unknown  -- Refer to Clinical Documentation Reviewer    PROVIDER RESPONSE TEXT:    This patient has LIZA related to BPH with partial urinary obstruction,   dehydration and Chemo. Query created by: Anson Hernandez on 3/2/2023 6:59 AM      QUERY TEXTMusara Grey    Pt admitted with LIZA and meets Severe Malnutrition per dietician ASPEN   criteria. Please further specify type of malnutrition with documentation in   the medical record. The medical record reflects the following:  Risk Factors: BMI 19  Clinical Indicators: ASPEN criteria: Energy Intake:  75% or less estimated   energy requirements for 1 month or longer & Weight Loss:  Greater than 20%   over 1 year (26.8% in the last 11 months)  & Body Fat Loss:  Severe body fat   loss Orbital, Fat Overlying Ribs, Triceps & Muscle Mass Loss:  Severe muscle   mass loss Temples (temporalis), Clavicles (pectoralis & deltoids), Thigh   (quadraceps), Calf (gastrocnemius) & Fluid Accumulation:  Mild Generalized  Treatment: ADULT ORAL NUTRITION SUPPLEMENT; Breakfast, Dinner; Standard High   Calorie/High Protein Oral Supplement    Bridget HACKETT, RN    ASPEN Criteria:    https://aspenjournals. onlinelibrary. parikh. com/doi/full/10.1177/695563957773052  5  Options provided:  -- Severe Malnutrition  -- No Severe Protein calorie malnutrition  -- Other - I will add my own diagnosis  -- Disagree - Not applicable / Not valid  -- Disagree - Clinically unable to determine / Unknown  -- Refer to Clinical Documentation Reviewer    PROVIDER RESPONSE TEXT:    This patient has severe malnutrition.     Query created by: Anson Hernandez on 3/3/2023 7:59 AM      Electronically signed by:  Natalee CUEVAS 3/3/2023 8:35 AM

## 2023-03-03 NOTE — PROGRESS NOTES
Urology Reason for Consult:  hydronephrosis   Requesting Physician:  Dr Mohit Mcclendon     History Obtained From:  patient, electronic medical record     Chief Complaint: abnormal labs    Subjective: \"    Patient is resting in bed, voiding yellow urine via cerda, ambulating with assistance, tolerating regular diet, denies any nausea or vomiting. There are complaints of no pain at this time. Plan:  ELYSIA 1-2 days to recheck hydro  D/c with cerda  Plan for cytoscopy outpatient  Flomax started by nephrology, cont at d/c     Moderate bilateral hydronephrosis - seen on previous Ct  in , . Urinary retention - bladder scan >999ml, 1900ml documented output after cerda 3800UOP 24/hrs,  likely contributing to hydro  LIZA - CRT 7.2 on arrival, 5.6 today  Metastatic pancreatic ca - medicine managing, oncology on board       Vitals:  BP (!) 111/57   Pulse 80   Temp 98.5 °F (36.9 °C) (Oral)   Resp 14   Ht 5' 9\" (1.753 m)   Wt 129 lb (58.5 kg)   SpO2 99%   BMI 19.05 kg/m²   Temp  Av.1 °F (36.7 °C)  Min: 97.5 °F (36.4 °C)  Max: 98.6 °F (37 °C)    Intake/Output Summary (Last 24 hours) at 3/3/2023 0857  Last data filed at 3/3/2023 0750  Gross per 24 hour   Intake 2467 ml   Output 2575 ml   Net -108 ml       Social History     Socioeconomic History    Marital status:      Spouse name: Hendricks Councilman    Number of children: 3    Years of education: Not on file    Highest education level: Not on file   Occupational History    Not on file   Tobacco Use    Smoking status: Every Day     Types: Cigars     Start date: 2012     Passive exposure: Past (Dad smoked)    Smokeless tobacco: Never    Tobacco comments:     -3-4 cigars per day. denies cessation counseling 22.  Declines counseling      Same as above      Same as above      Same as above    Vaping Use    Vaping Use: Never used   Substance and Sexual Activity    Alcohol use: Yes     Comment: 2-3 beers per day since    Previous drank - Drug use: No    Sexual activity: Not on file   Other Topics Concern    Not on file   Social History Narrative    Not on file     Social Determinants of Health     Financial Resource Strain: Not on file   Food Insecurity: Not on file   Transportation Needs: Not on file   Physical Activity: Inactive    Days of Exercise per Week: 0 days    Minutes of Exercise per Session: 0 min   Stress: Not on file   Social Connections: Not on file   Intimate Partner Violence: Not on file   Housing Stability: Not on file     Family History   Problem Relation Age of Onset    High Blood Pressure Mother     Lung Cancer Father      No Known Allergies      Constitutional: Alert and oriented times x3, no acute distress, and cooperative to examination with appropriate mood and affect. HEENT:   Head:               Normocephalic and atraumatic. Mouth/Throat:                Mucous membranes are normal.   Eyes:               EOM are normal. No scleral icterus. Nose:               The external appearance of the nose is normal  Ears: The ears appear normal to external inspection. Cardiovascular:       Normal rate, regular rhythm. Pulmonary/Chest:  Normal respiratory rate and rhthym. No use of accessory muscles. Abdominal:               Soft. No tenderness. Genitalia:               Normal uncircumcised penis  Urethral meatus is normal in size and location  Scrotal contents normal to inspection and palpation   Normal testes palpated bilaterally  Henson draining yellow urine  Neurological:               Alert and oriented.      Labs:  WBC:    Lab Results   Component Value Date/Time    WBC 4.5 03/02/2023 05:34 AM     Hemoglobin/Hematocrit:    Lab Results   Component Value Date/Time    HGB 7.3 03/03/2023 01:08 AM    HCT 21.7 03/03/2023 01:08 AM     BMP:    Lab Results   Component Value Date/Time     03/03/2023 01:08 AM    K 3.0 03/03/2023 01:08 AM    K 4.0 03/02/2023 04:13 PM    CL 97 03/03/2023 01:08 AM    CO2 21 03/03/2023 01:08 AM    BUN 53 03/03/2023 01:08 AM    LABALBU 2.6 03/02/2023 05:33 AM    CREATININE 5.6 03/03/2023 01:08 AM    CALCIUM 7.3 03/03/2023 01:08 AM    LABGLOM 10 03/03/2023 01:08 AM       Pretty Menjivar, APRN - CNP, APRN  03/03/23 8:57 AM  Urology

## 2023-03-03 NOTE — PROGRESS NOTES
[facility] 58  INPATIENT OCCUPATIONAL THERAPY  [facility] RENAL TELEMETRY 6K  EVALUATION    Time:   Time In: 6277  Time Out: 1010  Timed Code Treatment Minutes: 9 Minutes  Minutes: 18          Date: 3/3/2023  Patient Name: Shirley Amaral,   Gender: male      MRN: [id number]  : 1955  (69 y.o.)  Referring Practitioner: Christiano Suarez PA-C  Diagnosis: LIZA  Additional Pertinent Hx: per chart review; Shirley Amaral is a 69 y.o. male with PMHx of HTN, metastatic pancreatic CA, hypothyroidism, and GERD who presented to [facility] with chief complaint of abnormal labs. Patient was getting routine labs due to his chemotherapy and subsequently sent to the ER for further evaluation. He reports he has been very fatigued lately, but not having any other pain. Does admit to some mild shortness of breath and believes it is 2/2 to the anemia. Patient reports he has been trying to maintain frequent and healthy meals, but does have a lower appetite. States he has been urinating frequently. Denies any lightheadedness/dizziness, chest pain, abdominal pain, N/V/D, urinary symptoms, and leg swelling    Restrictions/Precautions:  Restrictions/Precautions: Fall Risk, General Precautions    Subjective  Chart Reviewed: Yes, Orders, History and Physical, Progress Notes  Patient assessed for rehabilitation services?: Yes  Family / Caregiver Present: Yes (wife-very involved)    Subjective: RN approved session, patient supine in bed upon OT arrival with wife present. patient A & O x4. patient's wife reports patient very active prior to admit. report they do not this patient needs OT and is at/close to baseline.     Pain: 0/10:     Vitals: Vitals not assessed per clinical judgement, see nursing flowsheet    Social/Functional History:  Lives With: Spouse  Type of Home: House  Home Layout: One level  Home Access: Stairs to enter with rails  Entrance Stairs - Number of Steps: 2-3   Bathroom Shower/Tub: Tub/Shower unit  Bathroom Toilet: Standard  Bathroom Accessibility: Accessible    Receives Help From: Family  ADL Assistance: Independent  Homemaking Assistance: Independent  Ambulation Assistance: Independent  Transfer Assistance: Independent    Active : Yes  Occupation: Retired  Additional Comments: patient used no AD prior to admit. patient's spouse is home 24/7 to assist as needed. VISION:WFL    HEARING:  WFL    COGNITION: WFL    RANGE OF MOTION:  Bilateral Upper Extremity:  WFL    STRENGTH:  Bilateral Upper Extremity:  WFL    SENSATION:   WFL    ADL:   Footwear Management: Independent. To doff/don slipper socks seated EOB  Toilet Transfer: Supervision. With no AD; pushed IV pole . BALANCE:  Sitting Balance:  Independent. Standing Balance: Supervision. With no AD    BED MOBILITY:  Supine to Sit: Modified Independent with use of bed rails  Sit to Supine: Modified Independent and use of hercules to pull up into bed    TRANSFERS:  Sit to Stand:  Supervision. No unsteadiness or dizziness in transition from sit to stand    FUNCTIONAL MOBILITY:  Assistive Device: None and pushed IV pole  Assist Level:  Supervision. Distance: To and from bathroom  Patient's wife closely followed into BR. Activity Tolerance:  Patient tolerance of  treatment: Good treatment tolerance      Assessment:  Assessment: patient is able to complete mobility and self care at /close to baseline with no AD and no LOB. patient and wife feel comfortable with returning home when discharged and wife is home all day to assist as needed. patient eval only and d/c.  Performance deficits / Impairments: Decreased endurance  Prognosis: Good  REQUIRES OT FOLLOW-UP: No  No Skilled OT: No OT goals identified, At baseline function  Decision Making: Medium Complexity    Treatment Initiated: Treatment and education initiated within context of evaluation.   Evaluation time included review of current medical information, gathering information related to past medical, social and functional history, completion of standardized testing, formal and informal observation of tasks, assessment of data and development of plan of care and goals. Treatment time included skilled education and facilitation of tasks to increase safety and independence with ADL's for improved functional independence and quality of life. Discharge Recommendations:  Home with assist PRN    Patient Education:     Patient Education  Education Given To: Patient  Education Provided: Role of Therapy, ADL Adaptive Strategies, Fall Prevention Strategies, Transfer Training, Precautions  Barriers to Learning: None    Equipment Recommendations:  Equipment Needed: No    Goals:  Patient goals : return home at [address]  Time Frame for Short Term Goals: no OT goals identified due to at/close baseline and does not think he needs therapy. Following session, patient left in safe position with all fall risk precautions in place.

## 2023-03-04 LAB
ANION GAP SERPL CALC-SCNC: 15 MEQ/L (ref 8–16)
BUN SERPL-MCNC: 42 MG/DL (ref 7–22)
CALCIUM SERPL-MCNC: 7.4 MG/DL (ref 8.5–10.5)
CHLORIDE SERPL-SCNC: 98 MEQ/L (ref 98–111)
CO2 SERPL-SCNC: 23 MEQ/L (ref 23–33)
CREAT SERPL-MCNC: 4.4 MG/DL (ref 0.4–1.2)
GFR SERPL CREATININE-BSD FRML MDRD: 14 ML/MIN/1.73M2
GLUCOSE SERPL-MCNC: 90 MG/DL (ref 70–108)
MAGNESIUM SERPL-MCNC: 1.5 MG/DL (ref 1.6–2.4)
PHOSPHATE SERPL-MCNC: 2.5 MG/DL (ref 2.4–4.7)
POTASSIUM SERPL-SCNC: 4.1 MEQ/L (ref 3.5–5.2)
SODIUM SERPL-SCNC: 136 MEQ/L (ref 135–145)

## 2023-03-04 PROCEDURE — 99232 SBSQ HOSP IP/OBS MODERATE 35: CPT | Performed by: PHYSICIAN ASSISTANT

## 2023-03-04 PROCEDURE — 6370000000 HC RX 637 (ALT 250 FOR IP): Performed by: INTERNAL MEDICINE

## 2023-03-04 PROCEDURE — 6370000000 HC RX 637 (ALT 250 FOR IP)

## 2023-03-04 PROCEDURE — 2500000003 HC RX 250 WO HCPCS: Performed by: INTERNAL MEDICINE

## 2023-03-04 PROCEDURE — 1200000003 HC TELEMETRY R&B

## 2023-03-04 PROCEDURE — 36415 COLL VENOUS BLD VENIPUNCTURE: CPT

## 2023-03-04 PROCEDURE — 84100 ASSAY OF PHOSPHORUS: CPT

## 2023-03-04 PROCEDURE — 6360000002 HC RX W HCPCS

## 2023-03-04 PROCEDURE — 99232 SBSQ HOSP IP/OBS MODERATE 35: CPT | Performed by: INTERNAL MEDICINE

## 2023-03-04 PROCEDURE — 80048 BASIC METABOLIC PNL TOTAL CA: CPT

## 2023-03-04 PROCEDURE — 83735 ASSAY OF MAGNESIUM: CPT

## 2023-03-04 RX ORDER — MAGNESIUM SULFATE HEPTAHYDRATE 40 MG/ML
2000 INJECTION, SOLUTION INTRAVENOUS ONCE
Status: COMPLETED | OUTPATIENT
Start: 2023-03-04 | End: 2023-03-04

## 2023-03-04 RX ORDER — POTASSIUM CHLORIDE 20 MEQ/1
40 TABLET, EXTENDED RELEASE ORAL ONCE
Status: COMPLETED | OUTPATIENT
Start: 2023-03-04 | End: 2023-03-04

## 2023-03-04 RX ADMIN — SODIUM BICARBONATE 1300 MG: 650 TABLET ORAL at 21:26

## 2023-03-04 RX ADMIN — LEVOTHYROXINE SODIUM 25 MCG: 0.03 TABLET ORAL at 06:15

## 2023-03-04 RX ADMIN — HEPARIN SODIUM 5000 UNITS: 5000 INJECTION INTRAVENOUS; SUBCUTANEOUS at 06:14

## 2023-03-04 RX ADMIN — SODIUM BICARBONATE 1300 MG: 650 TABLET ORAL at 09:05

## 2023-03-04 RX ADMIN — SODIUM BICARBONATE 1300 MG: 650 TABLET ORAL at 13:46

## 2023-03-04 RX ADMIN — PANTOPRAZOLE SODIUM 40 MG: 40 TABLET, DELAYED RELEASE ORAL at 06:15

## 2023-03-04 RX ADMIN — POTASSIUM CHLORIDE 40 MEQ: 1500 TABLET, EXTENDED RELEASE ORAL at 11:15

## 2023-03-04 RX ADMIN — HEPARIN SODIUM 5000 UNITS: 5000 INJECTION INTRAVENOUS; SUBCUTANEOUS at 13:41

## 2023-03-04 RX ADMIN — HEPARIN SODIUM 5000 UNITS: 5000 INJECTION INTRAVENOUS; SUBCUTANEOUS at 21:27

## 2023-03-04 RX ADMIN — LEVOTHYROXINE SODIUM 200 MCG: 0.1 TABLET ORAL at 06:15

## 2023-03-04 RX ADMIN — TAMSULOSIN HYDROCHLORIDE 0.8 MG: 0.4 CAPSULE ORAL at 09:03

## 2023-03-04 RX ADMIN — MAGNESIUM SULFATE HEPTAHYDRATE 2000 MG: 40 INJECTION, SOLUTION INTRAVENOUS at 11:17

## 2023-03-04 RX ADMIN — DILTIAZEM HYDROCHLORIDE 240 MG: 240 CAPSULE, EXTENDED RELEASE ORAL at 09:06

## 2023-03-04 ASSESSMENT — PAIN SCALES - GENERAL
PAINLEVEL_OUTOF10: 0
PAINLEVEL_OUTOF10: 0

## 2023-03-04 NOTE — PROGRESS NOTES
[facility]   Nephrology progress note  3/4/2023, 9:58 AM      Pt Name:    Ian Roberts  MRN:     [id number]     YOB: 1955  Admit Date:    3/1/2023 10:57 AM    Chief Complaint: Nephrology following for LIZA    Subjective:  Patient was seen and examined this morning  Feels okay denies any complaints no chest pain or shortness of breath  Says he is feeling so much better  Patient urine output is decent      Objective:  24HR INTAKE/OUTPUT:    Intake/Output Summary (Last 24 hours) at 3/4/2023 0958  Last data filed at 3/4/2023 0345  Gross per 24 hour   Intake 300 ml   Output 1900 ml   Net -1600 ml           I/O last 3 completed shifts: In: 3087 [P.O.:620; I.V.:2467]  Out: 4400 [Urine:4400]  No intake/output data recorded.    Admission weight: 129 lb (58.5 kg)  Wt Readings from Last 3 Encounters:   03/03/23 129 lb (58.5 kg)   03/01/23 129 lb 9.6 oz (58.8 kg)   03/01/23 129 lb 9.6 oz (58.8 kg)        Vitals :   Vitals:    03/03/23 2130 03/03/23 2315 03/04/23 0345 03/04/23 0800   BP: 110/66 119/61 (!) 110/53 116/72   Pulse: 77 71 76 75   Resp: 18 18 18 22   Temp: 98.6 °F (37 °C) 99.4 °F (37.4 °C) 97.8 °F (36.6 °C) 99.3 °F (37.4 °C)   TempSrc: Oral Oral Oral Oral   SpO2: 100% 99% 98% 98%   Weight:       Height:           Physical examination  General Appearance: alert and cooperative with exam, appears comfortable, no distress  Mouth/Throat: + Dry mucosa moist  Neck: No JVD  Lungs: Air entry B/L, no rales, no use of accessory muscles  Heart:  S1, S2 heard  GI: soft, non-tender, no guarding  Extremities: no LE edema    Medications:  Infusion:    sodium chloride 75 mL/hr at 03/03/23 1310    sodium chloride      sodium chloride       Meds:    sodium bicarbonate  1,300 mg Oral TID    [Held by provider] aspirin  81 mg Oral Daily    dilTIAZem  240 mg Oral Daily    levothyroxine  25 mcg Oral QAM AC    levothyroxine  200 mcg Oral QAM AC    pantoprazole  40 mg Oral QAM AC    sodium chloride flush 5-40 mL IntraVENous 2 times per day    heparin (porcine)  5,000 Units SubCUTAneous 3 times per day    epoetin fay-epbx  10,000 Units SubCUTAneous Once per day on Mon Wed Fri    tamsulosin  0.8 mg Oral Daily     Meds prn: sodium chloride, sodium chloride flush, sodium chloride, ondansetron **OR** ondansetron, polyethylene glycol, acetaminophen **OR** acetaminophen     Lab Data :  CBC:   Recent Labs     03/01/23  1100 03/01/23  1456 03/02/23  0534 03/02/23  1254 03/03/23  0108   WBC 4.6*  --  4.5*  --   --    HGB 6.6*   < > 8.1* 7.2* 7.3*   HCT 19.5*   < > 24.2* 21.8* 21.7*     --  291  --   --     < > = values in this interval not displayed. CMP:  Recent Labs     03/01/23  1100 03/01/23  1455 03/02/23  1613 03/03/23  0108 03/04/23  0554   *   < > 133* 133* 136   K 4.2   < > 4.0 3.0* 4.1      < > 97* 97* 98   CO2 12*   < > 19* 21* 23   BUN 60*   < > 57* 53* 42*   CREATININE 7.2*   < > 6.3* 5.6* 4.4*   GLUCOSE 101   < > 116* 125* 90   CALCIUM 8.2*   < > 7.5* 7.3* 7.4*   MG 1.4*  --   --  1.2* 1.5*   PHOS 3.8  --   --  3.1 2.5    < > = values in this interval not displayed. Hepatic:   Recent Labs     03/01/23  1100 03/02/23  0533   LABALBU 2.6* 2.6*           Assessment and Plan:  LIZA on what appears to be underlying CKD. Creatinine was 1.3 back in 2020  Serum creatinine 7.2, trending down it is 4.4 today  Unclear baseline. LIZA appears to be multifactorial.  Cannot rule out platin (cisplatin etc.)  based nephrotoxicity/ATN in setting of underlying bilateral hydronephrosis  Continue IV fluids will increase to 100 mm/h  Severe metabolic acidosis: Continue p.o. bicarbonate  Bilateral hydronephrosis.   Urology input appreciated, continue with Flomax, continue cerda on discharge per urology, will need cysto as outpatient  Mild hyponatremia secondary to impaired free water excretion doing better  Azotemia without enid uremia  Anemia  Neuro endocrine tumor of the pancreas  Hypokalemia; will give another 20 mEq today  Hypomagnesemia: Iv magnesium ordered    D/W patient and family member    Max Win MD  [facility]    This report has been created using voice recognition software.  It may contain minor errors which are inherent in voice recognition technology

## 2023-03-04 NOTE — PROGRESS NOTES
Hospitalist Progress Note      Patient:  [facility]    Unit/Bed:6K-07/007-A  YOB: 1955  MRN: [id number]   Acct: [de-identified]   PCP: Tara Alfaro MD  Date of Admission: 3/1/2023    Assessment/Plan:    LIZA on CKD: Nephrology consulted. Initially thought this was mainly due to bladder outlet obstruction. Creatinine on admission, 7.1. Cerda placed, 2L output initially. Creatinine today 4.4. With this information, nephrology believes LIZA appears to be multifactorial.  Cannot rule out platin (cisplatin etc.)  based nephrotoxicity/ATN in setting of underlying bilateral hydronephrosis. Nephrology and this provider spoke to the patient about the potential for RRT. Urinary Retention: Urology consulted. Cerda placed, plan to keep cerda at DC. Flomax, continue at DC. 1900cc documented output after cerda. Will do renal US again in 2-3 days. Severe Metabolic Acidosis: Resolved. CO2 12 on arrival and CO2 23 today. Nephrology increased IVF to 100 cc/hr and started PO Bicarb. Hypokalemia: Potassium 4.1 today, nephrology managing. Potassium replacement. Anemia, acute on chronic: Hgb baseline appears 8-9s. Hgb 6.6 on admission. Received 1 U pRBCs. Hgb has been ~7 since. Neuroendocrine CA of the pancreas: Oncology consulted. He has undergone therapy with 3 cycles of cisplatin continue regimen with etoposide was completed in December. Rescanning showed that he had progressive disease. Status post 1 cycle of FOLFIRINOX on 2/15/2023. Hyponatremia: Sodium, today 136. 129 on admission. Likely combination of poor PO intake and LIZA. Nephrology following. Continue to monitor. Essential HTN: continue diltiazem with hold parameters. Hold losartan given #1. Continue to monitor.     Hypothyroidism: continue synthroid  GERD: continue pantoprazole  Malnourishment: consult dietitian      Chief Complaint: abnormal labs    Initial H and P:-    Initial H&P \"Aaron HARDWICK Ayleen is a 69 y.o. male with PMHx of HTN, metastatic pancreatic CA, hypothyroidism, and GERD who presented to [facility] with chief complaint of abnormal labs. Patient was getting routine labs due to his chemotherapy and subsequently sent to the ER for further evaluation. He reports he has been very fatigued lately, but not having any other pain. Does admit to some mild shortness of breath and believes it is 2/2 to the anemia. Patient reports he has been trying to maintain frequent and healthy meals, but does have a lower appetite. States he has been urinating frequently. Denies any lightheadedness/dizziness, chest pain, abdominal pain, N/V/D, urinary symptoms, and leg swelling. \"     Subjective (past 24 hours):   No acute events overnight. Patient is more energetic and conversational today. Patient resting in the chair today. Patient is eating well and doing well. No new issues. Past medical history, family history, social history and allergies reviewed again and is unchanged since admission. ROS (All review of systems completed. Pertinent positives noted.  Otherwise All other systems reviewed and negative.)     Medications:  Reviewed    Infusion Medications    sodium chloride 100 mL/hr at 03/04/23 1027    sodium chloride      sodium chloride       Scheduled Medications    magnesium sulfate  2,000 mg IntraVENous Once    sodium bicarbonate  1,300 mg Oral TID    [Held by provider] aspirin  81 mg Oral Daily    dilTIAZem  240 mg Oral Daily    levothyroxine  25 mcg Oral QAM AC    levothyroxine  200 mcg Oral QAM AC    pantoprazole  40 mg Oral QAM AC    sodium chloride flush  5-40 mL IntraVENous 2 times per day    heparin (porcine)  5,000 Units SubCUTAneous 3 times per day    epoetin fay-epbx  10,000 Units SubCUTAneous Once per day on Mon Wed Fri    tamsulosin  0.8 mg Oral Daily     PRN Meds: sodium chloride, sodium chloride flush, sodium chloride, ondansetron **OR** ondansetron, polyethylene glycol, acetaminophen **OR** acetaminophen      Intake/Output Summary (Last 24 hours) at 3/4/2023 1256  Last data filed at 3/4/2023 0345  Gross per 24 hour   Intake --   Output 1900 ml   Net -1900 ml       Diet:  ADULT DIET; Regular  ADULT ORAL NUTRITION SUPPLEMENT; Breakfast, Dinner; Standard High Calorie/High Protein Oral Supplement    Physical Exam:  /65   Pulse 76   Temp 98.5 °F (36.9 °C) (Oral)   Resp 22   Ht 5' 9\" (1.753 m)   Wt 129 lb (58.5 kg)   SpO2 99%   BMI 19.05 kg/m²   General appearance: No apparent distress, appears stated age and cooperative. Very thin. HEENT: Pupils equal, round, and reactive to light. Conjunctivae/corneas clear. Neck: Supple, with full range of motion. No jugular venous distention. Trachea midline. Respiratory:  Normal respiratory effort on RA. Clear to auscultation, bilaterally without Rales/Wheezes/Rhonchi. Cardiovascular: Regular rate and rhythm with normal S1/S2 without murmurs, rubs or gallops. Abdomen: Soft, non-tender, non-distended with normal bowel sounds. Musculoskeletal: passive and active ROM x 4 extremities. Skin: Skin color, texture, turgor normal.  No rashes or lesions. Neurologic:  Neurovascularly intact without any focal sensory/motor deficits. Cranial nerves: II-XII intact, grossly non-focal.  Psychiatric: Alert and oriented  Capillary Refill: Brisk,< 3 seconds   Peripheral Pulses: +2 palpable, equal bilaterally     Labs:   Recent Labs     03/02/23  0534 03/02/23  1254 03/03/23  0108   WBC 4.5*  --   --    HGB 8.1* 7.2* 7.3*   HCT 24.2* 21.8* 21.7*     --   --      Recent Labs     03/02/23  1613 03/03/23  0108 03/04/23  0554   * 133* 136   K 4.0 3.0* 4.1   CL 97* 97* 98   CO2 19* 21* 23   BUN 57* 53* 42*   CREATININE 6.3* 5.6* 4.4*   CALCIUM 7.5* 7.3* 7.4*   PHOS  --  3.1 2.5     No results for input(s): AST, ALT, BILIDIR, BILITOT, ALKPHOS in the last 72 hours.       Urinalysis:      Lab Results   Component Value Date/Time    NITRU NEGATIVE 03/01/2023 03:40 PM    WBCUA 0-2 03/01/2023 03:40 PM    BACTERIA NONE SEEN 03/01/2023 03:40 PM    RBCUA NONE 03/01/2023 03:40 PM    BLOODU NEGATIVE 03/01/2023 03:40 PM    GLUCOSEU NEGATIVE 03/01/2023 03:40 PM       Radiology:  CT ABDOMEN PELVIS WO CONTRAST Additional Contrast? None   Final Result   1. Pneumobilia. Self-expanding biliary stent in place. Interval development of a 5.5 cm necrotic mass in the right hepatic lobe since prior study which also contains air and some fluid. No pancreatic head mass, not well demonstrated on this study. 2. Small amount of ascites. Moderate gaseous distention of the colon. Cannot exclude colonic ileus. 3. Abnormal thickening of the bladder wall anteriorly which could be due to cystitis or neoplastic involvement of the bladder. 4. Moderate bilateral hydronephrosis, not appreciably changed from prior study. No definite obstructing lesion or calculus, however, can be appreciated on this study. **This report has been created using voice recognition software. It may contain minor errors which are inherent in voice recognition technology. **      Final report electronically signed by Dr. Anushka Roberts on 3/1/2023 6:59 PM        Electronically signed by Gregor Osgood, PA on 3/4/2023 at 12:56 PM

## 2023-03-04 NOTE — FLOWSHEET NOTE
03/04/23 1008   Safe Environment   Safety Measures Other (comment)  (vn round completed , pt up in chair resting quietly with eyes closed resp easy call light visible)

## 2023-03-05 ENCOUNTER — APPOINTMENT (OUTPATIENT)
Dept: ULTRASOUND IMAGING | Age: 68
DRG: 682 | End: 2023-03-05
Payer: MEDICARE

## 2023-03-05 LAB
ANION GAP SERPL CALC-SCNC: 10 MEQ/L (ref 8–16)
BUN SERPL-MCNC: 36 MG/DL (ref 7–22)
CALCIUM SERPL-MCNC: 7.3 MG/DL (ref 8.5–10.5)
CHLORIDE SERPL-SCNC: 99 MEQ/L (ref 98–111)
CO2 SERPL-SCNC: 24 MEQ/L (ref 23–33)
CREAT SERPL-MCNC: 3.5 MG/DL (ref 0.4–1.2)
DEPRECATED RDW RBC AUTO: 51.1 FL (ref 35–45)
ERYTHROCYTE [DISTWIDTH] IN BLOOD BY AUTOMATED COUNT: 15 % (ref 11.5–14.5)
GFR SERPL CREATININE-BSD FRML MDRD: 18 ML/MIN/1.73M2
GLUCOSE SERPL-MCNC: 84 MG/DL (ref 70–108)
HCT VFR BLD AUTO: 23 % (ref 42–52)
HGB BLD-MCNC: 7.4 GM/DL (ref 14–18)
MCH RBC QN AUTO: 30.3 PG (ref 26–33)
MCHC RBC AUTO-ENTMCNC: 32.2 GM/DL (ref 32.2–35.5)
MCV RBC AUTO: 94.3 FL (ref 80–94)
PLATELET # BLD AUTO: 298 THOU/MM3 (ref 130–400)
PMV BLD AUTO: 9.2 FL (ref 9.4–12.4)
POTASSIUM SERPL-SCNC: 4.3 MEQ/L (ref 3.5–5.2)
RBC # BLD AUTO: 2.44 MILL/MM3 (ref 4.7–6.1)
SODIUM SERPL-SCNC: 133 MEQ/L (ref 135–145)
WBC # BLD AUTO: 10.1 THOU/MM3 (ref 4.8–10.8)

## 2023-03-05 PROCEDURE — 85027 COMPLETE CBC AUTOMATED: CPT

## 2023-03-05 PROCEDURE — 6370000000 HC RX 637 (ALT 250 FOR IP): Performed by: INTERNAL MEDICINE

## 2023-03-05 PROCEDURE — 2580000003 HC RX 258: Performed by: INTERNAL MEDICINE

## 2023-03-05 PROCEDURE — 36415 COLL VENOUS BLD VENIPUNCTURE: CPT

## 2023-03-05 PROCEDURE — 99232 SBSQ HOSP IP/OBS MODERATE 35: CPT | Performed by: INTERNAL MEDICINE

## 2023-03-05 PROCEDURE — 80048 BASIC METABOLIC PNL TOTAL CA: CPT

## 2023-03-05 PROCEDURE — 6370000000 HC RX 637 (ALT 250 FOR IP)

## 2023-03-05 PROCEDURE — 99232 SBSQ HOSP IP/OBS MODERATE 35: CPT | Performed by: PHYSICIAN ASSISTANT

## 2023-03-05 PROCEDURE — 1200000003 HC TELEMETRY R&B

## 2023-03-05 PROCEDURE — 6360000002 HC RX W HCPCS

## 2023-03-05 RX ADMIN — TAMSULOSIN HYDROCHLORIDE 0.8 MG: 0.4 CAPSULE ORAL at 08:41

## 2023-03-05 RX ADMIN — SODIUM CHLORIDE: 9 INJECTION, SOLUTION INTRAVENOUS at 03:50

## 2023-03-05 RX ADMIN — LEVOTHYROXINE SODIUM 200 MCG: 0.1 TABLET ORAL at 06:19

## 2023-03-05 RX ADMIN — HEPARIN SODIUM 5000 UNITS: 5000 INJECTION INTRAVENOUS; SUBCUTANEOUS at 21:34

## 2023-03-05 RX ADMIN — HEPARIN SODIUM 5000 UNITS: 5000 INJECTION INTRAVENOUS; SUBCUTANEOUS at 06:20

## 2023-03-05 RX ADMIN — SODIUM CHLORIDE: 9 INJECTION, SOLUTION INTRAVENOUS at 21:39

## 2023-03-05 RX ADMIN — HEPARIN SODIUM 5000 UNITS: 5000 INJECTION INTRAVENOUS; SUBCUTANEOUS at 14:04

## 2023-03-05 RX ADMIN — LEVOTHYROXINE SODIUM 25 MCG: 0.03 TABLET ORAL at 06:20

## 2023-03-05 RX ADMIN — SODIUM BICARBONATE 1300 MG: 650 TABLET ORAL at 14:03

## 2023-03-05 RX ADMIN — PANTOPRAZOLE SODIUM 40 MG: 40 TABLET, DELAYED RELEASE ORAL at 06:19

## 2023-03-05 RX ADMIN — DILTIAZEM HYDROCHLORIDE 240 MG: 240 CAPSULE, EXTENDED RELEASE ORAL at 08:41

## 2023-03-05 RX ADMIN — SODIUM BICARBONATE 1300 MG: 650 TABLET ORAL at 21:35

## 2023-03-05 RX ADMIN — SODIUM BICARBONATE 1300 MG: 650 TABLET ORAL at 08:41

## 2023-03-05 ASSESSMENT — PAIN SCALES - GENERAL
PAINLEVEL_OUTOF10: 0

## 2023-03-05 ASSESSMENT — PAIN SCALES - WONG BAKER
WONGBAKER_NUMERICALRESPONSE: 0

## 2023-03-05 NOTE — PROGRESS NOTES
[facility]   Nephrology progress note  3/5/2023, 10:04 AM      Pt Name:    Angel Chauhan  MRN:     [id number]     YOB: 1955  Admit Date:    3/1/2023 10:57 AM    Chief Complaint: Nephrology following for LIZA    Subjective:  Patient was seen and examined this morning  Feels okay denies any complaints no chest pain or shortness of breath  Says he is feeling so much better  Patient urine output is decent      Objective:  24HR INTAKE/OUTPUT:    Intake/Output Summary (Last 24 hours) at 3/5/2023 1004  Last data filed at 3/5/2023 0354  Gross per 24 hour   Intake 3057 ml   Output 2225 ml   Net 832 ml           I/O last 3 completed shifts: In: 2382 [I.V.:3057]  Out: 1913 [Urine:3775]  No intake/output data recorded.    Admission weight: 129 lb (58.5 kg)  Wt Readings from Last 3 Encounters:   03/03/23 129 lb (58.5 kg)   03/01/23 129 lb 9.6 oz (58.8 kg)   03/01/23 129 lb 9.6 oz (58.8 kg)        Vitals :   Vitals:    03/04/23 2119 03/05/23 0002 03/05/23 0354 03/05/23 0830   BP: 120/63 (!) 106/52 112/64 114/67   Pulse: 75 77 77 69   Resp: 16 16 16 16   Temp: 99.9 °F (37.7 °C) 99.3 °F (37.4 °C) 99.1 °F (37.3 °C) 98.9 °F (37.2 °C)   TempSrc: Oral Oral Oral Oral   SpO2: 99% 97% 97% 94%   Weight:       Height:           Physical examination  General Appearance: Awake and alert no distress  Mouth/Throat: + Dry mucosa moist  CNS grossly intact  Neck no accessory muscle use  GI: soft, non-tender, no guarding  Extremities: no LE edema    Medications:  Infusion:    sodium chloride 100 mL/hr at 03/05/23 0350    sodium chloride      sodium chloride       Meds:    sodium bicarbonate  1,300 mg Oral TID    [Held by provider] aspirin  81 mg Oral Daily    dilTIAZem  240 mg Oral Daily    levothyroxine  25 mcg Oral QAM AC    levothyroxine  200 mcg Oral QAM AC    pantoprazole  40 mg Oral QAM AC    sodium chloride flush  5-40 mL IntraVENous 2 times per day    heparin (porcine)  5,000 Units SubCUTAneous 3 times per day    epoetin fay-epbx  10,000 Units SubCUTAneous Once per day on Mon Wed Fri    tamsulosin  0.8 mg Oral Daily     Meds prn: sodium chloride, sodium chloride flush, sodium chloride, ondansetron **OR** ondansetron, polyethylene glycol, acetaminophen **OR** acetaminophen     Lab Data :  CBC:   Recent Labs     03/02/23  1254 03/03/23  0108 03/05/23  0514   WBC  --   --  10.1   HGB 7.2* 7.3* 7.4*   HCT 21.8* 21.7* 23.0*   PLT  --   --  298       CMP:  Recent Labs     03/03/23  0108 03/04/23  0554 03/05/23  0513   * 136 133*   K 3.0* 4.1 4.3   CL 97* 98 99   CO2 21* 23 24   BUN 53* 42* 36*   CREATININE 5.6* 4.4* 3.5*   GLUCOSE 125* 90 84   CALCIUM 7.3* 7.4* 7.3*   MG 1.2* 1.5*  --    PHOS 3.1 2.5  --        Hepatic:   No results for input(s): LABALBU, AST, ALT, ALB, BILITOT, ALKPHOS in the last 72 hours. Assessment and Plan:  LIZA on what appears to be underlying CKD. Creatinine was 1.3 back in 2020  Serum creatinine 7.2, trending down it is 4.4 today  Unclear baseline. LIZA appears to be multifactorial.  Cannot rule out platin (cisplatin etc.)  based nephrotoxicity/ATN in setting of underlying bilateral hydronephrosis  Continue IV fluids creatinine getting better down to 3.5  Severe metabolic acidosis: Currently normalized continue p.o. bicarbonate  Bilateral hydronephrosis. Urology input appreciated, continue with Flomax, continue cerda on discharge per urology, will need cysto as outpatient  Mild hyponatremia secondary to impaired free water excretion doing better with mild fluctuations  Azotemia without enid uremia  Anemia  Neuro endocrine tumor of the pancreas      D/W patient and family member    Bubba Jeong MD  [facility]    This report has been created using voice recognition software.  It may contain minor errors which are inherent in voice recognition technology

## 2023-03-05 NOTE — PROGRESS NOTES
Hospitalist Progress Note      Patient:  Taniya Abbasi    Unit/Bed:6K-07/007-A  YOB: 1955  MRN: [id number]   Acct: [de-identified]   PCP: Lili Phan MD  Date of Admission: 3/1/2023    Assessment/Plan:    LIZA on CKD: Nephrology consulted. Initially thought this was mainly due to bladder outlet obstruction. Creatinine on admission, 7.1. Cerda placed, 2L output initially. Creatinine today 3.5. With this information, nephrology believes LIZA appears to be multifactorial.  Cannot rule out platin (cisplatin etc.)  based nephrotoxicity/ATN in setting of underlying bilateral hydronephrosis. Urinary Retention: Urology consulted. Cerda placed, plan to keep cerda at DC. Flomax, continue at DC. 1900cc documented output after cerda. Renal US ordered. Severe Metabolic Acidosis: Resolved. CO2 12 on arrival and CO2 24 today. Nephrology increased IVF to 100 cc/hr and started PO Bicarb. Hypokalemia: Potassium 4.3 today, nephrology managing. Potassium replacement. Anemia, acute on chronic: Hgb baseline appears 8-9s. Hgb 6.6 on admission. Received 1 U pRBCs. Hgb has been ~7 since. Neuroendocrine CA of the pancreas: Oncology consulted. He has undergone therapy with 3 cycles of cisplatin continue regimen with etoposide was completed in December. Rescanning showed that he had progressive disease. Status post 1 cycle of FOLFIRINOX on 2/15/2023. Hyponatremia: Sodium, today 133. 129 on admission. Likely combination of poor PO intake and LIZA. Nephrology following. Continue to monitor. Essential HTN: continue diltiazem with hold parameters. Hold losartan given #1. Continue to monitor.     Hypothyroidism: continue synthroid  GERD: continue pantoprazole  Malnourishment: consult dietitian      Chief Complaint: abnormal labs    Initial H and P:-    Initial H&P \"Aaron Abbasi is a 69 y.o. male with PMHx of HTN, metastatic pancreatic CA, hypothyroidism, and GERD who presented to [facility] with chief complaint of abnormal labs. Patient was getting routine labs due to his chemotherapy and subsequently sent to the ER for further evaluation. He reports he has been very fatigued lately, but not having any other pain. Does admit to some mild shortness of breath and believes it is 2/2 to the anemia. Patient reports he has been trying to maintain frequent and healthy meals, but does have a lower appetite. States he has been urinating frequently. Denies any lightheadedness/dizziness, chest pain, abdominal pain, N/V/D, urinary symptoms, and leg swelling.\"     Subjective (past 24 hours):   No acute events overnight.  Patient continues to improve. Pt is walking in the halls. Pt is eating well. Pt denies CP, SOB, RIVAS. Pt states that he did not sleep well due to IV beeping.       Past medical history, family history, social history and allergies reviewed again and is unchanged since admission.    ROS (All review of systems completed.  Pertinent positives noted. Otherwise All other systems reviewed and negative.)     Medications:  Reviewed    Infusion Medications    sodium chloride 100 mL/hr at 03/05/23 0350    sodium chloride      sodium chloride       Scheduled Medications    sodium bicarbonate  1,300 mg Oral TID    [Held by provider] aspirin  81 mg Oral Daily    dilTIAZem  240 mg Oral Daily    levothyroxine  25 mcg Oral QAM AC    levothyroxine  200 mcg Oral QAM AC    pantoprazole  40 mg Oral QAM AC    sodium chloride flush  5-40 mL IntraVENous 2 times per day    heparin (porcine)  5,000 Units SubCUTAneous 3 times per day    epoetin fay-epbx  10,000 Units SubCUTAneous Once per day on Mon Wed Fri    tamsulosin  0.8 mg Oral Daily     PRN Meds: sodium chloride, sodium chloride flush, sodium chloride, ondansetron **OR** ondansetron, polyethylene glycol, acetaminophen **OR** acetaminophen      Intake/Output Summary (Last 24 hours) at 3/5/2023 1036  Last data filed at 3/5/2023 0354  Gross per 24 hour  Intake 3057 ml   Output 2225 ml   Net 832 ml       Diet:  ADULT DIET; Regular  ADULT ORAL NUTRITION SUPPLEMENT; Breakfast, Dinner; Standard High Calorie/High Protein Oral Supplement    Physical Exam:  /67   Pulse 69   Temp 98.9 °F (37.2 °C) (Oral)   Resp 16   Ht 5' 9\" (1.753 m)   Wt 129 lb (58.5 kg)   SpO2 94%   BMI 19.05 kg/m²   General appearance: No apparent distress, appears stated age and cooperative. Very thin. HEENT: Pupils equal, round, and reactive to light. Conjunctivae/corneas clear. Neck: Supple, with full range of motion. No jugular venous distention. Trachea midline. Respiratory:  Normal respiratory effort on RA. Clear to auscultation, bilaterally without Rales/Wheezes/Rhonchi. Cardiovascular: Regular rate and rhythm with normal S1/S2 without murmurs, rubs or gallops. Abdomen: Soft, non-tender, non-distended with normal bowel sounds. Musculoskeletal: passive and active ROM x 4 extremities. Skin: Skin color, texture, turgor normal.  No rashes or lesions. Neurologic:  Neurovascularly intact without any focal sensory/motor deficits. Cranial nerves: II-XII intact, grossly non-focal.  Psychiatric: Alert and oriented  Capillary Refill: Brisk,< 3 seconds   Peripheral Pulses: +2 palpable, equal bilaterally     Labs:   Recent Labs     03/02/23  1254 03/03/23  0108 03/05/23  0514   WBC  --   --  10.1   HGB 7.2* 7.3* 7.4*   HCT 21.8* 21.7* 23.0*   PLT  --   --  298     Recent Labs     03/03/23  0108 03/04/23  0554 03/05/23  0513   * 136 133*   K 3.0* 4.1 4.3   CL 97* 98 99   CO2 21* 23 24   BUN 53* 42* 36*   CREATININE 5.6* 4.4* 3.5*   CALCIUM 7.3* 7.4* 7.3*   PHOS 3.1 2.5  --      No results for input(s): AST, ALT, BILIDIR, BILITOT, ALKPHOS in the last 72 hours.       Urinalysis:      Lab Results   Component Value Date/Time    NITRU NEGATIVE 03/01/2023 03:40 PM    WBCUA 0-2 03/01/2023 03:40 PM    BACTERIA NONE SEEN 03/01/2023 03:40 PM    RBCUA NONE 03/01/2023 03:40 PM    BLOODU NEGATIVE 03/01/2023 03:40 PM    GLUCOSEU NEGATIVE 03/01/2023 03:40 PM       Radiology:  CT ABDOMEN PELVIS WO CONTRAST Additional Contrast? None   Final Result   1. Pneumobilia. Self-expanding biliary stent in place. Interval development of a 5.5 cm necrotic mass in the right hepatic lobe since prior study which also contains air and some fluid. No pancreatic head mass, not well demonstrated on this study. 2. Small amount of ascites. Moderate gaseous distention of the colon. Cannot exclude colonic ileus. 3. Abnormal thickening of the bladder wall anteriorly which could be due to cystitis or neoplastic involvement of the bladder. 4. Moderate bilateral hydronephrosis, not appreciably changed from prior study. No definite obstructing lesion or calculus, however, can be appreciated on this study. **This report has been created using voice recognition software. It may contain minor errors which are inherent in voice recognition technology. **      Final report electronically signed by Dr. Dulce Najera on 3/1/2023 6:59 PM        Electronically signed by FAITH Landry on 3/5/2023 at 10:36 AM

## 2023-03-05 NOTE — PLAN OF CARE
Problem: Discharge Planning  Goal: Discharge to home or other facility with appropriate resources  Outcome: Progressing  Flowsheets (Taken 3/5/2023 1025)  Discharge to home or other facility with appropriate resources:   Identify barriers to discharge with patient and caregiver   Arrange for needed discharge resources and transportation as appropriate   Identify discharge learning needs (meds, wound care, etc)     Problem: Skin/Tissue Integrity  Goal: Absence of new skin breakdown  Description: 1. Monitor for areas of redness and/or skin breakdown  2. Assess vascular access sites hourly  3. Every 4-6 hours minimum:  Change oxygen saturation probe site  4. Every 4-6 hours:  If on nasal continuous positive airway pressure, respiratory therapy assess nares and determine need for appliance change or resting period.   3/5/2023 1025 by Stanislav Black RN  Outcome: Progressing  3/5/2023 0026 by Shirley Cunningham RN  Note: Patient will remain free of skin breakdown     Problem: Safety - Adult  Goal: Free from fall injury  Outcome: Progressing  Flowsheets (Taken 3/5/2023 1025)  Free From Fall Injury: Instruct family/caregiver on patient safety     Problem: Cardiovascular - Adult  Goal: Maintains optimal cardiac output and hemodynamic stability  Outcome: Progressing  Flowsheets (Taken 3/5/2023 1025)  Maintains optimal cardiac output and hemodynamic stability:   Monitor blood pressure and heart rate   Monitor urine output and notify Licensed Independent Practitioner for values outside of normal range   Assess for signs of decreased cardiac output   Administer fluid and/or volume expanders as ordered     Problem: Skin/Tissue Integrity - Adult  Goal: Skin integrity remains intact  Outcome: Progressing  Flowsheets (Taken 3/5/2023 1025)  Skin Integrity Remains Intact: Monitor for areas of redness and/or skin breakdown  Goal: Oral mucous membranes remain intact  Outcome: Progressing     Problem: Gastrointestinal - Adult  Goal: Minimal or absence of nausea and vomiting  Outcome: Progressing  Goal: Maintains or returns to baseline bowel function  Outcome: Progressing  Goal: Maintains adequate nutritional intake  Outcome: Progressing     Problem: Genitourinary - Adult  Goal: Absence of urinary retention  Outcome: Progressing  Flowsheets (Taken 3/2/2023 0915 by Xuan Simon, RN)  Absence of urinary retention: Assess patients ability to void and empty bladder     Problem: Infection - Adult  Goal: Absence of infection at discharge  Outcome: Progressing  Goal: Absence of infection during hospitalization  Outcome: Progressing     Problem: Metabolic/Fluid and Electrolytes - Adult  Goal: Electrolytes maintained within normal limits  Outcome: Progressing  Goal: Hemodynamic stability and optimal renal function maintained  Outcome: Progressing     Problem: Hematologic - Adult  Goal: Maintains hematologic stability  Outcome: Progressing  Flowsheets (Taken 3/2/2023 0915 by Xuan Simon RN)  Maintains hematologic stability: Assess for signs and symptoms of bleeding or hemorrhage     Problem: Pain  Goal: Verbalizes/displays adequate comfort level or baseline comfort level  3/5/2023 1025 by Margot Castanon RN  Outcome: Progressing  [address] (Taken 3/5/2023 0026 by Mira Ackerman RN)  Verbalizes/displays adequate comfort level or baseline comfort level:   Encourage patient to monitor pain and request assistance   Assess pain using appropriate pain scale  3/5/2023 0026 by Mira Ackerman RN  Flowsheets (Taken 3/5/2023 0026)  Verbalizes/displays adequate comfort level or baseline comfort level:   Encourage patient to monitor pain and request assistance   Assess pain using appropriate pain scale     Problem: Chronic Conditions and Co-morbidities  Goal: Patient's chronic conditions and co-morbidity symptoms are monitored and maintained or improved  Outcome: Progressing     Problem: ABCDS Injury Assessment  Goal: Absence of physical injury  Outcome: Progressing  Flowsheets (Taken 3/5/2023 1025)  Absence of Physical Injury: Implement safety measures based on patient assessment     Problem: Nutrition Deficit:  Goal: Optimize nutritional status  Outcome: Progressing  Flowsheets (Taken 3/5/2023 1025)  Nutrient intake appropriate for improving, restoring, or maintaining nutritional needs:   Assess nutritional status and recommend course of action   Monitor oral intake, labs, and treatment plans   Recommend appropriate diets, oral nutritional supplements, and vitamin/mineral supplements

## 2023-03-05 NOTE — PLAN OF CARE
Problem: Skin/Tissue Integrity  Goal: Absence of new skin breakdown  Description: 1. Monitor for areas of redness and/or skin breakdown  2. Assess vascular access sites hourly  3. Every 4-6 hours minimum:  Change oxygen saturation probe site  4. Every 4-6 hours:  If on nasal continuous positive airway pressure, respiratory therapy assess nares and determine need for appliance change or resting period.   Note: Patient will remain free of skin breakdown     Problem: Pain  Goal: Verbalizes/displays adequate comfort level or baseline comfort level  Flowsheets (Taken 3/5/2023 0026)  Verbalizes/displays adequate comfort level or baseline comfort level:   Encourage patient to monitor pain and request assistance   Assess pain using appropriate pain scale

## 2023-03-06 ENCOUNTER — APPOINTMENT (OUTPATIENT)
Dept: ULTRASOUND IMAGING | Age: 68
DRG: 682 | End: 2023-03-06
Payer: MEDICARE

## 2023-03-06 ENCOUNTER — APPOINTMENT (OUTPATIENT)
Dept: GENERAL RADIOLOGY | Age: 68
DRG: 682 | End: 2023-03-06
Payer: MEDICARE

## 2023-03-06 ENCOUNTER — HOSPITAL ENCOUNTER (OUTPATIENT)
Dept: INFUSION THERAPY | Age: 68
End: 2023-03-06

## 2023-03-06 LAB
ANION GAP SERPL CALC-SCNC: 10 MEQ/L (ref 8–16)
ANION GAP SERPL CALC-SCNC: 12 MEQ/L (ref 8–16)
BUN SERPL-MCNC: 29 MG/DL (ref 7–22)
BUN SERPL-MCNC: 29 MG/DL (ref 7–22)
CALCIUM SERPL-MCNC: 7.4 MG/DL (ref 8.5–10.5)
CALCIUM SERPL-MCNC: 7.5 MG/DL (ref 8.5–10.5)
CHLORIDE SERPL-SCNC: 100 MEQ/L (ref 98–111)
CHLORIDE SERPL-SCNC: 104 MEQ/L (ref 98–111)
CO2 SERPL-SCNC: 23 MEQ/L (ref 23–33)
CO2 SERPL-SCNC: 24 MEQ/L (ref 23–33)
CREAT SERPL-MCNC: 3.1 MG/DL (ref 0.4–1.2)
CREAT SERPL-MCNC: 3.3 MG/DL (ref 0.4–1.2)
GFR SERPL CREATININE-BSD FRML MDRD: 19 ML/MIN/1.73M2
GFR SERPL CREATININE-BSD FRML MDRD: 21 ML/MIN/1.73M2
GLUCOSE SERPL-MCNC: 84 MG/DL (ref 70–108)
GLUCOSE SERPL-MCNC: 85 MG/DL (ref 70–108)
POTASSIUM SERPL-SCNC: 3.9 MEQ/L (ref 3.5–5.2)
POTASSIUM SERPL-SCNC: 4.2 MEQ/L (ref 3.5–5.2)
SODIUM SERPL-SCNC: 134 MEQ/L (ref 135–145)
SODIUM SERPL-SCNC: 139 MEQ/L (ref 135–145)

## 2023-03-06 PROCEDURE — 6360000002 HC RX W HCPCS

## 2023-03-06 PROCEDURE — 6370000000 HC RX 637 (ALT 250 FOR IP)

## 2023-03-06 PROCEDURE — 1200000003 HC TELEMETRY R&B

## 2023-03-06 PROCEDURE — 80048 BASIC METABOLIC PNL TOTAL CA: CPT

## 2023-03-06 PROCEDURE — 36415 COLL VENOUS BLD VENIPUNCTURE: CPT

## 2023-03-06 PROCEDURE — 99232 SBSQ HOSP IP/OBS MODERATE 35: CPT | Performed by: UROLOGY

## 2023-03-06 PROCEDURE — 99232 SBSQ HOSP IP/OBS MODERATE 35: CPT | Performed by: PHYSICIAN ASSISTANT

## 2023-03-06 PROCEDURE — 2580000003 HC RX 258: Performed by: INTERNAL MEDICINE

## 2023-03-06 PROCEDURE — 99232 SBSQ HOSP IP/OBS MODERATE 35: CPT | Performed by: INTERNAL MEDICINE

## 2023-03-06 PROCEDURE — 74018 RADEX ABDOMEN 1 VIEW: CPT

## 2023-03-06 PROCEDURE — 6370000000 HC RX 637 (ALT 250 FOR IP): Performed by: INTERNAL MEDICINE

## 2023-03-06 PROCEDURE — 76775 US EXAM ABDO BACK WALL LIM: CPT

## 2023-03-06 PROCEDURE — 6360000002 HC RX W HCPCS: Performed by: INTERNAL MEDICINE

## 2023-03-06 RX ORDER — LEVOTHYROXINE SODIUM 0.2 MG/1
TABLET ORAL
Qty: 30 TABLET | Refills: 1 | OUTPATIENT
Start: 2023-03-06

## 2023-03-06 RX ADMIN — SODIUM CHLORIDE: 9 INJECTION, SOLUTION INTRAVENOUS at 09:03

## 2023-03-06 RX ADMIN — EPOETIN ALFA-EPBX 10000 UNITS: 10000 INJECTION, SOLUTION INTRAVENOUS; SUBCUTANEOUS at 10:54

## 2023-03-06 RX ADMIN — TAMSULOSIN HYDROCHLORIDE 0.8 MG: 0.4 CAPSULE ORAL at 09:06

## 2023-03-06 RX ADMIN — LEVOTHYROXINE SODIUM 200 MCG: 0.1 TABLET ORAL at 06:26

## 2023-03-06 RX ADMIN — PANTOPRAZOLE SODIUM 40 MG: 40 TABLET, DELAYED RELEASE ORAL at 06:25

## 2023-03-06 RX ADMIN — HEPARIN SODIUM 5000 UNITS: 5000 INJECTION INTRAVENOUS; SUBCUTANEOUS at 15:34

## 2023-03-06 RX ADMIN — DILTIAZEM HYDROCHLORIDE 240 MG: 240 CAPSULE, EXTENDED RELEASE ORAL at 09:06

## 2023-03-06 RX ADMIN — HEPARIN SODIUM 5000 UNITS: 5000 INJECTION INTRAVENOUS; SUBCUTANEOUS at 22:05

## 2023-03-06 RX ADMIN — SODIUM BICARBONATE 1300 MG: 650 TABLET ORAL at 09:06

## 2023-03-06 RX ADMIN — SODIUM BICARBONATE 1300 MG: 650 TABLET ORAL at 15:34

## 2023-03-06 RX ADMIN — LEVOTHYROXINE SODIUM 25 MCG: 0.03 TABLET ORAL at 06:26

## 2023-03-06 RX ADMIN — HEPARIN SODIUM 5000 UNITS: 5000 INJECTION INTRAVENOUS; SUBCUTANEOUS at 06:25

## 2023-03-06 RX ADMIN — SODIUM BICARBONATE 1300 MG: 650 TABLET ORAL at 20:53

## 2023-03-06 ASSESSMENT — PAIN SCALES - GENERAL
PAINLEVEL_OUTOF10: 0

## 2023-03-06 ASSESSMENT — PAIN SCALES - WONG BAKER
WONGBAKER_NUMERICALRESPONSE: 0

## 2023-03-06 NOTE — PLAN OF CARE
Problem: Discharge Planning  Goal: Discharge to home or other facility with appropriate resources  3/6/2023 1512 by Bhupendra Giraldo RN  Outcome: Progressing  Flowsheets (Taken 3/6/2023 1512)  Discharge to home or other facility with appropriate resources:   Identify barriers to discharge with patient and caregiver   Arrange for needed discharge resources and transportation as appropriate   Identify discharge learning needs (meds, wound care, etc)  3/6/2023 0119 by Dequan Sanford RN  Flowsheets (Taken 3/6/2023 0119)  Discharge to home or other facility with appropriate resources: Identify barriers to discharge with patient and caregiver     Problem: Skin/Tissue Integrity  Goal: Absence of new skin breakdown  Description: 1. Monitor for areas of redness and/or skin breakdown  2. Assess vascular access sites hourly  3. Every 4-6 hours minimum:  Change oxygen saturation probe site  4. Every 4-6 hours:  If on nasal continuous positive airway pressure, respiratory therapy assess nares and determine need for appliance change or resting period.   3/6/2023 1512 by Bhupendra Giraldo RN  Outcome: Progressing  3/6/2023 0119 by Dequan Sanford RN  Note: Patient will remain free from skin breakdown     Problem: Safety - Adult  Goal: Free from fall injury  Outcome: Progressing  Flowsheets (Taken 3/6/2023 1512)  Free From Fall Injury: Instruct family/caregiver on patient safety     Problem: Cardiovascular - Adult  Goal: Maintains optimal cardiac output and hemodynamic stability  Outcome: Progressing  Flowsheets (Taken 3/6/2023 1512)  Maintains optimal cardiac output and hemodynamic stability:   Monitor blood pressure and heart rate   Assess for signs of decreased cardiac output   Monitor urine output and notify Licensed Independent Practitioner for values outside of normal range   Administer vasoactive medications as ordered   Administer fluid and/or volume expanders as ordered     Problem: Skin/Tissue Integrity - Adult  Goal: Skin integrity remains intact  Outcome: Progressing  Flowsheets (Taken 3/6/2023 1512)  Skin Integrity Remains Intact: Monitor for areas of redness and/or skin breakdown  Goal: Oral mucous membranes remain intact  Outcome: Progressing     Problem: Gastrointestinal - Adult  Goal: Minimal or absence of nausea and vomiting  Outcome: Progressing  Flowsheets (Taken 3/6/2023 1512)  Minimal or absence of nausea and vomiting:   Administer IV fluids as ordered to ensure adequate hydration   Provide nonpharmacologic comfort measures as appropriate  Goal: Maintains or returns to baseline bowel function  Outcome: Progressing  Goal: Maintains adequate nutritional intake  Outcome: Progressing     Problem: Genitourinary - Adult  Goal: Absence of urinary retention  Outcome: Progressing  Flowsheets (Taken 3/6/2023 1512)  Absence of urinary retention:   Assess patients ability to void and empty bladder   Monitor intake/output and perform bladder scan as needed     Problem: Infection - Adult  Goal: Absence of infection at discharge  Outcome: Progressing  Flowsheets (Taken 3/6/2023 1512)  Absence of infection at discharge:   Assess and monitor for signs and symptoms of infection   Monitor lab/diagnostic results  Goal: Absence of infection during hospitalization  Outcome: Progressing     Problem: Metabolic/Fluid and Electrolytes - Adult  Goal: Electrolytes maintained within normal limits  Outcome: Progressing  Flowsheets (Taken 3/6/2023 1512)  Electrolytes maintained within normal limits:   Monitor labs and assess patient for signs and symptoms of electrolyte imbalances   Administer electrolyte replacement as ordered  Goal: Hemodynamic stability and optimal renal function maintained  Outcome: Progressing     Problem: Hematologic - Adult  Goal: Maintains hematologic stability  Outcome: Progressing     Problem: Pain  Goal: Verbalizes/displays adequate comfort level or baseline comfort level  Outcome: Progressing     Problem: Chronic Conditions and Co-morbidities  Goal: Patient's chronic conditions and co-morbidity symptoms are monitored and maintained or improved  Outcome: Progressing     Problem: ABCDS Injury Assessment  Goal: Absence of physical injury  Outcome: Progressing     Problem: Nutrition Deficit:  Goal: Optimize nutritional status  Outcome: Progressing

## 2023-03-06 NOTE — PLAN OF CARE
Problem: Discharge Planning  Goal: Discharge to home or other facility with appropriate resources  Flowsheets (Taken 3/6/2023 0119)  Discharge to home or other facility with appropriate resources: Identify barriers to discharge with patient and caregiver     Problem: Skin/Tissue Integrity  Goal: Absence of new skin breakdown  Description: 1. Monitor for areas of redness and/or skin breakdown  2. Assess vascular access sites hourly  3. Every 4-6 hours minimum:  Change oxygen saturation probe site  4. Every 4-6 hours:  If on nasal continuous positive airway pressure, respiratory therapy assess nares and determine need for appliance change or resting period.   Note: Patient will remain free from skin breakdown

## 2023-03-06 NOTE — PROGRESS NOTES
Comprehensive Nutrition Assessment    Type and Reason for Visit:  Reassess (po/ supplement monitor)    Nutrition Recommendations/Plan:   Consider renal MVI, folbee plus  Diet as per Provider  Patient declines oral nutrition supplements     Malnutrition Assessment:  Malnutrition Status:  Severe malnutrition (03/02/23 1131)    Context:  Chronic Illness     Findings of the 6 clinical characteristics of malnutrition:  Energy Intake:  75% or less estimated energy requirements for 1 month or longer  Weight Loss:  Greater than 20% over 1 year (26.8% in the last 11 months)     Body Fat Loss:  Severe body fat loss Orbital, Fat Overlying Ribs, Triceps   Muscle Mass Loss:  Severe muscle mass loss Temples (temporalis), Clavicles (pectoralis & deltoids), Thigh (quadraceps), Calf (gastrocnemius)  Fluid Accumulation:  Mild Generalized   Strength:  Not Performed    Nutrition Assessment:     Pt. With no improvement from nutritional standpoint AEB ongoing poor appetite, declines ONS, intake 50% or less of meals, NPO at present. At risk for further nutritional compromise r/t severe malnutrition, catabolic disease-neuroendocrin tumor of pancreas with metastasis, recently changed chemotherapy, admit with LIZA, bladder outlet obstruction with urinary retention, dehydration, and underlying medical condition (hx: CKD, HTN, hypothroidism, smoking, daily alcohol use).        Nutrition Related Findings:    Pt. Report/Treatments/Miscellaneous: Patient seen with wife, reports aware of NPO today for possible procedure, prior to today intake remains poor, dislikes Ensure Enlive and thus will remain discontinued, discussed ONS options for when diet resumed, patient declines all ONS, has snacks available in room   GI Status: RN reports stools remain loose but less frequent, last stool was 3/5 PM  Pertinent Labs: Sodium 139, Potassium 3.9, BUN 29, Creatinine 3.1, Glucose 84  Pertinent Meds: retacrit, protonix, IV fluid    Wound Type: None Current Nutrition Intake & Therapies:    Average Meal Intake: 1-25%, 26-50%  Average Supplements Intake: Refusing to take  Diet NPO    Anthropometric Measures:  Height: 5' 9\" (175.3 cm)  Ideal Body Weight (IBW): 160 lbs (73 kg)    Admission Body Weight: 129 lb 9.6 oz (58.8 kg) (3/1/23 at MD office generalized edema)  Current Body Weight: 129 lb (58.5 kg) (3/3; no edema),    Current BMI (kg/m2): 19  Usual Body Weight:  (~ 200#. Per EMR:6/23/2020: 193#10oz,  4/12/22: 177#, 10/12/22: 136#11oz, 12/1/22: 138#14oz)                       BMI Categories: Underweight (BMI less than 22) age over 72    Estimated Daily Nutrient Needs:  Energy Requirements Based On: Kcal/kg  Weight Used for Energy Requirements:  (59 kg)  Energy (kcal/day): ~ 9448-3600 kcals (30-35 kcals/kg/day)  Weight Used for Protein Requirements:  (59 kg)  Protein (g/day): ~ 47-59 grams  (0.1-1 grams/kg/day) pending renal status         Nutrition Diagnosis:   Severe malnutrition, In context of chronic illness related to inadequate protein-energy intake as evidenced by Criteria as identified in malnutrition assessment    Nutrition Interventions:   Food and/or Nutrient Delivery:  (resume diet when able, discontinued ONS)  Nutrition Education/Counseling:  (when po diet resumed, encouraged intake at best effort, snacks between meals)  Coordination of Nutrition Care: Continue to monitor while inpatient       Goals:  Previous Goal Met: No Progress toward Goal(s)  Goals: PO intake 75% or greater, by next RD assessment       Nutrition Monitoring and Evaluation:      Food/Nutrient Intake Outcomes: Food and Nutrient Intake  Physical Signs/Symptoms Outcomes: Biochemical Data, GI Status, Fluid Status or Edema, Nutrition Focused Physical Findings, Weight    Discharge Planning:     Too soon to determine     Hanna Fagan RD, LD  Contact: [phone]

## 2023-03-06 NOTE — PROGRESS NOTES
[facility]  PHYSICAL THERAPY MISSED TREATMENT NOTE  [facility] RENAL TELEMETRY 6K    Date: 3/6/2023  Patient Name: Chantel Paul        MRN: [id number]   : 1955  (73 y.o.)  Gender: male                REASON FOR MISSED TREATMENT:  Patient refused treatment. Per nursing, pt agitated this afternoon, waiting for the physician to come to speak to wife and pt. Pt in bed when PT arrived, wife present. Pt declines therapy at this time, appears agitated and irritable,  reports he feels comfortable in the bed and not wanting to amb. Will check back next available date.

## 2023-03-06 NOTE — PROGRESS NOTES
Hospitalist Progress Note      Patient:  Ranjit Abbasi    Unit/Bed:6K-07/007-A  YOB: 1955  MRN: [id number]   Acct: [de-identified]   PCP: Ta Salguero MD  Date of Admission: 3/1/2023    Assessment/Plan:    LIZA on CKD: Nephrology consulted. Initially thought this was mainly due to bladder outlet obstruction. Creatinine on admission, 7.1. Cerda placed, 2L output initially. Creatinine today 3.3, recheck at 12 3.1. Nephrology believes LIZA appears to be multifactorial.  Cannot rule out platin (cisplatin etc.)  based nephrotoxicity/ATN in setting of underlying bilateral hydronephrosis. Repeat renal ultrasound shows bilateral hydronephrosis, possible stent placement by urology? Urinary Retention: Urology consulted. Cerda placed, plan to keep cerda at DC. Flomax, continue at DC. 1900cc documented output after cerda. Renal US showed bilateral hydronephrosis. Severe Metabolic Acidosis: Resolved. CO2 12 on arrival and CO2 23 today. Nephrology ordered IVF to 100 cc/hr and started PO Bicarb. Hypokalemia: Potassium 3.9 today, nephrology managing. Potassium replacement. Anemia, acute on chronic: Hgb baseline appears 8-9s. Hgb 6.6 on admission. Received 1 U pRBCs. Hgb has been ~7 since. CBC in the a.m. Neuroendocrine CA of the pancreas: Oncology consulted. He has undergone therapy with 3 cycles of cisplatin continue regimen with etoposide was completed in December. Rescanning showed that he had progressive disease. Status post 1 cycle of FOLFIRINOX on 2/15/2023. Hyponatremia: Sodium, today 133. 139 on admission. Likely combination of poor PO intake and LIZA. Nephrology following. Continue to monitor. Essential HTN: continue diltiazem with hold parameters. Hold losartan given #1. Continue to monitor.     Hypothyroidism: continue synthroid  GERD: continue pantoprazole  Malnourishment: consult dietitian      Chief Complaint: abnormal labs    Initial H and P:-    Initial H&P \"Aaron Abbasi is a 69 y.o. male with PMHx of HTN, metastatic pancreatic CA, hypothyroidism, and GERD who presented to [facility] with chief complaint of abnormal labs. Patient was getting routine labs due to his chemotherapy and subsequently sent to the ER for further evaluation. He reports he has been very fatigued lately, but not having any other pain. Does admit to some mild shortness of breath and believes it is 2/2 to the anemia. Patient reports he has been trying to maintain frequent and healthy meals, but does have a lower appetite. States he has been urinating frequently. Denies any lightheadedness/dizziness, chest pain, abdominal pain, N/V/D, urinary symptoms, and leg swelling. \"     Subjective (past 24 hours):   No acute events overnight. Patient is frustrated that he is still in the hospital and possibly will need stents. Patient is resting comfortably in bed, no pain. No new issues. Past medical history, family history, social history and allergies reviewed again and is unchanged since admission. ROS (All review of systems completed. Pertinent positives noted.  Otherwise All other systems reviewed and negative.)     Medications:  Reviewed    Infusion Medications    sodium chloride 100 mL/hr at 03/06/23 0903    sodium chloride      sodium chloride       Scheduled Medications    sodium bicarbonate  1,300 mg Oral TID    [Held by provider] aspirin  81 mg Oral Daily    dilTIAZem  240 mg Oral Daily    levothyroxine  25 mcg Oral QAM AC    levothyroxine  200 mcg Oral QAM AC    pantoprazole  40 mg Oral QAM AC    sodium chloride flush  5-40 mL IntraVENous 2 times per day    heparin (porcine)  5,000 Units SubCUTAneous 3 times per day    epoetin fay-epbx  10,000 Units SubCUTAneous Once per day on Mon Wed Fri    tamsulosin  0.8 mg Oral Daily     PRN Meds: sodium chloride, sodium chloride flush, sodium chloride, ondansetron **OR** ondansetron, polyethylene glycol, acetaminophen **OR** acetaminophen      Intake/Output Summary (Last 24 hours) at 3/6/2023 1415  Last data filed at 3/6/2023 0903  Gross per 24 hour   Intake --   Output 3675 ml   Net -3675 ml       Diet:  Diet NPO    Physical Exam:  BP (!) 96/53   Pulse 79   Temp 98.4 °F (36.9 °C) (Oral)   Resp 16   Ht 5' 9\" (1.753 m)   Wt 129 lb (58.5 kg)   SpO2 96%   BMI 19.05 kg/m²   General appearance: No apparent distress, appears stated age and cooperative. Very thin. HEENT: Pupils equal, round, and reactive to light. Conjunctivae/corneas clear. Neck: Supple, with full range of motion. No jugular venous distention. Trachea midline. Respiratory:  Normal respiratory effort on RA. Clear to auscultation, bilaterally without Rales/Wheezes/Rhonchi. Cardiovascular: Regular rate and rhythm with normal S1/S2 without murmurs, rubs or gallops. Abdomen: Soft, non-tender, non-distended with normal bowel sounds. Musculoskeletal: passive and active ROM x 4 extremities. Skin: Skin color, texture, turgor normal.  No rashes or lesions. Neurologic:  Neurovascularly intact without any focal sensory/motor deficits. Cranial nerves: II-XII intact, grossly non-focal.  Psychiatric: Alert and oriented  Capillary Refill: Brisk,< 3 seconds   Peripheral Pulses: +2 palpable, equal bilaterally     Labs:   Recent Labs     03/05/23  0514   WBC 10.1   HGB 7.4*   HCT 23.0*        Recent Labs     03/04/23  0554 03/05/23  0513 03/06/23  0525 03/06/23  1154    133* 134* 139   K 4.1 4.3 4.2 3.9   CL 98 99 100 104   CO2 23 24 24 23   BUN 42* 36* 29* 29*   CREATININE 4.4* 3.5* 3.3* 3.1*   CALCIUM 7.4* 7.3* 7.5* 7.4*   PHOS 2.5  --   --   --      No results for input(s): AST, ALT, BILIDIR, BILITOT, ALKPHOS in the last 72 hours.       Urinalysis:      Lab Results   Component Value Date/Time    NITRU NEGATIVE 03/01/2023 03:40 PM    WBCUA 0-2 03/01/2023 03:40 PM    BACTERIA NONE SEEN 03/01/2023 03:40 PM    RBCUA NONE 03/01/2023 03:40 PM    BLOODU NEGATIVE 03/01/2023 03:40 PM    GLUCOSEU NEGATIVE 03/01/2023 03:40 PM       Radiology:  US RENAL LIMITED   Final Result       Persistent moderate severity bilateral hydronephrosis and hydroureter. Diffusely thickened bladder wall. **This report has been created using voice recognition software. It may contain minor errors which are inherent in voice recognition technology. **      Final report electronically signed by Dr. Alaina Herrera on 3/6/2023 7:46 AM      CT ABDOMEN PELVIS WO CONTRAST Additional Contrast? None   Final Result   1. Pneumobilia. Self-expanding biliary stent in place. Interval development of a 5.5 cm necrotic mass in the right hepatic lobe since prior study which also contains air and some fluid. No pancreatic head mass, not well demonstrated on this study. 2. Small amount of ascites. Moderate gaseous distention of the colon. Cannot exclude colonic ileus. 3. Abnormal thickening of the bladder wall anteriorly which could be due to cystitis or neoplastic involvement of the bladder. 4. Moderate bilateral hydronephrosis, not appreciably changed from prior study. No definite obstructing lesion or calculus, however, can be appreciated on this study. **This report has been created using voice recognition software. It may contain minor errors which are inherent in voice recognition technology. **      Final report electronically signed by Dr. Gayathri Tejeda on 3/1/2023 6:59 PM        Electronically signed by Elinor Fothergill, PA on 3/6/2023 at 2:15 PM

## 2023-03-06 NOTE — PROGRESS NOTES
[facility]   Nephrology progress note  3/6/2023, 11:43 AM      Pt Name:    Malena Gonzales  MRN:     [id number]     YOB: 1955  Admit Date:    3/1/2023 10:57 AM    Chief Complaint: Nephrology following for LIZA    Subjective:  Patient was seen and examined this morning  Feeling better  Discussed with wife this morning  No chest pain or shortness of breath      Objective:  24HR INTAKE/OUTPUT:    Intake/Output Summary (Last 24 hours) at 3/6/2023 1143  Last data filed at 3/6/2023 8785  Gross per 24 hour   Intake --   Output 3675 ml   Net -3675 ml           I/O last 3 completed shifts:   In: 9512 [I.V.:3057]  Out: 4800 [Urine:4800]  I/O this shift:  In: -   Out: 1100 [Urine:1100]   Admission weight: 129 lb (58.5 kg)  Wt Readings from Last 3 Encounters:   03/03/23 129 lb (58.5 kg)   03/01/23 129 lb 9.6 oz (58.8 kg)   03/01/23 129 lb 9.6 oz (58.8 kg)        Vitals :   Vitals:    03/05/23 1600 03/05/23 1951 03/06/23 0437 03/06/23 0845   BP: 110/65 121/69 125/62 116/64   Pulse: 78 82 78 81   Resp: 16 16 16 16   Temp: 99.1 °F (37.3 °C) 100.1 °F (37.8 °C) 98 °F (36.7 °C) 98.1 °F (36.7 °C)   TempSrc: Oral Oral Oral Oral   SpO2: 99% 99% 99% 98%   Weight:       Height:           Physical examination  General Appearance: alert and cooperative with exam, appears comfortable, no distress  Mouth/Throat: + Dry mucosa moist  Neck: No JVD  Lungs:  no use of accessory muscles  Heart:  S1, S2 heard  GI: soft, non-tender, no guarding  Extremities: no LE edema    Medications:  Infusion:    sodium chloride 100 mL/hr at 03/06/23 0903    sodium chloride      sodium chloride       Meds:    sodium bicarbonate  1,300 mg Oral TID    [Held by provider] aspirin  81 mg Oral Daily    dilTIAZem  240 mg Oral Daily    levothyroxine  25 mcg Oral QAM AC    levothyroxine  200 mcg Oral QAM AC    pantoprazole  40 mg Oral QAM AC    sodium chloride flush  5-40 mL IntraVENous 2 times per day    heparin (porcine)  5,000 Units SubCUTAneous 3 times per day    epoetin fay-epbx  10,000 Units SubCUTAneous Once per day on Mon Wed Fri    tamsulosin  0.8 mg Oral Daily     Meds prn: sodium chloride, sodium chloride flush, sodium chloride, ondansetron **OR** ondansetron, polyethylene glycol, acetaminophen **OR** acetaminophen     Lab Data :  CBC:   Recent Labs     03/05/23  0514   WBC 10.1   HGB 7.4*   HCT 23.0*          CMP:  Recent Labs     03/04/23  0554 03/05/23  0513 03/06/23  0525    133* 134*   K 4.1 4.3 4.2   CL 98 99 100   CO2 23 24 24   BUN 42* 36* 29*   CREATININE 4.4* 3.5* 3.3*   GLUCOSE 90 84 85   CALCIUM 7.4* 7.3* 7.5*   MG 1.5*  --   --    PHOS 2.5  --   --        Hepatic:   No results for input(s): LABALBU, AST, ALT, ALB, BILITOT, ALKPHOS in the last 72 hours. Assessment and Plan:  LIZA on what appears to be underlying CKD. Creatinine was 1.3 back in 2020  Serum creatinine 7.2, down to 6.9 this morning  Unclear baseline. LIZA appears to be multifactorial.  Cannot rule out platin (cisplatin etc.)  based nephrotoxicity/ATN in setting of underlying bilateral hydronephrosis  Continue IV fluids. Serum creatinine down to 3.3 today. Overall improved  Severe metabolic acidosis: Improved, reduce sodium bicarbonate  Bilateral hydronephrosis. continue with Flomax, urology following. Possible cystoscopy at some point  Mild hyponatremia secondary to impaired free water excretion  Azotemia without enid uremia  Anemia  Neuro endocrine tumor of the pancreas      D/W patient and family member    Allison Mishra MD  [facility]    This report has been created using voice recognition software.  It may contain minor errors which are inherent in voice recognition technology

## 2023-03-06 NOTE — PROGRESS NOTES
Urology Reason for Consult:  hydronephrosis   Requesting Physician:  Dr Rg Rodriguez     History Obtained From:  patient, electronic medical record     Chief Complaint: abnormal labs    Subjective: \"    Patient is resting in bed, voiding yellow urine via cerda, ambulating with assistance, tolerating regular diet, denies any nausea or vomiting. There are complaints of no pain at this time. Plan:  NPO  ELYSIA shows bilat hydro, diffuse bladder wall  Check BMP at 1200  May need bilateral stents, discussed with pt and wife  D/c with cerda  Plan for cytoscopy outpatient  Flomax started by nephrology, cont at d/c     Moderate bilateral hydronephrosis - seen on previous Ct  in , unchanged. Urinary retention - bladder scan >999ml, 1900ml documented output after cerda 3800UOP 24/hrs,  likely contributing to hydro  LIZA - CRT 7.2 on arrival, 3.3 today, still trending down  Metastatic pancreatic ca - medicine managing, oncology on board       Vitals:  /62   Pulse 78   Temp 98 °F (36.7 °C) (Oral)   Resp 16   Ht 5' 9\" (1.753 m)   Wt 129 lb (58.5 kg)   SpO2 99%   BMI 19.05 kg/m²   Temp  Av.7 °F (37.1 °C)  Min: 97.6 °F (36.4 °C)  Max: 100.1 °F (37.8 °C)    Intake/Output Summary (Last 24 hours) at 3/6/2023 0852  Last data filed at 3/6/2023 0437  Gross per 24 hour   Intake --   Output 2575 ml   Net -2575 ml         Social History     Socioeconomic History    Marital status:      Spouse name: Shaji [employer]    Number of children: 3    Years of education: Not on file    Highest education level: Not on file   Occupational History    Not on file   Tobacco Use    Smoking status: Every Day     Types: Cigars     Start date: 2012     Passive exposure: Past (Dad smoked)    Smokeless tobacco: Never    Tobacco comments:     -3-4 cigars per day. denies cessation counseling 22.  Declines counseling      Same as above      Same as above      Same as above    Vaping Use    Vaping Use: Never used Substance and Sexual Activity    Alcohol use: Yes     Comment: 2-3 beers per day since 2022   Previous drank 8-12    Drug use: No    Sexual activity: Not on file   Other Topics Concern    Not on file   Social History Narrative    Not on file     Social Determinants of Health     Financial Resource Strain: Not on file   Food Insecurity: Not on file   Transportation Needs: Not on file   Physical Activity: Inactive    Days of Exercise per Week: 0 days    Minutes of Exercise per Session: 0 min   Stress: Not on file   Social Connections: Not on file   Intimate Partner Violence: Not on file   Housing Stability: Not on file     Family History   Problem Relation Age of Onset    High Blood Pressure Mother     Lung Cancer Father      No Known Allergies      Constitutional: Alert and oriented times x3, no acute distress, and cooperative to examination with appropriate mood and affect. HEENT:   Head:               Normocephalic and atraumatic. Mouth/Throat:                Mucous membranes are normal.   Eyes:               EOM are normal. No scleral icterus. Nose:               The external appearance of the nose is normal  Ears: The ears appear normal to external inspection. Cardiovascular:       Normal rate, regular rhythm. Pulmonary/Chest:  Normal respiratory rate and rhthym. No use of accessory muscles. Abdominal:               Soft. No tenderness. Genitalia:               Normal uncircumcised penis  Urethral meatus is normal in size and location  Scrotal contents normal to inspection and palpation   Normal testes palpated bilaterally  Henson draining yellow urine  Neurological:               Alert and oriented.      Labs:  WBC:    Lab Results   Component Value Date/Time    WBC 10.1 03/05/2023 05:14 AM     Hemoglobin/Hematocrit:    Lab Results   Component Value Date/Time    HGB 7.4 03/05/2023 05:14 AM    HCT 23.0 03/05/2023 05:14 AM     BMP:    Lab Results   Component Value Date/Time     03/06/2023 05:25 AM    K 4.2 03/06/2023 05:25 AM    K 4.0 03/02/2023 04:13 PM     03/06/2023 05:25 AM    CO2 24 03/06/2023 05:25 AM    BUN 29 03/06/2023 05:25 AM    LABALBU 2.6 03/02/2023 05:33 AM    CREATININE 3.3 03/06/2023 05:25 AM    CALCIUM 7.5 03/06/2023 05:25 AM    LABGLOM 19 03/06/2023 05:25 AM       Anant Doyle, APRN - CNP, APRN  03/06/23 8:52 AM  Urology

## 2023-03-06 NOTE — CARE COORDINATION
3/6/23, 2:46 PM EST    DISCHARGE ON GOING [address] day: 5  Location: -07/007-A Reason for admit: Acute kidney injury ([facility] [address] [N17.9]  Acute renal failure, unspecified acute renal failure type ([address] [N17.9]  Anemia, unspecified type [D64.9]   Procedure:   3/1 CT abdomen/pelvis: 1. Pneumobilia. Self-expanding biliary stent in place. Interval development of a 5.5 cm necrotic mass in the right hepatic lobe since prior study which also contains air and some fluid. No pancreatic head mass, not well demonstrated on this study. 2. Small amount of ascites. Moderate gaseous distention of the colon. Cannot exclude colonic ileus. 3. Abnormal thickening of the bladder wall anteriorly which could be due to cystitis or neoplastic involvement of the bladder. 4. Moderate bilateral hydronephrosis, not appreciably changed from prior study. No definite obstructing lesion or calculus, however, can be appreciated on this study. Barriers to Discharge: Hospitalist, uro, nephro following. Possible stent placement by urology. Creatinine down to 3.1. IVF. Na+ bicarb. Flomax. PCP: Ta Salguero MD  Readmission Risk Score: 22%  Patient Goals/Plan/Treatment Preferences: Plans home with wife, denies needs.

## 2023-03-07 ENCOUNTER — TELEPHONE (OUTPATIENT)
Dept: UROLOGY | Age: 68
End: 2023-03-07

## 2023-03-07 LAB
ANION GAP SERPL CALC-SCNC: 10 MEQ/L (ref 8–16)
BASO STIPL BLD QL SMEAR: ABNORMAL
BASOPHILS ABSOLUTE: 0 THOU/MM3 (ref 0–0.1)
BASOPHILS NFR BLD AUTO: 0.3 %
BUN SERPL-MCNC: 24 MG/DL (ref 7–22)
CALCIUM SERPL-MCNC: 7.5 MG/DL (ref 8.5–10.5)
CHLORIDE SERPL-SCNC: 104 MEQ/L (ref 98–111)
CO2 SERPL-SCNC: 23 MEQ/L (ref 23–33)
CREAT SERPL-MCNC: 2.7 MG/DL (ref 0.4–1.2)
DEPRECATED RDW RBC AUTO: 51.7 FL (ref 35–45)
EOSINOPHIL NFR BLD AUTO: 0.5 %
EOSINOPHILS ABSOLUTE: 0.1 THOU/MM3 (ref 0–0.4)
ERYTHROCYTE [DISTWIDTH] IN BLOOD BY AUTOMATED COUNT: 14.8 % (ref 11.5–14.5)
GFR SERPL CREATININE-BSD FRML MDRD: 25 ML/MIN/1.73M2
GLUCOSE SERPL-MCNC: 94 MG/DL (ref 70–108)
HCT VFR BLD AUTO: 21.8 % (ref 42–52)
HGB BLD-MCNC: 7 GM/DL (ref 14–18)
LYMPHOCYTES ABSOLUTE: 0.6 THOU/MM3 (ref 1–4.8)
LYMPHOCYTES NFR BLD AUTO: 5.7 %
MCH RBC QN AUTO: 30.6 PG (ref 26–33)
MCHC RBC AUTO-ENTMCNC: 32.1 GM/DL (ref 32.2–35.5)
MCV RBC AUTO: 95.2 FL (ref 80–94)
MONOCYTES ABSOLUTE: 1 THOU/MM3 (ref 0.4–1.3)
MONOCYTES NFR BLD AUTO: 9.7 %
NEUTROPHILS NFR BLD AUTO: 82 %
NRBC BLD AUTO-RTO: 0 /100 WBC
PLATELET # BLD AUTO: 296 THOU/MM3 (ref 130–400)
PLATELET BLD QL SMEAR: ADEQUATE
PMV BLD AUTO: 9.1 FL (ref 9.4–12.4)
POIKILOCYTES: ABNORMAL
POLYCHROMASIA BLD QL SMEAR: ABNORMAL
POTASSIUM SERPL-SCNC: 3.6 MEQ/L (ref 3.5–5.2)
RBC # BLD AUTO: 2.29 MILL/MM3 (ref 4.7–6.1)
SCAN OF BLOOD SMEAR: NORMAL
SEGMENTED NEUTROPHILS ABSOLUTE COUNT: 8.3 THOU/MM3 (ref 1.8–7.7)
SODIUM SERPL-SCNC: 137 MEQ/L (ref 135–145)
TOXIC GRANULES BLD QL SMEAR: PRESENT
WBC # BLD AUTO: 10.1 THOU/MM3 (ref 4.8–10.8)

## 2023-03-07 PROCEDURE — 6370000000 HC RX 637 (ALT 250 FOR IP): Performed by: INTERNAL MEDICINE

## 2023-03-07 PROCEDURE — 6360000002 HC RX W HCPCS

## 2023-03-07 PROCEDURE — 1200000003 HC TELEMETRY R&B

## 2023-03-07 PROCEDURE — 99232 SBSQ HOSP IP/OBS MODERATE 35: CPT | Performed by: INTERNAL MEDICINE

## 2023-03-07 PROCEDURE — 80048 BASIC METABOLIC PNL TOTAL CA: CPT

## 2023-03-07 PROCEDURE — 85025 COMPLETE CBC W/AUTO DIFF WBC: CPT

## 2023-03-07 PROCEDURE — 36415 COLL VENOUS BLD VENIPUNCTURE: CPT

## 2023-03-07 PROCEDURE — 2580000003 HC RX 258: Performed by: INTERNAL MEDICINE

## 2023-03-07 PROCEDURE — 6370000000 HC RX 637 (ALT 250 FOR IP)

## 2023-03-07 PROCEDURE — 99232 SBSQ HOSP IP/OBS MODERATE 35: CPT | Performed by: PHYSICIAN ASSISTANT

## 2023-03-07 RX ORDER — SODIUM BICARBONATE 650 MG/1
1300 TABLET ORAL 2 TIMES DAILY
Status: DISCONTINUED | OUTPATIENT
Start: 2023-03-07 | End: 2023-03-08 | Stop reason: HOSPADM

## 2023-03-07 RX ADMIN — SODIUM BICARBONATE 1300 MG: 650 TABLET ORAL at 08:51

## 2023-03-07 RX ADMIN — LEVOTHYROXINE SODIUM 25 MCG: 0.03 TABLET ORAL at 06:15

## 2023-03-07 RX ADMIN — TAMSULOSIN HYDROCHLORIDE 0.8 MG: 0.4 CAPSULE ORAL at 08:51

## 2023-03-07 RX ADMIN — LEVOTHYROXINE SODIUM 200 MCG: 0.1 TABLET ORAL at 06:15

## 2023-03-07 RX ADMIN — HEPARIN SODIUM 5000 UNITS: 5000 INJECTION INTRAVENOUS; SUBCUTANEOUS at 06:12

## 2023-03-07 RX ADMIN — SODIUM CHLORIDE: 9 INJECTION, SOLUTION INTRAVENOUS at 21:44

## 2023-03-07 RX ADMIN — HEPARIN SODIUM 5000 UNITS: 5000 INJECTION INTRAVENOUS; SUBCUTANEOUS at 21:41

## 2023-03-07 RX ADMIN — PANTOPRAZOLE SODIUM 40 MG: 40 TABLET, DELAYED RELEASE ORAL at 06:12

## 2023-03-07 RX ADMIN — DILTIAZEM HYDROCHLORIDE 240 MG: 240 CAPSULE, EXTENDED RELEASE ORAL at 08:51

## 2023-03-07 RX ADMIN — SODIUM CHLORIDE: 9 INJECTION, SOLUTION INTRAVENOUS at 04:11

## 2023-03-07 RX ADMIN — SODIUM BICARBONATE 1300 MG: 650 TABLET ORAL at 20:34

## 2023-03-07 NOTE — PROGRESS NOTES
Hospitalist Progress Note      Patient:  Simon Abbasi    Unit/Bed:6K-07/007-A  YOB: 1955  MRN: [id number]   Acct: [de-identified]   PCP: Lauren Romero MD  Date of Admission: 3/1/2023    Assessment/Plan:    LIZA on CKD: Nephrology consulted. Initially thought this was mainly due to bladder outlet obstruction. Creatinine on admission, 7.1. Cerda placed, 2L output initially. Creatinine today 2.7. Nephrology believes LIZA appears to be multifactorial.  Cannot rule out platin (cisplatin etc.)  based nephrotoxicity/ATN in setting of underlying bilateral hydronephrosis. Repeat renal ultrasound (3/5) shows bilateral hydronephrosis. Nephrology & Urology okay with DC. DC with Cerda & Flomax. Urinary Retention: Urology consulted. Cerda placed, plan to keep cerda at DC. Flomax, continue at DC. 1900cc documented output after cerda. Renal US showed bilateral hydronephrosis. Severe Metabolic Acidosis: Resolved. CO2 12 on arrival and CO2 23 today. Nephrology ordered IVF to 100 cc/hr and started PO Bicarb. Hypokalemia: Potassium 3.9 today, nephrology managing. Potassium replacement. Anemia, acute on chronic: Hgb baseline appears 8-9s. Hgb 6.6 on admission. Received 1 U pRBCs. Hgb today 7.0, trending down. CBC in the a.m. Spoke with Oncology. Plan is to recheck CBC in the AM. If Hgb is 7 or better, will DC with CBC in one week. Neuroendocrine CA of the pancreas: Oncology consulted. He has undergone therapy with 3 cycles of cisplatin continue regimen with etoposide was completed in December. Rescanning showed that he had progressive disease. Status post 1 cycle of FOLFIRINOX on 2/15/2023. Hyponatremia: Sodium, today 133. 139 on admission. Likely combination of poor PO intake and LIZA. Nephrology following. Continue to monitor. Essential HTN: continue diltiazem with hold parameters. Hold losartan given #1. Continue to monitor.     Hypothyroidism: continue synthroid  GERD: continue pantoprazole  Malnourishment: consult dietitian      Chief Complaint: abnormal labs    Initial H and P:-    Initial H&P \"Aaron Abbasi is a 69 y.o. male with PMHx of HTN, metastatic pancreatic CA, hypothyroidism, and GERD who presented to [facility] with chief complaint of abnormal labs. Patient was getting routine labs due to his chemotherapy and subsequently sent to the ER for further evaluation. He reports he has been very fatigued lately, but not having any other pain. Does admit to some mild shortness of breath and believes it is 2/2 to the anemia. Patient reports he has been trying to maintain frequent and healthy meals, but does have a lower appetite. States he has been urinating frequently. Denies any lightheadedness/dizziness, chest pain, abdominal pain, N/V/D, urinary symptoms, and leg swelling. \"     Subjective (past 24 hours):   No acute events overnight. Pt resting comfortably in bed. Pt denies pain. Pt understands that he is improving everyday. Pt has no issues or concerns. Past medical history, family history, social history and allergies reviewed again and is unchanged since admission. ROS (All review of systems completed. Pertinent positives noted.  Otherwise All other systems reviewed and negative.)     Medications:  Reviewed    Infusion Medications    sodium chloride 100 mL/hr at 03/07/23 0411    sodium chloride      sodium chloride       Scheduled Medications    sodium bicarbonate  1,300 mg Oral TID    [Held by provider] aspirin  81 mg Oral Daily    dilTIAZem  240 mg Oral Daily    levothyroxine  25 mcg Oral QAM AC    levothyroxine  200 mcg Oral QAM AC    pantoprazole  40 mg Oral QAM AC    sodium chloride flush  5-40 mL IntraVENous 2 times per day    heparin (porcine)  5,000 Units SubCUTAneous 3 times per day    epoetin fay-epbx  10,000 Units SubCUTAneous Once per day on Mon Wed Fri    tamsulosin  0.8 mg Oral Daily     PRN Meds: sodium chloride, sodium chloride flush, sodium chloride, ondansetron **OR** ondansetron, polyethylene glycol, acetaminophen **OR** acetaminophen      Intake/Output Summary (Last 24 hours) at 3/7/2023 0706  Last data filed at 3/6/2023 2303  Gross per 24 hour   Intake --   Output 3300 ml   Net -3300 ml       Diet:  Diet NPO    Physical Exam:  BP (!) 121/52   Pulse 77   Temp 98.4 °F (36.9 °C) (Oral)   Resp 18   Ht 5' 9\" (1.753 m)   Wt 129 lb (58.5 kg)   SpO2 96%   BMI 19.05 kg/m²   General appearance: No apparent distress, appears stated age and cooperative. Very thin. HEENT: Pupils equal, round, and reactive to light. Conjunctivae/corneas clear. Neck: Supple, with full range of motion. No jugular venous distention. Trachea midline. Respiratory:  Normal respiratory effort on RA. Clear to auscultation, bilaterally without Rales/Wheezes/Rhonchi. Cardiovascular: Regular rate and rhythm with normal S1/S2 without murmurs, rubs or gallops. Abdomen: Soft, non-tender, non-distended with normal bowel sounds. Musculoskeletal: passive and active ROM x 4 extremities. Skin: Skin color, texture, turgor normal.  No rashes or lesions. Neurologic:  Neurovascularly intact without any focal sensory/motor deficits. Cranial nerves: II-XII intact, grossly non-focal.  Psychiatric: Alert and oriented  Capillary Refill: Brisk,< 3 seconds   Peripheral Pulses: +2 palpable, equal bilaterally     Labs:   Recent Labs     03/05/23  0514   WBC 10.1   HGB 7.4*   HCT 23.0*        Recent Labs     03/05/23  0513 03/06/23  0525 03/06/23  1154   * 134* 139   K 4.3 4.2 3.9   CL 99 100 104   CO2 24 24 23   BUN 36* 29* 29*   CREATININE 3.5* 3.3* 3.1*   CALCIUM 7.3* 7.5* 7.4*     No results for input(s): AST, ALT, BILIDIR, BILITOT, ALKPHOS in the last 72 hours.       Urinalysis:      Lab Results   Component Value Date/Time    NITRU NEGATIVE 03/01/2023 03:40 PM    WBCUA 0-2 03/01/2023 03:40 PM    BACTERIA NONE SEEN 03/01/2023 03:40 PM    RBCUA NONE 03/01/2023 03:40 PM    BLOODU NEGATIVE 03/01/2023 03:40 PM    GLUCOSEU NEGATIVE 03/01/2023 03:40 PM       Radiology:  US RENAL LIMITED   Final Result       Persistent moderate severity bilateral hydronephrosis and hydroureter. Diffusely thickened bladder wall. **This report has been created using voice recognition software. It may contain minor errors which are inherent in voice recognition technology. **      Final report electronically signed by Dr. Shonna Willson on 3/6/2023 7:46 AM      CT ABDOMEN PELVIS WO CONTRAST Additional Contrast? None   Final Result   1. Pneumobilia. Self-expanding biliary stent in place. Interval development of a 5.5 cm necrotic mass in the right hepatic lobe since prior study which also contains air and some fluid. No pancreatic head mass, not well demonstrated on this study. 2. Small amount of ascites. Moderate gaseous distention of the colon. Cannot exclude colonic ileus. 3. Abnormal thickening of the bladder wall anteriorly which could be due to cystitis or neoplastic involvement of the bladder. 4. Moderate bilateral hydronephrosis, not appreciably changed from prior study. No definite obstructing lesion or calculus, however, can be appreciated on this study. **This report has been created using voice recognition software. It may contain minor errors which are inherent in voice recognition technology. **      Final report electronically signed by Dr. Robin Stokes on 3/1/2023 6:59 PM      XR ABDOMEN (KUB) (SINGLE AP VIEW)    (Results Pending)     Electronically signed by FAITH Curry on 3/7/2023 at 7:06 AM

## 2023-03-07 NOTE — FLOWSHEET NOTE
Virtual RN rounds completed. Pt lying in bed sleeping, call light in reach, will check back at later round.

## 2023-03-07 NOTE — TELEPHONE ENCOUNTER
Consult for urinary retention  Will be d/c with cerda  Needs cysto in office with next avaible in the upcoming wks

## 2023-03-07 NOTE — PROGRESS NOTES
[facility]   Nephrology progress note  3/7/2023, 9:48 AM      Pt Name:    Malena Gonzales  MRN:     [id number]     YOB: 1955  Admit Date:    3/1/2023 10:57 AM    Chief Complaint: Nephrology following for LIZA    Subjective:  Patient was seen and examined this morning  Feeling better  No chest pain or shortness of breath      Objective:  24HR INTAKE/OUTPUT:    Intake/Output Summary (Last 24 hours) at 3/7/2023 0948  Last data filed at 3/7/2023 0856  Gross per 24 hour   Intake --   Output 2700 ml   Net -2700 ml           I/O last 3 completed shifts:  In: -   Out: 4200 [Urine:4200]  I/O this shift:  In: -   Out: 500 [Urine:500]   Admission weight: 129 lb (58.5 kg)  Wt Readings from Last 3 Encounters:   03/03/23 129 lb (58.5 kg)   03/01/23 129 lb 9.6 oz (58.8 kg)   03/01/23 129 lb 9.6 oz (58.8 kg)        Vitals :   Vitals:    03/06/23 2048 03/06/23 2303 03/07/23 0359 03/07/23 0845   BP: 127/70 128/63 (!) 121/52 113/64   Pulse: 72 80 77 65   Resp: 17 17 18 18   Temp: 99.1 °F (37.3 °C) 99.4 °F (37.4 °C) 98.4 °F (36.9 °C) 98.6 °F (37 °C)   TempSrc: Oral Oral Oral Oral   SpO2: 99% 100% 96% 97%   Weight:       Height:           Physical examination  General Appearance: alert and cooperative with exam, appears comfortable, no distress  Mouth/Throat: + Dry mucosa moist  Neck: No JVD  Lungs:  no use of accessory muscles  GI: soft, non-tender, no guarding  Extremities: no LE edema    Medications:  Infusion:    sodium chloride 100 mL/hr at 03/07/23 0411    sodium chloride      sodium chloride       Meds:    sodium bicarbonate  1,300 mg Oral TID    [Held by provider] aspirin  81 mg Oral Daily    dilTIAZem  240 mg Oral Daily    levothyroxine  25 mcg Oral QAM AC    levothyroxine  200 mcg Oral QAM AC    pantoprazole  40 mg Oral QAM AC    sodium chloride flush  5-40 mL IntraVENous 2 times per day    heparin (porcine)  5,000 Units SubCUTAneous 3 times per day    epoetin fay-epbx  10,000 Units SubCUTAneous Once per day on Mon Wed Fri    tamsulosin  0.8 mg Oral Daily     Meds prn: sodium chloride, sodium chloride flush, sodium chloride, ondansetron **OR** ondansetron, polyethylene glycol, acetaminophen **OR** acetaminophen     Lab Data :  CBC:   Recent Labs     03/05/23  0514 03/07/23  0839   WBC 10.1 10.1   HGB 7.4* 7.0*   HCT 23.0* 21.8*    296       CMP:  Recent Labs     03/06/23  0525 03/06/23  1154 03/07/23  0600   * 139 137   K 4.2 3.9 3.6    104 104   CO2 24 23 23   BUN 29* 29* 24*   CREATININE 3.3* 3.1* 2.7*   GLUCOSE 85 84 94   CALCIUM 7.5* 7.4* 7.5*       Hepatic:   No results for input(s): LABALBU, AST, ALT, ALB, BILITOT, ALKPHOS in the last 72 hours. Assessment and Plan:  LIZA on what appears to be underlying CKD. Creatinine was 1.3 back in 2020  LIZA likely secondary to cisplatin nephrotoxicity in setting of bilateral hydronephrosis. Serum creatinine is down to 2.7 today. Severe metabolic acidosis: Reduce bicarbonate to twice daily. Bilateral hydronephrosis. continue with Flomax, urology following. Mild hyponatremia secondary to impaired free water excretion. Resolved  Azotemia without enid uremia: Improved  Anemia: Hemoglobin down to 7.0. Hemoglobin dropping. Defer management to hematology and hospitalist  Neuro endocrine tumor of the pancreas    D/W patient and hospitalist    Yony Castillo MD  [facility]    This report has been created using voice recognition software.  It may contain minor errors which are inherent in voice recognition technology

## 2023-03-07 NOTE — PLAN OF CARE
Problem: Discharge Planning  Goal: Discharge to home or other facility with appropriate resources  3/6/2023 2137 by Cherry Richey RN  Outcome: Progressing  Flowsheets (Taken 3/6/2023 2137)  Discharge to home or other facility with appropriate resources:   Identify barriers to discharge with patient and caregiver   Arrange for needed discharge resources and transportation as appropriate   Identify discharge learning needs (meds, wound care, etc)   Arrange for interpreters to assist at discharge as needed     Problem: Skin/Tissue Integrity  Goal: Absence of new skin breakdown  Description: 1. Monitor for areas of redness and/or skin breakdown  2. Assess vascular access sites hourly  3. Every 4-6 hours minimum:  Change oxygen saturation probe site  4. Every 4-6 hours:  If on nasal continuous positive airway pressure, respiratory therapy assess nares and determine need for appliance change or resting period. 3/6/2023 2137 by Cherry Richey RN  Outcome: Progressing  Note: No new skin issues.      Problem: Safety - Adult  Goal: Free from fall injury  3/6/2023 2137 by Cherry Richey RN  Outcome: Progressing  Flowsheets (Taken 3/6/2023 2137)  Free From Fall Injury:   Chucky Galvez family/caregiver on patient safety   Based on caregiver fall risk screen, instruct family/caregiver to ask for assistance with transferring infant if caregiver noted to have fall risk factors     Problem: Cardiovascular - Adult  Goal: Maintains optimal cardiac output and hemodynamic stability  3/6/2023 2137 by Cherry Richey RN  Outcome: Progressing  Flowsheets (Taken 3/6/2023 2137)  Maintains optimal cardiac output and hemodynamic stability:   Monitor blood pressure and heart rate   Monitor urine output and notify Licensed Independent Practitioner for values outside of normal range   Assess for signs of decreased cardiac output   Administer fluid and/or volume expanders as ordered   Administer vasoactive medications as ordered     Problem: Skin/Tissue Integrity - Adult  Goal: Skin integrity remains intact  3/6/2023 2137 by Anuja Thompson RN  Outcome: Progressing  Flowsheets  Taken 3/6/2023 2137  Skin Integrity Remains Intact:   Monitor for areas of redness and/or skin breakdown   Assess vascular access sites hourly  Taken 3/6/2023 2136  Skin Integrity Remains Intact:   Monitor for areas of redness and/or skin breakdown   Assess vascular access sites hourly     Problem: Skin/Tissue Integrity - Adult  Goal: Oral mucous membranes remain intact  3/6/2023 2137 by Anuja Thompson RN  Outcome: Progressing  Flowsheets (Taken 3/6/2023 2137)  Oral Mucous Membranes Remain Intact:   Assess oral mucosa and hygiene practices   Implement preventative oral hygiene regimen   Implement oral medicated treatments as ordered     Problem: Gastrointestinal - Adult  Goal: Minimal or absence of nausea and vomiting  3/6/2023 2137 by Anuja Thompson RN  Outcome: Progressing  Flowsheets (Taken 3/6/2023 2137)  Minimal or absence of nausea and vomiting:   Administer IV fluids as ordered to ensure adequate hydration   Nasogastric tube to low intermittent suction as ordered   Administer ordered antiemetic medications as needed   Advance diet as tolerated, if ordered     Problem: Gastrointestinal - Adult  Goal: Maintains or returns to baseline bowel function  3/6/2023 2137 by Anuja Thompson RN  Outcome: Progressing  Flowsheets (Taken 3/6/2023 2137)  Maintains or returns to baseline bowel function:   Assess bowel function   Encourage oral fluids to ensure adequate hydration   Administer IV fluids as ordered to ensure adequate hydration   Administer ordered medications as needed     Problem: Gastrointestinal - Adult  Goal: Maintains adequate nutritional intake  3/6/2023 2137 by Anuja Thompson RN  Outcome: Progressing  Flowsheets (Taken 3/6/2023 2137)  Maintains adequate nutritional intake:   Monitor percentage of each meal consumed   Identify factors contributing to decreased intake, treat as appropriate   Assist with meals as needed   Monitor intake and output, weight and lab values     Problem: Genitourinary - Adult  Goal: Absence of urinary retention  3/6/2023 2137 by Kathleen Young RN  Outcome: Progressing  Flowsheets (Taken 3/6/2023 2137)  Absence of urinary retention:   Assess patients ability to void and empty bladder   Monitor intake/output and perform bladder scan as needed   Place urinary catheter per Licensed Independent Practitioner order if needed     Problem: Infection - Adult  Goal: Absence of infection at discharge  3/6/2023 2137 by Kathleen Young RN  Outcome: Progressing  Flowsheets (Taken 3/6/2023 2137)  Absence of infection at discharge:   Assess and monitor for signs and symptoms of infection   Monitor endotracheal (as able) and nasal secretions for changes in amount and color   Castor appropriate cooling/warming therapies per order   Administer medications as ordered     Problem: Infection - Adult  Goal: Absence of infection during hospitalization  3/6/2023 2137 by Kathleen Young RN  Outcome: Progressing     Problem: Metabolic/Fluid and Electrolytes - Adult  Goal: Electrolytes maintained within normal limits  3/6/2023 2137 by Kathleen Young RN  Outcome: Progressing  Flowsheets (Taken 3/6/2023 2137)  Electrolytes maintained within normal limits:   Monitor labs and assess patient for signs and symptoms of electrolyte imbalances   Administer electrolyte replacement as ordered   Monitor response to electrolyte replacements, including repeat lab results as appropriate   Fluid restriction as ordered     Problem: Metabolic/Fluid and Electrolytes - Adult  Goal: Hemodynamic stability and optimal renal function maintained  3/6/2023 2137 by Kathleen Young RN  Outcome: Progressing  Flowsheets (Taken 3/6/2023 2137)  Hemodynamic stability and optimal renal function maintained:   Monitor labs and assess for signs and symptoms of volume excess or deficit   Monitor intake, output and patient weight   Monitor response to interventions for patient's volume status, including labs, urine output, blood pressure (other measures as available)   Encourage oral intake as appropriate     Problem: Hematologic - Adult  Goal: Maintains hematologic stability  3/6/2023 2137 by Natali Egan RN  Outcome: Progressing  Flowsheets (Taken 3/6/2023 2137)  Maintains hematologic stability:   Assess for signs and symptoms of bleeding or hemorrhage   Monitor labs for bleeding or clotting disorders     Problem: Pain  Goal: Verbalizes/displays adequate comfort level or baseline comfort level  3/6/2023 2137 by Natali Egan RN  Outcome: Progressing  Flowsheets (Taken 3/6/2023 2137)  Verbalizes/displays adequate comfort level or baseline comfort level:   Encourage patient to monitor pain and request assistance   Assess pain using appropriate pain scale   Administer analgesics based on type and severity of pain and evaluate response   Implement non-pharmacological measures as appropriate and evaluate response     Problem: Chronic Conditions and Co-morbidities  Goal: Patient's chronic conditions and co-morbidity symptoms are monitored and maintained or improved  3/6/2023 2137 by Natali Egan RN  Outcome: Progressing  Flowsheets (Taken 3/6/2023 2137)  Care Plan - Patient's Chronic Conditions and Co-Morbidity Symptoms are Monitored and Maintained or Improved:   Monitor and assess patient's chronic conditions and comorbid symptoms for stability, deterioration, or improvement   Collaborate with multidisciplinary team to address chronic and comorbid conditions and prevent exacerbation or deterioration   Update acute care plan with appropriate goals if chronic or comorbid symptoms are exacerbated and prevent overall improvement and discharge     Problem: ABCDS Injury Assessment  Goal: Absence of physical injury  3/6/2023 2137 by Natali Egan RN  Outcome: Progressing  Flowsheets (Taken 3/6/2023 2137)  Absence of Physical Injury: Implement safety measures based on patient assessment     Problem: Nutrition Deficit:  Goal: Optimize nutritional status  3/6/2023 2137 by Issac Dc RN  Outcome: Progressing  Flowsheets (Taken 3/6/2023 2137)  Nutrient intake appropriate for improving, restoring, or maintaining nutritional needs:   Assess nutritional status and recommend course of action   Monitor oral intake, labs, and treatment plans   Recommend appropriate diets, oral nutritional supplements, and vitamin/mineral supplements     Care plan reviewed with patient and wife. Patient and wife verbalize understanding of the plan of care and contribute to goal setting.

## 2023-03-07 NOTE — CARE COORDINATION
3/7/23, 12:18 PM EST    DISCHARGE ON GOING [address] day: 6  Location: Counts include 234 beds at the [facility]07/007-A Reason for admit: Acute kidney injury ([facility] [address] [N17.9]  Acute renal failure, unspecified acute renal failure type ([facility] [address] [N17.9]  Anemia, unspecified type [D64.9]   Barriers to Discharge: creat improved to 2.7. Hgb dropped to 7.0. IVF, bicarb tablets. PT eval.   PCP: Jamison Mora MD  Readmission Risk Score: 21.6%  Patient Goals/Plan/Treatment Preferences: Home with wife. Henson cath to remain in place. OP cysto planned.

## 2023-03-07 NOTE — PROGRESS NOTES
Urology Reason for Consult:  hydronephrosis   Requesting Physician:  Dr Michelle Pacheco     History Obtained From:  patient, electronic medical record     Chief Complaint: abnormal labs    Subjective: \"    Patient is resting in bed, voiding yellow urine via cerda, ambulating with assistance, tolerating regular diet, denies any nausea or vomiting. There are complaints of no pain at this time. Plan:  Ok to eat  ELYSIA shows bilat hydro, diffuse bladder wall, likely chronic  CRT continues decline  D/c with cerda  Plan for cytoscopy outpatient  Flomax started by nephrology, cont at d/c     Moderate bilateral hydronephrosis - seen on previous Ct  in , unchanged. Urinary retention - bladder scan >999ml, 1900ml documented output after cerda 3800UOP 24/hrs,  likely contributing to hydro  LIZA - CRT 7.2 on arrival, 2.7 today, still trending down  Metastatic pancreatic ca - medicine managing, oncology on board    Urology signing off, plan for cysto outpt  Call with questions       Vitals:  BP (!) 121/52   Pulse 77   Temp 98.4 °F (36.9 °C) (Oral)   Resp 18   Ht 5' 9\" (1.753 m)   Wt 129 lb (58.5 kg)   SpO2 96%   BMI 19.05 kg/m²   Temp  Av.5 °F (36.9 °C)  Min: 97.8 °F (36.6 °C)  Max: 99.4 °F (37.4 °C)    Intake/Output Summary (Last 24 hours) at 3/7/2023 0844  Last data filed at 3/6/2023 2303  Gross per 24 hour   Intake --   Output 3300 ml   Net -3300 ml         Social History     Socioeconomic History    Marital status:      Spouse name: Shaji [employer]    Number of children: 3    Years of education: Not on file    Highest education level: Not on file   Occupational History    Not on file   Tobacco Use    Smoking status: Every Day     Types: Cigars     Start date: 2012     Passive exposure: Past (Dad smoked)    Smokeless tobacco: Never    Tobacco comments:     -3-4 cigars per day. denies cessation counseling 22.  Declines counseling      Same as above      Same as above      Same as above  Vaping Use    Vaping Use: Never used   Substance and Sexual Activity    Alcohol use: Yes     Comment: 2-3 beers per day since 2022   Previous drank 8-12    Drug use: No    Sexual activity: Not on file   Other Topics Concern    Not on file   Social History Narrative    Not on file     Social Determinants of Health     Financial Resource Strain: Not on file   Food Insecurity: Not on file   Transportation Needs: Not on file   Physical Activity: Inactive    Days of Exercise per Week: 0 days    Minutes of Exercise per Session: 0 min   Stress: Not on file   Social Connections: Not on file   Intimate Partner Violence: Not on file   Housing Stability: Not on file     Family History   Problem Relation Age of Onset    High Blood Pressure Mother     Lung Cancer Father      No Known Allergies      Constitutional: Alert and oriented times x3, no acute distress, and cooperative to examination with appropriate mood and affect. HEENT:   Head:               Normocephalic and atraumatic. Mouth/Throat:                Mucous membranes are normal.   Eyes:               EOM are normal. No scleral icterus. Nose:               The external appearance of the nose is normal  Ears: The ears appear normal to external inspection. Cardiovascular:       Normal rate, regular rhythm. Pulmonary/Chest:  Normal respiratory rate and rhthym. No use of accessory muscles. Abdominal:               Soft. No tenderness. Genitalia:               Normal uncircumcised penis  Urethral meatus is normal in size and location  Scrotal contents normal to inspection and palpation   Normal testes palpated bilaterally  Henson draining yellow urine  Neurological:               Alert and oriented.      Labs:  WBC:    Lab Results   Component Value Date/Time    WBC 10.1 03/05/2023 05:14 AM     Hemoglobin/Hematocrit:    Lab Results   Component Value Date/Time    HGB 7.4 03/05/2023 05:14 AM    HCT 23.0 03/05/2023 05:14 AM     BMP:    Lab Results Component Value Date/Time     03/07/2023 06:00 AM    K 3.6 03/07/2023 06:00 AM    K 4.0 03/02/2023 04:13 PM     03/07/2023 06:00 AM    CO2 23 03/07/2023 06:00 AM    BUN 24 03/07/2023 06:00 AM    LABALBU 2.6 03/02/2023 05:33 AM    CREATININE 2.7 03/07/2023 06:00 AM    CALCIUM 7.5 03/07/2023 06:00 AM    LABGLOM 25 03/07/2023 06:00 AM       Erica Dinh APRN - CNP, APRN  03/07/23 8:44 AM  Urology

## 2023-03-08 VITALS
HEIGHT: 69 IN | OXYGEN SATURATION: 97 % | TEMPERATURE: 98.4 F | RESPIRATION RATE: 18 BRPM | SYSTOLIC BLOOD PRESSURE: 120 MMHG | BODY MASS INDEX: 19.11 KG/M2 | HEART RATE: 68 BPM | WEIGHT: 129 LBS | DIASTOLIC BLOOD PRESSURE: 71 MMHG

## 2023-03-08 LAB
ANION GAP SERPL CALC-SCNC: 11 MEQ/L (ref 8–16)
BUN SERPL-MCNC: 19 MG/DL (ref 7–22)
CALCIUM SERPL-MCNC: 7 MG/DL (ref 8.5–10.5)
CHLORIDE SERPL-SCNC: 105 MEQ/L (ref 98–111)
CO2 SERPL-SCNC: 23 MEQ/L (ref 23–33)
CREAT SERPL-MCNC: 2.4 MG/DL (ref 0.4–1.2)
DEPRECATED RDW RBC AUTO: 51.7 FL (ref 35–45)
ERYTHROCYTE [DISTWIDTH] IN BLOOD BY AUTOMATED COUNT: 14.9 % (ref 11.5–14.5)
GFR SERPL CREATININE-BSD FRML MDRD: 28 ML/MIN/1.73M2
GLUCOSE SERPL-MCNC: 89 MG/DL (ref 70–108)
HCT VFR BLD AUTO: 22.3 % (ref 42–52)
HGB BLD-MCNC: 7.1 GM/DL (ref 14–18)
MCH RBC QN AUTO: 30.3 PG (ref 26–33)
MCHC RBC AUTO-ENTMCNC: 31.8 GM/DL (ref 32.2–35.5)
MCV RBC AUTO: 95.3 FL (ref 80–94)
PLATELET # BLD AUTO: 311 THOU/MM3 (ref 130–400)
PMV BLD AUTO: 9 FL (ref 9.4–12.4)
POTASSIUM SERPL-SCNC: 3.3 MEQ/L (ref 3.5–5.2)
RBC # BLD AUTO: 2.34 MILL/MM3 (ref 4.7–6.1)
SODIUM SERPL-SCNC: 139 MEQ/L (ref 135–145)
WBC # BLD AUTO: 10.7 THOU/MM3 (ref 4.8–10.8)

## 2023-03-08 PROCEDURE — 6370000000 HC RX 637 (ALT 250 FOR IP)

## 2023-03-08 PROCEDURE — 2580000003 HC RX 258: Performed by: INTERNAL MEDICINE

## 2023-03-08 PROCEDURE — 99232 SBSQ HOSP IP/OBS MODERATE 35: CPT | Performed by: INTERNAL MEDICINE

## 2023-03-08 PROCEDURE — 85027 COMPLETE CBC AUTOMATED: CPT

## 2023-03-08 PROCEDURE — 6360000002 HC RX W HCPCS: Performed by: PHYSICIAN ASSISTANT

## 2023-03-08 PROCEDURE — 99239 HOSP IP/OBS DSCHRG MGMT >30: CPT | Performed by: PHYSICIAN ASSISTANT

## 2023-03-08 PROCEDURE — 80048 BASIC METABOLIC PNL TOTAL CA: CPT

## 2023-03-08 PROCEDURE — 6360000002 HC RX W HCPCS

## 2023-03-08 PROCEDURE — 6360000002 HC RX W HCPCS: Performed by: INTERNAL MEDICINE

## 2023-03-08 PROCEDURE — 6370000000 HC RX 637 (ALT 250 FOR IP): Performed by: INTERNAL MEDICINE

## 2023-03-08 PROCEDURE — 36415 COLL VENOUS BLD VENIPUNCTURE: CPT

## 2023-03-08 PROCEDURE — 2580000003 HC RX 258

## 2023-03-08 RX ORDER — POTASSIUM CHLORIDE 20 MEQ/1
40 TABLET, EXTENDED RELEASE ORAL ONCE
Status: COMPLETED | OUTPATIENT
Start: 2023-03-08 | End: 2023-03-08

## 2023-03-08 RX ORDER — TAMSULOSIN HYDROCHLORIDE 0.4 MG/1
0.8 CAPSULE ORAL DAILY
Qty: 30 CAPSULE | Refills: 3 | Status: SHIPPED | OUTPATIENT
Start: 2023-03-09

## 2023-03-08 RX ORDER — HEPARIN SODIUM (PORCINE) LOCK FLUSH IV SOLN 100 UNIT/ML 100 UNIT/ML
500 SOLUTION INTRAVENOUS ONCE
Status: COMPLETED | OUTPATIENT
Start: 2023-03-08 | End: 2023-03-08

## 2023-03-08 RX ADMIN — SODIUM BICARBONATE 1300 MG: 650 TABLET ORAL at 09:29

## 2023-03-08 RX ADMIN — LEVOTHYROXINE SODIUM 200 MCG: 0.1 TABLET ORAL at 05:45

## 2023-03-08 RX ADMIN — POTASSIUM CHLORIDE 40 MEQ: 1500 TABLET, EXTENDED RELEASE ORAL at 11:27

## 2023-03-08 RX ADMIN — LEVOTHYROXINE SODIUM 25 MCG: 0.03 TABLET ORAL at 05:46

## 2023-03-08 RX ADMIN — EPOETIN ALFA-EPBX 10000 UNITS: 10000 INJECTION, SOLUTION INTRAVENOUS; SUBCUTANEOUS at 09:29

## 2023-03-08 RX ADMIN — HEPARIN 500 UNITS: 100 SYRINGE at 11:07

## 2023-03-08 RX ADMIN — TAMSULOSIN HYDROCHLORIDE 0.8 MG: 0.4 CAPSULE ORAL at 09:29

## 2023-03-08 RX ADMIN — PANTOPRAZOLE SODIUM 40 MG: 40 TABLET, DELAYED RELEASE ORAL at 05:47

## 2023-03-08 RX ADMIN — SODIUM CHLORIDE, PRESERVATIVE FREE 10 ML: 5 INJECTION INTRAVENOUS at 11:08

## 2023-03-08 RX ADMIN — DILTIAZEM HYDROCHLORIDE 240 MG: 240 CAPSULE, EXTENDED RELEASE ORAL at 09:29

## 2023-03-08 RX ADMIN — SODIUM CHLORIDE: 9 INJECTION, SOLUTION INTRAVENOUS at 07:57

## 2023-03-08 RX ADMIN — HEPARIN SODIUM 5000 UNITS: 5000 INJECTION INTRAVENOUS; SUBCUTANEOUS at 05:47

## 2023-03-08 NOTE — FLOWSHEET NOTE
03/08/23 1140   Safe Environment   Safety Measures Other (comment)  (VN completed discharge education and medication education.  Pt and spouse verbalized understanding and denied further questions or needs.)

## 2023-03-08 NOTE — CARE COORDINATION
3/8/23, 10:43 AM EST    Met with Aan Jarrell and his wife, they deny all discharge needs. Patient goals/plan/ treatment preferences discussed by  and . Patient goals/plan/ treatment preferences reviewed with patient/ family. Patient/ family verbalize understanding of discharge plan and are in agreement with goal/plan/treatment preferences. Understanding was demonstrated using the teach back method. AVS provided by RN at time of discharge, which includes all necessary medical information pertaining to the patients current course of illness, treatment, post-discharge goals of care, and treatment preferences. Pt verbalized understanding and gave permission for possible discharge within 4 hours of receiving IMM.      Services At/After Discharge: None       IMM Letter  IMM Letter given to Patient/Family/Significant other/Guardian/POA/by[de-identified] Sunny Hull CM  IMM Letter date given[de-identified] 03/08/23  IMM Letter time given[de-identified] 5847

## 2023-03-08 NOTE — PLAN OF CARE
Problem: Discharge Planning  Goal: Discharge to home or other facility with appropriate resources  Outcome: Progressing  Flowsheets (Taken 3/7/2023 2254)  Discharge to home or other facility with appropriate resources:   Identify barriers to discharge with patient and caregiver   Identify discharge learning needs (meds, wound care, etc)  Note: Plans to go home with wife. Problem: Skin/Tissue Integrity  Goal: Absence of new skin breakdown  Description: 1. Monitor for areas of redness and/or skin breakdown  2. Assess vascular access sites hourly  3. Every 4-6 hours minimum:  Change oxygen saturation probe site  4. Every 4-6 hours:  If on nasal continuous positive airway pressure, respiratory therapy assess nares and determine need for appliance change or resting period. Outcome: Progressing  Note: No new skin breakdown noted. Encouraged patient to reposition in bed.         Problem: Safety - Adult  Goal: Free from fall injury  Outcome: Progressing  Flowsheets (Taken 3/7/2023 2254)  Free From Fall Injury: Instruct family/caregiver on patient safety     Problem: Cardiovascular - Adult  Goal: Maintains optimal cardiac output and hemodynamic stability  Outcome: Progressing  Flowsheets (Taken 3/7/2023 2254)  Maintains optimal cardiac output and hemodynamic stability:   Monitor blood pressure and heart rate   Assess for signs of decreased cardiac output   Monitor urine output and notify Licensed Independent Practitioner for values outside of normal range     Problem: Skin/Tissue Integrity - Adult  Goal: Skin integrity remains intact  Outcome: Progressing  Flowsheets (Taken 3/7/2023 2254)  Skin Integrity Remains Intact:   Monitor for areas of redness and/or skin breakdown   Assess vascular access sites hourly     Problem: Skin/Tissue Integrity - Adult  Goal: Oral mucous membranes remain intact  Outcome: Progressing  Flowsheets (Taken 3/7/2023 2254)  Oral Mucous Membranes Remain Intact:   Assess oral mucosa and hygiene practices   Implement preventative oral hygiene regimen   Implement oral medicated treatments as ordered     Problem: Gastrointestinal - Adult  Goal: Minimal or absence of nausea and vomiting  Outcome: Progressing  Flowsheets (Taken 3/7/2023 2254)  Minimal or absence of nausea and vomiting:   Administer IV fluids as ordered to ensure adequate hydration   Provide nonpharmacologic comfort measures as appropriate     Problem: Gastrointestinal - Adult  Goal: Maintains or returns to baseline bowel function  Outcome: Progressing  Flowsheets (Taken 3/7/2023 2254)  Maintains or returns to baseline bowel function:   Assess bowel function   Encourage oral fluids to ensure adequate hydration   Administer IV fluids as ordered to ensure adequate hydration   Encourage mobilization and activity   Administer ordered medications as needed     Problem: Gastrointestinal - Adult  Goal: Maintains adequate nutritional intake  Outcome: Progressing  Flowsheets (Taken 3/7/2023 2254)  Maintains adequate nutritional intake:   Monitor percentage of each meal consumed   Identify factors contributing to decreased intake, treat as appropriate   Monitor intake and output, weight and lab values     Problem: Genitourinary - Adult  Goal: Absence of urinary retention  Outcome: Progressing  Flowsheets (Taken 3/7/2023 2254)  Absence of urinary retention:   Assess patients ability to void and empty bladder   Monitor intake/output and perform bladder scan as needed     Problem: Infection - Adult  Goal: Absence of infection at discharge  Outcome: Progressing  Flowsheets (Taken 3/7/2023 2254)  Absence of infection at discharge:   Assess and monitor for signs and symptoms of infection   Monitor lab/diagnostic results   Administer medications as ordered   Instruct and encourage patient and family to use good hand hygiene technique   Identify and instruct in appropriate isolation precautions for identified infection/condition     Problem: Infection - Adult  Goal: Absence of infection during hospitalization  Outcome: Progressing  Flowsheets (Taken 3/7/2023 2254)  Absence of infection during hospitalization:   Assess and monitor for signs and symptoms of infection   Monitor lab/diagnostic results   Instruct and encourage patient and family to use good hand hygiene technique   Identify and instruct in appropriate isolation precautions for identified infection/condition     Problem: Metabolic/Fluid and Electrolytes - Adult  Goal: Electrolytes maintained within normal limits  Outcome: Progressing  Flowsheets (Taken 3/7/2023 2254)  Electrolytes maintained within normal limits:   Monitor labs and assess patient for signs and symptoms of electrolyte imbalances   Administer electrolyte replacement as ordered     Problem: Metabolic/Fluid and Electrolytes - Adult  Goal: Hemodynamic stability and optimal renal function maintained  Outcome: Progressing  Flowsheets (Taken 3/7/2023 2254)  Hemodynamic stability and optimal renal function maintained:   Monitor intake, output and patient weight   Encourage oral intake as appropriate     Problem: Hematologic - Adult  Goal: Maintains hematologic stability  Outcome: Progressing  Flowsheets (Taken 3/7/2023 2254)  Maintains hematologic stability:   Assess for signs and symptoms of bleeding or hemorrhage   Monitor labs for bleeding or clotting disorders     Problem: Pain  Goal: Verbalizes/displays adequate comfort level or baseline comfort level  Outcome: Progressing  Flowsheets (Taken 3/7/2023 2254)  Verbalizes/displays adequate comfort level or baseline comfort level:   Encourage patient to monitor pain and request assistance   Implement non-pharmacological measures as appropriate and evaluate response  Note: Patient denied any pain upon assessment.      Problem: Chronic Conditions and Co-morbidities  Goal: Patient's chronic conditions and co-morbidity symptoms are monitored and maintained or improved  Outcome: Progressing  Flowsheets (Taken 3/7/2023 2254)  Care Plan - Patient's Chronic Conditions and Co-Morbidity Symptoms are Monitored and Maintained or Improved:   Monitor and assess patient's chronic conditions and comorbid symptoms for stability, deterioration, or improvement   Update acute care plan with appropriate goals if chronic or comorbid symptoms are exacerbated and prevent overall improvement and discharge     Problem: ABCDS Injury Assessment  Goal: Absence of physical injury  Outcome: Progressing  Flowsheets (Taken 3/7/2023 2254)  Absence of Physical Injury: Implement safety measures based on patient assessment     Problem: Nutrition Deficit:  Goal: Optimize nutritional status  Outcome: Progressing  Flowsheets (Taken 3/7/2023 2254)  Nutrient intake appropriate for improving, restoring, or maintaining nutritional needs:   Assess nutritional status and recommend course of action   Monitor oral intake, labs, and treatment plans     Care plan reviewed with patient and wife. Patient and wife verbalized understanding of the plan of care and contribute to goal setting.

## 2023-03-08 NOTE — DISCHARGE SUMMARY
Hospitalist Discharge Summary    Patient: Shirley Amaral  YOB: 1955  MRN: [id number]   Acct: [de-identified]    Primary Care Physician: Renan Heller MD    Admit date  3/1/2023    Discharge date: 3/8/2023     Discharge Assessment and Plan:    LIZA on CKD:   Nephrology consult appreciated. LIZA likely secondary to cisplatin nephrotoxicity in setting of bilateral hydronephrosis. Cr 7.1 on admission, improved with Henson placement and IV fluids. Cr 2.4 on day of discharge  Repeat ultrasound 3/5 showed continued bilateral hydronephrosis  Okay for discharge from nephrology and urology standpoint. Plan to discharge with Henson, Flomax. Repeat BMP in 1 week and follow-up with nephrology outpatient. Urinary retention: Urology consult appreciated. Henson cath was placed on admission. Renal ultrasound showed bilateral hydro, retention likely contributing. Urology recommends discharge with Henson, plan for cystoscopy as outpatient. Severe metabolic acidosis: Treated with p.o. bicarb and IV fluids. Nephrology assistance appreciated. Resolved. Repeat BMP in 1 week and follow-up with nephrology. Hypokalemia: Replaced per nephrology. Repeat BMP in 1 week    Acute on chronic anemia: Multifactorial 2/2 chemotherapy and CKD. Received 1U PRBC 3/1 for Hgb 6.6. No signs of acute bleeding. Treated with VERA per Nephrology. Hgb has remained stable. Repeat CBC in 1 week and follow-up with heme-onc. Metastatic neuroendocrine carcinoma of pancreas: He has undergone therapy with 3 cycles of cisplatin continue regimen with etoposide was completed in December. Rescanning showed that he had progressive disease. Status post 1 cycle of FOLFIRINOX on 2/15/2023. Follows with Dr. Rosibel Cervantes. Appreciate heme-onc consult while inpatient. Essential hypertension: Losartan stopped given #1. Continue diltiazem. BP stable, continue to hold losartan at discharge.   Follow-up with PCP for continued blood pressure monitoring and management    Hypothyroidism: Synthroid    GERD: PPI    Malnutrition: Dietitian consulted      Chief Complaint on presentation: Abnormal labs    Initial H&P / Hospital Course:   Initial H&P \"Aaron Abbasi is a 69 y.o. male with PMHx of HTN, metastatic pancreatic CA, hypothyroidism, and GERD who presented to [facility] with chief complaint of abnormal labs. Patient was getting routine labs due to his chemotherapy and subsequently sent to the ER for further evaluation. He reports he has been very fatigued lately, but not having any other pain. Does admit to some mild shortness of breath and believes it is 2/2 to the anemia. Patient reports he has been trying to maintain frequent and healthy meals, but does have a lower appetite. States he has been urinating frequently. Denies any lightheadedness/dizziness, chest pain, abdominal pain, N/V/D, urinary symptoms, and leg swelling. JOSSE MARCIAL Tobey Hospital course:   Patient was admitted 3/1 for creatinine of 7.1. Previous creatinine was 1.3 in 2020. Patient was also noted to have severe urinary retention with hydronephrosis, bladder scan with >999 ML, 1900 mL documented output after Henson placed. Nephrology and urology were consulted. Patient was treated with IV fluids, Cr down to 2.4. Urology recommend discharged with Henson cath and plan for cystoscopy as outpatient. Hematology/oncology was also consulted for continuation of care and anemia. Patient was noted to have a hemoglobin of 6.6 on 3/1 and received 1 unit PRBC. He was started on VERA per nephrology. Hemoglobin has remained stable, no signs of bleeding. Patient responded well to medical management. Consultants had signed off or were contacted and agree with discharge plan. The patient was discharged in stable condition with appropriate outpatient follow up arranged. Subjective (day of discharge):   Patient resting comfortably in chair. Reports fatigue and generalized weakness.   He denies fever/chills, shortness of breath, chest pain, abdominal pain, N/V/D. Patient is tolerating p.o. diet. Patient is eager to go home. Physical Exam:-  Vitals: Patient Vitals for the past 24 hrs:   BP Temp Temp src Pulse Resp SpO2   03/08/23 1127 120/71 98.4 °F (36.9 °C) Oral 68 18 97 %   03/08/23 0926 117/72 99 °F (37.2 °C) Oral 78 16 98 %   03/08/23 0401 (!) 115/53 98.2 °F (36.8 °C) Oral 70 17 96 %   03/07/23 2341 (!) 110/55 99.3 °F (37.4 °C) Oral 75 -- 96 %   03/07/23 2030 (!) 115/54 99.1 °F (37.3 °C) Oral 73 18 98 %   03/07/23 1526 (!) 129/54 99 °F (37.2 °C) Oral 83 18 97 %     Weight: Weight: 129 lb (58.5 kg)   24 hour intake/output:   Intake/Output Summary (Last 24 hours) at 3/8/2023 1443  Last data filed at 3/8/2023 0408  Gross per 24 hour   Intake --   Output 1645 ml   Net -1645 ml       General appearance: No apparent distress, appears stated age and cooperative. HEENT: Normal cephalic, atraumatic without obvious deformity. Pupils equal, round, and reactive to light. Extra ocular muscles intact. Conjunctivae/corneas clear. Neck: Supple, with full range of motion. No jugular venous distention. Trachea midline. Respiratory:  Normal respiratory effort. Clear to auscultation, bilaterally without Rales/Wheezes/Rhonchi. Cardiovascular: Regular rate and rhythm with normal S1/S2 without murmurs, rubs or gallops. Abdomen: Soft, non-tender, non-distended with normal bowel sounds. Musculoskeletal:  No clubbing, cyanosis or edema bilaterally. Skin: Skin color, texture, turgor normal.  No rashes or lesions. Neurologic:  Neurovascularly intact without any focal sensory/motor deficits.  Cranial nerves: II-XII intact, grossly non-focal.  Psychiatric: Alert and oriented, thought content appropriate, normal insight  Capillary Refill: Brisk,< 3 seconds   Peripheral Pulses: +2 palpable, equal bilaterally     Labs :  Recent Results (from the past 72 hour(s))   Basic Metabolic Panel    Collection Time: 03/06/23  5:25 AM Result Value Ref Range    Sodium 134 (L) 135 - 145 meq/L    Potassium 4.2 3.5 - 5.2 meq/L    Chloride 100 98 - 111 meq/L    CO2 24 23 - 33 meq/L    Glucose 85 70 - 108 mg/dL    BUN 29 (H) 7 - 22 mg/dL    Creatinine 3.3 (HH) 0.4 - 1.2 mg/dL    Calcium 7.5 (L) 8.5 - 10.5 mg/dL   Anion Gap    Collection Time: 03/06/23  5:25 AM   Result Value Ref Range    Anion Gap 10.0 8.0 - 16.0 meq/L   Glomerular Filtration Rate, Estimated    Collection Time: 03/06/23  5:25 AM   Result Value Ref Range    Est, Glom Filt Rate 19 (A) >60 BF/ZGH/7.14D4   Basic Metabolic Panel    Collection Time: 03/06/23 11:54 AM   Result Value Ref Range    Sodium 139 135 - 145 meq/L    Potassium 3.9 3.5 - 5.2 meq/L    Chloride 104 98 - 111 meq/L    CO2 23 23 - 33 meq/L    Glucose 84 70 - 108 mg/dL    BUN 29 (H) 7 - 22 mg/dL    Creatinine 3.1 (HH) 0.4 - 1.2 mg/dL    Calcium 7.4 (L) 8.5 - 10.5 mg/dL   Anion Gap    Collection Time: 03/06/23 11:54 AM   Result Value Ref Range    Anion Gap 12.0 8.0 - 16.0 meq/L   Glomerular Filtration Rate, Estimated    Collection Time: 03/06/23 11:54 AM   Result Value Ref Range    Est, Glom Filt Rate 21 (A) >60 TR/WAP/1.10V3   Basic Metabolic Panel    Collection Time: 03/07/23  6:00 AM   Result Value Ref Range    Sodium 137 135 - 145 meq/L    Potassium 3.6 3.5 - 5.2 meq/L    Chloride 104 98 - 111 meq/L    CO2 23 23 - 33 meq/L    Glucose 94 70 - 108 mg/dL    BUN 24 (H) 7 - 22 mg/dL    Creatinine 2.7 (H) 0.4 - 1.2 mg/dL    Calcium 7.5 (L) 8.5 - 10.5 mg/dL   Anion Gap    Collection Time: 03/07/23  6:00 AM   Result Value Ref Range    Anion Gap 10.0 8.0 - 16.0 meq/L   Glomerular Filtration Rate, Estimated    Collection Time: 03/07/23  6:00 AM   Result Value Ref Range    Est, Glom Filt Rate 25 (A) >60 ml/min/1.73m2   CBC with Auto Differential    Collection Time: 03/07/23  8:39 AM   Result Value Ref Range    WBC 10.1 4.8 - 10.8 thou/mm3    RBC 2.29 (L) 4.70 - 6.10 mill/mm3    Hemoglobin 7.0 (LL) 14.0 - 18.0 gm/dl    Hematocrit 21.8 (L) 42.0 - 52.0 %    MCV 95.2 (H) 80.0 - 94.0 fL    MCH 30.6 26.0 - 33.0 pg    MCHC 32.1 (L) 32.2 - 35.5 gm/dl    RDW-CV 14.8 (H) 11.5 - 14.5 %    RDW-SD 51.7 (H) 35.0 - 45.0 fL    Platelets 461 575 - 362 thou/mm3    MPV 9.1 (L) 9.4 - 12.4 fL    Seg Neutrophils 82.0 %    Lymphocytes 5.7 %    Monocytes 9.7 %    Eosinophils 0.5 %    Basophils 0.3 %    Platelet Estimate ADEQUATE Adequate    Segs Absolute 8.3 (H) 1.8 - 7.7 thou/mm3    Lymphocytes Absolute 0.6 (L) 1.0 - 4.8 thou/mm3    Monocytes Absolute 1.0 0.4 - 1.3 thou/mm3    Eosinophils Absolute 0.1 0.0 - 0.4 thou/mm3    Basophils Absolute 0.0 0.0 - 0.1 thou/mm3    nRBC 0 /100 wbc    BASOPHILIA 1+ Absent    Basophilic Stippling 1+ Absent    Poikilocytes 1+ Absent    Toxic Granulation Present Absent   Scan of Blood Smear    Collection Time: 03/07/23  8:39 AM   Result Value Ref Range    SCAN OF BLOOD SMEAR see below    Basic Metabolic Panel    Collection Time: 03/08/23  5:23 AM   Result Value Ref Range    Sodium 139 135 - 145 meq/L    Potassium 3.3 (L) 3.5 - 5.2 meq/L    Chloride 105 98 - 111 meq/L    CO2 23 23 - 33 meq/L    Glucose 89 70 - 108 mg/dL    BUN 19 7 - 22 mg/dL    Creatinine 2.4 (H) 0.4 - 1.2 mg/dL    Calcium 7.0 (L) 8.5 - 10.5 mg/dL   CBC    Collection Time: 03/08/23  5:23 AM   Result Value Ref Range    WBC 10.7 4.8 - 10.8 thou/mm3    RBC 2.34 (L) 4.70 - 6.10 mill/mm3    Hemoglobin 7.1 (L) 14.0 - 18.0 gm/dl    Hematocrit 22.3 (L) 42.0 - 52.0 %    MCV 95.3 (H) 80.0 - 94.0 fL    MCH 30.3 26.0 - 33.0 pg    MCHC 31.8 (L) 32.2 - 35.5 gm/dl    RDW-CV 14.9 (H) 11.5 - 14.5 %    RDW-SD 51.7 (H) 35.0 - 45.0 fL    Platelets 859 828 - 558 thou/mm3    MPV 9.0 (L) 9.4 - 12.4 fL   Anion Gap    Collection Time: 03/08/23  5:23 AM   Result Value Ref Range    Anion Gap 11.0 8.0 - 16.0 meq/L   Glomerular Filtration Rate, Estimated    Collection Time: 03/08/23  5:23 AM   Result Value Ref Range    Est, Glom Filt Rate 28 (A) >60 ml/min/1.73m2        Microbiology:    Blood culture #1: No results found for: BC  Blood culture #2:No results found for: BLOODCULT2  Organism:  No results found for: LABGRAM  MRSA culture only:No results found for: [address]  Urine culture: No results found for: LABURIN  No results found for: ORG   Respiratory culture: No results found for: CULTRESP  Aerobic and Anaerobic :  No results found for: LABAERO  No results found for: LABANAE    Urinalysis:     Lab Results   Component Value Date/Time    NITRU NEGATIVE 03/01/2023 03:40 PM    WBCUA 0-2 03/01/2023 03:40 PM    BACTERIA NONE SEEN 03/01/2023 03:40 PM    RBCUA NONE 03/01/2023 03:40 PM    BLOODU NEGATIVE 03/01/2023 03:40 PM    GLUCOSEU NEGATIVE 03/01/2023 03:40 PM       Radiology:  CT ABDOMEN PELVIS WO CONTRAST Additional Contrast? None    Result Date: 3/1/2023  CT ABDOMEN AND PELVIS WITHOUT CONTRAST ENHANCEMENT: CLINICAL INFORMATION: Concern for metastatic spread of pancreatic CA, urinary retention COMPARISON: 1/23/2023 TECHNIQUE: Multiple axial 5 mm images of the abdomen, pelvis, and lung bases were obtained without the administration of oral or intravenous contrast material. Computer generated sagittal and coronal images of the abdomen and pelvis were also reconstructed. ALL CT SCANS AT THIS FACILITY use dose modulation, iterative reconstruction, and/or weight-based dosing when appropriate to reduce radiation dose to as low as reasonably achievable. FINDINGS: LUNG BASES: Unremarkable. No infiltrates. No effusions. No lung nodules. LIVER/GALLBLADDER: A self-expanding metallic stent is present in the common bile duct. Moderate pneumobilia is seen in the liver. Note that there is a necrotic mass in the right hepatic lobe measuring 5.5 cm in diameter which also contains air and fluid,  no doubt communicating with the biliary tree. This lesion was not present on the prior study. Liver is relatively small in size. The gallbladder is not seen. Cristy Gip PANCREAS, SPLEEN AND ADRENAL GLANDS: The pancreas is poorly defined. The previously described pancreatic head mass is not clearly depicted in the absence of IV contrast material. The spleen and adrenal glands are unremarkable. KIDNEYS:  There is mild bilateral hydronephrosis and dilatation of the ureters. No calculi are seen. The severity of hydronephrosis. .. Not appreciably changed from prior study. BOWEL/PERITONEUM: No small bowel dilatation is seen. There is moderate gaseous distention involving most of the colon. Detail on this study is very limited due to the absence of intravenous contrast material and relative possibility of intra-abdominal adipose tissue. No free air. Small amount of ascites adjacent to liver and right paracolic gutter. No abnormally enlarged para-aortic lymph nodes are seen. BONES: No evidence for acute fracture or bone destruction. PELVIS: There is a Henson catheter in urinary bladder. A few air bubbles are seen in the urinary bladder. There is marked thickening of the wall of the urinary bladder anteriorly which could related to neoplasm or cystitis. This appearance of the bladder was not present on the prior study. 1. Pneumobilia. Self-expanding biliary stent in place. Interval development of a 5.5 cm necrotic mass in the right hepatic lobe since prior study which also contains air and some fluid. No pancreatic head mass, not well demonstrated on this study. 2. Small amount of ascites. Moderate gaseous distention of the colon. Cannot exclude colonic ileus. 3. Abnormal thickening of the bladder wall anteriorly which could be due to cystitis or neoplastic involvement of the bladder. 4. Moderate bilateral hydronephrosis, not appreciably changed from prior study. No definite obstructing lesion or calculus, however, can be appreciated on this study. **This report has been created using voice recognition software. It may contain minor errors which are inherent in voice recognition technology. ** Final report electronically signed by Dr. Anushka Roberts on 3/1/2023 6:59 PM       Consults:   IP CONSULT TO NEPHROLOGY  PALLIATIVE CARE EVAL  IP CONSULT TO DIETITIAN  IP CONSULT TO ONCOLOGY  IP CONSULT TO UROLOGY    Discharge Medications:      Medication List        START taking these medications      tamsulosin 0.4 MG capsule  Commonly known as: FLOMAX  Take 2 capsules by mouth daily  Start taking on: March 9, 2023            CHANGE how you take these medications      * levothyroxine 200 MCG tablet  Commonly known as: SYNTHROID  Take 1 tablet by mouth daily  What changed: Another medication with the same name was changed. Make sure you understand how and when to take each. * levothyroxine 25 MCG tablet  Commonly known as: SYNTHROID  take 1 tablet by mouth once daily  What changed: See the new instructions. * This list has 2 medication(s) that are the same as other medications prescribed for you. Read the directions carefully, and ask your doctor or other care provider to review them with you.                 CONTINUE taking these medications      dilTIAZem 240 MG extended release capsule  Commonly known as: DILACOR XR  TAKE 1 CAPSULE DAILY     MULTIVITAMIN PO     pantoprazole 40 MG tablet  Commonly known as: PROTONIX  take 1 tablet by mouth EVERY MORNING BEFORE BREAKFAST            STOP taking these medications      aspirin 81 MG EC tablet     lidocaine-prilocaine 2.5-2.5 % cream  Commonly known as: EMLA     losartan 100 MG tablet  Commonly known as: COZAAR     ondansetron 4 MG tablet  Commonly known as: ZOFRAN     prochlorperazine 10 MG tablet  Commonly known as: COMPAZINE     sucralfate 1 GM tablet  Commonly known as: CARAFATE               Where to Get Your Medications        These medications were sent to [address], [address]  [address] [place] [address]  [facility], [address] [address]      Phone: [phone]   tamsulosin 0.4 MG capsule       These medications were sent to [id number] - [place], [place] - [address]  [address] [place] [id number]      Phone: [phone]   levothyroxine 25 MCG tablet          Patient Instructions:    Discharge lab work: BMP, CBC  Activity: activity as tolerated  Diet: ADULT DIET; Regular  ADULT ORAL NUTRITION SUPPLEMENT; Breakfast, Dinner; Standard High Calorie/High Protein Oral Supplement      Follow-up visits:   Shira Kincaid, CARLOS - CNP  [address]    Follow up  Office will call patient with appointment time and date. Mark Guevara MD  [address] [facility] [address]  [phone]    Follow up on 3/14/2023  appointment time 11:30am         Disposition: home  Condition at Discharge: Stable    Time Spent: 50 minutes    Signed: Thank you Mark Guevara MD for the opportunity to be involved in this patient's care.     Electronically signed by Sae Dominguez PA-C on 3/8/2023 at 2:43 PM  Discharging Hospitalist

## 2023-03-08 NOTE — PROGRESS NOTES
[facility]   Nephrology progress note  3/8/2023, 8:25 AM      Pt Name:    Luis Zuniga  MRN:     [id number]     YOB: 1955  Admit Date:    3/1/2023 10:57 AM    Chief Complaint: Nephrology following for LIZA    Subjective:  Patient was seen and examined this morning  No new complaints  Feeling better  On room air  On IV fluids  Has a catheter in place    Objective:  24HR INTAKE/OUTPUT:    Intake/Output Summary (Last 24 hours) at 3/8/2023 0825  Last data filed at 3/8/2023 0408  Gross per 24 hour   Intake --   Output 2595 ml   Net -2595 ml           I/O last 3 completed shifts:  In: -   Out: 9350 [Urine:4395]  No intake/output data recorded.    Admission weight: 129 lb (58.5 kg)  Wt Readings from Last 3 Encounters:   03/03/23 129 lb (58.5 kg)   03/01/23 129 lb 9.6 oz (58.8 kg)   03/01/23 129 lb 9.6 oz (58.8 kg)        Vitals :   Vitals:    03/07/23 1526 03/07/23 2030 03/07/23 2341 03/08/23 0401   BP: (!) 129/54 (!) 115/54 (!) 110/55 (!) 115/53   Pulse: 83 73 75 70   Resp: 18 18  17   Temp: 99 °F (37.2 °C) 99.1 °F (37.3 °C) 99.3 °F (37.4 °C) 98.2 °F (36.8 °C)   TempSrc: Oral Oral Oral Oral   SpO2: 97% 98% 96% 96%   Weight:       Height:           Physical examination  General Appearance: alert and cooperative with exam, appears comfortable, no distress  Neck: No JVD  Lungs:  no use of accessory muscles  GI: soft, non-tender, no guarding  Extremities: no LE edema    Medications:  Infusion:    sodium chloride 100 mL/hr at 03/08/23 0757    sodium chloride      sodium chloride       Meds:    sodium bicarbonate  1,300 mg Oral BID    [Held by provider] aspirin  81 mg Oral Daily    dilTIAZem  240 mg Oral Daily    levothyroxine  25 mcg Oral QAM AC    levothyroxine  200 mcg Oral QAM AC    pantoprazole  40 mg Oral QAM AC    sodium chloride flush  5-40 mL IntraVENous 2 times per day    heparin (porcine)  5,000 Units SubCUTAneous 3 times per day    epoetin [id number]  10,000 Units SubCUTAneous Once per day on Mon Wed Fri    tamsulosin  0.8 mg Oral Daily     Meds prn: sodium chloride, sodium chloride flush, sodium chloride, ondansetron **OR** ondansetron, polyethylene glycol, acetaminophen **OR** acetaminophen     Lab Data :  CBC:   Recent Labs     03/07/23  0839 03/08/23  0523   WBC 10.1 10.7   HGB 7.0* 7.1*   HCT 21.8* 22.3*    311       CMP:  Recent Labs     03/06/23  1154 03/07/23  0600 03/08/23  0523    137 139   K 3.9 3.6 3.3*    104 105   CO2 23 23 23   BUN 29* 24* 19   CREATININE 3.1* 2.7* 2.4*   GLUCOSE 84 94 89   CALCIUM 7.4* 7.5* 7.0*       Hepatic:   No results for input(s): LABALBU, AST, ALT, ALB, BILITOT, ALKPHOS in the last 72 hours. Assessment and Plan:  LIZA on what appears to be underlying CKD. Creatinine was 1.3 back in 2020  LIZA likely secondary to cisplatin nephrotoxicity in setting of bilateral hydronephrosis. Serum creatinine is down to 2.4  Overall serum creatinine has improved remarkably  Severe metabolic acidosis: Stable with oral sodium bicarbonate  Bilateral hydronephrosis. continue with Flomax, urology following. Mild hyponatremia secondary to impaired free water excretion. Resolved  Azotemia without enid uremia: Improved  Anemia  Neuro endocrine tumor of the pancreas  Hypokalemia. Will replace with 40 mEq potassium x1 dose      Klaus Phillips MD  [facility]    This report has been created using voice recognition software.  It may contain minor errors which are inherent in voice recognition technology

## 2023-03-08 NOTE — PLAN OF CARE
Problem: Discharge Planning  Goal: Discharge to home or other facility with appropriate resources  3/8/2023 1111 by Reva Breaux RN  Outcome: Completed     Problem: Skin/Tissue Integrity  Goal: Absence of new skin breakdown  Description: 1. Monitor for areas of redness and/or skin breakdown  2. Assess vascular access sites hourly  3. Every 4-6 hours minimum:  Change oxygen saturation probe site  4. Every 4-6 hours:  If on nasal continuous positive airway pressure, respiratory therapy assess nares and determine need for appliance change or resting period. 3/8/2023 1111 by Reva Breaux RN  Outcome: Completed     Problem: Safety - Adult  Goal: Free from fall injury  3/8/2023 1111 by Reva Breaux RN  Outcome: Completed     Problem: Cardiovascular - Adult  Goal: Maintains optimal cardiac output and hemodynamic stability  3/8/2023 1111 by Reva Breaux RN  Outcome: Completed     Problem: Skin/Tissue Integrity - Adult  Goal: Skin integrity remains intact  3/8/2023 1111 by Reva Breaux RN  Outcome: Completed     Problem: Skin/Tissue Integrity - Adult  Goal: Oral mucous membranes remain intact  3/8/2023 1111 by Reva Breaux RN  Outcome: Completed     Problem: Gastrointestinal - Adult  Goal: Minimal or absence of nausea and vomiting  3/8/2023 1111 by Reva Breaux RN  Outcome: Completed     Problem: Gastrointestinal - Adult  Goal: Maintains or returns to baseline bowel function  3/8/2023 1111 by Reva Breaux RN  Outcome: Completed     Problem: Gastrointestinal - Adult  Goal: Maintains adequate nutritional intake  3/8/2023 1111 by Reva Breaux RN  Outcome: Completed     Problem: Genitourinary - Adult  Goal: Absence of urinary retention  3/8/2023 1111 by Reva Breaux RN  Outcome: Completed     Problem: Infection - Adult  Goal: Absence of infection at discharge  3/8/2023 1111 by Reva Breaux RN  Outcome: Completed  Care plan reviewed with patient.  Patient verbalizes understanding of plan of care and contributes to goal setting.

## 2023-03-08 NOTE — PROGRESS NOTES
Discharge teaching and instructions for diagnosis/procedure of LIZA completed with patient using teachback method. AVS reviewed. Prescriptions given to patient. Patient voiced understanding regarding prescriptions, follow up appointments, and care of self at home. Discharged ambulatory to  home with support per  spouse.

## 2023-03-09 ENCOUNTER — CARE COORDINATION (OUTPATIENT)
Dept: CASE MANAGEMENT | Age: 68
End: 2023-03-09

## 2023-03-09 DIAGNOSIS — D64.9 ANEMIA, UNSPECIFIED TYPE: Primary | ICD-10-CM

## 2023-03-09 PROCEDURE — 1111F DSCHRG MED/CURRENT MED MERGE: CPT | Performed by: FAMILY MEDICINE

## 2023-03-09 NOTE — CARE COORDINATION
[facility] Care Transitions Initial Follow Up Call    Call within 2 business days of discharge: Yes    Patient Current Location:  Home: [address] [place] [address]    Care Transition Nurse contacted the family by telephone to perform post hospital discharge assessment. Verified name and  with family as identifiers. Provided introduction to self, and explanation of the Care Transition Nurse role. Patient: Simon Abbasi Patient : 1955   MRN: [id number]  Reason for Admission: Anemia    Discharge Date: 3/8/23 RARS: Readmission Risk Score: 19.8      Last Discharge  Street       Date Complaint Diagnosis Description Type Department Provider    3/1/23 Abnormal Lab Anemia, unspecified type . .. ED to Hosp-Admission (Discharged) (ADMITTED) [facility] Anderson Dickinson PA-C; Yusuf Orellana. .. Challenges to be reviewed by the provider   Additional needs identified to be addressed with provider: No  none               Method of communication with provider: none. Spoke with wife, Tawanda Ceja, said Devendra Salas slept well and is getting up and around. Denies fever, pain. Discharged with an indwelling Henson cath, draining clear yellow urine, understands how to care for it. Will have cysto next week. Reviewed medications/changes, aware of meds stopped. Appetite and fluid intake is good. Bowels are moving. Will see PCP next week and chemo also. Aware to get lab work done in a wk. Discharge summary said to f/u with nephro but wasn't on AVS.  Suggested she ask Dr Gaurav Castillo or Dr Luis Barakat if he needs to see, has never seen nephro in the past.  Denies any other needs. No other questions or concerns at this time. Will continue to follow. Care Transition Nurse reviewed discharge instructions, medical action plan, and red flags with family who verbalized understanding. The family was given an opportunity to ask questions and does not have any further questions or concerns at this time.  Were discharge instructions available to patient? Yes. Reviewed appropriate site of care based on symptoms and resources available to patient including: PCP  Specialist  Urgent care clinics  When to call 911. The family agrees to contact the PCP office for questions related to their healthcare. Advance Care Planning:   Does patient have an Advance Directive: not on file. Medication reconciliation was performed with family, who verbalizes understanding of administration of home medications. Medications reviewed, 1111F entered: yes    Was patient discharged with a pulse oximeter? no    Non-face-to-face services provided:  Scheduled appointment with PCP-3/14  Scheduled appointment with Specialist-3/14 3/15  Obtained and reviewed discharge summary and/or continuity of care documents    Offered patient enrollment in the Remote Patient Monitoring (RPM) program for in-home monitoring: Patient is not eligible for RPM program.    Care Transitions 24 Hour Call    Schedule Follow Up Appointment with PCP: Completed  Do you have a copy of your discharge instructions?: Yes  Do you have all of your prescriptions and are they filled?: Yes  Have you been contacted by a [facility] Pharmacist?: No  Have you scheduled your follow up appointment?: Yes  How are you going to get to your appointment?: Car - family or friend to transport  Do you feel like you have everything you need to keep you well at home?: Yes  Care Transitions Interventions     Transportation Support: Declined            Discussed follow-up appointments. If no appointment was previously scheduled, appointment scheduling offered: Yes. Is follow up appointment scheduled within 7 days of discharge? Yes.     Follow Up  Future Appointments   Date Time Provider Oziel Robles   3/14/2023  9:15 AM STR URO PROCEDURE ROOM 5 STRZ URO [place] HOD   3/14/2023 11:30 AM Candace Anderson MD SRPX TOUSS[facility] e[facility]uming   3/15/2023  8:00 AM STR OUT PT ONC INJ RM 01 STRZ OP ONC [place] HOD   3/15/2023  8:30 AM STR OUT PT ONC CMA STRZ OP Harjit Gomes   3/15/2023  8:45 AM Emilia Mccollum MD N Oncology [facility] Transition Nurse provided contact information. Plan for follow-up call in 5-7 days based on severity of symptoms and risk factors.   Plan for next call: symptom management-new or worsening symptoms  follow-up appointment-review f/u notes 3/14 3/15    Francis Prajapati RN

## 2023-03-13 RX ORDER — LOSARTAN POTASSIUM 100 MG/1
TABLET ORAL
Qty: 90 TABLET | Refills: 3 | Status: SHIPPED | OUTPATIENT
Start: 2023-03-13 | End: 2023-03-14

## 2023-03-14 ENCOUNTER — OFFICE VISIT (OUTPATIENT)
Dept: FAMILY MEDICINE CLINIC | Age: 68
End: 2023-03-14

## 2023-03-14 ENCOUNTER — HOSPITAL ENCOUNTER (OUTPATIENT)
Dept: UROLOGY | Age: 68
Discharge: HOME | End: 2023-03-14
Payer: MEDICARE

## 2023-03-14 VITALS
SYSTOLIC BLOOD PRESSURE: 103 MMHG | RESPIRATION RATE: 16 BRPM | DIASTOLIC BLOOD PRESSURE: 65 MMHG | TEMPERATURE: 97 F | BODY MASS INDEX: 19.66 KG/M2 | WEIGHT: 133.1 LBS | HEART RATE: 98 BPM

## 2023-03-14 VITALS
BODY MASS INDEX: 19.64 KG/M2 | RESPIRATION RATE: 16 BRPM | TEMPERATURE: 97.7 F | HEART RATE: 72 BPM | DIASTOLIC BLOOD PRESSURE: 66 MMHG | WEIGHT: 133 LBS | SYSTOLIC BLOOD PRESSURE: 104 MMHG

## 2023-03-14 DIAGNOSIS — R33.9 URINE RETENTION: Primary | ICD-10-CM

## 2023-03-14 DIAGNOSIS — E03.9 HYPOTHYROIDISM, UNSPECIFIED TYPE: Chronic | ICD-10-CM

## 2023-03-14 DIAGNOSIS — Z09 HOSPITAL DISCHARGE FOLLOW-UP: ICD-10-CM

## 2023-03-14 DIAGNOSIS — C25.1 MALIGNANT NEOPLASM OF BODY OF PANCREAS (HCC): ICD-10-CM

## 2023-03-14 DIAGNOSIS — I10 PRIMARY HYPERTENSION: Chronic | ICD-10-CM

## 2023-03-14 LAB
BILIRUBIN URINE: NEGATIVE
BLOOD URINE, POC: ABNORMAL
CHARACTER, URINE: ABNORMAL
COLOR, URINE: YELLOW
GLUCOSE URINE: NEGATIVE MG/DL
KETONES, URINE: NEGATIVE
LEUKOCYTE CLUMPS, URINE: NEGATIVE
NITRITE, URINE: NEGATIVE
PH, URINE: 5.5 (ref 5–9)
PROTEIN, URINE: 30 MG/DL
SPECIFIC GRAVITY, URINE: 1.01 (ref 1–1.03)
UROBILINOGEN, URINE: 0.2 EU/DL (ref 0–1)

## 2023-03-14 PROCEDURE — 52000 CYSTOURETHROSCOPY: CPT

## 2023-03-14 RX ORDER — LEVOTHYROXINE SODIUM 0.2 MG/1
200 TABLET ORAL DAILY
Qty: 90 TABLET | Refills: 1 | Status: SHIPPED | OUTPATIENT
Start: 2023-03-14

## 2023-03-14 RX ORDER — LEVOTHYROXINE SODIUM 0.05 MG/1
TABLET ORAL
Qty: 90 TABLET | Refills: 1 | Status: SHIPPED | OUTPATIENT
Start: 2023-03-14

## 2023-03-14 SDOH — ECONOMIC STABILITY: FOOD INSECURITY: WITHIN THE PAST 12 MONTHS, THE FOOD YOU BOUGHT JUST DIDN'T LAST AND YOU DIDN'T HAVE MONEY TO GET MORE.: NEVER TRUE

## 2023-03-14 SDOH — ECONOMIC STABILITY: HOUSING INSECURITY
IN THE LAST 12 MONTHS, WAS THERE A TIME WHEN YOU DID NOT HAVE A STEADY PLACE TO SLEEP OR SLEPT IN A SHELTER (INCLUDING NOW)?: NO

## 2023-03-14 SDOH — ECONOMIC STABILITY: FOOD INSECURITY: WITHIN THE PAST 12 MONTHS, YOU WORRIED THAT YOUR FOOD WOULD RUN OUT BEFORE YOU GOT MONEY TO BUY MORE.: NEVER TRUE

## 2023-03-14 SDOH — ECONOMIC STABILITY: INCOME INSECURITY: HOW HARD IS IT FOR YOU TO PAY FOR THE VERY BASICS LIKE FOOD, HOUSING, MEDICAL CARE, AND HEATING?: NOT HARD AT ALL

## 2023-03-14 ASSESSMENT — PATIENT HEALTH QUESTIONNAIRE - PHQ9
10. IF YOU CHECKED OFF ANY PROBLEMS, HOW DIFFICULT HAVE THESE PROBLEMS MADE IT FOR YOU TO DO YOUR WORK, TAKE CARE OF THINGS AT HOME, OR GET ALONG WITH OTHER PEOPLE: 1
SUM OF ALL RESPONSES TO PHQ9 QUESTIONS 1 & 2: 4
SUM OF ALL RESPONSES TO PHQ QUESTIONS 1-9: 6
SUM OF ALL RESPONSES TO PHQ QUESTIONS 1-9: 6
4. FEELING TIRED OR HAVING LITTLE ENERGY: 1
SUM OF ALL RESPONSES TO PHQ QUESTIONS 1-9: 6
8. MOVING OR SPEAKING SO SLOWLY THAT OTHER PEOPLE COULD HAVE NOTICED. OR THE OPPOSITE, BEING SO FIGETY OR RESTLESS THAT YOU HAVE BEEN MOVING AROUND A LOT MORE THAN USUAL: 0
3. TROUBLE FALLING OR STAYING ASLEEP: 0
6. FEELING BAD ABOUT YOURSELF - OR THAT YOU ARE A FAILURE OR HAVE LET YOURSELF OR YOUR FAMILY DOWN: 1
SUM OF ALL RESPONSES TO PHQ QUESTIONS 1-9: 6
5. POOR APPETITE OR OVEREATING: 0
9. THOUGHTS THAT YOU WOULD BE BETTER OFF DEAD, OR OF HURTING YOURSELF: 0
1. LITTLE INTEREST OR PLEASURE IN DOING THINGS: 3
2. FEELING DOWN, DEPRESSED OR HOPELESS: 1
7. TROUBLE CONCENTRATING ON THINGS, SUCH AS READING THE NEWSPAPER OR WATCHING TELEVISION: 0

## 2023-03-14 NOTE — OP NOTE
Cystoscopy    Operative Note    Patient:  Patrick Koch  MRN: [id number]  YOB: 1955    Date: 03/14/23  Surgeon: Lorna Whaley MD  Anesthesia: Deanna Closs Local  Indications: Moderate bilateral hydronephrosis - seen on previous Ct  in Jan, unchanged. Urinary retention - bladder scan >999ml, 1900ml documented output after cerda 3800UOP 24/hrs,  likely contributing to hydro  LIAZ - CRT 7.2 on arrival, 2.7 today, still trending down  Metastatic pancreatic ca         Position: Supine  EBL: 0 ml    Findings:   The patient was prepped and draped in the usual sterile fashion. The flexible cystoscope was advanced through the urethra and into the bladder. The bladder was thoroughly inspected and the following was noted:    Residual Urine: significant\" \" . Urine clear, with no obvious infection  Urethra: No abnormalities of the urethra are noted. Urethral dilation was not performed. Prostate: lateral lobe hypertrophy + present, prostate moderately obstructing, intravesical extension of prostate not present. There was no previous TURP defect. Bladder: no tumor noted. Severe trabeculation noted. small bladder diverticulum. Ureters: Orifices with normal configuration and location. The cystoscope was removed. The patient tolerated the procedure well.   Had pancreatic cancer  Does have sensation with high volume but unable to void  Concern about neurogenic component  Unsure if outlet surgery will help  Placing catheter again-- we did discuss long term catheter-- pt would like to try all measures to become catheter free    Cysto lee (30)

## 2023-03-14 NOTE — DISCHARGE INSTRUCTIONS
Discharge instructions: Cystoscopy  You May experience painful urination and see blood in the urine after your procedure. This should resolve over time. Pt ok to discharge home in good condition  No heavy lifting, >10 lbs for today  Pt should avoid strenuous activity for today  Pt should walk moderately at home  Pt ok to shower   Pt may resume diet as tolerated  Please call attending physician or hospital  with questions  Call or Present to ED if fever (> 101F), intractable nausea vomiting or pain.     Schedule Cysto Greenlight

## 2023-03-14 NOTE — PROGRESS NOTES
[facility]  This procedure has been fully reviewed with the patient and written informed consent has been obtained. 1023 Procedure started with Dr. Sofia Stokes  1024 Procedure completed; patient tolerated well. Dr. Sofia Stokes talked to patient in length about procedure findings. Verbal orders given to replace cerda. 16fr cerda catheter inserted  without difficulty. 10cc NS instilled in balloon. Cerda connected to leg bag. Pt tolerated well  Patient discharged.     PLAN     Schedule Cysto Greenlight (30)

## 2023-03-15 ENCOUNTER — HOSPITAL ENCOUNTER (OUTPATIENT)
Dept: INFUSION THERAPY | Age: 68
Discharge: HOME | End: 2023-03-15
Payer: MEDICARE

## 2023-03-15 ENCOUNTER — TELEPHONE (OUTPATIENT)
Dept: UROLOGY | Age: 68
End: 2023-03-15

## 2023-03-15 ENCOUNTER — OFFICE VISIT (OUTPATIENT)
Dept: ONCOLOGY | Age: 68
End: 2023-03-15
Payer: MEDICARE

## 2023-03-15 ENCOUNTER — CLINICAL DOCUMENTATION (OUTPATIENT)
Dept: CASE MANAGEMENT | Age: 68
End: 2023-03-15

## 2023-03-15 VITALS
RESPIRATION RATE: 18 BRPM | HEART RATE: 80 BPM | DIASTOLIC BLOOD PRESSURE: 56 MMHG | SYSTOLIC BLOOD PRESSURE: 114 MMHG | HEIGHT: 69 IN | BODY MASS INDEX: 19.7 KG/M2 | WEIGHT: 133 LBS | OXYGEN SATURATION: 100 % | TEMPERATURE: 97.7 F

## 2023-03-15 VITALS
SYSTOLIC BLOOD PRESSURE: 112 MMHG | OXYGEN SATURATION: 100 % | WEIGHT: 133 LBS | HEIGHT: 69 IN | RESPIRATION RATE: 16 BRPM | DIASTOLIC BLOOD PRESSURE: 69 MMHG | HEART RATE: 71 BPM | BODY MASS INDEX: 19.7 KG/M2 | TEMPERATURE: 98.3 F

## 2023-03-15 DIAGNOSIS — C25.1 MALIGNANT NEOPLASM OF BODY OF PANCREAS (HCC): Primary | ICD-10-CM

## 2023-03-15 DIAGNOSIS — C25.1 MALIGNANT NEOPLASM OF BODY OF PANCREAS (HCC): ICD-10-CM

## 2023-03-15 DIAGNOSIS — D50.8 OTHER IRON DEFICIENCY ANEMIA: ICD-10-CM

## 2023-03-15 DIAGNOSIS — I95.1 ORTHOSTATIC HYPOTENSION: Primary | ICD-10-CM

## 2023-03-15 LAB
ABO: NORMAL
ABSOLUTE IMMATURE GRANULOCYTE: 0.08 THOU/MM3 (ref 0–0.07)
ALBUMIN SERPL BCG-MCNC: 2.3 G/DL (ref 3.5–5.1)
ALP SERPL-CCNC: 238 U/L (ref 38–126)
ALT SERPL W/O P-5'-P-CCNC: < 5 U/L (ref 11–66)
ANTIBODY SCREEN: NORMAL
AST SERPL-CCNC: 16 U/L (ref 5–40)
BASOPHILS ABSOLUTE: 0 THOU/MM3 (ref 0–0.1)
BASOPHILS NFR BLD AUTO: 0 % (ref 0–3)
BILIRUB CONJ SERPL-MCNC: < 0.2 MG/DL (ref 0–0.3)
BILIRUB SERPL-MCNC: 0.4 MG/DL (ref 0.3–1.2)
BUN BLDP-MCNC: 15 MG/DL (ref 8–26)
CHLORIDE BLD-SCNC: 103 MEQ/L (ref 98–109)
CREAT BLD-MCNC: 1.8 MG/DL (ref 0.5–1.2)
EOSINOPHIL NFR BLD AUTO: 0 % (ref 0–4)
EOSINOPHILS ABSOLUTE: 0 THOU/MM3 (ref 0–0.4)
ERYTHROCYTE [DISTWIDTH] IN BLOOD BY AUTOMATED COUNT: 14.8 % (ref 11.5–14.5)
GFR SERPL CREATININE-BSD FRML MDRD: 40 ML/MIN/1.73M2
GLUCOSE BLD-MCNC: 104 MG/DL (ref 70–108)
HCT VFR BLD AUTO: 24.5 % (ref 42–52)
HGB BLD-MCNC: 7.6 GM/DL (ref 14–18)
IMMATURE GRANULOCYTES: 1 %
IONIZED CALCIUM, WHOLE BLOOD: 1.13 MMOL/L (ref 1.12–1.32)
LYMPHOCYTES ABSOLUTE: 0.6 THOU/MM3 (ref 1–4.8)
LYMPHOCYTES NFR BLD AUTO: 6 % (ref 15–47)
MCH RBC QN AUTO: 29.2 PG (ref 26–33)
MCHC RBC AUTO-ENTMCNC: 31 GM/DL (ref 32.2–35.5)
MCV RBC AUTO: 94 FL (ref 80–94)
MONOCYTES ABSOLUTE: 0.8 THOU/MM3 (ref 0.4–1.3)
MONOCYTES NFR BLD AUTO: 8 % (ref 0–12)
NEUTROPHILS NFR BLD AUTO: 85 % (ref 43–75)
PLATELET # BLD AUTO: 342 THOU/MM3 (ref 130–400)
PMV BLD AUTO: 9 FL (ref 9.4–12.4)
POTASSIUM BLD-SCNC: 3.5 MEQ/L (ref 3.5–4.9)
PROT SERPL-MCNC: 5.9 G/DL (ref 6.1–8)
RBC # BLD AUTO: 2.6 MILL/MM3 (ref 4.7–6.1)
RH FACTOR: NORMAL
SEGMENTED NEUTROPHILS ABSOLUTE COUNT: 8.7 THOU/MM3 (ref 1.8–7.7)
SODIUM BLD-SCNC: 138 MEQ/L (ref 138–146)
TOTAL CO2, WHOLE BLOOD: 21 MEQ/L (ref 23–33)
WBC # BLD AUTO: 10.3 THOU/MM3 (ref 4.8–10.8)

## 2023-03-15 PROCEDURE — 2580000003 HC RX 258: Performed by: INTERNAL MEDICINE

## 2023-03-15 PROCEDURE — 6360000002 HC RX W HCPCS: Performed by: INTERNAL MEDICINE

## 2023-03-15 PROCEDURE — 86900 BLOOD TYPING SEROLOGIC ABO: CPT

## 2023-03-15 PROCEDURE — 86923 COMPATIBILITY TEST ELECTRIC: CPT

## 2023-03-15 PROCEDURE — 3017F COLORECTAL CA SCREEN DOC REV: CPT | Performed by: INTERNAL MEDICINE

## 2023-03-15 PROCEDURE — 1124F ACP DISCUSS-NO DSCNMKR DOCD: CPT | Performed by: INTERNAL MEDICINE

## 2023-03-15 PROCEDURE — 80047 BASIC METABLC PNL IONIZED CA: CPT

## 2023-03-15 PROCEDURE — 4004F PT TOBACCO SCREEN RCVD TLK: CPT | Performed by: INTERNAL MEDICINE

## 2023-03-15 PROCEDURE — G8427 DOCREV CUR MEDS BY ELIG CLIN: HCPCS | Performed by: INTERNAL MEDICINE

## 2023-03-15 PROCEDURE — 3074F SYST BP LT 130 MM HG: CPT | Performed by: INTERNAL MEDICINE

## 2023-03-15 PROCEDURE — 99211 OFF/OP EST MAY X REQ PHY/QHP: CPT

## 2023-03-15 PROCEDURE — 3078F DIAST BP <80 MM HG: CPT | Performed by: INTERNAL MEDICINE

## 2023-03-15 PROCEDURE — 86850 RBC ANTIBODY SCREEN: CPT

## 2023-03-15 PROCEDURE — 80076 HEPATIC FUNCTION PANEL: CPT

## 2023-03-15 PROCEDURE — 85025 COMPLETE CBC W/AUTO DIFF WBC: CPT

## 2023-03-15 PROCEDURE — 36591 DRAW BLOOD OFF VENOUS DEVICE: CPT

## 2023-03-15 PROCEDURE — P9016 RBC LEUKOCYTES REDUCED: HCPCS

## 2023-03-15 PROCEDURE — 99214 OFFICE O/P EST MOD 30 MIN: CPT | Performed by: INTERNAL MEDICINE

## 2023-03-15 PROCEDURE — G8484 FLU IMMUNIZE NO ADMIN: HCPCS | Performed by: INTERNAL MEDICINE

## 2023-03-15 PROCEDURE — G8420 CALC BMI NORM PARAMETERS: HCPCS | Performed by: INTERNAL MEDICINE

## 2023-03-15 PROCEDURE — 1111F DSCHRG MED/CURRENT MED MERGE: CPT | Performed by: INTERNAL MEDICINE

## 2023-03-15 PROCEDURE — 36430 TRANSFUSION BLD/BLD COMPNT: CPT

## 2023-03-15 PROCEDURE — 86901 BLOOD TYPING SEROLOGIC RH(D): CPT

## 2023-03-15 RX ORDER — SODIUM CHLORIDE 0.9 % (FLUSH) 0.9 %
5-40 SYRINGE (ML) INJECTION PRN
Status: CANCELLED | OUTPATIENT
Start: 2023-03-15

## 2023-03-15 RX ORDER — ONDANSETRON 2 MG/ML
8 INJECTION INTRAMUSCULAR; INTRAVENOUS
Status: CANCELLED | OUTPATIENT
Start: 2023-03-15

## 2023-03-15 RX ORDER — DIPHENHYDRAMINE HYDROCHLORIDE 50 MG/ML
50 INJECTION INTRAMUSCULAR; INTRAVENOUS
OUTPATIENT
Start: 2023-03-15

## 2023-03-15 RX ORDER — SODIUM CHLORIDE 0.9 % (FLUSH) 0.9 %
5-40 SYRINGE (ML) INJECTION PRN
Status: DISCONTINUED | OUTPATIENT
Start: 2023-03-15 | End: 2023-03-16 | Stop reason: HOSPADM

## 2023-03-15 RX ORDER — HEPARIN SODIUM (PORCINE) LOCK FLUSH IV SOLN 100 UNIT/ML 100 UNIT/ML
500 SOLUTION INTRAVENOUS PRN
Status: DISCONTINUED | OUTPATIENT
Start: 2023-03-15 | End: 2023-03-16 | Stop reason: HOSPADM

## 2023-03-15 RX ORDER — SODIUM CHLORIDE 9 MG/ML
25 INJECTION, SOLUTION INTRAVENOUS PRN
Status: CANCELLED | OUTPATIENT
Start: 2023-03-15

## 2023-03-15 RX ORDER — SODIUM CHLORIDE 9 MG/ML
INJECTION, SOLUTION INTRAVENOUS CONTINUOUS
Status: CANCELLED | OUTPATIENT
Start: 2023-03-15

## 2023-03-15 RX ORDER — SODIUM CHLORIDE 9 MG/ML
25 INJECTION, SOLUTION INTRAVENOUS PRN
Status: DISCONTINUED | OUTPATIENT
Start: 2023-03-15 | End: 2023-03-16 | Stop reason: HOSPADM

## 2023-03-15 RX ORDER — ACETAMINOPHEN 325 MG/1
650 TABLET ORAL
OUTPATIENT
Start: 2023-03-15

## 2023-03-15 RX ORDER — SODIUM CHLORIDE 9 MG/ML
20 INJECTION, SOLUTION INTRAVENOUS CONTINUOUS
Status: DISCONTINUED | OUTPATIENT
Start: 2023-03-15 | End: 2023-03-16 | Stop reason: HOSPADM

## 2023-03-15 RX ORDER — 0.9 % SODIUM CHLORIDE 0.9 %
1000 INTRAVENOUS SOLUTION INTRAVENOUS ONCE
Status: CANCELLED
Start: 2023-03-15 | End: 2023-03-15

## 2023-03-15 RX ORDER — SODIUM CHLORIDE 9 MG/ML
20 INJECTION, SOLUTION INTRAVENOUS CONTINUOUS
Status: CANCELLED | OUTPATIENT
Start: 2023-03-15

## 2023-03-15 RX ORDER — ALBUTEROL SULFATE 90 UG/1
4 AEROSOL, METERED RESPIRATORY (INHALATION) PRN
OUTPATIENT
Start: 2023-03-15

## 2023-03-15 RX ORDER — SODIUM CHLORIDE 9 MG/ML
INJECTION, SOLUTION INTRAVENOUS CONTINUOUS
OUTPATIENT
Start: 2023-03-15

## 2023-03-15 RX ORDER — ALBUTEROL SULFATE 90 UG/1
4 AEROSOL, METERED RESPIRATORY (INHALATION) PRN
Status: CANCELLED | OUTPATIENT
Start: 2023-03-15

## 2023-03-15 RX ORDER — ONDANSETRON 2 MG/ML
8 INJECTION INTRAMUSCULAR; INTRAVENOUS
OUTPATIENT
Start: 2023-03-15

## 2023-03-15 RX ORDER — ACETAMINOPHEN 325 MG/1
650 TABLET ORAL
Status: CANCELLED | OUTPATIENT
Start: 2023-03-15

## 2023-03-15 RX ORDER — HEPARIN SODIUM (PORCINE) LOCK FLUSH IV SOLN 100 UNIT/ML 100 UNIT/ML
500 SOLUTION INTRAVENOUS PRN
Status: CANCELLED | OUTPATIENT
Start: 2023-03-15

## 2023-03-15 RX ORDER — EPINEPHRINE 1 MG/ML
0.3 INJECTION, SOLUTION INTRAMUSCULAR; SUBCUTANEOUS PRN
OUTPATIENT
Start: 2023-03-15

## 2023-03-15 RX ORDER — FAMOTIDINE 10 MG/ML
20 INJECTION, SOLUTION INTRAVENOUS
Status: CANCELLED | OUTPATIENT
Start: 2023-03-15

## 2023-03-15 RX ORDER — DIPHENHYDRAMINE HYDROCHLORIDE 50 MG/ML
50 INJECTION INTRAMUSCULAR; INTRAVENOUS
Status: CANCELLED | OUTPATIENT
Start: 2023-03-15

## 2023-03-15 RX ORDER — EPINEPHRINE 1 MG/ML
0.3 INJECTION, SOLUTION, CONCENTRATE INTRAVENOUS PRN
Status: CANCELLED | OUTPATIENT
Start: 2023-03-15

## 2023-03-15 RX ADMIN — SODIUM CHLORIDE 20 ML/HR: 9 INJECTION, SOLUTION INTRAVENOUS at 10:45

## 2023-03-15 RX ADMIN — SODIUM CHLORIDE, PRESERVATIVE FREE 20 ML: 5 INJECTION INTRAVENOUS at 14:18

## 2023-03-15 RX ADMIN — SODIUM CHLORIDE, PRESERVATIVE FREE 10 ML: 5 INJECTION INTRAVENOUS at 08:20

## 2023-03-15 RX ADMIN — SODIUM CHLORIDE, PRESERVATIVE FREE 20 ML: 5 INJECTION INTRAVENOUS at 08:21

## 2023-03-15 RX ADMIN — Medication 500 UNITS: at 14:18

## 2023-03-15 NOTE — PATIENT INSTRUCTIONS
Reviewed labs and recent medical history  Holding chemotherapy today. Discussed his low HGB/HCT. Will transfuse him 1 units of blood.   Will have patient return next Wednesday to see MD and possible treatment. (put him in at 9:30am)

## 2023-03-15 NOTE — PLAN OF CARE
Problem: Safety - Adult  Goal: Free from fall injury  Outcome: Adequate for Discharge  Flowsheets (Taken 3/15/2023 1204)  Free From Fall Injury: Instruct family/caregiver on patient safety     Problem: Discharge Planning  Goal: Discharge to home or other facility with appropriate resources  Outcome: Adequate for Discharge  Flowsheets (Taken 3/15/2023 1204)  Discharge to home or other facility with appropriate resources: Identify barriers to discharge with patient and caregiver     Problem: Infection - Adult  Goal: Absence of infection at discharge  Outcome: Adequate for Discharge  Flowsheets (Taken 3/15/2023 1204)  Absence of infection at discharge: Monitor all insertion sites i.e., indwelling lines, tubes and drains  Note: Mediport site with no redness or warmth. Skin over port site intact with no signs of breakdown noted. Patient verbalizes signs/symptoms of port infection and when to notify the physician. Goal: Absence of fever/infection during anticipated neutropenic period  Outcome: Adequate for Discharge     Problem: Chronic Conditions and Co-morbidities  Goal: Patient's chronic conditions and co-morbidity symptoms are monitored and maintained or improved  Outcome: Adequate for Discharge  Flowsheets (Taken 3/15/2023 1204)  Care Plan - Patient's Chronic Conditions and Co-Morbidity Symptoms are Monitored and Maintained or Improved: Collaborate with multidisciplinary team to address chronic and comorbid conditions and prevent exacerbation or deterioration  Note: Patient educated over blood product transfusion protocol:    Patient receiving 1 unit of packed red blood cells:      -  Blood product transfusion information sheet given with questions answered. -  Blood consent signed  -  Take vital signs/ monitor lungs sounds prior to transfusion.  -  Monitor patient for 15 minutes after transfusion started. -  Take vital signs / monitor lungs sounds after first 15 minutes. -  Assess IV site.    -  Monitor patient closely for potential transfusion reaction.  -  Take vital signs /monitor lung sounds after the completion of transfusion. Call MD if develop complications prior to discharge. Care plan reviewed with patient and spouse. Patient and spouse verbalize understanding of the plan of care and contribute to goal setting.

## 2023-03-15 NOTE — DISCHARGE INSTRUCTIONS
Patient tolerated transfusion of packed red blood cells and port flush with lab draw without any complications. Patient kept for 20 minutes observation post transfusion. Patient denies any fever/chills, pain on the sides of the torso or back, shortness of breath, difficulty swallowing, itchy skin, rash, nausea, vomiting, or blood in the urine or dark colored-urine. Denies any dizziness, lightheadedness, acute nausea or vomiting, headache, chest pain/pressure, rash/itching, or an increase in shortness of breath. Patient instructed, although extremely rare, delayed reactions can occur days or weeks after the transfusion such as anemia, low-graded fever, jaundice, low blood pressure, wheezing, anxiety, or red-colored urine. Physician's instructions:  Reviewed labs and recent medical history  Holding chemotherapy today. Discussed his low HGB/HCT. Will transfuse him 1 units of blood. Will have patient return next Wednesday to see MD and possible treatment. (put him in at 9:30am)    Patient instructed if they experience any of the above symptoms following today's transfusion, he is to notify their physician immediately or go to the emergency department.

## 2023-03-15 NOTE — PROGRESS NOTES
Name: Jn Garcia  : 1955  MRN: [id number]    Oncology Navigation Follow-Up Note    Contact Type:  Medical Oncology    Notes:   Saw in infusion clinic.-accompanied by his wife, Carrie Castorena. Pancreatic Cancer. Recent discharge from hospital .  Creat trending down. Saw Urology yesterday-keeping cerda in for now. Very fatigued. Hgb 7.6-no treatment today, will be given one unit of blood. Will return to see Dr. Howard Barragan Wednesday, labs and possible treatment. Knows next time when feeling bad to call office-didn't prior to this hospital admission (which was his first)-feeling poorly for one week.   Electronically signed by Luca Diaz RN on 3/15/2023 at 10:50 AM

## 2023-03-15 NOTE — PROGRESS NOTES
Patient tolerated transfusion of packed red blood cells and port flush with lab draw without any complications. Patient kept for 20 minutes observation post transfusion. Patient denies any fever/chills, pain on the sides of the torso or back, shortness of breath, difficulty swallowing, itchy skin, rash, nausea, vomiting, or blood in the urine or dark colored-urine. Denies any dizziness, lightheadedness, acute nausea or vomiting, headache, chest pain/pressure, rash/itching, or an increase in shortness of breath. Patient instructed, although extremely rare, delayed reactions can occur days or weeks after the transfusion such as anemia, low-graded fever, jaundice, low blood pressure, wheezing, anxiety, or red-colored urine. Patient instructed if they experience any of the above symptoms following today's transfusion, he is to notify their physician immediately or go to the emergency department. Discharge instructions given to patient. Verbalizes understanding. Ambulated off unit per self, accompanied by spouse, with belongings.

## 2023-03-15 NOTE — PROGRESS NOTES
pancreas and pancreatic body measuring approximately 4.5 x 3.8 cm. There was sonographic evidence of invasion into the superior mesenteric artery and a few abnormal lymph nodes were visualized in the peripancreatic region. 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 9/19/2022. ERCP. There is no evidence of esophageal varices or gastric varices. There is stenosis in the main bile duct in the middle third. Sphincterotomy was performed. An uncovered metal stent was placed and he was discharged home. 9/21/2022. Patient followed up with his primary care physician Dr. Lilly Adam. Patient said that he felt better but was continuing to lose weight.    9/28/2022. PET scan showed that there was an FDG avid pancreatic mass highly suspicious for malignancy that was better seen on recent CT scan. Maximum SUV was 5.7 with areas of photopenia in the mass associated with hypoattenuation likely secondary to necrosis. The urinary bladder was markedly distended and there was bilateral hydroureter. Prostate was enlarged with calcifications. There is no evidence of mesenteric retroperitoneal pelvic or inguinal lymphadenopathy that was FDG avid. Degenerative changes were seen in the lumbar spine and pelvis without evidence of hypermetabolic avidity    Comorbidities include hypertension,hypothyroidism, anxiety, alcohol use disorder, nicotine use disorder. The patient said that he used to weigh 197 pounds and now he weighs 137. He says that he feels cold all of the time. 10/4/2022. Initial clinical nurse oncology navigation. Encouraged physical activity, smoking cessation, minimization of alcohol take, consideration of being seen back at [facility] for their input. 10/14/2022. Consultation with Dr. Muna Juan radiation oncology.   Collaborated with Dr. Muna Juan who wants to give radiation

## 2023-03-16 ENCOUNTER — CARE COORDINATION (OUTPATIENT)
Dept: CASE MANAGEMENT | Age: 68
End: 2023-03-16

## 2023-03-16 NOTE — CARE COORDINATION
[facility] Care Transitions Follow Up Call    Patient Current Location: [address]  Transition Nurse contacted the family by telephone to follow up after admission. Verified name and  with family as identifiers. Patient: Vipul Abbasi  Patient : 1955   MRN: <[id number]>  Reason for Admission: pancreatic cancer; LIZA  Discharge Date: 3/8/23 RARS: Readmission Risk Score: 19.8      Needs to be reviewed by the provider   Additional needs identified to be addressed with provider: No  none             Method of communication with provider: none. Pt's wife states he's doing as well as can be expected. He had a blood transfusion yesterday. Reports feeling somewhat weak and fatigued. States he's eating and drinking adequately. Henson catheter in place, draining clear yellow urine. Denies fevers or other flu-like symptoms. They deny questions or concerns at this time. Addressed changes since last contact:  none  Discussed follow-up appointments. If no appointment was previously scheduled, appointment scheduling offered: Yes. Is follow up appointment scheduled within 7 days of discharge? No.    Follow Up  Future Appointments   Date Time Provider Oziel Robles   3/22/2023  9:30 AM STR OUT PT ONC INJ RM 10 STRZ OP ONC [place] HOD   3/22/2023  9:45 AM Julieta Diaz MD N [facility]   2023  1:15 PM [facility]     [facility] follow up appointment(s):      Care Transition Nurse reviewed discharge instructions, medical action plan, and red flags with family and discussed any barriers to care and/or understanding of plan of care after discharge. Discussed appropriate site of care based on symptoms and resources available to patient including: PCP  Specialist  When to call 911. The family agrees to contact the PCP office for questions related to their healthcare.    Patients top risk factors for readmission: medical condition-   Interventions to address risk factors: Obtained and reviewed discharge summary and/or continuity of care documents and Assessment and support for treatment adherence and medication management-     Offered patient enrollment in the Remote Patient Monitoring (RPM) program for in-home monitoring: NA.     Care Transitions Subsequent and Final Call    Subsequent and Final Calls  Care Transitions Interventions  Other Interventions:             Care Transition Nurse provided contact information for future needs. Plan for follow-up call in 5-7 days based on severity of symptoms and risk factors.   Plan for next call: symptom management-   self management-     Sandra Hopkins

## 2023-03-19 NOTE — PROGRESS NOTES
Post-Discharge Transitional Care  Follow Up      Addison Abbasi   YOB: 1955    Date of Office Visit:  3/14/2023  Date of Hospital Admission: 3/1/23  Date of Hospital Discharge: 3/8/23  Risk of hospital readmission (high >=14%. Medium >=10%) :Readmission Risk Score: 19.8      Care management risk score Rising risk (score 2-5) and Complex Care (Scores >=6): No Risk Score On File     Non face to face  following discharge, date last encounter closed (first attempt may have been earlier): 03/09/2023    Call initiated 2 business days of discharge: Yes    ASSESSMENT/PLAN:   Urine retention  Malignant neoplasm of body of pancreas (HCC)  Hypothyroidism, unspecified type  -     TSH; Future  -     T4, Free; Future  Primary hypertension      Medical Decision Making: high complexity  No follow-ups on file. On this date 3/14/2023 I have spent 35 minutes reviewing previous notes, test results and face to face with the patient discussing the diagnosis and importance of compliance with the treatment plan as well as documenting on the day of the visit. Subjective:   HPI:  Follow up of Hospital problems/diagnosis(es):    Diagnosis Orders   1. Urine retention        2. Malignant neoplasm of body of pancreas ([address]        3. Hypothyroidism, unspecified type  TSH    T4, Free      4. Primary hypertension              Inpatient course: Discharge summary reviewed- see chart.     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 If you have any questions, ask your nurse or doctor. CHANGE how you take these medications      * levothyroxine 50 MCG tablet  Commonly known as: SYNTHROID  take 1 tablet by mouth once daily  What changed: medication strength  Changed by: Jory Bocanegra MD     * levothyroxine 200 MCG tablet  Commonly known as: SYNTHROID  Take 1 tablet by mouth daily  What changed: Another medication with the same name was changed. Make sure you understand how and when to take each. Changed by: Jory Bocanegra MD           * This list has 2 medication(s) that are the same as other medications prescribed for you. Read the directions carefully, and ask your doctor or other care provider to review them with you.                 CONTINUE taking these medications      MULTIVITAMIN PO     pantoprazole 40 MG tablet  Commonly known as: PROTONIX  take 1 tablet by mouth EVERY MORNING BEFORE BREAKFAST     tamsulosin 0.4 MG capsule  Commonly known as: FLOMAX  Take 2 capsules by mouth daily            STOP taking these medications      losartan 100 MG tablet  Commonly known as: COZAAR  Stopped by: Jory Bocanegra MD               Where to Get Your Medications        These medications were sent to [id number] - [place], [address] [facility]      Phone: [phone]   levothyroxine 200 MCG tablet  levothyroxine 50 MCG tablet           Medications marked \"taking\" at this time  Outpatient Medications Marked as Taking for the 3/14/23 encounter (Office Visit) with Jory Bocanegra MD   Medication Sig Dispense Refill    levothyroxine (SYNTHROID) 50 MCG tablet take 1 tablet by mouth once daily 90 tablet 1    levothyroxine (SYNTHROID) 200 MCG tablet Take 1 tablet by mouth daily 90 tablet 1    tamsulosin (FLOMAX) 0.4 MG capsule Take 2 capsules by mouth daily 30 capsule 3    pantoprazole (PROTONIX) 40 MG tablet take 1 tablet by mouth EVERY MORNING BEFORE BREAKFAST 90 tablet 3    [DISCONTINUED] dilTIAZem (DILACOR XR) 240 MG extended release capsule TAKE 1 CAPSULE DAILY 90 capsule 3    Multiple Vitamin (MULTIVITAMIN PO) Take 1 capsule by mouth daily. Medications patient taking as of now reconciled against medications ordered at time of hospital discharge: Yes    A comprehensive review of systems was negative except for what was noted in the HPI.     Objective:    /66 (Site: Left Upper Arm)   Pulse 72   Temp 97.7 °F (36.5 °C) (Oral)   Resp 16   Wt 133 lb (60.3 kg)   BMI 19.64 kg/m²   General Appearance: alert and oriented to person, place and time, well developed and well- nourished, in no acute distress  Skin: warm and dry, no rash or erythema  Head: normocephalic and atraumatic  Eyes: pupils equal, round, and reactive to light, extraocular eye movements intact, conjunctivae normal  ENT: tympanic membrane, external ear and ear canal normal bilaterally, nose without deformity, nasal mucosa and turbinates normal without polyps  Neck: supple and non-tender without mass, no thyromegaly or thyroid nodules, no cervical lymphadenopathy  Pulmonary/Chest: clear to auscultation bilaterally- no wheezes, rales or rhonchi, normal air movement, no respiratory distress  Cardiovascular: normal rate, regular rhythm, normal S1 and S2, no murmurs, rubs, clicks, or gallops, distal pulses intact, no carotid bruits  Abdomen: soft, non-tender, non-distended, normal bowel sounds, no masses or organomegaly  Extremities: no cyanosis, clubbing or edema  Musculoskeletal: normal range of motion, no joint swelling, deformity or tenderness  Neurologic: reflexes normal and symmetric, no cranial nerve deficit, gait, coordination and speech normal      An electronic signature was used to authenticate this note.   --Radha Batista MD

## 2023-03-20 RX ORDER — DILTIAZEM HYDROCHLORIDE 240 MG/1
CAPSULE, EXTENDED RELEASE ORAL
Qty: 90 CAPSULE | Refills: 3 | OUTPATIENT
Start: 2023-03-20

## 2023-03-22 ENCOUNTER — HOSPITAL ENCOUNTER (OUTPATIENT)
Dept: INFUSION THERAPY | Age: 68
Discharge: HOME | End: 2023-03-22
Payer: MEDICARE

## 2023-03-22 ENCOUNTER — OFFICE VISIT (OUTPATIENT)
Dept: ONCOLOGY | Age: 68
End: 2023-03-22
Payer: MEDICARE

## 2023-03-22 ENCOUNTER — CLINICAL DOCUMENTATION (OUTPATIENT)
Dept: CASE MANAGEMENT | Age: 68
End: 2023-03-22

## 2023-03-22 VITALS
TEMPERATURE: 97.4 F | SYSTOLIC BLOOD PRESSURE: 89 MMHG | DIASTOLIC BLOOD PRESSURE: 53 MMHG | RESPIRATION RATE: 16 BRPM | WEIGHT: 126.6 LBS | HEART RATE: 102 BPM | OXYGEN SATURATION: 95 % | HEIGHT: 69 IN | BODY MASS INDEX: 18.75 KG/M2

## 2023-03-22 VITALS
TEMPERATURE: 97.4 F | DIASTOLIC BLOOD PRESSURE: 53 MMHG | BODY MASS INDEX: 18.75 KG/M2 | RESPIRATION RATE: 16 BRPM | SYSTOLIC BLOOD PRESSURE: 89 MMHG | HEART RATE: 102 BPM | OXYGEN SATURATION: 95 % | HEIGHT: 69 IN | WEIGHT: 126.6 LBS

## 2023-03-22 DIAGNOSIS — D50.8 OTHER IRON DEFICIENCY ANEMIA: ICD-10-CM

## 2023-03-22 DIAGNOSIS — C25.1 MALIGNANT NEOPLASM OF BODY OF PANCREAS (HCC): ICD-10-CM

## 2023-03-22 DIAGNOSIS — N39.0 URINARY TRACT INFECTION WITHOUT HEMATURIA, SITE UNSPECIFIED: ICD-10-CM

## 2023-03-22 DIAGNOSIS — C25.1 MALIGNANT NEOPLASM OF BODY OF PANCREAS (HCC): Primary | ICD-10-CM

## 2023-03-22 DIAGNOSIS — I95.1 ORTHOSTATIC HYPOTENSION: Primary | ICD-10-CM

## 2023-03-22 LAB
ABSOLUTE IMMATURE GRANULOCYTE: 0.27 THOU/MM3 (ref 0–0.07)
ALBUMIN SERPL BCG-MCNC: 2.2 G/DL (ref 3.5–5.1)
ALP SERPL-CCNC: 303 U/L (ref 38–126)
ALT SERPL W/O P-5'-P-CCNC: 6 U/L (ref 11–66)
AST SERPL-CCNC: 18 U/L (ref 5–40)
BASOPHILS ABSOLUTE: 0 THOU/MM3 (ref 0–0.1)
BASOPHILS NFR BLD AUTO: 0 % (ref 0–3)
BILIRUB CONJ SERPL-MCNC: < 0.2 MG/DL (ref 0–0.3)
BILIRUB SERPL-MCNC: 0.4 MG/DL (ref 0.3–1.2)
BUN BLDP-MCNC: 16 MG/DL (ref 8–26)
CHLORIDE BLD-SCNC: 101 MEQ/L (ref 98–109)
CREAT BLD-MCNC: 1.6 MG/DL (ref 0.5–1.2)
EOSINOPHIL NFR BLD AUTO: 0 % (ref 0–4)
EOSINOPHILS ABSOLUTE: 0 THOU/MM3 (ref 0–0.4)
ERYTHROCYTE [DISTWIDTH] IN BLOOD BY AUTOMATED COUNT: 14.9 % (ref 11.5–14.5)
GFR SERPL CREATININE-BSD FRML MDRD: 46 ML/MIN/1.73M2
GLUCOSE BLD-MCNC: 125 MG/DL (ref 70–108)
HCT VFR BLD AUTO: 29 % (ref 42–52)
HGB BLD-MCNC: 9.1 GM/DL (ref 14–18)
IMMATURE GRANULOCYTES: 2 %
IONIZED CALCIUM, WHOLE BLOOD: 1.09 MMOL/L (ref 1.12–1.32)
LYMPHOCYTES ABSOLUTE: 0.6 THOU/MM3 (ref 1–4.8)
LYMPHOCYTES NFR BLD AUTO: 3 % (ref 15–47)
MCH RBC QN AUTO: 28.5 PG (ref 26–33)
MCHC RBC AUTO-ENTMCNC: 31.4 GM/DL (ref 32.2–35.5)
MCV RBC AUTO: 91 FL (ref 80–94)
MONOCYTES ABSOLUTE: 1.4 THOU/MM3 (ref 0.4–1.3)
MONOCYTES NFR BLD AUTO: 8 % (ref 0–12)
NEUTROPHILS NFR BLD AUTO: 87 % (ref 43–75)
PLATELET # BLD AUTO: 382 THOU/MM3 (ref 130–400)
PMV BLD AUTO: 9 FL (ref 9.4–12.4)
POTASSIUM BLD-SCNC: 3.4 MEQ/L (ref 3.5–4.9)
PROT SERPL-MCNC: 6.1 G/DL (ref 6.1–8)
RBC # BLD AUTO: 3.19 MILL/MM3 (ref 4.7–6.1)
SEGMENTED NEUTROPHILS ABSOLUTE COUNT: 16 THOU/MM3 (ref 1.8–7.7)
SODIUM BLD-SCNC: 136 MEQ/L (ref 138–146)
TOTAL CO2, WHOLE BLOOD: 23 MEQ/L (ref 23–33)
WBC # BLD AUTO: 18.3 THOU/MM3 (ref 4.8–10.8)

## 2023-03-22 PROCEDURE — 6360000002 HC RX W HCPCS: Performed by: INTERNAL MEDICINE

## 2023-03-22 PROCEDURE — 36591 DRAW BLOOD OFF VENOUS DEVICE: CPT

## 2023-03-22 PROCEDURE — 80076 HEPATIC FUNCTION PANEL: CPT

## 2023-03-22 PROCEDURE — 2580000003 HC RX 258: Performed by: INTERNAL MEDICINE

## 2023-03-22 PROCEDURE — 1124F ACP DISCUSS-NO DSCNMKR DOCD: CPT | Performed by: INTERNAL MEDICINE

## 2023-03-22 PROCEDURE — 3078F DIAST BP <80 MM HG: CPT | Performed by: INTERNAL MEDICINE

## 2023-03-22 PROCEDURE — 99211 OFF/OP EST MAY X REQ PHY/QHP: CPT

## 2023-03-22 PROCEDURE — 1111F DSCHRG MED/CURRENT MED MERGE: CPT | Performed by: INTERNAL MEDICINE

## 2023-03-22 PROCEDURE — 80047 BASIC METABLC PNL IONIZED CA: CPT

## 2023-03-22 PROCEDURE — 3017F COLORECTAL CA SCREEN DOC REV: CPT | Performed by: INTERNAL MEDICINE

## 2023-03-22 PROCEDURE — 85025 COMPLETE CBC W/AUTO DIFF WBC: CPT

## 2023-03-22 PROCEDURE — 99215 OFFICE O/P EST HI 40 MIN: CPT | Performed by: INTERNAL MEDICINE

## 2023-03-22 PROCEDURE — 4004F PT TOBACCO SCREEN RCVD TLK: CPT | Performed by: INTERNAL MEDICINE

## 2023-03-22 PROCEDURE — G8420 CALC BMI NORM PARAMETERS: HCPCS | Performed by: INTERNAL MEDICINE

## 2023-03-22 PROCEDURE — 3074F SYST BP LT 130 MM HG: CPT | Performed by: INTERNAL MEDICINE

## 2023-03-22 PROCEDURE — G8484 FLU IMMUNIZE NO ADMIN: HCPCS | Performed by: INTERNAL MEDICINE

## 2023-03-22 PROCEDURE — G8427 DOCREV CUR MEDS BY ELIG CLIN: HCPCS | Performed by: INTERNAL MEDICINE

## 2023-03-22 RX ORDER — DEXAMETHASONE 4 MG/1
4 TABLET ORAL
Qty: 30 TABLET | Refills: 0 | Status: ON HOLD | OUTPATIENT
Start: 2023-03-22 | End: 2023-04-21

## 2023-03-22 RX ORDER — SODIUM CHLORIDE 0.9 % (FLUSH) 0.9 %
5-40 SYRINGE (ML) INJECTION PRN
Status: CANCELLED | OUTPATIENT
Start: 2023-03-22

## 2023-03-22 RX ORDER — SODIUM CHLORIDE 0.9 % (FLUSH) 0.9 %
5-40 SYRINGE (ML) INJECTION PRN
Status: DISCONTINUED | OUTPATIENT
Start: 2023-03-22 | End: 2023-03-23 | Stop reason: HOSPADM

## 2023-03-22 RX ORDER — 0.9 % SODIUM CHLORIDE 0.9 %
1000 INTRAVENOUS SOLUTION INTRAVENOUS ONCE
Status: CANCELLED
Start: 2023-03-22 | End: 2023-03-22

## 2023-03-22 RX ORDER — SODIUM CHLORIDE 9 MG/ML
25 INJECTION, SOLUTION INTRAVENOUS PRN
Status: CANCELLED | OUTPATIENT
Start: 2023-03-22

## 2023-03-22 RX ORDER — SERTRALINE HYDROCHLORIDE 25 MG/1
25 TABLET, FILM COATED ORAL DAILY
Qty: 30 TABLET | Refills: 5 | Status: ON HOLD | OUTPATIENT
Start: 2023-03-22

## 2023-03-22 RX ORDER — SULFAMETHOXAZOLE AND TRIMETHOPRIM 800; 160 MG/1; MG/1
1 TABLET ORAL 2 TIMES DAILY
Qty: 10 TABLET | Refills: 0 | Status: SHIPPED | OUTPATIENT
Start: 2023-03-22 | End: 2023-03-27

## 2023-03-22 RX ORDER — HEPARIN SODIUM (PORCINE) LOCK FLUSH IV SOLN 100 UNIT/ML 100 UNIT/ML
500 SOLUTION INTRAVENOUS PRN
Status: DISCONTINUED | OUTPATIENT
Start: 2023-03-22 | End: 2023-03-23 | Stop reason: HOSPADM

## 2023-03-22 RX ORDER — HEPARIN SODIUM (PORCINE) LOCK FLUSH IV SOLN 100 UNIT/ML 100 UNIT/ML
500 SOLUTION INTRAVENOUS PRN
Status: CANCELLED | OUTPATIENT
Start: 2023-03-22

## 2023-03-22 RX ADMIN — Medication 500 UNITS: at 10:42

## 2023-03-22 RX ADMIN — SODIUM CHLORIDE, PRESERVATIVE FREE 20 ML: 5 INJECTION INTRAVENOUS at 09:36

## 2023-03-22 RX ADMIN — SODIUM CHLORIDE, PRESERVATIVE FREE 10 ML: 5 INJECTION INTRAVENOUS at 10:42

## 2023-03-22 RX ADMIN — SODIUM CHLORIDE, PRESERVATIVE FREE 10 ML: 5 INJECTION INTRAVENOUS at 09:35

## 2023-03-22 NOTE — PATIENT INSTRUCTIONS
Reviewed labs and recent medical history  Holding chemotherapy today. Discussed his white blood cell count that is elevated. Reviewed symptoms that may reflect an infection. Has a cerda catheter, will treat with Bactrim. Script sent to pharmacy. Does not need a transfusion today. Discussed his depression and will start him on sertraline (Zoloft 25mg) daily. Discussed his appetite and will start him on 4 mg of Decadron daily. Script sent to pharmacy.   Return to see MD in 1 week

## 2023-03-22 NOTE — DISCHARGE INSTRUCTIONS
Please contact your Oncologist if you have any questions regarding the port flush with lab draw that you received today. Patient instructed if experience any of the symptoms following today's port flush with lab draw to notify MD immediately or go to emergency department. * dizziness/lightheadedness  *acute nausea/vomiting - not relieved with medication  *headache - not relieved from Tylenol/pain medication  *chest pain/pressure  *rash/itching  *shortness of breath        Drink fluids - 48oz fluids daily  Call if develop fever/ chills/ signs or symptoms of infection     Physician's instructions:  Reviewed labs and recent medical history  Holding chemotherapy today. Discussed his white blood cell count that is elevated. Reviewed symptoms that may reflect an infection. Has a cerda catheter, will treat with Bactrim. Script sent to pharmacy. Does not need a transfusion today. Discussed his depression and will start him on sertraline (Zoloft 25mg) daily. Discussed his appetite and will start him on 4 mg of Decadron daily. Script sent to pharmacy.   Return to see MD in 1 week

## 2023-03-22 NOTE — PLAN OF CARE
Problem: Safety - Adult  Goal: Free from fall injury  Outcome: Adequate for Discharge  Flowsheets (Taken 3/22/2023 1621)  Free From Fall Injury: Instruct family/caregiver on patient safety     Problem: Discharge Planning  Goal: Discharge to home or other facility with appropriate resources  Outcome: Adequate for Discharge  Flowsheets (Taken 3/22/2023 1621)  Discharge to home or other facility with appropriate resources: Identify barriers to discharge with patient and caregiver     Problem: Infection - Adult  Goal: Absence of infection at discharge  Outcome: Adequate for Discharge  Flowsheets (Taken 3/22/2023 1621)  Absence of infection at discharge: Monitor all insertion sites i.e., indwelling lines, tubes and drains  Note: Mediport site with no redness or warmth. Skin over port site intact with no signs of breakdown noted. Patient verbalizes signs/symptoms of port infection and when to notify the physician. Goal: Absence of fever/infection during anticipated neutropenic period  Outcome: Adequate for Discharge     Care plan reviewed with patient and spouse. Patient and spouse verbalize understanding of the plan of care and contribute to goal setting.

## 2023-03-22 NOTE — PROGRESS NOTES
says that his appetite is good. His ankles are swollen. He has a prominent abdomen and possibly has ascites. We discussed doing next generation sequencing with a liquid biopsy. His original tumor biopsy was an FNA that was done at [facility]. It is likely there will not be enough tissue to do PD-L1 testing. Guidelines suggest that pembrolizumab  can be done for MSI high or advanced TMB greater than 10 mutations per megabyte or deficiency and mismatch repair genes. FOLFOX or FOLFIRI certainly can be given as well. We will also recheck his CA 19 9.     3/1/2023. The last time I saw Mr. Stephanie Sanchez was on 1/24/2023. At that time we had discussed the fact that he had progressive disease. He had new lesions in his liver. Foundation 1 testing was done as well as repeat CA 19-9 and PET scan. He had developed ascites and peripheral edema. I encouraged him to wear support stockings, cut down on the salt in his diet and monitor his weight. His albumin had been markedly low. He had profound weight loss unable. My plan at that time was to see him in 1 week. Due to my illness, I was unable to see him. He was seen by Dr. Nida Alegria. The patient was started on FOLFIRINOX. He tolerated it fairly well receiving his dose on 2/15/2023. Today he comes in feeling ill. Call in the interim for any assistance. He was drinking a lot of fluid. Wife is forcing him to eat. He had lost another 5 pounds. Laboratory data was markedly abnormal with a creatinine of 7.4 and a CO2 of 9. His serum sodium is 131 and his hemoglobin and hematocrit were markedly low with a hemoglobin of 6.6 19.6. With these findings he was sent to the emergency department for further evaluation and care. 3/1 through 3/8/2023. He was admitted to the hospital acute renal failure related to cisplatin, oxaliplatin and dehydration. He also had urinary retention with bilateral hydronephrosis. His creatinine was 7.1 on admission.   With

## 2023-03-22 NOTE — PROGRESS NOTES
Patient tolerated port flush with lab draw without any complications. Last vital signs:   BP (!) 89/53   Pulse (!) 102   Temp 97.4 °F (36.3 °C) (Oral)   Resp 16   Ht 5' 9\" (1.753 m)   Wt 126 lb 9.6 oz (57.4 kg)   SpO2 95%   BMI 18.70 kg/m²     Physician's instructions:  Reviewed labs and recent medical history  Holding chemotherapy today. Discussed his white blood cell count that is elevated. Reviewed symptoms that may reflect an infection. Has a cerda catheter, will treat with Bactrim. Script sent to pharmacy. Does not need a transfusion today. Discussed his depression and will start him on sertraline (Zoloft 25mg) daily. Discussed his appetite and will start him on 4 mg of Decadron daily. Script sent to pharmacy. Return to see MD in 1 week    Patient instructed if experience any symptoms following today's port flush with lab draw, he is to notify MD immediately or go to the emergency department. Discharge instructions given to patient. Verbalizes understanding. Wheelchair assisted off unit by spouse, with belongings.

## 2023-03-23 ENCOUNTER — CARE COORDINATION (OUTPATIENT)
Dept: CASE MANAGEMENT | Age: 68
End: 2023-03-23

## 2023-03-23 NOTE — PROGRESS NOTES
Name: Bari Thompson  : 1955  MRN: [id number]    Oncology Navigation Follow-Up Note    Contact Type:  Medical Oncology    Notes:   Met with Christina Sheets and his wife Adrianna Alvarez during follow up with Dr. Serge Milian. Needed wheelchair-difficulty ambulating  He has lost 7# in one week. Hard to feel like eating. Still has indwelling catheter. Drinking less. Adrianna Alvarez reports that he has been very \"down\" and sleeps most of the time. Dr. Serge Milian encouraged being awake and up more. Increase fluid intake. Lab work actually better:  WBC 18, Hgb 9.1, creat 1.6  BP down 89/56    New scripts: Zoloft, Decadron  Bactrim ordered for suspected UTI. Encouraged to call with any change. Will return for follow up and possible treatment 3/29. Assisted to car via wheel chair.           Electronically signed by Nedra Mercer RN on 3/23/2023 at 1:00 PM

## 2023-03-23 NOTE — CARE COORDINATION
[facility] Care Transitions Follow Up Call    Patient: Emory Abbasi  Patient : 1955   MRN: <[id number]>  Reason for Admission: pancreatic cancer; LIZA  Discharge Date: 3/8/23 RARS: Readmission Risk Score: 19.8    Attempted to contact patient for follow up transition call. Left voicemail message to return call with an update on condition since discharge. Contact information provided. Will continue to follow up.     Follow Up  Future Appointments   Date Time Provider Oziel Robles   3/29/2023  8:15 AM STR OUT PT ONC INJ RM 10 STRZ OP ONC [place] HOD   3/29/2023  8:45 AM Ashu Atkins MD  Oncology [facility] [address] [facility]   2023  1:15 PM Antonio Cid MD [facility] Transitions Subsequent and Final Call    Subsequent and Final Calls  Care Transitions Interventions     Transportation Support: Declined   Other Interventions:           Leann Mera LPN   [phone]

## 2023-03-29 ENCOUNTER — CLINICAL DOCUMENTATION (OUTPATIENT)
Dept: CASE MANAGEMENT | Age: 68
End: 2023-03-29

## 2023-03-29 ENCOUNTER — HOSPITAL ENCOUNTER (OUTPATIENT)
Dept: INFUSION THERAPY | Age: 68
Discharge: HOME | End: 2023-03-29
Payer: MEDICARE

## 2023-03-29 ENCOUNTER — OFFICE VISIT (OUTPATIENT)
Dept: ONCOLOGY | Age: 68
End: 2023-03-29
Payer: MEDICARE

## 2023-03-29 VITALS
OXYGEN SATURATION: 99 % | DIASTOLIC BLOOD PRESSURE: 62 MMHG | RESPIRATION RATE: 16 BRPM | HEART RATE: 82 BPM | HEIGHT: 69 IN | SYSTOLIC BLOOD PRESSURE: 101 MMHG | TEMPERATURE: 97.6 F | WEIGHT: 121.6 LBS | BODY MASS INDEX: 18.01 KG/M2

## 2023-03-29 VITALS
WEIGHT: 121.6 LBS | TEMPERATURE: 97.6 F | DIASTOLIC BLOOD PRESSURE: 62 MMHG | SYSTOLIC BLOOD PRESSURE: 101 MMHG | HEIGHT: 69 IN | HEART RATE: 82 BPM | RESPIRATION RATE: 16 BRPM | OXYGEN SATURATION: 99 % | BODY MASS INDEX: 18.01 KG/M2

## 2023-03-29 DIAGNOSIS — C25.1 MALIGNANT NEOPLASM OF BODY OF PANCREAS (HCC): ICD-10-CM

## 2023-03-29 DIAGNOSIS — C25.1 MALIGNANT NEOPLASM OF BODY OF PANCREAS (HCC): Primary | ICD-10-CM

## 2023-03-29 DIAGNOSIS — I95.1 ORTHOSTATIC HYPOTENSION: Primary | ICD-10-CM

## 2023-03-29 LAB
ABSOLUTE IMMATURE GRANULOCYTE: 0.24 THOU/MM3 (ref 0–0.07)
ALBUMIN SERPL BCG-MCNC: 2.2 G/DL (ref 3.5–5.1)
ALP SERPL-CCNC: 318 U/L (ref 38–126)
ALT SERPL W/O P-5'-P-CCNC: 27 U/L (ref 11–66)
AST SERPL-CCNC: 31 U/L (ref 5–40)
BASOPHILS ABSOLUTE: 0 THOU/MM3 (ref 0–0.1)
BASOPHILS NFR BLD AUTO: 0 % (ref 0–3)
BILIRUB CONJ SERPL-MCNC: < 0.2 MG/DL (ref 0–0.3)
BILIRUB SERPL-MCNC: 0.4 MG/DL (ref 0.3–1.2)
BUN BLDP-MCNC: 37 MG/DL (ref 8–26)
CHLORIDE BLD-SCNC: 101 MEQ/L (ref 98–109)
CREAT BLD-MCNC: 1.8 MG/DL (ref 0.5–1.2)
EOSINOPHIL NFR BLD AUTO: 0 % (ref 0–4)
EOSINOPHILS ABSOLUTE: 0 THOU/MM3 (ref 0–0.4)
ERYTHROCYTE [DISTWIDTH] IN BLOOD BY AUTOMATED COUNT: 15.5 % (ref 11.5–14.5)
GFR SERPL CREATININE-BSD FRML MDRD: 40 ML/MIN/1.73M2
GLUCOSE BLD-MCNC: 192 MG/DL (ref 70–108)
HCT VFR BLD AUTO: 26.7 % (ref 42–52)
HGB BLD-MCNC: 8.5 GM/DL (ref 14–18)
IMMATURE GRANULOCYTES: 1 %
IONIZED CALCIUM, WHOLE BLOOD: 1.19 MMOL/L (ref 1.12–1.32)
LYMPHOCYTES ABSOLUTE: 0.4 THOU/MM3 (ref 1–4.8)
LYMPHOCYTES NFR BLD AUTO: 2 % (ref 15–47)
MCH RBC QN AUTO: 28.1 PG (ref 26–33)
MCHC RBC AUTO-ENTMCNC: 31.8 GM/DL (ref 32.2–35.5)
MCV RBC AUTO: 88 FL (ref 80–94)
MONOCYTES ABSOLUTE: 0.6 THOU/MM3 (ref 0.4–1.3)
MONOCYTES NFR BLD AUTO: 3 % (ref 0–12)
NEUTROPHILS NFR BLD AUTO: 94 % (ref 43–75)
PLATELET # BLD AUTO: 112 THOU/MM3 (ref 130–400)
PMV BLD AUTO: 10.4 FL (ref 9.4–12.4)
POTASSIUM BLD-SCNC: 3.5 MEQ/L (ref 3.5–4.9)
PROT SERPL-MCNC: 5.7 G/DL (ref 6.1–8)
RBC # BLD AUTO: 3.02 MILL/MM3 (ref 4.7–6.1)
SEGMENTED NEUTROPHILS ABSOLUTE COUNT: 18 THOU/MM3 (ref 1.8–7.7)
SODIUM BLD-SCNC: 136 MEQ/L (ref 138–146)
TOTAL CO2, WHOLE BLOOD: 22 MEQ/L (ref 23–33)
WBC # BLD AUTO: 19.3 THOU/MM3 (ref 4.8–10.8)

## 2023-03-29 PROCEDURE — G8484 FLU IMMUNIZE NO ADMIN: HCPCS | Performed by: INTERNAL MEDICINE

## 2023-03-29 PROCEDURE — 1111F DSCHRG MED/CURRENT MED MERGE: CPT | Performed by: INTERNAL MEDICINE

## 2023-03-29 PROCEDURE — 99211 OFF/OP EST MAY X REQ PHY/QHP: CPT

## 2023-03-29 PROCEDURE — 1123F ACP DISCUSS/DSCN MKR DOCD: CPT | Performed by: INTERNAL MEDICINE

## 2023-03-29 PROCEDURE — 99214 OFFICE O/P EST MOD 30 MIN: CPT | Performed by: INTERNAL MEDICINE

## 2023-03-29 PROCEDURE — 85025 COMPLETE CBC W/AUTO DIFF WBC: CPT

## 2023-03-29 PROCEDURE — 80076 HEPATIC FUNCTION PANEL: CPT

## 2023-03-29 PROCEDURE — 2580000003 HC RX 258: Performed by: INTERNAL MEDICINE

## 2023-03-29 PROCEDURE — G8427 DOCREV CUR MEDS BY ELIG CLIN: HCPCS | Performed by: INTERNAL MEDICINE

## 2023-03-29 PROCEDURE — 36591 DRAW BLOOD OFF VENOUS DEVICE: CPT

## 2023-03-29 PROCEDURE — 4004F PT TOBACCO SCREEN RCVD TLK: CPT | Performed by: INTERNAL MEDICINE

## 2023-03-29 PROCEDURE — 3074F SYST BP LT 130 MM HG: CPT | Performed by: INTERNAL MEDICINE

## 2023-03-29 PROCEDURE — G8420 CALC BMI NORM PARAMETERS: HCPCS | Performed by: INTERNAL MEDICINE

## 2023-03-29 PROCEDURE — 80047 BASIC METABLC PNL IONIZED CA: CPT

## 2023-03-29 PROCEDURE — 3078F DIAST BP <80 MM HG: CPT | Performed by: INTERNAL MEDICINE

## 2023-03-29 PROCEDURE — 3017F COLORECTAL CA SCREEN DOC REV: CPT | Performed by: INTERNAL MEDICINE

## 2023-03-29 PROCEDURE — 6360000002 HC RX W HCPCS: Performed by: INTERNAL MEDICINE

## 2023-03-29 RX ORDER — 0.9 % SODIUM CHLORIDE 0.9 %
1000 INTRAVENOUS SOLUTION INTRAVENOUS ONCE
Start: 2023-03-29 | End: 2023-03-29

## 2023-03-29 RX ORDER — SODIUM CHLORIDE 0.9 % (FLUSH) 0.9 %
5-40 SYRINGE (ML) INJECTION PRN
OUTPATIENT
Start: 2023-03-29

## 2023-03-29 RX ORDER — SODIUM CHLORIDE 9 MG/ML
25 INJECTION, SOLUTION INTRAVENOUS PRN
OUTPATIENT
Start: 2023-03-29

## 2023-03-29 RX ORDER — SODIUM CHLORIDE 0.9 % (FLUSH) 0.9 %
5-40 SYRINGE (ML) INJECTION PRN
Status: DISCONTINUED | OUTPATIENT
Start: 2023-03-29 | End: 2023-03-30 | Stop reason: HOSPADM

## 2023-03-29 RX ORDER — HEPARIN SODIUM (PORCINE) LOCK FLUSH IV SOLN 100 UNIT/ML 100 UNIT/ML
500 SOLUTION INTRAVENOUS PRN
OUTPATIENT
Start: 2023-03-29

## 2023-03-29 RX ORDER — HEPARIN SODIUM (PORCINE) LOCK FLUSH IV SOLN 100 UNIT/ML 100 UNIT/ML
500 SOLUTION INTRAVENOUS PRN
Status: DISCONTINUED | OUTPATIENT
Start: 2023-03-29 | End: 2023-03-30 | Stop reason: HOSPADM

## 2023-03-29 RX ADMIN — SODIUM CHLORIDE, PRESERVATIVE FREE 20 ML: 5 INJECTION INTRAVENOUS at 08:46

## 2023-03-29 RX ADMIN — SODIUM CHLORIDE, PRESERVATIVE FREE 10 ML: 5 INJECTION INTRAVENOUS at 09:41

## 2023-03-29 RX ADMIN — HEPARIN 500 UNITS: 100 SYRINGE at 09:41

## 2023-03-29 RX ADMIN — SODIUM CHLORIDE, PRESERVATIVE FREE 10 ML: 5 INJECTION INTRAVENOUS at 08:45

## 2023-03-29 NOTE — PATIENT INSTRUCTIONS
Reviewed labs and recent medical history. No treatment today. Discussed his weight loss and continued to encourage increase caloric intake. Discussed treatment options including weekly Taxol if patient would still like to continue treatment. Plan for weekly Taxol placed. Will have patient return next Wednesday 4/5/23 to see MD and possible treatment.

## 2023-03-29 NOTE — PROGRESS NOTES
off to recover before we continue his chemotherapy. He continues to have bursts of energy and then extremely fatigued. He denies any fevers, chills, sweats, bleeding, nausea, vomiting, constipation and diarrhea. Today is his birthday. His appetite is getting slightly better. 2022. Mr. 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 He is due to receive his chemotherapy this week. He has trouble to keep his weight on. On 1215 he weighed 134  Weighs 131. He is 5 feet 9 inches tall. 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 Denies significant pain. He has had no fevers, chills, sweats, bleeding, nausea or vomiting constipation or diarrhea. He says that his appetite is getting better. His counts are adequate for treatment today. 2023. Mr. Warren Brittle returns to the office after having had a CAT scan of his abdomen and pelvis ordered by radiation oncology. This shows that the mass in the head of the pancreas measured 52 x 53 mm previously measuring 55 x 48 mm. It also showed a new lesion in the liver being 13 mm in the dome of the right lobe suspicious for metastasis. There is some mild decrease in the size of lymph nodes adjacent to the pancreas from 14 to 12 mm. Has enteric lymph nodes previously measured 816 mm and now measures 14 mm. A peripancreatic lymph node had decreased as noted above. Obviously Mr. Warren Brittle is upset by this information. He had been scheduled for another cycle of chemotherapy with cis-platinum and etoposide but he has had complications with each cycle and if this is not keeping his disease and check it does not make sense to go forward with such toxic therapy. He had required fluids and transfusions. His blood pressure was low he had orthostatic dizziness.   Today he says that he has had no fevers, chills, sweats, bleeding, nausea or vomiting, constipation

## 2023-03-29 NOTE — PLAN OF CARE
Problem: Safety - Adult  Goal: Free from fall injury  Outcome: Adequate for Discharge  Flowsheets (Taken 3/29/2023 1503)  Free From Fall Injury: Instruct family/caregiver on patient safety     Problem: Discharge Planning  Goal: Discharge to home or other facility with appropriate resources  Outcome: Adequate for Discharge  Flowsheets (Taken 3/29/2023 1503)  Discharge to home or other facility with appropriate resources: Identify barriers to discharge with patient and caregiver     Problem: Infection - Adult  Goal: Absence of infection at discharge  Outcome: Adequate for Discharge  Flowsheets (Taken 3/29/2023 1503)  Absence of infection at discharge: Monitor all insertion sites i.e., indwelling lines, tubes and drains  Note: Mediport site with no redness or warmth. Skin over port site intact with no signs of breakdown noted. Patient verbalizes signs/symptoms of port infection and when to notify the physician. Goal: Absence of fever/infection during anticipated neutropenic period  Outcome: Adequate for Discharge     Care plan reviewed with patient and spouse. Patient and spouse verbalize understanding of the plan of care and contribute to goal setting.

## 2023-03-29 NOTE — PROGRESS NOTES
Name: Mena Siemens  : 1955  MRN: [id number]    Oncology Navigation Follow-Up Note    Contact Type:  Medical Oncology    Notes:   Met with Tommy Frye and his wife during follow up with Dr. Elizabeth Spence. Better today, did, though, lose 5 more pounds. Willing to try new therapy, Taxol. Dr. Elizabeth Spence went over in detail. Plans on starting next Wednesday. Encouraged to eat, move more, not sleep as much and drink fluids. Verbalized understanding. Marcy Welch will call if any changes.     Electronically signed by Myke Zurita RN on 3/29/2023 at 3:27 PM

## 2023-03-29 NOTE — PROGRESS NOTES
Patient tolerated port flush with lab draw without any complications. Last vital signs:   /62   Pulse 82   Temp 97.6 °F (36.4 °C) (Oral)   Resp 16   Ht 5' 9\" (1.753 m)   Wt 121 lb 9.6 oz (55.2 kg)   SpO2 99%   BMI 17.96 kg/m²     Physicians' instructions:  Reviewed labs and recent medical history. No treatment today. Discussed his weight loss and continued to encourage increase caloric intake. Discussed treatment options including weekly Taxol if patient would still like to continue treatment. Plan for weekly Taxol placed. Will have patient return next Wednesday 4/5/23 to see MD and possible treatment. Patient instructed if experience any symptoms following today's port flush with lab draw, he is to notify MD immediately or go to the emergency department. Discharge instructions given to patient. Verbalizes understanding. Wheelchair assisted off unit by spouse, with belongings.

## 2023-03-29 NOTE — DISCHARGE INSTRUCTIONS
Please contact your Oncologist if you have any questions regarding the port flush with lab draw that you received today. Patient instructed if experience any of the symptoms following today's port flush with lab draw to notify MD immediately or go to emergency department. * dizziness/lightheadedness  *acute nausea/vomiting - not relieved with medication  *headache - not relieved from Tylenol/pain medication  *chest pain/pressure  *rash/itching  *shortness of breath        Drink fluids - 48oz fluids daily  Call if develop fever/ chills/ signs or symptoms of infection     Physicians' instructions:  Reviewed labs and recent medical history. No treatment today. Discussed his weight loss and continued to encourage increase caloric intake. Discussed treatment options including weekly Taxol if patient would still like to continue treatment. Plan for weekly Taxol placed. Will have patient return next Wednesday 4/5/23 to see MD and possible treatment.

## 2023-03-30 PROBLEM — I95.9 HYPOTENSION: Status: ACTIVE | Noted: 2023-01-01

## 2023-03-30 PROBLEM — E43 SEVERE MALNUTRITION (HCC): Chronic | Status: ACTIVE | Noted: 2023-01-01

## 2023-03-31 PROBLEM — N18.4 CKD (CHRONIC KIDNEY DISEASE), STAGE IV (HCC): Status: ACTIVE | Noted: 2023-01-01

## 2023-04-01 LAB
EKG ATRIAL RATE: 59 BPM
EKG P AXIS: 32 DEGREES
EKG P-R INTERVAL: 152 MS
EKG Q-T INTERVAL: 424 MS
EKG QRS DURATION: 88 MS
EKG QTC CALCULATION (BAZETT): 419 MS
EKG R AXIS: 38 DEGREES
EKG T AXIS: 65 DEGREES
EKG VENTRICULAR RATE: 59 BPM

## 2023-04-04 PROBLEM — R55 SYNCOPE AND COLLAPSE: Status: ACTIVE | Noted: 2023-04-04

## 2023-04-10 NOTE — PLAN OF CARE
Problem: Discharge Planning  Goal: Discharge to home or other facility with appropriate resources  Outcome: Progressing  Flowsheets (Taken 4/10/2023 0505)  Discharge to home or other facility with appropriate resources: Identify barriers to discharge with patient and caregiver     Problem: Skin/Tissue Integrity  Goal: Absence of new skin breakdown  Description: 1. Monitor for areas of redness and/or skin breakdown  2. Assess vascular access sites hourly  3. Every 4-6 hours minimum:  Change oxygen saturation probe site  4. Every 4-6 hours:  If on nasal continuous positive airway pressure, respiratory therapy assess nares and determine need for appliance change or resting period. Outcome: Progressing  Note: Ongoing assessment & interventions provided throughout shift. Skin assessments provided. Encouraging/assisting patient to turn as needed. Problem: Safety - Adult  Goal: Free from fall injury  Outcome: Progressing  Flowsheets (Taken 4/10/2023 0505)  Free From Fall Injury: Instruct family/caregiver on patient safety  Note: Bed locked & in low position, call light in reach, side-rails up x2, bed/chair alarm utilized, non-slip socks on when ambulating, reminded patient to use call light to call for assistance.        Problem: Nutrition Deficit:  Goal: Optimize nutritional status  Outcome: Progressing  Flowsheets (Taken 4/10/2023 0505)  Nutrient intake appropriate for improving, restoring, or maintaining nutritional needs:   Assess nutritional status and recommend course of action   Monitor oral intake, labs, and treatment plans     Problem: Genitourinary - Adult  Goal: Absence of urinary retention  Outcome: Progressing  Flowsheets (Taken 4/10/2023 0505)  Absence of urinary retention:   Assess patients ability to void and empty bladder   Monitor intake/output and perform bladder scan as needed     Problem: Pain  Goal: Verbalizes/displays adequate comfort level or baseline comfort level  Outcome:

## 2023-04-10 NOTE — PROGRESS NOTES
Discussed discharge summary with patient. Instructed patient about medications & follow up appointments. Patient denies any additional questions. Patient was discharged with all belongings. No distress noted. Patient discharged via [facility] to home residence.

## 2023-04-10 NOTE — PROGRESS NOTES
This nurse and  Lolis Yung RN met with Jayde Pedroza in [facility] room. He did open eyes while in room but did not paricipate in conversation of discharge planning. Jayde Pedroza voiced that home ready for discharge today- the children were able to get arranged. Talked about deliver of bed, tray table, and oxygen late this morning. She will be able to contact her daughter to accept delivery. Explained plan of equipment delivered with need to make bed, transport to be set up based off of time for equipment delivery, and admitting hospice nurse will meet at home within a few hours of Eladio getting home. Let Jayde Pedroza know that comfort medications will be delivered with supplies and teaching/training on how to care for Lexii Lizarraga will start on admission. She denied questions for this nurse. Did ask about attending provider for hospice care, Jayde Pedroza voiced that okay to use Dr. Maren Holland. Let her know that Dr. Elfego Sanderson could be contacted if she preferred. Told nurse that Dr. Mahendra Suarez is okay. Nurse had spoke with Ronnie Corrales with equipment company and it can be delivered by 1300. Spoke with ROD Mccoy with request for 1300 transport time. This nurse contacted [facility] and able to accomodate requested transport time. Call placed to floor and updated that transport time of 1300.    Primary nurse Sharron Mcclellan RN will fax AVS to hospice office at [phone] when completed and call hospice at [phone] when patient leaving hospital.

## 2023-04-10 NOTE — DISCHARGE SUMMARY
CALCIUM 8.2 04/05/2023 06:50 AM    PHOS 5.2 04/04/2023 05:40 AM       Cardiac: No results for input(s): Imagene Plank in the last 72 hours. Significant Diagnostic Studies    Radiology:   US RENAL LIMITED   Final Result   Impression:   1. Limited study. 2. Negative for hydronephrosis. 3. Incidental right hepatic mass. 4 quadrant ascites. Please refer to CT    abdomen 3/30/23. This document has been electronically signed by: Lily Garay MD on    03/31/2023 03:29 AM      CT ABDOMEN PELVIS WO CONTRAST Additional Contrast? None   Final Result   No findings specific for ischemic bowel. Note that there is a mild ileus bowel pattern which can be an early sign    of ischemia, however this is a very nonspecific finding. Progression in ascites. Progression in liver masses as noted. Pancreatic head mass noted on prior contrast exam is not definitively seen    on this noncontrast study. Stable prominent peripancreatic adenopathy. This document has been electronically signed by: Nury Saldaña MD on    03/30/2023 05:10 AM      All CTs at this facility use dose modulation techniques and iterative    reconstructions, and/or weight-based dosing   when appropriate to reduce radiation to a low as reasonably achievable. XR CHEST PORTABLE   Final Result   1. No acute findings. This document has been electronically signed by: Lauren Dejesus DO    on 03/30/2023 12:58 AM             Consults:     IP CONSULT TO DIETITIAN  PALLIATIVE CARE EVAL  IP CONSULT TO SOCIAL WORK  IP CONSULT TO HOSPICE    Disposition:    [x] Home       [] TCU       [] Rehab       [] Psych       [] SNF       [] [place]       [] Other-    Condition at Discharge: Terminal    Code Status:  DNR-CC     Pending tests at discharge:      none    Patient Instructions:    Discharge lab work: none  Activity: activity as tolerated  Diet: ADULT DIET;  Regular  ADULT ORAL NUTRITION SUPPLEMENT; Breakfast, Lunch,

## 2023-04-10 NOTE — CARE COORDINATION
4/10/23, 12:43 PM EDT    Patient goals/plan/ treatment preferences discussed by  and . Patient goals/plan/ treatment preferences reviewed with patient/ family. Patient/ family verbalize understanding of discharge plan and are in agreement with goal/plan/treatment preferences. Understanding was demonstrated using the teach back method. AVS provided by RN at time of discharge, which includes all necessary medical information pertaining to the patients current course of illness, treatment, post-discharge goals of care, and treatment preferences. Services At/After Discharge: Hospice and In ambulance       IMM Letter  IMM Letter given to Patient/Family/Significant other/Guardian/POA/by[de-identified] Pt Access  IMM Letter date given[de-identified] 03/30/23  IMM Letter time given[de-identified] 200       Met with pt and wife this am, pt opened eyes 1x while in room but did not participate with conversation. Wife states they are ready to accept pt at home today. Please see Shazia Whaley with Hospice notes from today.

## (undated) DEVICE — BREAST HERNIA PACK: Brand: MEDLINE INDUSTRIES, INC.

## (undated) DEVICE — APPLICATOR PREP 26ML 0.7% IOD POVACRYLEX 74% ISO ALC ST

## (undated) DEVICE — BAG,BANDED,W/RUBBERBAND,STERILE,30X36: Brand: MEDLINE

## (undated) DEVICE — ADHESIVE SKIN CLSR 0.7ML TOP DERMBND ADV

## (undated) DEVICE — PENCIL SMK EVAC 15FT BLADE ELECTRD ROCKER F/TELSCP

## (undated) DEVICE — SUTURE PROL SZ 2-0 L30IN NONABSORBABLE BLU L26MM CT-1 1/2 8423H

## (undated) DEVICE — SYRINGE MED 10ML LUERLOCK TIP W/O SFTY DISP

## (undated) DEVICE — GLOVE ORANGE PI 7 1/2   MSG9075

## (undated) DEVICE — SUTURE VCRL + SZ 4-0 L27IN ABSRB WHT FS-2 3/8 CIR REV CUT VCP422H